# Patient Record
Sex: MALE | Race: WHITE | Employment: UNEMPLOYED | URBAN - METROPOLITAN AREA
[De-identification: names, ages, dates, MRNs, and addresses within clinical notes are randomized per-mention and may not be internally consistent; named-entity substitution may affect disease eponyms.]

---

## 2020-08-19 ENCOUNTER — HOSPITAL ENCOUNTER (INPATIENT)
Age: 45
LOS: 6 days | Discharge: NURSING, REHAB OR LONG-TERM CARE | DRG: 246 | End: 2020-08-25
Attending: EMERGENCY MEDICINE | Admitting: INTERNAL MEDICINE
Payer: MEDICAID

## 2020-08-19 ENCOUNTER — APPOINTMENT (OUTPATIENT)
Dept: GENERAL RADIOLOGY | Age: 45
DRG: 246 | End: 2020-08-19

## 2020-08-19 ENCOUNTER — APPOINTMENT (OUTPATIENT)
Dept: CT IMAGING | Age: 45
DRG: 246 | End: 2020-08-19

## 2020-08-19 PROBLEM — I46.9 CARDIAC ARREST (HCC): Status: ACTIVE | Noted: 2020-08-19

## 2020-08-19 LAB
ALBUMIN SERPL-MCNC: 3.7 G/DL (ref 3.5–4.6)
ALP BLD-CCNC: 96 U/L (ref 35–104)
ALT SERPL-CCNC: 142 U/L (ref 0–41)
ANION GAP SERPL CALCULATED.3IONS-SCNC: 10 MEQ/L (ref 9–15)
ANION GAP SERPL CALCULATED.3IONS-SCNC: 18 MEQ/L (ref 9–15)
ANION GAP SERPL CALCULATED.3IONS-SCNC: 20 MEQ/L (ref 9–15)
APTT: 29.8 SEC (ref 24.4–36.8)
APTT: 66.6 SEC (ref 24.4–36.8)
AST SERPL-CCNC: 190 U/L (ref 0–40)
BACTERIA: NEGATIVE /HPF
BASE EXCESS ARTERIAL: -9 (ref -3–3)
BASE EXCESS ARTERIAL: -9 (ref -3–3)
BASOPHILS ABSOLUTE: 0 K/UL (ref 0–0.2)
BASOPHILS RELATIVE PERCENT: 0.2 %
BETA-HYDROXYBUTYRATE: 4.2 MG/DL (ref 0.2–2.8)
BILIRUB SERPL-MCNC: 0.3 MG/DL (ref 0.2–0.7)
BILIRUBIN URINE: NEGATIVE
BLOOD, URINE: ABNORMAL
BUN BLDV-MCNC: 18 MG/DL (ref 6–20)
BUN BLDV-MCNC: 20 MG/DL (ref 6–20)
BUN BLDV-MCNC: 20 MG/DL (ref 6–20)
CALCIUM IONIZED: 1.18 MMOL/L (ref 1.12–1.32)
CALCIUM IONIZED: 1.27 MMOL/L (ref 1.12–1.32)
CALCIUM SERPL-MCNC: 8.5 MG/DL (ref 8.5–9.9)
CALCIUM SERPL-MCNC: 8.5 MG/DL (ref 8.5–9.9)
CALCIUM SERPL-MCNC: 8.8 MG/DL (ref 8.5–9.9)
CHLORIDE BLD-SCNC: 101 MEQ/L (ref 95–107)
CHLORIDE BLD-SCNC: 115 MEQ/L (ref 95–107)
CHLORIDE BLD-SCNC: 95 MEQ/L (ref 95–107)
CHP ED QC CHECK: YES
CLARITY: CLEAR
CO2: 16 MEQ/L (ref 20–31)
CO2: 16 MEQ/L (ref 20–31)
CO2: 17 MEQ/L (ref 20–31)
COLOR: YELLOW
CREAT SERPL-MCNC: 0.8 MG/DL (ref 0.7–1.2)
CREAT SERPL-MCNC: 0.83 MG/DL (ref 0.7–1.2)
CREAT SERPL-MCNC: 0.88 MG/DL (ref 0.7–1.2)
EKG ATRIAL RATE: 89 BPM
EKG P AXIS: 81 DEGREES
EKG P-R INTERVAL: 158 MS
EKG Q-T INTERVAL: 414 MS
EKG QRS DURATION: 98 MS
EKG QTC CALCULATION (BAZETT): 503 MS
EKG R AXIS: 16 DEGREES
EKG T AXIS: 105 DEGREES
EKG VENTRICULAR RATE: 89 BPM
EOSINOPHILS ABSOLUTE: 0 K/UL (ref 0–0.7)
EOSINOPHILS RELATIVE PERCENT: 0.4 %
EPITHELIAL CELLS, UA: NORMAL /HPF (ref 0–5)
GFR AFRICAN AMERICAN: >60
GFR NON-AFRICAN AMERICAN: >60
GLOBULIN: 2.8 G/DL (ref 2.3–3.5)
GLUCOSE BLD-MCNC: 101 MG/DL (ref 60–115)
GLUCOSE BLD-MCNC: 101 MG/DL (ref 70–99)
GLUCOSE BLD-MCNC: 106 MG/DL (ref 60–115)
GLUCOSE BLD-MCNC: 150 MG/DL (ref 60–115)
GLUCOSE BLD-MCNC: 293 MG/DL (ref 60–115)
GLUCOSE BLD-MCNC: 454 MG/DL (ref 60–115)
GLUCOSE BLD-MCNC: 478 MG/DL (ref 70–99)
GLUCOSE BLD-MCNC: 519 MG/DL (ref 60–115)
GLUCOSE BLD-MCNC: 527 MG/DL
GLUCOSE BLD-MCNC: 527 MG/DL (ref 60–115)
GLUCOSE BLD-MCNC: 629 MG/DL (ref 70–99)
GLUCOSE BLD-MCNC: 674 MG/DL (ref 60–115)
GLUCOSE URINE: >=1000 MG/DL
HCO3 ARTERIAL: 17.4 MMOL/L (ref 21–29)
HCO3 ARTERIAL: 17.4 MMOL/L (ref 21–29)
HCT VFR BLD CALC: 38.6 % (ref 42–52)
HEMOGLOBIN: 12 GM/DL (ref 13.5–17.5)
HEMOGLOBIN: 13 G/DL (ref 14–18)
HEMOGLOBIN: 13.3 GM/DL (ref 13.5–17.5)
HYALINE CASTS: NORMAL /HPF (ref 0–5)
INR BLD: 1.1
INR BLD: 1.2
KETONES, URINE: NEGATIVE MG/DL
LACTATE: 3.94 MMOL/L (ref 0.4–2)
LACTATE: 5.92 MMOL/L (ref 0.4–2)
LEUKOCYTE ESTERASE, URINE: NEGATIVE
LV EF: 23 %
LVEF MODALITY: NORMAL
LYMPHOCYTES ABSOLUTE: 4.6 K/UL (ref 1–4.8)
LYMPHOCYTES RELATIVE PERCENT: 35.8 %
MAGNESIUM: 1.4 MG/DL (ref 1.7–2.4)
MAGNESIUM: 1.7 MG/DL (ref 1.7–2.4)
MCH RBC QN AUTO: 29.4 PG (ref 27–31.3)
MCHC RBC AUTO-ENTMCNC: 33.7 % (ref 33–37)
MCV RBC AUTO: 87.2 FL (ref 80–100)
MONOCYTES ABSOLUTE: 0.5 K/UL (ref 0.2–0.8)
MONOCYTES RELATIVE PERCENT: 3.6 %
NEUTROPHILS ABSOLUTE: 7.7 K/UL (ref 1.4–6.5)
NEUTROPHILS RELATIVE PERCENT: 60 %
NITRITE, URINE: NEGATIVE
O2 SAT, ARTERIAL: 100 % (ref 93–100)
O2 SAT, ARTERIAL: 95 % (ref 93–100)
PCO2 ARTERIAL: 35 MM HG (ref 35–45)
PCO2 ARTERIAL: 37 MM HG (ref 35–45)
PDW BLD-RTO: 12.2 % (ref 11.5–14.5)
PERFORMED ON: ABNORMAL
PERFORMED ON: NORMAL
PERFORMED ON: NORMAL
PH ARTERIAL: 7.28 (ref 7.35–7.45)
PH ARTERIAL: 7.3 (ref 7.35–7.45)
PH UA: 6 (ref 5–9)
PHOSPHORUS: 0.8 MG/DL (ref 2.3–4.8)
PHOSPHORUS: 2.5 MG/DL (ref 2.3–4.8)
PLATELET # BLD: 175 K/UL (ref 130–400)
PO2 ARTERIAL: 571 MM HG (ref 75–108)
PO2 ARTERIAL: 84 MM HG (ref 75–108)
POC ACTIVATED CLOTTING TIME KAOLIN: 87 SEC (ref 82–152)
POC CHLORIDE: 100 MEQ/L (ref 99–110)
POC CHLORIDE: 113 MEQ/L (ref 99–110)
POC CREATININE: 0.9 MG/DL (ref 0.9–1.3)
POC CREATININE: 1 MG/DL (ref 0.9–1.3)
POC FIO2: 100
POC FIO2: 50
POC HEMATOCRIT: 35 % (ref 41–53)
POC HEMATOCRIT: 39 % (ref 41–53)
POC POTASSIUM: 2.8 MEQ/L (ref 3.5–5.1)
POC POTASSIUM: 3.5 MEQ/L (ref 3.5–5.1)
POC SAMPLE TYPE: ABNORMAL
POC SAMPLE TYPE: ABNORMAL
POC SAMPLE TYPE: NORMAL
POC SODIUM: 130 MEQ/L (ref 136–145)
POC SODIUM: 145 MEQ/L (ref 136–145)
POTASSIUM SERPL-SCNC: 2.8 MEQ/L (ref 3.4–4.9)
POTASSIUM SERPL-SCNC: 3.6 MEQ/L (ref 3.4–4.9)
POTASSIUM SERPL-SCNC: 3.8 MEQ/L (ref 3.4–4.9)
PROTEIN UA: >=300 MG/DL
PROTHROMBIN TIME: 14.5 SEC (ref 12.3–14.9)
PROTHROMBIN TIME: 15 SEC (ref 12.3–14.9)
RBC # BLD: 4.43 M/UL (ref 4.7–6.1)
RBC UA: NORMAL /HPF (ref 0–5)
SODIUM BLD-SCNC: 131 MEQ/L (ref 135–144)
SODIUM BLD-SCNC: 135 MEQ/L (ref 135–144)
SODIUM BLD-SCNC: 142 MEQ/L (ref 135–144)
SPECIFIC GRAVITY UA: 1.03 (ref 1–1.03)
TCO2 ARTERIAL: 19 (ref 22–29)
TCO2 ARTERIAL: 19 (ref 22–29)
TOTAL PROTEIN: 6.5 G/DL (ref 6.3–8)
TROPONIN: 0.02 NG/ML (ref 0–0.01)
URINE REFLEX TO CULTURE: ABNORMAL
UROBILINOGEN, URINE: 1 E.U./DL
WBC # BLD: 12.8 K/UL (ref 4.8–10.8)
WBC UA: NORMAL /HPF (ref 0–5)

## 2020-08-19 PROCEDURE — 84100 ASSAY OF PHOSPHORUS: CPT

## 2020-08-19 PROCEDURE — 82435 ASSAY OF BLOOD CHLORIDE: CPT

## 2020-08-19 PROCEDURE — 6370000000 HC RX 637 (ALT 250 FOR IP): Performed by: EMERGENCY MEDICINE

## 2020-08-19 PROCEDURE — 72128 CT CHEST SPINE W/O DYE: CPT

## 2020-08-19 PROCEDURE — 82330 ASSAY OF CALCIUM: CPT

## 2020-08-19 PROCEDURE — 93005 ELECTROCARDIOGRAM TRACING: CPT | Performed by: EMERGENCY MEDICINE

## 2020-08-19 PROCEDURE — 2500000003 HC RX 250 WO HCPCS: Performed by: INTERNAL MEDICINE

## 2020-08-19 PROCEDURE — 92928 PRQ TCAT PLMT NTRAC ST 1 LES: CPT | Performed by: INTERNAL MEDICINE

## 2020-08-19 PROCEDURE — 93458 L HRT ARTERY/VENTRICLE ANGIO: CPT | Performed by: INTERNAL MEDICINE

## 2020-08-19 PROCEDURE — 4A023N7 MEASUREMENT OF CARDIAC SAMPLING AND PRESSURE, LEFT HEART, PERCUTANEOUS APPROACH: ICD-10-PCS | Performed by: INTERNAL MEDICINE

## 2020-08-19 PROCEDURE — 6360000004 HC RX CONTRAST MEDICATION: Performed by: SURGERY

## 2020-08-19 PROCEDURE — 2709999900 HC NON-CHARGEABLE SUPPLY

## 2020-08-19 PROCEDURE — C1769 GUIDE WIRE: HCPCS

## 2020-08-19 PROCEDURE — 2000000000 HC ICU R&B

## 2020-08-19 PROCEDURE — 71045 X-RAY EXAM CHEST 1 VIEW: CPT

## 2020-08-19 PROCEDURE — 027034Z DILATION OF CORONARY ARTERY, ONE ARTERY WITH DRUG-ELUTING INTRALUMINAL DEVICE, PERCUTANEOUS APPROACH: ICD-10-PCS | Performed by: INTERNAL MEDICINE

## 2020-08-19 PROCEDURE — 6360000002 HC RX W HCPCS: Performed by: INTERNAL MEDICINE

## 2020-08-19 PROCEDURE — 31500 INSERT EMERGENCY AIRWAY: CPT

## 2020-08-19 PROCEDURE — 85025 COMPLETE CBC W/AUTO DIFF WBC: CPT

## 2020-08-19 PROCEDURE — 83605 ASSAY OF LACTIC ACID: CPT

## 2020-08-19 PROCEDURE — 92941 PRQ TRLML REVSC TOT OCCL AMI: CPT | Performed by: INTERNAL MEDICINE

## 2020-08-19 PROCEDURE — 85730 THROMBOPLASTIN TIME PARTIAL: CPT

## 2020-08-19 PROCEDURE — 6360000004 HC RX CONTRAST MEDICATION: Performed by: INTERNAL MEDICINE

## 2020-08-19 PROCEDURE — 94002 VENT MGMT INPAT INIT DAY: CPT

## 2020-08-19 PROCEDURE — 2500000003 HC RX 250 WO HCPCS

## 2020-08-19 PROCEDURE — 99291 CRITICAL CARE FIRST HOUR: CPT

## 2020-08-19 PROCEDURE — 0BH18EZ INSERTION OF ENDOTRACHEAL AIRWAY INTO TRACHEA, VIA NATURAL OR ARTIFICIAL OPENING ENDOSCOPIC: ICD-10-PCS | Performed by: EMERGENCY MEDICINE

## 2020-08-19 PROCEDURE — 82565 ASSAY OF CREATININE: CPT

## 2020-08-19 PROCEDURE — 2500000003 HC RX 250 WO HCPCS: Performed by: EMERGENCY MEDICINE

## 2020-08-19 PROCEDURE — 36620 INSERTION CATHETER ARTERY: CPT

## 2020-08-19 PROCEDURE — 84132 ASSAY OF SERUM POTASSIUM: CPT

## 2020-08-19 PROCEDURE — 6370000000 HC RX 637 (ALT 250 FOR IP): Performed by: INTERNAL MEDICINE

## 2020-08-19 PROCEDURE — 85014 HEMATOCRIT: CPT

## 2020-08-19 PROCEDURE — 5A1945Z RESPIRATORY VENTILATION, 24-96 CONSECUTIVE HOURS: ICD-10-PCS | Performed by: INTERNAL MEDICINE

## 2020-08-19 PROCEDURE — 36600 WITHDRAWAL OF ARTERIAL BLOOD: CPT

## 2020-08-19 PROCEDURE — 2580000003 HC RX 258

## 2020-08-19 PROCEDURE — B2111ZZ FLUOROSCOPY OF MULTIPLE CORONARY ARTERIES USING LOW OSMOLAR CONTRAST: ICD-10-PCS | Performed by: INTERNAL MEDICINE

## 2020-08-19 PROCEDURE — 74177 CT ABD & PELVIS W/CONTRAST: CPT

## 2020-08-19 PROCEDURE — 2580000003 HC RX 258: Performed by: INTERNAL MEDICINE

## 2020-08-19 PROCEDURE — 83735 ASSAY OF MAGNESIUM: CPT

## 2020-08-19 PROCEDURE — 80053 COMPREHEN METABOLIC PANEL: CPT

## 2020-08-19 PROCEDURE — 71260 CT THORAX DX C+: CPT

## 2020-08-19 PROCEDURE — 85347 COAGULATION TIME ACTIVATED: CPT

## 2020-08-19 PROCEDURE — 99291 CRITICAL CARE FIRST HOUR: CPT | Performed by: INTERNAL MEDICINE

## 2020-08-19 PROCEDURE — 81001 URINALYSIS AUTO W/SCOPE: CPT

## 2020-08-19 PROCEDURE — B2151ZZ FLUOROSCOPY OF LEFT HEART USING LOW OSMOLAR CONTRAST: ICD-10-PCS | Performed by: INTERNAL MEDICINE

## 2020-08-19 PROCEDURE — 37799 UNLISTED PX VASCULAR SURGERY: CPT

## 2020-08-19 PROCEDURE — 70450 CT HEAD/BRAIN W/O DYE: CPT

## 2020-08-19 PROCEDURE — 82803 BLOOD GASES ANY COMBINATION: CPT

## 2020-08-19 PROCEDURE — 36415 COLL VENOUS BLD VENIPUNCTURE: CPT

## 2020-08-19 PROCEDURE — 84295 ASSAY OF SERUM SODIUM: CPT

## 2020-08-19 PROCEDURE — 6360000002 HC RX W HCPCS

## 2020-08-19 PROCEDURE — 85610 PROTHROMBIN TIME: CPT

## 2020-08-19 PROCEDURE — C1894 INTRO/SHEATH, NON-LASER: HCPCS

## 2020-08-19 PROCEDURE — 51702 INSERT TEMP BLADDER CATH: CPT

## 2020-08-19 PROCEDURE — 72131 CT LUMBAR SPINE W/O DYE: CPT

## 2020-08-19 PROCEDURE — 72125 CT NECK SPINE W/O DYE: CPT

## 2020-08-19 PROCEDURE — C1725 CATH, TRANSLUMIN NON-LASER: HCPCS

## 2020-08-19 PROCEDURE — 93306 TTE W/DOPPLER COMPLETE: CPT

## 2020-08-19 PROCEDURE — 99254 IP/OBS CNSLTJ NEW/EST MOD 60: CPT | Performed by: SURGERY

## 2020-08-19 PROCEDURE — 84484 ASSAY OF TROPONIN QUANT: CPT

## 2020-08-19 PROCEDURE — C1887 CATHETER, GUIDING: HCPCS

## 2020-08-19 PROCEDURE — 82010 KETONE BODYS QUAN: CPT

## 2020-08-19 PROCEDURE — 36556 INSERT NON-TUNNEL CV CATH: CPT

## 2020-08-19 PROCEDURE — 99223 1ST HOSP IP/OBS HIGH 75: CPT | Performed by: INTERNAL MEDICINE

## 2020-08-19 PROCEDURE — C1874 STENT, COATED/COV W/DEL SYS: HCPCS

## 2020-08-19 PROCEDURE — 82948 REAGENT STRIP/BLOOD GLUCOSE: CPT

## 2020-08-19 RX ORDER — POTASSIUM CHLORIDE 7.45 MG/ML
10 INJECTION INTRAVENOUS PRN
Status: DISCONTINUED | OUTPATIENT
Start: 2020-08-19 | End: 2020-08-21

## 2020-08-19 RX ORDER — ASPIRIN 81 MG/1
81 TABLET ORAL DAILY
Status: DISCONTINUED | OUTPATIENT
Start: 2020-08-20 | End: 2020-08-25 | Stop reason: HOSPADM

## 2020-08-19 RX ORDER — CHLORHEXIDINE GLUCONATE 0.12 MG/ML
15 RINSE ORAL 2 TIMES DAILY
Status: DISCONTINUED | OUTPATIENT
Start: 2020-08-19 | End: 2020-08-25 | Stop reason: HOSPADM

## 2020-08-19 RX ORDER — DEXTROSE MONOHYDRATE 25 G/50ML
12.5 INJECTION, SOLUTION INTRAVENOUS PRN
Status: DISCONTINUED | OUTPATIENT
Start: 2020-08-19 | End: 2020-08-25 | Stop reason: HOSPADM

## 2020-08-19 RX ORDER — DEXTROSE, SODIUM CHLORIDE, AND POTASSIUM CHLORIDE 5; .45; .15 G/100ML; G/100ML; G/100ML
INJECTION INTRAVENOUS CONTINUOUS PRN
Status: DISCONTINUED | OUTPATIENT
Start: 2020-08-19 | End: 2020-08-21

## 2020-08-19 RX ORDER — SODIUM CHLORIDE 9 MG/ML
INJECTION, SOLUTION INTRAVENOUS CONTINUOUS
Status: DISCONTINUED | OUTPATIENT
Start: 2020-08-19 | End: 2020-08-19 | Stop reason: SDUPTHER

## 2020-08-19 RX ORDER — PROMETHAZINE HYDROCHLORIDE 12.5 MG/1
12.5 TABLET ORAL EVERY 6 HOURS PRN
Status: DISCONTINUED | OUTPATIENT
Start: 2020-08-19 | End: 2020-08-25 | Stop reason: HOSPADM

## 2020-08-19 RX ORDER — MAGNESIUM SULFATE 1 G/100ML
1 INJECTION INTRAVENOUS PRN
Status: DISCONTINUED | OUTPATIENT
Start: 2020-08-19 | End: 2020-08-21

## 2020-08-19 RX ORDER — PROPOFOL 10 MG/ML
10 INJECTION, EMULSION INTRAVENOUS
Status: DISCONTINUED | OUTPATIENT
Start: 2020-08-19 | End: 2020-08-22

## 2020-08-19 RX ORDER — SODIUM CHLORIDE 9 MG/ML
INJECTION, SOLUTION INTRAVENOUS CONTINUOUS
Status: DISCONTINUED | OUTPATIENT
Start: 2020-08-19 | End: 2020-08-21

## 2020-08-19 RX ORDER — ATORVASTATIN CALCIUM 40 MG/1
80 TABLET, FILM COATED ORAL NIGHTLY
Status: DISCONTINUED | OUTPATIENT
Start: 2020-08-19 | End: 2020-08-25 | Stop reason: HOSPADM

## 2020-08-19 RX ORDER — ETOMIDATE 2 MG/ML
INJECTION INTRAVENOUS DAILY PRN
Status: COMPLETED | OUTPATIENT
Start: 2020-08-19 | End: 2020-08-19

## 2020-08-19 RX ORDER — HEPARIN SODIUM 1000 [USP'U]/ML
4000 INJECTION, SOLUTION INTRAVENOUS; SUBCUTANEOUS ONCE
Status: DISCONTINUED | OUTPATIENT
Start: 2020-08-19 | End: 2020-08-25 | Stop reason: HOSPADM

## 2020-08-19 RX ORDER — ASPIRIN 300 MG/1
300 SUPPOSITORY RECTAL ONCE
Status: DISCONTINUED | OUTPATIENT
Start: 2020-08-19 | End: 2020-08-25 | Stop reason: HOSPADM

## 2020-08-19 RX ORDER — 0.9 % SODIUM CHLORIDE 0.9 %
1000 INTRAVENOUS SOLUTION INTRAVENOUS ONCE
Status: DISCONTINUED | OUTPATIENT
Start: 2020-08-19 | End: 2020-08-19

## 2020-08-19 RX ORDER — PROPOFOL 10 MG/ML
INJECTION, EMULSION INTRAVENOUS
Status: COMPLETED
Start: 2020-08-19 | End: 2020-08-19

## 2020-08-19 RX ORDER — ETOMIDATE 2 MG/ML
20 INJECTION INTRAVENOUS ONCE
Status: COMPLETED | OUTPATIENT
Start: 2020-08-19 | End: 2020-08-19

## 2020-08-19 RX ORDER — ONDANSETRON 2 MG/ML
4 INJECTION INTRAMUSCULAR; INTRAVENOUS EVERY 6 HOURS PRN
Status: DISCONTINUED | OUTPATIENT
Start: 2020-08-19 | End: 2020-08-25 | Stop reason: HOSPADM

## 2020-08-19 RX ADMIN — POTASSIUM CHLORIDE, DEXTROSE MONOHYDRATE AND SODIUM CHLORIDE: 150; 5; 450 INJECTION, SOLUTION INTRAVENOUS at 22:22

## 2020-08-19 RX ADMIN — TICAGRELOR 180 MG: 90 TABLET ORAL at 11:11

## 2020-08-19 RX ADMIN — IOPAMIDOL 50 ML: 612 INJECTION, SOLUTION INTRAVENOUS at 14:54

## 2020-08-19 RX ADMIN — POTASSIUM CHLORIDE 10 MEQ: 7.46 INJECTION, SOLUTION INTRAVENOUS at 23:11

## 2020-08-19 RX ADMIN — SODIUM CHLORIDE: 9 INJECTION, SOLUTION INTRAVENOUS at 20:48

## 2020-08-19 RX ADMIN — CHLORHEXIDINE GLUCONATE 15 ML: 1.2 RINSE ORAL at 20:46

## 2020-08-19 RX ADMIN — PROPOFOL 10 MCG/KG/MIN: 10 INJECTION, EMULSION INTRAVENOUS at 13:25

## 2020-08-19 RX ADMIN — INSULIN HUMAN 7 UNITS: 100 INJECTION, SOLUTION PARENTERAL at 15:56

## 2020-08-19 RX ADMIN — ATORVASTATIN CALCIUM 80 MG: 40 TABLET, FILM COATED ORAL at 20:46

## 2020-08-19 RX ADMIN — ETOMIDATE 20 MG: 2 INJECTION, SOLUTION INTRAVENOUS at 10:43

## 2020-08-19 RX ADMIN — SODIUM CHLORIDE 13.77 UNITS/HR: 9 INJECTION, SOLUTION INTRAVENOUS at 15:47

## 2020-08-19 RX ADMIN — IOPAMIDOL 165 ML: 612 INJECTION, SOLUTION INTRAVENOUS at 13:47

## 2020-08-19 RX ADMIN — MAGNESIUM SULFATE HEPTAHYDRATE 1 G: 1 INJECTION, SOLUTION INTRAVENOUS at 23:33

## 2020-08-19 RX ADMIN — SODIUM PHOSPHATE, MONOBASIC, MONOHYDRATE 20 MMOL: 276; 142 INJECTION, SOLUTION INTRAVENOUS at 23:33

## 2020-08-19 RX ADMIN — PROPOFOL 20 MCG/KG/MIN: 10 INJECTION, EMULSION INTRAVENOUS at 21:10

## 2020-08-19 RX ADMIN — NOREPINEPHRINE BITARTRATE 2 MCG/MIN: 1 INJECTION INTRAVENOUS at 17:49

## 2020-08-19 RX ADMIN — SODIUM CHLORIDE 1.64 UNITS/HR: 9 INJECTION, SOLUTION INTRAVENOUS at 22:02

## 2020-08-19 RX ADMIN — ETOMIDATE 20 MG: 2 INJECTION INTRAVENOUS at 10:43

## 2020-08-19 ASSESSMENT — PULMONARY FUNCTION TESTS
PIF_VALUE: 11
PIF_VALUE: 16
PIF_VALUE: 12
PIF_VALUE: 10
PIF_VALUE: 11
PIF_VALUE: 16
PIF_VALUE: 11
PIF_VALUE: 12
PIF_VALUE: 15
PIF_VALUE: 17
PIF_VALUE: 11
PIF_VALUE: 16
PIF_VALUE: 15
PIF_VALUE: 14
PIF_VALUE: 13
PIF_VALUE: 13
PIF_VALUE: 12
PIF_VALUE: 12
PIF_VALUE: 10
PIF_VALUE: 14
PIF_VALUE: 16
PIF_VALUE: 11
PIF_VALUE: 12
PIF_VALUE: 25

## 2020-08-19 ASSESSMENT — PAIN SCALES - GENERAL
PAINLEVEL_OUTOF10: 0
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
## 2020-08-19 NOTE — BRIEF OP NOTE
Brief OP Note [facility]    Access: R femoral 6 sheath  Anticoagulation: heparin IV  additional heparin given  Hemostasis: manual  Indications: posterior MI, cardiac arrest  Referring: [place]    Findings    LMT: normal  LAD: 70% prox  LCX: 100% mid large territory codom, small caliber  RCA: 99% mid small caliber, diffuse disease    LV: 25% inferior, posterior AK  LVEDP: 22    Dominance: mixed    PCI note: Successful PCI mid % URBANO 0 flow reduced to 0% URBANO 3 flow with resolute 2.0/15 postdil 2.5 NC. Conclusions: DAPT, maximal medical therapy  Consult pulmonary trauma and hospitalist    Candido Gill MD [facility] Director of Cardiology Services and Cardiac Catheterization Laboratory  [facility], [facility]
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
## 2020-08-19 NOTE — ED NOTES
Brilinta given after double checking with Dr Hardy Cartwright that she wanted it administered     Merlene Schneider RN  08/19/20 0602

## 2020-08-19 NOTE — FLOWSHEET NOTE
1300: Farida care completed. Complete linen change. Assessment completed. Cath site; clean, dry, and intact. No signs of bleeding or hematoma. 1400: Pt taken to RAD for CT. Complete linen change after one episode of large emesis. 1600: Dr. Keke Puente in room. Unable to start artic-sun -s/p cath and sheath still in place. Also using as A-line to monitor pressures. Orders to check ACT q2hrs post procedure and remove sheath if new A-line is successfully inserted. Testing cartridges not available. Call to lab; awaiting ACT cartridge for testing delivery. 1630: Echo in progess. Call from wife. Updated on status of pt. Consent obtained for CVC and a-line. 1700: Unable to check ACT as cartridge not available. 2nd call placed to Lab. Cartridges sent to wrong unit. Discussed sheath removal orders and artic-sun protocol with . 1705: Dr. Brian Love in room inserting new Arterial Line and CVC. 1740: Pt cleaned and repositioned. Artic-Sun connected. Water tank refilled. Orders reviewed. 1800: Hypothermic therapy started. Portable Xray completed. Dr. Brian Love unable to view at this time d/t emergency care for other pt.     1830: Received ACT cartridge from lab will attempt to obtain results before change of shift. 1900: Cartridge reading failure. New cartridge requested from lab.    1930: ACT <170. Discussed with oncoming RN during hand-off of care report.

## 2020-08-19 NOTE — ED NOTES
STEMI showed on monitor, pt shocked at [facility], [address] [place] [address]  08/19/20 2270

## 2020-08-19 NOTE — PROGRESS NOTES
Spiritual Care Services     Summary of Visit:  Code Purple. Patient unable to respond. Patient from [facility] and no emergency contacts are listed. Security has patient's 's license and attempting to find Emergency contact information. Patient to go to Cath Lab. Spiritual Assessment/Intervention/Outcomes:    Encounter Summary  Services provided to[de-identified] Patient  Referral/Consult From[de-identified] Nurse  Support System: Unknown  Continue Visiting: Yes  Complexity of Encounter: High  Length of Encounter: 30 minutes     Crisis  Type: Stemi Alert  Assessment: Unable to respond  Intervention: Sustaining presence/ Ministry of presence  Outcome: Did not respond                            Care Plan:    Continue to follow and provide support as appropriate or needed. Spiritual Care Services   Electronically signed by Seth Bain on 8/19/20 at 11:07 AM EDT     To reach a  for emotional and spiritual support, place an [facility] consult request.   If a  is needed immediately, dial 0 and ask to page the on-call .
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
## 2020-08-20 ENCOUNTER — APPOINTMENT (OUTPATIENT)
Dept: GENERAL RADIOLOGY | Age: 45
DRG: 246 | End: 2020-08-20

## 2020-08-20 LAB
ANION GAP SERPL CALCULATED.3IONS-SCNC: 13 MEQ/L (ref 9–15)
ANION GAP SERPL CALCULATED.3IONS-SCNC: 8 MEQ/L (ref 9–15)
ANION GAP SERPL CALCULATED.3IONS-SCNC: 8 MEQ/L (ref 9–15)
ANION GAP SERPL CALCULATED.3IONS-SCNC: 9 MEQ/L (ref 9–15)
BASE EXCESS ARTERIAL: -10 (ref -3–3)
BASE EXCESS ARTERIAL: -9 (ref -3–3)
BASOPHILS ABSOLUTE: 0 K/UL (ref 0–0.2)
BASOPHILS ABSOLUTE: 0 K/UL (ref 0–0.2)
BASOPHILS RELATIVE PERCENT: 0.4 %
BASOPHILS RELATIVE PERCENT: 0.5 %
BUN BLDV-MCNC: 15 MG/DL (ref 6–20)
BUN BLDV-MCNC: 15 MG/DL (ref 6–20)
BUN BLDV-MCNC: 16 MG/DL (ref 6–20)
BUN BLDV-MCNC: 20 MG/DL (ref 6–20)
CALCIUM IONIZED: 1.07 MMOL/L (ref 1.12–1.32)
CALCIUM IONIZED: 1.1 MMOL/L (ref 1.12–1.32)
CALCIUM IONIZED: 1.11 MMOL/L (ref 1.12–1.32)
CALCIUM IONIZED: 1.18 MMOL/L (ref 1.12–1.32)
CALCIUM SERPL-MCNC: 7.2 MG/DL (ref 8.5–9.9)
CALCIUM SERPL-MCNC: 7.3 MG/DL (ref 8.5–9.9)
CALCIUM SERPL-MCNC: 7.6 MG/DL (ref 8.5–9.9)
CALCIUM SERPL-MCNC: 7.7 MG/DL (ref 8.5–9.9)
CHLORIDE BLD-SCNC: 105 MEQ/L (ref 95–107)
CHLORIDE BLD-SCNC: 105 MEQ/L (ref 95–107)
CHLORIDE BLD-SCNC: 107 MEQ/L (ref 95–107)
CHLORIDE BLD-SCNC: 111 MEQ/L (ref 95–107)
CO2: 15 MEQ/L (ref 20–31)
CO2: 16 MEQ/L (ref 20–31)
CO2: 16 MEQ/L (ref 20–31)
CO2: 17 MEQ/L (ref 20–31)
CREAT SERPL-MCNC: 0.41 MG/DL (ref 0.7–1.2)
CREAT SERPL-MCNC: 0.42 MG/DL (ref 0.7–1.2)
CREAT SERPL-MCNC: 0.5 MG/DL (ref 0.7–1.2)
CREAT SERPL-MCNC: 0.6 MG/DL (ref 0.7–1.2)
EOSINOPHILS ABSOLUTE: 0 K/UL (ref 0–0.7)
EOSINOPHILS ABSOLUTE: 0 K/UL (ref 0–0.7)
EOSINOPHILS RELATIVE PERCENT: 0 %
EOSINOPHILS RELATIVE PERCENT: 0.1 %
GFR AFRICAN AMERICAN: >60
GFR NON-AFRICAN AMERICAN: >60
GLUCOSE BLD-MCNC: 126 MG/DL (ref 60–115)
GLUCOSE BLD-MCNC: 134 MG/DL (ref 60–115)
GLUCOSE BLD-MCNC: 134 MG/DL (ref 70–99)
GLUCOSE BLD-MCNC: 138 MG/DL (ref 60–115)
GLUCOSE BLD-MCNC: 140 MG/DL (ref 60–115)
GLUCOSE BLD-MCNC: 145 MG/DL (ref 60–115)
GLUCOSE BLD-MCNC: 145 MG/DL (ref 70–99)
GLUCOSE BLD-MCNC: 147 MG/DL (ref 60–115)
GLUCOSE BLD-MCNC: 149 MG/DL (ref 60–115)
GLUCOSE BLD-MCNC: 157 MG/DL (ref 60–115)
GLUCOSE BLD-MCNC: 162 MG/DL (ref 70–99)
GLUCOSE BLD-MCNC: 163 MG/DL (ref 70–99)
GLUCOSE BLD-MCNC: 165 MG/DL (ref 60–115)
GLUCOSE BLD-MCNC: 167 MG/DL (ref 60–115)
GLUCOSE BLD-MCNC: 188 MG/DL (ref 60–115)
GLUCOSE BLD-MCNC: 196 MG/DL (ref 60–115)
GLUCOSE BLD-MCNC: 205 MG/DL (ref 60–115)
HCO3 ARTERIAL: 15.7 MMOL/L (ref 21–29)
HCO3 ARTERIAL: 16.6 MMOL/L (ref 21–29)
HCO3 ARTERIAL: 16.7 MMOL/L (ref 21–29)
HCO3 ARTERIAL: 17.3 MMOL/L (ref 21–29)
HCT VFR BLD CALC: 37.6 % (ref 42–52)
HCT VFR BLD CALC: 39.5 % (ref 42–52)
HEMOGLOBIN: 11.4 GM/DL (ref 13.5–17.5)
HEMOGLOBIN: 12.1 GM/DL (ref 13.5–17.5)
HEMOGLOBIN: 12.7 GM/DL (ref 13.5–17.5)
HEMOGLOBIN: 12.9 G/DL (ref 14–18)
HEMOGLOBIN: 13.4 G/DL (ref 14–18)
LACTATE: 1.9 MMOL/L (ref 0.4–2)
LACTATE: 2.26 MMOL/L (ref 0.4–2)
LACTATE: 2.56 MMOL/L (ref 0.4–2)
LACTATE: 3.35 MMOL/L (ref 0.4–2)
LACTIC ACID: 2.1 MMOL/L (ref 0.5–2.2)
LACTIC ACID: 2.8 MMOL/L (ref 0.5–2.2)
LACTIC ACID: 3.4 MMOL/L (ref 0.5–2.2)
LACTIC ACID: 3.9 MMOL/L (ref 0.5–2.2)
LYMPHOCYTES ABSOLUTE: 1.2 K/UL (ref 1–4.8)
LYMPHOCYTES ABSOLUTE: 1.4 K/UL (ref 1–4.8)
LYMPHOCYTES RELATIVE PERCENT: 10.5 %
LYMPHOCYTES RELATIVE PERCENT: 14 %
MAGNESIUM: 1.6 MG/DL (ref 1.7–2.4)
MAGNESIUM: 1.8 MG/DL (ref 1.7–2.4)
MAGNESIUM: 2.2 MG/DL (ref 1.7–2.4)
MCH RBC QN AUTO: 28.7 PG (ref 27–31.3)
MCH RBC QN AUTO: 29.1 PG (ref 27–31.3)
MCHC RBC AUTO-ENTMCNC: 34.1 % (ref 33–37)
MCHC RBC AUTO-ENTMCNC: 34.4 % (ref 33–37)
MCV RBC AUTO: 84.3 FL (ref 80–100)
MCV RBC AUTO: 84.7 FL (ref 80–100)
MONOCYTES ABSOLUTE: 0.3 K/UL (ref 0.2–0.8)
MONOCYTES ABSOLUTE: 0.4 K/UL (ref 0.2–0.8)
MONOCYTES RELATIVE PERCENT: 3.3 %
MONOCYTES RELATIVE PERCENT: 3.4 %
NEUTROPHILS ABSOLUTE: 8.3 K/UL (ref 1.4–6.5)
NEUTROPHILS ABSOLUTE: 9.5 K/UL (ref 1.4–6.5)
NEUTROPHILS RELATIVE PERCENT: 82.1 %
NEUTROPHILS RELATIVE PERCENT: 85.7 %
O2 SAT, ARTERIAL: 91 % (ref 93–100)
O2 SAT, ARTERIAL: 92 % (ref 93–100)
O2 SAT, ARTERIAL: 92 % (ref 93–100)
O2 SAT, ARTERIAL: 93 % (ref 93–100)
PCO2 ARTERIAL: 30 MM HG (ref 35–45)
PCO2 ARTERIAL: 33 MM HG (ref 35–45)
PCO2 ARTERIAL: 34 MM HG (ref 35–45)
PCO2 ARTERIAL: 35 MM HG (ref 35–45)
PDW BLD-RTO: 12.9 % (ref 11.5–14.5)
PDW BLD-RTO: 13 % (ref 11.5–14.5)
PERFORMED ON: ABNORMAL
PH ARTERIAL: 7.3 (ref 7.35–7.45)
PH ARTERIAL: 7.31 (ref 7.35–7.45)
PH ARTERIAL: 7.32 (ref 7.35–7.45)
PH ARTERIAL: 7.33 (ref 7.35–7.45)
PHOSPHORUS: 2.1 MG/DL (ref 2.3–4.8)
PHOSPHORUS: 2.5 MG/DL (ref 2.3–4.8)
PHOSPHORUS: 3.3 MG/DL (ref 2.3–4.8)
PLATELET # BLD: 122 K/UL (ref 130–400)
PLATELET # BLD: 135 K/UL (ref 130–400)
PO2 ARTERIAL: 65 MM HG (ref 75–108)
PO2 ARTERIAL: 67 MM HG (ref 75–108)
PO2 ARTERIAL: 68 MM HG (ref 75–108)
PO2 ARTERIAL: 69 MM HG (ref 75–108)
POC CHLORIDE: 108 MEQ/L (ref 99–110)
POC CHLORIDE: 109 MEQ/L (ref 99–110)
POC CHLORIDE: 109 MEQ/L (ref 99–110)
POC CREATININE: 0.4 MG/DL (ref 0.9–1.3)
POC CREATININE: 0.5 MG/DL (ref 0.9–1.3)
POC CREATININE: 0.5 MG/DL (ref 0.9–1.3)
POC FIO2: 50
POC FIO2: 80
POC HEMATOCRIT: 34 % (ref 41–53)
POC HEMATOCRIT: 36 % (ref 41–53)
POC HEMATOCRIT: 37 % (ref 41–53)
POC POTASSIUM: 3.5 MEQ/L (ref 3.5–5.1)
POC POTASSIUM: 3.9 MEQ/L (ref 3.5–5.1)
POC POTASSIUM: 4.2 MEQ/L (ref 3.5–5.1)
POC SAMPLE TYPE: ABNORMAL
POC SODIUM: 133 MEQ/L (ref 136–145)
POC SODIUM: 136 MEQ/L (ref 136–145)
POC SODIUM: 136 MEQ/L (ref 136–145)
POTASSIUM SERPL-SCNC: 3.5 MEQ/L (ref 3.4–4.9)
POTASSIUM SERPL-SCNC: 3.8 MEQ/L (ref 3.4–4.9)
POTASSIUM SERPL-SCNC: 4 MEQ/L (ref 3.4–4.9)
POTASSIUM SERPL-SCNC: 4.1 MEQ/L (ref 3.4–4.9)
RBC # BLD: 4.43 M/UL (ref 4.7–6.1)
RBC # BLD: 4.68 M/UL (ref 4.7–6.1)
SODIUM BLD-SCNC: 129 MEQ/L (ref 135–144)
SODIUM BLD-SCNC: 129 MEQ/L (ref 135–144)
SODIUM BLD-SCNC: 135 MEQ/L (ref 135–144)
SODIUM BLD-SCNC: 137 MEQ/L (ref 135–144)
TCO2 ARTERIAL: 17 (ref 22–29)
TCO2 ARTERIAL: 18 (ref 22–29)
WBC # BLD: 10.1 K/UL (ref 4.8–10.8)
WBC # BLD: 11.1 K/UL (ref 4.8–10.8)

## 2020-08-20 PROCEDURE — 80048 BASIC METABOLIC PNL TOTAL CA: CPT

## 2020-08-20 PROCEDURE — 37799 UNLISTED PX VASCULAR SURGERY: CPT

## 2020-08-20 PROCEDURE — 93010 ELECTROCARDIOGRAM REPORT: CPT | Performed by: INTERNAL MEDICINE

## 2020-08-20 PROCEDURE — 94003 VENT MGMT INPAT SUBQ DAY: CPT

## 2020-08-20 PROCEDURE — 2580000003 HC RX 258: Performed by: INTERNAL MEDICINE

## 2020-08-20 PROCEDURE — 85025 COMPLETE CBC W/AUTO DIFF WBC: CPT

## 2020-08-20 PROCEDURE — 99291 CRITICAL CARE FIRST HOUR: CPT | Performed by: INTERNAL MEDICINE

## 2020-08-20 PROCEDURE — 85014 HEMATOCRIT: CPT

## 2020-08-20 PROCEDURE — 84295 ASSAY OF SERUM SODIUM: CPT

## 2020-08-20 PROCEDURE — 84132 ASSAY OF SERUM POTASSIUM: CPT

## 2020-08-20 PROCEDURE — 82565 ASSAY OF CREATININE: CPT

## 2020-08-20 PROCEDURE — 82435 ASSAY OF BLOOD CHLORIDE: CPT

## 2020-08-20 PROCEDURE — 82330 ASSAY OF CALCIUM: CPT

## 2020-08-20 PROCEDURE — 2500000003 HC RX 250 WO HCPCS: Performed by: INTERNAL MEDICINE

## 2020-08-20 PROCEDURE — 84100 ASSAY OF PHOSPHORUS: CPT

## 2020-08-20 PROCEDURE — 83605 ASSAY OF LACTIC ACID: CPT

## 2020-08-20 PROCEDURE — 99254 IP/OBS CNSLTJ NEW/EST MOD 60: CPT | Performed by: PSYCHIATRY & NEUROLOGY

## 2020-08-20 PROCEDURE — 2700000000 HC OXYGEN THERAPY PER DAY

## 2020-08-20 PROCEDURE — 99233 SBSQ HOSP IP/OBS HIGH 50: CPT | Performed by: INTERNAL MEDICINE

## 2020-08-20 PROCEDURE — 36415 COLL VENOUS BLD VENIPUNCTURE: CPT

## 2020-08-20 PROCEDURE — 99231 SBSQ HOSP IP/OBS SF/LOW 25: CPT | Performed by: SURGERY

## 2020-08-20 PROCEDURE — 83735 ASSAY OF MAGNESIUM: CPT

## 2020-08-20 PROCEDURE — 6360000002 HC RX W HCPCS: Performed by: INTERNAL MEDICINE

## 2020-08-20 PROCEDURE — 6370000000 HC RX 637 (ALT 250 FOR IP): Performed by: INTERNAL MEDICINE

## 2020-08-20 PROCEDURE — 82803 BLOOD GASES ANY COMBINATION: CPT

## 2020-08-20 PROCEDURE — 71045 X-RAY EXAM CHEST 1 VIEW: CPT

## 2020-08-20 PROCEDURE — 2000000000 HC ICU R&B

## 2020-08-20 RX ADMIN — POTASSIUM CHLORIDE 10 MEQ: 7.46 INJECTION, SOLUTION INTRAVENOUS at 05:28

## 2020-08-20 RX ADMIN — POTASSIUM CHLORIDE 10 MEQ: 7.46 INJECTION, SOLUTION INTRAVENOUS at 04:08

## 2020-08-20 RX ADMIN — INSULIN LISPRO 2 UNITS: 100 INJECTION, SOLUTION INTRAVENOUS; SUBCUTANEOUS at 18:16

## 2020-08-20 RX ADMIN — TICAGRELOR 90 MG: 90 TABLET ORAL at 08:01

## 2020-08-20 RX ADMIN — POTASSIUM CHLORIDE, DEXTROSE MONOHYDRATE AND SODIUM CHLORIDE: 150; 5; 450 INJECTION, SOLUTION INTRAVENOUS at 12:20

## 2020-08-20 RX ADMIN — CHLORHEXIDINE GLUCONATE 15 ML: 1.2 RINSE ORAL at 20:31

## 2020-08-20 RX ADMIN — PROPOFOL 25 MCG/KG/MIN: 10 INJECTION, EMULSION INTRAVENOUS at 07:48

## 2020-08-20 RX ADMIN — POTASSIUM CHLORIDE 10 MEQ: 7.46 INJECTION, SOLUTION INTRAVENOUS at 06:07

## 2020-08-20 RX ADMIN — PIPERACILLIN AND TAZOBACTAM 3.38 G: 3; .375 INJECTION, POWDER, FOR SOLUTION INTRAVENOUS at 20:31

## 2020-08-20 RX ADMIN — SODIUM PHOSPHATE, MONOBASIC, MONOHYDRATE 15 MMOL: 276; 142 INJECTION, SOLUTION INTRAVENOUS at 05:59

## 2020-08-20 RX ADMIN — TICAGRELOR 90 MG: 90 TABLET ORAL at 20:31

## 2020-08-20 RX ADMIN — ATORVASTATIN CALCIUM 80 MG: 40 TABLET, FILM COATED ORAL at 20:31

## 2020-08-20 RX ADMIN — POTASSIUM CHLORIDE 10 MEQ: 7.46 INJECTION, SOLUTION INTRAVENOUS at 18:00

## 2020-08-20 RX ADMIN — POTASSIUM CHLORIDE 10 MEQ: 7.46 INJECTION, SOLUTION INTRAVENOUS at 00:09

## 2020-08-20 RX ADMIN — CHLORHEXIDINE GLUCONATE 15 ML: 1.2 RINSE ORAL at 08:01

## 2020-08-20 RX ADMIN — PIPERACILLIN AND TAZOBACTAM 3.38 G: 3; .375 INJECTION, POWDER, FOR SOLUTION INTRAVENOUS at 15:55

## 2020-08-20 RX ADMIN — POTASSIUM CHLORIDE 10 MEQ: 7.46 INJECTION, SOLUTION INTRAVENOUS at 02:08

## 2020-08-20 RX ADMIN — POTASSIUM CHLORIDE 10 MEQ: 7.46 INJECTION, SOLUTION INTRAVENOUS at 01:04

## 2020-08-20 RX ADMIN — MAGNESIUM SULFATE HEPTAHYDRATE 1 G: 1 INJECTION, SOLUTION INTRAVENOUS at 00:37

## 2020-08-20 RX ADMIN — POTASSIUM CHLORIDE, DEXTROSE MONOHYDRATE AND SODIUM CHLORIDE: 150; 5; 450 INJECTION, SOLUTION INTRAVENOUS at 05:07

## 2020-08-20 RX ADMIN — FENTANYL CITRATE 25 MCG/HR: 50 INJECTION, SOLUTION INTRAMUSCULAR; INTRAVENOUS at 13:24

## 2020-08-20 RX ADMIN — ASPIRIN 81 MG: 81 TABLET, COATED ORAL at 08:01

## 2020-08-20 ASSESSMENT — PULMONARY FUNCTION TESTS
PIF_VALUE: 11
PIF_VALUE: 16
PIF_VALUE: 13
PIF_VALUE: 14
PIF_VALUE: 11
PIF_VALUE: 13
PIF_VALUE: 13
PIF_VALUE: 14
PIF_VALUE: 11
PIF_VALUE: 15
PIF_VALUE: 11
PIF_VALUE: 13
PIF_VALUE: 11
PIF_VALUE: 12
PIF_VALUE: 11
PIF_VALUE: 13
PIF_VALUE: 10
PIF_VALUE: 11
PIF_VALUE: 13
PIF_VALUE: 11
PIF_VALUE: 13
PIF_VALUE: 13
PIF_VALUE: 14
PIF_VALUE: 13
PIF_VALUE: 14
PIF_VALUE: 13
PIF_VALUE: 13
PIF_VALUE: 11
PIF_VALUE: 11
PIF_VALUE: 10
PIF_VALUE: 13
PIF_VALUE: 11
PIF_VALUE: 13
PIF_VALUE: 12
PIF_VALUE: 14
PIF_VALUE: 13
PIF_VALUE: 12
PIF_VALUE: 13
PIF_VALUE: 14
PIF_VALUE: 13
PIF_VALUE: 13
PIF_VALUE: 11
PIF_VALUE: 16
PIF_VALUE: 13
PIF_VALUE: 13
PIF_VALUE: 11
PIF_VALUE: 13
PIF_VALUE: 14
PIF_VALUE: 10
PIF_VALUE: 14
PIF_VALUE: 10
PIF_VALUE: 13
PIF_VALUE: 15
PIF_VALUE: 12
PIF_VALUE: 14
PIF_VALUE: 11
PIF_VALUE: 10
PIF_VALUE: 12
PIF_VALUE: 11
PIF_VALUE: 11
PIF_VALUE: 13
PIF_VALUE: 13
PIF_VALUE: 11
PIF_VALUE: 14
PIF_VALUE: 13
PIF_VALUE: 12
PIF_VALUE: 13
PIF_VALUE: 11
PIF_VALUE: 13
PIF_VALUE: 14
PIF_VALUE: 13
PIF_VALUE: 14
PIF_VALUE: 14
PIF_VALUE: 22
PIF_VALUE: 13
PIF_VALUE: 10
PIF_VALUE: 13
PIF_VALUE: 10
PIF_VALUE: 14
PIF_VALUE: 10
PIF_VALUE: 10

## 2020-08-20 ASSESSMENT — PAIN SCALES - GENERAL
PAINLEVEL_OUTOF10: 0

## 2020-08-20 NOTE — PROGRESS NOTES
2000 Dr. Bass Sides checked [facility] for central line placement- stated that cental line is in good position and that IV fluids can be infused. Pt is on [place]- cooling to 33 celsius. Pt is on Propofol and Insulin drips. Pt on vent and restrained bilaterally. Pt receiving supplemental K, Magnesium, Sodium Phosphate IV. Wife at bedside. Given HIPPA code and condition report. Pts clothing, cell phone and wallet given to wife. Restarted on Levophed drip for low BP. R femoral sheath removed and pressure held  X 20 min. R dorsalis pedis pulse palpated. Pressure dressing applied. No bleeding or hematoma noted. Pt has minimal movement to painful stimuli. Pupils equal and reactive. Remains on [place]- cooling. Suctioned per ET tube for small amount tan mucous. Remains on Propofol, Levophed, Insulin drips.

## 2020-08-20 NOTE — FLOWSHEET NOTE
Blood draw collected for Mag, Phos, and BMP. Awaiting results. 1530: Call to  at approximately this time to check on status of results. None available at this time.

## 2020-08-20 NOTE — PROGRESS NOTES
Trauma Service    Overnight events reviewed       08/20/20 0845   --   26   68   --   113/63   --   80   --   90   --   --   --   --        08/20/20 0830   --   21   62   --   121/70   --   60   --   91   --   --   --   --       08/20/20 0815   --   23   67   --   106/59   --   60   --   84   --   --   --   --       08/20/20 0800   --   25   68   --   111/61   --   60   --   86   --   0   --   --      Tertiary trauma exam    Extremities x4 without new ecchymosis or edema  Abdomen soft nontender/non distended  Remain intubated and sedated  Pupils equal, reactive  Chest wall stable, no crepitus    No new traumatic injuries identified on tertiary survey  Please call with any questions/concerns    Shereen Ormond MD  Trauma Surgery/Critical Care  [phone]

## 2020-08-20 NOTE — PROGRESS NOTES
Physician Progress Note    2020   12:21 PM    Name:  Yolanda Warren. MRN:    [id number]     IP Day: 1     Admit Date: 2020 10:44 AM  PCP: Ivone Martinez MD    Code Status:  Full Code      Assessment and Plan: Active Problems/ diagnosis:     V.fib arrest/ STEMI  CAD s/p PCI  DKA/ DM   Acute Respiratory failure due to above     Plan  - resume insulin gtt until diet is started  - resume IVF, frequent BMP, Mg, phos  - hypothermia protocol  - CC is on board  - cardiology is primary     DVT PPx     Subjective:      no new events. Intubated and sedated. Physical Examination:      Vitals:  BP (!) 66/45   Pulse 68   Temp 92.8 °F (33.8 °C) (Core)   Resp 26   Ht 5' 10\" (1.778 m)   Wt 165 lb (74.8 kg)   SpO2 90%   BMI 23.68 kg/m²   Temp (24hrs), Av °F (35.6 °C), Min:92.8 °F (33.8 °C), Max:98.6 °F (37 °C)      General appearance: sedated, intubated   Mental Status: sedated   Lungs: clear to auscultation bilaterally   Heart: regular rate    Abdomen: soft, nontender, nondistended, bowel sounds present, no masses  Extremities: no edema, redness, tenderness in the calves  Skin: no gross lesions, rashes  Fuentes in place   ETT intact     Data:     Labs:  Recent Labs     20  1100  20  2143 20  1052   WBC 12.8*  --   --   --    HGB 13.0*   < > 12.0* 11.4*     --   --   --     < > = values in this interval not displayed.      Recent Labs     20  0200 20  0831 20  1052    135  --    K 3.5 4.0  --    * 107  --    CO2 17* 15*  --    BUN 20 16  --    CREATININE 0.60* 0.50* 0.4*   GLUCOSE 145* 162*  --      Recent Labs     20  1100   *   *   BILITOT 0.3   ALKPHOS 96       Current Facility-Administered Medications   Medication Dose Route Frequency Provider Last Rate Last Dose    fentaNYL (SUBLIMAZE) 1,000 mcg in sodium chloride 0.9 % 100 mL infusion  25 mcg/hr Intravenous Continuous Matthew Espinosa MD        heparin (porcine) injection 4,000 Units  4,000 Units Intravenous Once Lauren Kenny Dakin, DO        aspirin suppository 300 mg  300 mg Rectal Once Korina Johnson, DO        insulin regular (HUMULIN R;NOVOLIN R) injection 15 Units  15 Units Intravenous Once Korina Johnson DO        sodium bicarbonate 8.4 % injection 50 mEq  50 mEq Intravenous Once Lauren Kenny Dakin, DO        ticagrelor (BRILINTA) tablet 90 mg  90 mg Oral BID Jason Sigala MD   90 mg at 08/20/20 0801    aspirin EC tablet 81 mg  81 mg Oral Daily Jason Sigala MD   81 mg at 08/20/20 0801    atorvastatin (LIPITOR) tablet 80 mg  80 mg Oral Nightly Jason Sigala MD   80 mg at 08/19/20 2046    propofol injection  10 mcg/kg/min Intravenous Titrated Caren Hernández MD 11.2 mL/hr at 08/20/20 0748 25 mcg/kg/min at 08/20/20 0748    dextrose 50 % IV solution  12.5 g Intravenous PRN Shirlean Morrill Sedar, DO        potassium chloride 10 mEq/100 mL IVPB (Peripheral Line)  10 mEq Intravenous PRN Shirlean [place] Sedar,  mL/hr at 08/20/20 0607 10 mEq at 08/20/20 0607    magnesium sulfate 1 g in dextrose 5% 100 mL IVPB  1 g Intravenous PRN Shirlean Morrill Sedar, DO   Stopped at 08/20/20 0137    sodium phosphate 10 mmol in dextrose 5 % 250 mL IVPB  10 mmol Intravenous PRN Shirlean Morrill Sedar, DO        Or    sodium phosphate 15 mmol in dextrose 5 % 250 mL IVPB  15 mmol Intravenous PRN Shirlean [place] Sedar, DO 62.5 mL/hr at 08/20/20 0559 15 mmol at 08/20/20 0559    Or    sodium phosphate 20 mmol in dextrose 5 % 500 mL IVPB  20 mmol Intravenous PRN Shirlean Morrill Sedar, DO   Stopped at 08/20/20 0558    0.9 % sodium chloride infusion   Intravenous Continuous Shirlean Morrill Sedar, DO   Stopped at 08/19/20 2222    dextrose 5 % and 0.45 % NaCl with KCl 20 mEq infusion   Intravenous Continuous PRN Shirlean [place] Sedar,  mL/hr at 08/20/20 1220      insulin regular (HUMULIN R;NOVOLIN R) 100 Units in sodium chloride 0.9 % 100 mL infusion  0.1 Units/kg/hr Intravenous Continuous Enzo Ley DO 1 mL/hr at 08/20/20 0900 1.02 Units/hr at 08/20/20 0900    promethazine (PHENERGAN) tablet 12.5 mg  12.5 mg Oral Q6H PRN Bebeto Sanderson MD        Or    ondansetron (ZOFRAN) injection 4 mg  4 mg Intravenous Q6H PRN Bebeto Sanderson MD        chlorhexidine (PERIDEX) 0.12 % solution 15 mL  15 mL Mouth/Throat BID Bebeto Sanderson MD   15 mL at 08/20/20 0801    norepinephrine (LEVOPHED) 16 mg in dextrose 5 % 250 mL infusion  2 mcg/min Intravenous Continuous Bebeto Sanderson MD 4.7 mL/hr at 08/20/20 0030 5 mcg/min at 08/20/20 0030       Additional work up or/and treatment plan may be added today or then after based on clinical progression. I am managing a portion of pt care. Some medical issues are handled by other specialists. Additional work up and treatment should be done in out pt setting by pt PCP and other out pt providers. In addition to examining and evaluating pt, I spent additional time explaining care, normaland abnormal findings, and treatment plan. All of pt questions were answered. Counseling, diet and education were provided. Case will be discussed with nursing staff when appropriate. Family will be updated if and when appropriate.        Electronically signed by Gerardo Harris DO on 8/20/2020 at 12:21 PM

## 2020-08-20 NOTE — CARE COORDINATION
Angelica Mcmahon, talked to the patient's wife. The patient's wife states she will be in to the hospital after 5 pm today. The patient's wife may bring the patient's insurance cards later today. Angelica Mcmahon, would like someone to make a copy of the patient's insurance cards and place on the patient's blue chart if possible. The LSW talked to the  and she will update the patient's RN.  Electronically signed by PEDRO Mera on 8/20/20 at 11:36 AM EDT
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
## 2020-08-20 NOTE — PLAN OF CARE
Problem: Restraint Use - Nonviolent/Non-Self-Destructive Behavior:  Goal: Absence of restraint indications  Description: Absence of restraint indications  Outcome: Ongoing  Goal: Absence of restraint-related injury  Description: Absence of restraint-related injury  Outcome: Ongoing     Problem: Skin Integrity:  Goal: Will show no infection signs and symptoms  Description: Will show no infection signs and symptoms  Outcome: Ongoing  Goal: Absence of new skin breakdown  Description: Absence of new skin breakdown  Outcome: Ongoing     Problem: Falls - Risk of:  Goal: Will remain free from falls  Description: Will remain free from falls  Outcome: Ongoing  Goal: Absence of physical injury  Description: Absence of physical injury  Outcome: Ongoing     Problem: Discharge Planning:  Goal: Participates in care planning  Description: Participates in care planning  Outcome: Ongoing  Goal: Discharged to appropriate level of care  Description: Discharged to appropriate level of care  Outcome: Ongoing     Problem: Airway Clearance - Ineffective:  Goal: Ability to maintain a clear airway will improve  Description: Ability to maintain a clear airway will improve  Outcome: Ongoing     Problem: Anxiety/Stress:  Goal: Level of anxiety will decrease  Description: Level of anxiety will decrease  Outcome: Ongoing     Problem: Aspiration:  Goal: Absence of aspiration  Description: Absence of aspiration  Outcome: Ongoing     Problem:  Bowel Function - Altered:  Goal: Bowel elimination is within specified parameters  Description: Bowel elimination is within specified parameters  Outcome: Ongoing     Problem: Cardiac Output - Decreased:  Goal: Hemodynamic stability will improve  Description: Hemodynamic stability will improve  Outcome: Ongoing     Problem: Fluid Volume - Imbalance:  Goal: Absence of imbalanced fluid volume signs and symptoms  Description: Absence of imbalanced fluid volume signs and symptoms  Outcome: Ongoing     Problem: Gas Exchange - Impaired:  Goal: Levels of oxygenation will improve  Description: Levels of oxygenation will improve  Outcome: Ongoing     Problem: Mental Status - Impaired:  Goal: Mental status will be restored to baseline  Description: Mental status will be restored to baseline  Outcome: Ongoing     Problem: Nutrition Deficit:  Goal: Ability to achieve adequate nutritional intake will improve  Description: Ability to achieve adequate nutritional intake will improve  Outcome: Ongoing     Problem: Pain:  Goal: Pain level will decrease  Description: Pain level will decrease  Outcome: Ongoing  Goal: Recognizes and communicates pain  Description: Recognizes and communicates pain  Outcome: Ongoing  Goal: Control of acute pain  Description: Control of acute pain  Outcome: Ongoing  Goal: Control of chronic pain  Description: Control of chronic pain  Outcome: Ongoing     Problem: Serum Glucose Level - Abnormal:  Goal: Ability to maintain appropriate glucose levels will improve to within specified parameters  Description: Ability to maintain appropriate glucose levels will improve to within specified parameters  Outcome: Ongoing     Problem: Skin Integrity - Impaired:  Goal: Will show no infection signs and symptoms  Description: Will show no infection signs and symptoms  Outcome: Ongoing  Goal: Absence of new skin breakdown  Description: Absence of new skin breakdown  Outcome: Ongoing     Problem: Sleep Pattern Disturbance:  Goal: Appears well-rested  Description: Appears well-rested  Outcome: Ongoing     Problem: Tissue Perfusion, Altered:  Goal: Circulatory function within specified parameters  Description: Circulatory function within specified parameters  Outcome: Ongoing     Problem: Tissue Perfusion - Cardiopulmonary, Altered:  Goal: Absence of angina  Description: Absence of angina  Outcome: Ongoing  Goal: Hemodynamic stability will improve  Description: Hemodynamic stability will improve  Outcome: Ongoing

## 2020-08-20 NOTE — PROGRESS NOTES
Progress Note  Patient: Sabine Ace. Unit/Bed: 11/IC11-01  YOB: 1975  MRN: [id number]  Acct: [de-identified]   Admitting Diagnosis: Cardiac arrest [facility]) [I46.9]  Admit Date:  8/19/2020  Hospital Day: 1    Chief Complaint: found down     Subjective/HPI: 38year old male with prior CAD and PCI LCx presents after semi accident and found unresponsive with vfib/cardiac arrest s/p defib. Posterior stemi s/p primary PCI of the LCx. ICM EF 25%. Trauma survey negative. Patient on hypothermia and sedation. When awakens moans and has response to pain but no purposeful movements. EKG:  Review of Systems:   Review of Systems      Physical Examination:    BP (!) 66/45   Pulse 68   Temp 92.8 °F (33.8 °C) (Core)   Resp 26   Ht 5' 10\" (1.778 m)   Wt 165 lb (74.8 kg)   SpO2 90%   BMI 23.68 kg/m²    Physical Exam   Constitutional: No distress. He appears chronically ill and acutely ill. Intubated, sedated   HENT:   Mouth/Throat: Oropharynx is clear. Eyes: Pupils are equal, round, and reactive to light. Neck: Normal range of motion. No JVD present. Cardiovascular: Regular rhythm, S1 normal, S2 normal, normal heart sounds and intact distal pulses. Exam reveals no gallop. No murmur heard. Pulses:       Radial pulses are 2+ on the right side. Dorsalis pedis pulses are 2+ on the right side. Pulmonary/Chest: Effort normal and breath sounds normal. He has no wheezes. He has no rales. He exhibits no tenderness. Abdominal: Soft. Bowel sounds are normal.   Musculoskeletal: Normal range of motion. General: No edema. Neurological: He has intact cranial nerves. He exhibits altered mental status. Skin: Skin is warm and dry. No rash noted.        LABS:  CBC:   Lab Results   Component Value Date    WBC 12.8 08/19/2020    RBC 4.43 08/19/2020    HGB 11.4 08/20/2020    HCT 38.6 08/19/2020    MCV 87.2 08/19/2020    MCH 29.4 08/19/2020    MCHC 33.7 08/19/2020    RDW 12.2 08/19/2020

## 2020-08-20 NOTE — PROGRESS NOTES
Comprehensive Nutrition Assessment    Type and Reason for Visit:  Initial(mechanical ventilation)    Nutrition Recommendations/Plan: Once thermodynamically stable, start TF. Recommend Low Calorie, High Protein formula at 20 ml/hr x 24 hrs with a goal rate of 60 ml/hr when able to advance to goal. 100 ml water flush Q 4 hrs, or as per MD    Nutrition Assessment:  Pt admitted with s/p cardiac arrest.  Currently intubated and sedated with hypothermic protocol. Recommend start TF once pt thermodynamically stable. recommendations provided. Malnutrition Assessment:  Malnutrition Status:  Insufficient data    Context:  Acute Illness     Findings of the 6 clinical characteristics of malnutrition:  Energy Intake:  Unable to assess  Weight Loss:  Unable to assess     Body Fat Loss:  No significant body fat loss     Muscle Mass Loss:  No significant muscle mass loss    Fluid Accumulation:  Unable to assess     Strength:  Not Performed    Estimated Daily Nutrient Needs:  Energy (kcal):  3066-6041 (kg x 20-22); Weight Used for Energy Requirements:  Admission(74.8 kg)     Protein (g):  111-133 gm (kg x 1.5-1.8); Weight Used for Protein Requirements:  Admission(74 kg)        Fluid (ml/day):  ~ 1500 ml (1 ml/kcal) or as per physician       Nutrition Related Findings:  intubated (8/19), PMH: DM, HTN, heart disease.   Labs: Gluc: 162, 629 on admission, insulin drip infusing, OGT in place with 300 ml output, Peripheral line, IVF: D5 & .45 NaCl with 20 Meq KCL @ 150 ml/hr, abdomen noted to be rounded;nontender, BM (8/20), generalized edema per nsg, poor dentition      Wounds:  None       Current Nutrition Therapies:    Diet NPO Effective Now    Anthropometric Measures:  · Height: 5' 10\" (177.8 cm)  · Admission Body Weight: 165 lb (74.8 kg)(estimated)    · Usual Body Weight: (UTD-no recent records in EMR, 175 lb (2018-office visit))     · Ideal Body Weight: 166 lbs    · BMI:  23.68  · BMI Categories: Normal Weight (BMI 18.5-24. 9)       Nutrition Diagnosis:   · Inadequate oral intake related to impaired respiratory function as evidenced by intubation      Nutrition Interventions:   Food and/or Nutrient Delivery:  (Once thermodynamically stable, start TF.   Recommend Low Calorie, High Protein formula at 20 ml/hr x 24 hrs with a goal rate of 60 ml/hr when able to advance to goal. 100 ml water flush Q 4 hrs, or as per MD)  Nutrition Education/Counseling:  No recommendation at this time   Coordination of Nutrition Care:  Continued Inpatient Monitoring    Goals:  Ability to start TF       Nutrition Monitoring and Evaluation:   Food/Nutrient Intake Outcomes:  Enteral Nutrition Intake/Tolerance  Physical Signs/Symptoms Outcomes:  Biochemical Data, Fluid Status or Edema, Hemodynamic Status, Weight, Other (Comment)(thermodynamic status)     Electronically signed by Yazmin Antoine RD, LD on 8/20/20 at 11:20 AM EDT

## 2020-08-20 NOTE — PLAN OF CARE
Nutrition Problem #1: Inadequate oral intake  Intervention: Food and/or Nutrient Delivery: (Once thermodynamically stable, start TF.   Recommend Low Calorie, High Protein formula at 20 ml/hr x 24 hrs with a goal rate of 60 ml/hr when able to advance to goal. 100 ml water flush Q 4 hrs, or as per MD)  Nutritional Goals: Ability to start TF

## 2020-08-20 NOTE — CARE COORDINATION
[facility] EMERGENCY [facility] AT [place] Case Management Initial Discharge Assessment    Met with Flakita Vazquez to discuss discharge plan. PCP: Aleksandar Azul MD                                Date of Last Visit: Was seeing prior and they lost insurance    If no PCP, list provided? Declined    Discharge Planning    Living Arrangements: independently at home    Who do you live with? Wife    Who helps you with your care:  self    If lives at home:     Do you have any barriers navigating in your home? no    Patient can perform ADL? Yes    Current Services (outpatient and in home) :  None    Dialysis: No    Is transportation available to get to your appointments? Yes    DME Equipment:  No     Respiratory equipment: None    Respiratory provider:  no     Pharmacy:  yes - [facility]  In [address] with Medication Assistance Program?  Yes      Patient agreeable to [facility] AT [facility]? Declined    Patient agreeable to SNF/Rehab? Declined    Other discharge needs identified? N/A    Freedom of choice list provided with basic dialogue that supports the patient's individualized plan of care/goals and shares the quality data associated with the providers. Yes    Does Patient Have a High-Risk for Readmission Diagnosis (CHF, PN, MI, COPD)? Yes    If Yes,     Consult with pulmonologist? Yes   Consult with cardiologist? Yes   Cardiac Rehab referral if EF <35%? Yes   Consult with Pharmacy for medication assessment prior to discharge? Yes   Consult with Behavioral health to aid in depression, anxiety, or coping issues? N/A   Palliative Care Consult? N/A   Pulmonary Rehab order for COPD, PN, and CHF (if EF > 35%)? N/A    Does patient have a reliable scale and know how to read it (for CHF)? N/A   Nutrition consult for CHF? Yes   Respiratory therapy consult that includes bedside instruction on administration of nebulizers and/or inhalers, and assessment of oxygen and equipment needs in the home?  Yes    The plan for Transition of Care is related to the following treatment goal- Pt will be able to recover from this     Initial Discharge Plan? (Note: please see concurrent daily documentation for any updates after initial note). Spoke to patients wife and he is unresponsive on vent with [facility] and we will follow to determine his course and needs. The Patient   was provided with choice of any post-acute providers for care and equipment and agrees with discharge plan  Yes  I called patients wife and she will bring in insurance card. She thinks that their insurance is not any good and they have to pay a high out of pocket amount . Pt was independent with all ADLs and working as  prior to this admission. We will follow to determine his status and what he will need.    Electronically signed by Jayla Trujillo on 8/20/2020 at 2:29 PM

## 2020-08-21 ENCOUNTER — APPOINTMENT (OUTPATIENT)
Dept: GENERAL RADIOLOGY | Age: 45
DRG: 246 | End: 2020-08-21

## 2020-08-21 LAB
ANION GAP SERPL CALCULATED.3IONS-SCNC: 14 MEQ/L (ref 9–15)
ANION GAP SERPL CALCULATED.3IONS-SCNC: 15 MEQ/L (ref 9–15)
ANION GAP SERPL CALCULATED.3IONS-SCNC: 8 MEQ/L (ref 9–15)
BASE EXCESS ARTERIAL: -11 (ref -3–3)
BASOPHILS ABSOLUTE: 0 K/UL (ref 0–0.2)
BASOPHILS RELATIVE PERCENT: 0.3 %
BUN BLDV-MCNC: 15 MG/DL (ref 6–20)
BUN BLDV-MCNC: 16 MG/DL (ref 6–20)
BUN BLDV-MCNC: 19 MG/DL (ref 6–20)
CALCIUM IONIZED: 1.06 MMOL/L (ref 1.12–1.32)
CALCIUM SERPL-MCNC: 7.1 MG/DL (ref 8.5–9.9)
CALCIUM SERPL-MCNC: 7.2 MG/DL (ref 8.5–9.9)
CALCIUM SERPL-MCNC: 7.2 MG/DL (ref 8.5–9.9)
CHLORIDE BLD-SCNC: 102 MEQ/L (ref 95–107)
CHLORIDE BLD-SCNC: 106 MEQ/L (ref 95–107)
CHLORIDE BLD-SCNC: 106 MEQ/L (ref 95–107)
CO2: 14 MEQ/L (ref 20–31)
CO2: 15 MEQ/L (ref 20–31)
CO2: 19 MEQ/L (ref 20–31)
CREAT SERPL-MCNC: 0.46 MG/DL (ref 0.7–1.2)
CREAT SERPL-MCNC: 0.59 MG/DL (ref 0.7–1.2)
CREAT SERPL-MCNC: 0.83 MG/DL (ref 0.7–1.2)
EOSINOPHILS ABSOLUTE: 0 K/UL (ref 0–0.7)
EOSINOPHILS RELATIVE PERCENT: 0 %
GFR AFRICAN AMERICAN: >60
GFR NON-AFRICAN AMERICAN: >60
GLUCOSE BLD-MCNC: 129 MG/DL (ref 70–99)
GLUCOSE BLD-MCNC: 149 MG/DL (ref 60–115)
GLUCOSE BLD-MCNC: 160 MG/DL (ref 60–115)
GLUCOSE BLD-MCNC: 167 MG/DL (ref 70–99)
GLUCOSE BLD-MCNC: 183 MG/DL (ref 60–115)
GLUCOSE BLD-MCNC: 188 MG/DL (ref 60–115)
GLUCOSE BLD-MCNC: 275 MG/DL (ref 60–115)
GLUCOSE BLD-MCNC: 285 MG/DL (ref 70–99)
HCO3 ARTERIAL: 15.6 MMOL/L (ref 21–29)
HCT VFR BLD CALC: 31.2 % (ref 42–52)
HCT VFR BLD CALC: 34.2 % (ref 42–52)
HCT VFR BLD CALC: 39.5 % (ref 42–52)
HEMOGLOBIN: 10.5 G/DL (ref 14–18)
HEMOGLOBIN: 11.9 G/DL (ref 14–18)
HEMOGLOBIN: 12.1 GM/DL (ref 13.5–17.5)
HEMOGLOBIN: 13.5 G/DL (ref 14–18)
LACTATE: 1.83 MMOL/L (ref 0.4–2)
LYMPHOCYTES ABSOLUTE: 0.7 K/UL (ref 1–4.8)
LYMPHOCYTES ABSOLUTE: 0.8 K/UL (ref 1–4.8)
LYMPHOCYTES ABSOLUTE: 1.2 K/UL (ref 1–4.8)
LYMPHOCYTES RELATIVE PERCENT: 10.7 %
LYMPHOCYTES RELATIVE PERCENT: 6.7 %
LYMPHOCYTES RELATIVE PERCENT: 7.6 %
MCH RBC QN AUTO: 28.5 PG (ref 27–31.3)
MCH RBC QN AUTO: 28.9 PG (ref 27–31.3)
MCH RBC QN AUTO: 29.1 PG (ref 27–31.3)
MCHC RBC AUTO-ENTMCNC: 33.6 % (ref 33–37)
MCHC RBC AUTO-ENTMCNC: 34.1 % (ref 33–37)
MCHC RBC AUTO-ENTMCNC: 34.7 % (ref 33–37)
MCV RBC AUTO: 83.8 FL (ref 80–100)
MCV RBC AUTO: 84.7 FL (ref 80–100)
MCV RBC AUTO: 84.9 FL (ref 80–100)
MONOCYTES ABSOLUTE: 0.3 K/UL (ref 0.2–0.8)
MONOCYTES RELATIVE PERCENT: 2.6 %
MONOCYTES RELATIVE PERCENT: 3.1 %
MONOCYTES RELATIVE PERCENT: 3.1 %
NEUTROPHILS ABSOLUTE: 10.1 K/UL (ref 1.4–6.5)
NEUTROPHILS ABSOLUTE: 8.7 K/UL (ref 1.4–6.5)
NEUTROPHILS ABSOLUTE: 9.5 K/UL (ref 1.4–6.5)
NEUTROPHILS RELATIVE PERCENT: 85.9 %
NEUTROPHILS RELATIVE PERCENT: 89.5 %
NEUTROPHILS RELATIVE PERCENT: 89.9 %
O2 SAT, ARTERIAL: 92 % (ref 93–100)
PCO2 ARTERIAL: 33 MM HG (ref 35–45)
PDW BLD-RTO: 12.8 % (ref 11.5–14.5)
PDW BLD-RTO: 13 % (ref 11.5–14.5)
PDW BLD-RTO: 13 % (ref 11.5–14.5)
PERFORMED ON: ABNORMAL
PH ARTERIAL: 7.28 (ref 7.35–7.45)
PLATELET # BLD: 100 K/UL (ref 130–400)
PLATELET # BLD: 102 K/UL (ref 130–400)
PLATELET # BLD: 123 K/UL (ref 130–400)
PO2 ARTERIAL: 72 MM HG (ref 75–108)
POC ACTIVATED CLOTTING TIME KAOLIN: 197 SEC (ref 82–152)
POC ACTIVATED CLOTTING TIME KAOLIN: 213 SEC (ref 82–152)
POC CHLORIDE: 107 MEQ/L (ref 99–110)
POC CREATININE: 0.5 MG/DL (ref 0.9–1.3)
POC FIO2: 85
POC HEMATOCRIT: 36 % (ref 41–53)
POC POTASSIUM: 3.9 MEQ/L (ref 3.5–5.1)
POC SAMPLE TYPE: ABNORMAL
POC SODIUM: 135 MEQ/L (ref 136–145)
POTASSIUM SERPL-SCNC: 3.9 MEQ/L (ref 3.4–4.9)
POTASSIUM SERPL-SCNC: 4 MEQ/L (ref 3.4–4.9)
POTASSIUM SERPL-SCNC: 4.1 MEQ/L (ref 3.4–4.9)
POTASSIUM SERPL-SCNC: 4.1 MEQ/L (ref 3.4–4.9)
POTASSIUM SERPL-SCNC: 4.3 MEQ/L (ref 3.4–4.9)
POTASSIUM SERPL-SCNC: 4.4 MEQ/L (ref 3.4–4.9)
POTASSIUM SERPL-SCNC: 4.6 MEQ/L (ref 3.4–4.9)
RBC # BLD: 3.67 M/UL (ref 4.7–6.1)
RBC # BLD: 4.07 M/UL (ref 4.7–6.1)
RBC # BLD: 4.66 M/UL (ref 4.7–6.1)
SODIUM BLD-SCNC: 129 MEQ/L (ref 135–144)
SODIUM BLD-SCNC: 135 MEQ/L (ref 135–144)
SODIUM BLD-SCNC: 135 MEQ/L (ref 135–144)
TCO2 ARTERIAL: 17 (ref 22–29)
WBC # BLD: 11.1 K/UL (ref 4.8–10.8)
WBC # BLD: 11.3 K/UL (ref 4.8–10.8)
WBC # BLD: 9.7 K/UL (ref 4.8–10.8)

## 2020-08-21 PROCEDURE — 84295 ASSAY OF SERUM SODIUM: CPT

## 2020-08-21 PROCEDURE — 94003 VENT MGMT INPAT SUBQ DAY: CPT

## 2020-08-21 PROCEDURE — 82435 ASSAY OF BLOOD CHLORIDE: CPT

## 2020-08-21 PROCEDURE — 99233 SBSQ HOSP IP/OBS HIGH 50: CPT | Performed by: PSYCHIATRY & NEUROLOGY

## 2020-08-21 PROCEDURE — 2700000000 HC OXYGEN THERAPY PER DAY

## 2020-08-21 PROCEDURE — 2580000003 HC RX 258: Performed by: INTERNAL MEDICINE

## 2020-08-21 PROCEDURE — 83605 ASSAY OF LACTIC ACID: CPT

## 2020-08-21 PROCEDURE — 37799 UNLISTED PX VASCULAR SURGERY: CPT

## 2020-08-21 PROCEDURE — 6360000002 HC RX W HCPCS: Performed by: INTERNAL MEDICINE

## 2020-08-21 PROCEDURE — 82330 ASSAY OF CALCIUM: CPT

## 2020-08-21 PROCEDURE — 99291 CRITICAL CARE FIRST HOUR: CPT | Performed by: INTERNAL MEDICINE

## 2020-08-21 PROCEDURE — 2000000000 HC ICU R&B

## 2020-08-21 PROCEDURE — C9113 INJ PANTOPRAZOLE SODIUM, VIA: HCPCS | Performed by: INTERNAL MEDICINE

## 2020-08-21 PROCEDURE — 6370000000 HC RX 637 (ALT 250 FOR IP): Performed by: INTERNAL MEDICINE

## 2020-08-21 PROCEDURE — 82565 ASSAY OF CREATININE: CPT

## 2020-08-21 PROCEDURE — 85014 HEMATOCRIT: CPT

## 2020-08-21 PROCEDURE — 80048 BASIC METABOLIC PNL TOTAL CA: CPT

## 2020-08-21 PROCEDURE — 85025 COMPLETE CBC W/AUTO DIFF WBC: CPT

## 2020-08-21 PROCEDURE — 99233 SBSQ HOSP IP/OBS HIGH 50: CPT | Performed by: INTERNAL MEDICINE

## 2020-08-21 PROCEDURE — 84132 ASSAY OF SERUM POTASSIUM: CPT

## 2020-08-21 PROCEDURE — 82803 BLOOD GASES ANY COMBINATION: CPT

## 2020-08-21 PROCEDURE — 82948 REAGENT STRIP/BLOOD GLUCOSE: CPT

## 2020-08-21 PROCEDURE — 71045 X-RAY EXAM CHEST 1 VIEW: CPT

## 2020-08-21 RX ORDER — SODIUM CHLORIDE 9 MG/ML
INJECTION, SOLUTION INTRAVENOUS CONTINUOUS
Status: DISCONTINUED | OUTPATIENT
Start: 2020-08-21 | End: 2020-08-21

## 2020-08-21 RX ORDER — SODIUM CHLORIDE 9 MG/ML
10 INJECTION INTRAVENOUS DAILY
Status: DISCONTINUED | OUTPATIENT
Start: 2020-08-21 | End: 2020-08-23

## 2020-08-21 RX ORDER — PANTOPRAZOLE SODIUM 40 MG/10ML
40 INJECTION, POWDER, LYOPHILIZED, FOR SOLUTION INTRAVENOUS DAILY
Status: DISCONTINUED | OUTPATIENT
Start: 2020-08-21 | End: 2020-08-23

## 2020-08-21 RX ORDER — NICOTINE POLACRILEX 4 MG
15 LOZENGE BUCCAL PRN
Status: DISCONTINUED | OUTPATIENT
Start: 2020-08-21 | End: 2020-08-25 | Stop reason: HOSPADM

## 2020-08-21 RX ORDER — DEXTROSE MONOHYDRATE 25 G/50ML
12.5 INJECTION, SOLUTION INTRAVENOUS PRN
Status: DISCONTINUED | OUTPATIENT
Start: 2020-08-21 | End: 2020-08-25 | Stop reason: HOSPADM

## 2020-08-21 RX ORDER — DEXTROSE MONOHYDRATE 50 MG/ML
100 INJECTION, SOLUTION INTRAVENOUS PRN
Status: DISCONTINUED | OUTPATIENT
Start: 2020-08-21 | End: 2020-08-25 | Stop reason: HOSPADM

## 2020-08-21 RX ORDER — FUROSEMIDE 10 MG/ML
40 INJECTION INTRAMUSCULAR; INTRAVENOUS ONCE
Status: COMPLETED | OUTPATIENT
Start: 2020-08-21 | End: 2020-08-21

## 2020-08-21 RX ORDER — DOBUTAMINE HYDROCHLORIDE 200 MG/100ML
2.5 INJECTION INTRAVENOUS CONTINUOUS
Status: DISCONTINUED | OUTPATIENT
Start: 2020-08-21 | End: 2020-08-22

## 2020-08-21 RX ADMIN — TICAGRELOR 90 MG: 90 TABLET ORAL at 20:52

## 2020-08-21 RX ADMIN — PIPERACILLIN AND TAZOBACTAM 3.38 G: 3; .375 INJECTION, POWDER, FOR SOLUTION INTRAVENOUS at 21:00

## 2020-08-21 RX ADMIN — PROPOFOL 30 MCG/KG/MIN: 10 INJECTION, EMULSION INTRAVENOUS at 21:57

## 2020-08-21 RX ADMIN — DOBUTAMINE HYDROCHLORIDE 2.5 MCG/KG/MIN: 200 INJECTION INTRAVENOUS at 11:00

## 2020-08-21 RX ADMIN — CHLORHEXIDINE GLUCONATE 15 ML: 1.2 RINSE ORAL at 08:25

## 2020-08-21 RX ADMIN — SODIUM CHLORIDE: 9 INJECTION, SOLUTION INTRAVENOUS at 04:57

## 2020-08-21 RX ADMIN — PROPOFOL 10 MCG/KG/MIN: 10 INJECTION, EMULSION INTRAVENOUS at 02:49

## 2020-08-21 RX ADMIN — TICAGRELOR 90 MG: 90 TABLET ORAL at 08:25

## 2020-08-21 RX ADMIN — FENTANYL CITRATE 25 MCG/HR: 50 INJECTION, SOLUTION INTRAMUSCULAR; INTRAVENOUS at 14:42

## 2020-08-21 RX ADMIN — ATORVASTATIN CALCIUM 80 MG: 40 TABLET, FILM COATED ORAL at 20:52

## 2020-08-21 RX ADMIN — FUROSEMIDE 40 MG: 10 INJECTION, SOLUTION INTRAMUSCULAR; INTRAVENOUS at 10:19

## 2020-08-21 RX ADMIN — CHLORHEXIDINE GLUCONATE 15 ML: 1.2 RINSE ORAL at 20:51

## 2020-08-21 RX ADMIN — PANTOPRAZOLE SODIUM 40 MG: 40 INJECTION, POWDER, FOR SOLUTION INTRAVENOUS at 12:00

## 2020-08-21 RX ADMIN — PIPERACILLIN AND TAZOBACTAM 3.38 G: 3; .375 INJECTION, POWDER, FOR SOLUTION INTRAVENOUS at 15:18

## 2020-08-21 RX ADMIN — FENTANYL CITRATE 100 MCG/HR: 50 INJECTION, SOLUTION INTRAMUSCULAR; INTRAVENOUS at 04:30

## 2020-08-21 RX ADMIN — Medication 10 ML: at 12:00

## 2020-08-21 RX ADMIN — PIPERACILLIN AND TAZOBACTAM 3.38 G: 3; .375 INJECTION, POWDER, FOR SOLUTION INTRAVENOUS at 05:59

## 2020-08-21 RX ADMIN — FENTANYL CITRATE 150 MCG/HR: 50 INJECTION, SOLUTION INTRAMUSCULAR; INTRAVENOUS at 20:52

## 2020-08-21 RX ADMIN — ASPIRIN 81 MG: 81 TABLET, COATED ORAL at 08:25

## 2020-08-21 ASSESSMENT — PULMONARY FUNCTION TESTS
PIF_VALUE: 20
PIF_VALUE: 21
PIF_VALUE: 22
PIF_VALUE: 22
PIF_VALUE: 21
PIF_VALUE: 13
PIF_VALUE: 27
PIF_VALUE: 17
PIF_VALUE: 22
PIF_VALUE: 18
PIF_VALUE: 21
PIF_VALUE: 21
PIF_VALUE: 12
PIF_VALUE: 19
PIF_VALUE: 15
PIF_VALUE: 15
PIF_VALUE: 21
PIF_VALUE: 17
PIF_VALUE: 24
PIF_VALUE: 13
PIF_VALUE: 23
PIF_VALUE: 21
PIF_VALUE: 14
PIF_VALUE: 23
PIF_VALUE: 17
PIF_VALUE: 23
PIF_VALUE: 13
PIF_VALUE: 18
PIF_VALUE: 20
PIF_VALUE: 12
PIF_VALUE: 25
PIF_VALUE: 21
PIF_VALUE: 12
PIF_VALUE: 22
PIF_VALUE: 23
PIF_VALUE: 17
PIF_VALUE: 19
PIF_VALUE: 15
PIF_VALUE: 13
PIF_VALUE: 19
PIF_VALUE: 12
PIF_VALUE: 15
PIF_VALUE: 22
PIF_VALUE: 15
PIF_VALUE: 22
PIF_VALUE: 13
PIF_VALUE: 13
PIF_VALUE: 24
PIF_VALUE: 18
PIF_VALUE: 14
PIF_VALUE: 13
PIF_VALUE: 24
PIF_VALUE: 23
PIF_VALUE: 13
PIF_VALUE: 14
PIF_VALUE: 24
PIF_VALUE: 14
PIF_VALUE: 16
PIF_VALUE: 13
PIF_VALUE: 23
PIF_VALUE: 24
PIF_VALUE: 15
PIF_VALUE: 17
PIF_VALUE: 13
PIF_VALUE: 17
PIF_VALUE: 21
PIF_VALUE: 17
PIF_VALUE: 17
PIF_VALUE: 15
PIF_VALUE: 23
PIF_VALUE: 15
PIF_VALUE: 19
PIF_VALUE: 21
PIF_VALUE: 22
PIF_VALUE: 15
PIF_VALUE: 21
PIF_VALUE: 12
PIF_VALUE: 20
PIF_VALUE: 13
PIF_VALUE: 12
PIF_VALUE: 23
PIF_VALUE: 23
PIF_VALUE: 12
PIF_VALUE: 13

## 2020-08-21 ASSESSMENT — PAIN SCALES - GENERAL
PAINLEVEL_OUTOF10: 0

## 2020-08-21 NOTE — FLOWSHEET NOTE
Call from Juanis Matos, [facility]; updated on status of pt. Pt is now opening eyes in response to name. Sustaining eye contact. This nurse informed that Mr. Perla Cerda no longer meets criteria for organ procurement.

## 2020-08-21 NOTE — PLAN OF CARE
Problem: Restraint Use - Nonviolent/Non-Self-Destructive Behavior:  Goal: Absence of restraint indications  Description: Absence of restraint indications  Outcome: Ongoing  Goal: Absence of restraint-related injury  Description: Absence of restraint-related injury  Outcome: Ongoing     Problem: Skin Integrity:  Goal: Will show no infection signs and symptoms  Description: Will show no infection signs and symptoms  Outcome: Ongoing  Goal: Absence of new skin breakdown  Description: Absence of new skin breakdown  Outcome: Ongoing     Problem: Falls - Risk of:  Goal: Will remain free from falls  Description: Will remain free from falls  Outcome: Ongoing  Goal: Absence of physical injury  Description: Absence of physical injury  Outcome: Ongoing     Problem: Discharge Planning:  Goal: Participates in care planning  Description: Participates in care planning  Outcome: Ongoing  Goal: Discharged to appropriate level of care  Description: Discharged to appropriate level of care  Outcome: Ongoing     Problem: Airway Clearance - Ineffective:  Goal: Ability to maintain a clear airway will improve  Description: Ability to maintain a clear airway will improve  Outcome: Ongoing     Problem: Anxiety/Stress:  Goal: Level of anxiety will decrease  Description: Level of anxiety will decrease  Outcome: Ongoing     Problem: Aspiration:  Goal: Absence of aspiration  Description: Absence of aspiration  Outcome: Ongoing     Problem:  Bowel Function - Altered:  Goal: Bowel elimination is within specified parameters  Description: Bowel elimination is within specified parameters  Outcome: Ongoing     Problem: Cardiac Output - Decreased:  Goal: Hemodynamic stability will improve  Description: Hemodynamic stability will improve  Outcome: Ongoing     Problem: Fluid Volume - Imbalance:  Goal: Absence of imbalanced fluid volume signs and symptoms  Description: Absence of imbalanced fluid volume signs and symptoms  Outcome: Ongoing     Problem: Gas Exchange - Impaired:  Goal: Levels of oxygenation will improve  Description: Levels of oxygenation will improve  Outcome: Ongoing     Problem: Mental Status - Impaired:  Goal: Mental status will be restored to baseline  Description: Mental status will be restored to baseline  Outcome: Ongoing     Problem: Nutrition Deficit:  Goal: Ability to achieve adequate nutritional intake will improve  Description: Ability to achieve adequate nutritional intake will improve  Outcome: Ongoing     Problem: Pain:  Goal: Pain level will decrease  Description: Pain level will decrease  Outcome: Ongoing  Goal: Recognizes and communicates pain  Description: Recognizes and communicates pain  Outcome: Ongoing  Goal: Control of acute pain  Description: Control of acute pain  Outcome: Ongoing  Goal: Control of chronic pain  Description: Control of chronic pain  Outcome: Ongoing     Problem: Serum Glucose Level - Abnormal:  Goal: Ability to maintain appropriate glucose levels will improve to within specified parameters  Description: Ability to maintain appropriate glucose levels will improve to within specified parameters  Outcome: Ongoing     Problem: Skin Integrity - Impaired:  Goal: Will show no infection signs and symptoms  Description: Will show no infection signs and symptoms  Outcome: Ongoing  Goal: Absence of new skin breakdown  Description: Absence of new skin breakdown  Outcome: Ongoing     Problem: Sleep Pattern Disturbance:  Goal: Appears well-rested  Description: Appears well-rested  Outcome: Ongoing     Problem: Tissue Perfusion, Altered:  Goal: Circulatory function within specified parameters  Description: Circulatory function within specified parameters  Outcome: Ongoing     Problem: Tissue Perfusion - Cardiopulmonary, Altered:  Goal: Absence of angina  Description: Absence of angina  Outcome: Ongoing  Goal: Hemodynamic stability will improve  Description: Hemodynamic stability will improve  Outcome: Ongoing Problem: Nutrition  Goal: Optimal nutrition therapy  Outcome: Ongoing

## 2020-08-21 NOTE — PROGRESS NOTES
2000 Pt remains on [address]- cooling phase to 33 celsius. On Levophed, fentanyl drips. Weaning down Propofol. Pt has some non purposeful movement of upper extremities. 2100 Luis Daniel from [employer] called for condition report. 0001 Pt now in rewarming phase set at 36.5 celsius. 0246 Pt is awake. Eyes opened and reaching towards endo tube. Trying to sit up in bed. Fentanyl increased to 100 mcg.     0247 Propofol increased to 10 mcg.     0300 Pt more sedated now. BP is dropping. Levophed increased to 10 mcg.     0600Ptt remains in rewarming phase on [facility] [employer]. Remains on Propofol, Levophed, Fentanyl drips. Oral care given. Monitor shows sinus rhythm.

## 2020-08-21 NOTE — PROGRESS NOTES
Progress Note  Patient: John Ford. Unit/Bed: 11/IC11-01  YOB: 1975  MRN: [id number]  Acct: [de-identified]   Admitting Diagnosis: Cardiac arrest [facility]) [I46.9]  Admit Date:  8/19/2020  Hospital Day: 2    Chief Complaint: found down     Subjective/HPI: 38year old male with prior CAD and PCI LCx presents after semi accident and found unresponsive with vfib/cardiac arrest s/p defib. Posterior stemi s/p primary PCI of the LCx. ICM EF 25%. Trauma survey negative. Coming off hypothermia. Hypotensive on sedation. Dr. Blessing Dickens feels he is not ready for extubation due to pulmonary edema, wants to start dobutamine. EKG:  Review of Systems:   Review of Systems      Physical Examination:    BP (!) 66/45   Pulse 96   Temp 95.4 °F (35.2 °C) (Core)   Resp 21   Ht 5' 10\" (1.778 m)   Wt 165 lb (74.8 kg)   SpO2 100%   BMI 23.68 kg/m²    Physical Exam   Constitutional: No distress. He appears chronically ill and acutely ill. Intubated, sedated   HENT:   Mouth/Throat: Oropharynx is clear. Eyes: Pupils are equal, round, and reactive to light. Neck: Normal range of motion. No JVD present. Cardiovascular: Regular rhythm, S1 normal, S2 normal, normal heart sounds and intact distal pulses. Exam reveals no gallop. No murmur heard. Pulses:       Radial pulses are 2+ on the right side. Dorsalis pedis pulses are 2+ on the right side. Pulmonary/Chest: Effort normal and breath sounds normal. He has no wheezes. He has no rales. He exhibits no tenderness. Abdominal: Soft. Bowel sounds are normal.   Musculoskeletal: Normal range of motion. General: No edema. Neurological: He has intact cranial nerves. He exhibits altered mental status. Skin: Skin is warm and dry. No rash noted.        LABS:  CBC:   Lab Results   Component Value Date    WBC 9.7 08/21/2020    RBC 4.07 08/21/2020    HGB 11.9 08/21/2020    HCT 34.2 08/21/2020    MCV 83.8 08/21/2020    MCH 29.1 08/21/2020    MCHC 8/21/2020 at 11:41 AM

## 2020-08-21 NOTE — PROGRESS NOTES
Physician Progress Note    2020   1:38 PM    Name:  Shahana Steen MRN:    [id number]     IP Day: 2     Admit Date: 2020 10:44 AM  PCP: Echo Benedict MD    Code Status:  Full Code      Assessment and Plan: Active Problems/ diagnosis:     V.fib arrest/ STEMI  CAD s/p PCI  DKA/ DM   Acute Respiratory failure due to above     Plan    - resume insulin gtt until diet is started  - resume IVF, frequent BMP, Mg, phos  - hypothermia protocol  - CC is on board  - cardiology is primary      -Patient is doing much better today. He still intubated but he is awake and responding to commands. He is moving all his extremities. He is on CMV mode on the ventilator.  -He continues to require levophed, his sedation is being titrated to get him more comfortable. His blood pressure is stable.  -He is getting rewarmed today.  -Patient is DKA, he is off insulin drip. Endocrine is following.  -Critical care is on board.  -Cardiology is primary. DVT PPx     Subjective:      no new events. Intubated and sedated. Physical Examination:      Vitals:  BP (!) 66/45   Pulse 110   Temp 95.4 °F (35.2 °C) (Core)   Resp 18   Ht 5' 10\" (1.778 m)   Wt 165 lb (74.8 kg)   SpO2 98%   BMI 23.68 kg/m²   Temp (24hrs), Av.7 °F (33.7 °C), Min:90.9 °F (32.7 °C), Max:95.4 °F (35.2 °C)      General appearance: Intubated but awake and following commands  Mental Status:  Following commands  Lungs: Mechanical sound transmitted bilaterally  Heart: regular rate    Abdomen: soft, nontender, nondistended, bowel sounds present, no masses  Extremities: no edema, redness, tenderness in the calves  Skin: no gross lesions, rashes  Fuentes in place   ETT intact     Data:     Labs:  Recent Labs     20  0400 20  0506 20  1000   WBC 11.1*  --  9.7   HGB 13.5* 12.1* 11.9*   *  --  102*     Recent Labs     20  0400 20  0506  20  1000 20  1306     --   --  135  --    K 4.1  -- < > 4.1 4.4     --   --  106  --    CO2 14*  --   --  15*  --    BUN 15  --   --  16  --    CREATININE 0.46* 0.5*  --  0.59*  --    GLUCOSE 129*  --   --  167*  --     < > = values in this interval not displayed.      Recent Labs     08/19/20  1100   *   *   BILITOT 0.3   ALKPHOS 96       Current Facility-Administered Medications   Medication Dose Route Frequency Provider Last Rate Last Dose    DOBUTamine (DOBUTREX) 500 mg in dextrose 5 % 250 mL infusion  2.5 mcg/kg/min Intravenous Continuous Paulie Elaine MD 11.2 mL/hr at 08/21/20 1139 5 mcg/kg/min at 08/21/20 1139    pantoprazole (PROTONIX) injection 40 mg  40 mg Intravenous Daily Paulie Elaine MD        And    sodium chloride (PF) 0.9 % injection 10 mL  10 mL Intravenous Daily Paulie Elaine MD        fentaNYL (SUBLIMAZE) 1,000 mcg in sodium chloride 0.9 % 100 mL infusion  25 mcg/hr Intravenous Continuous Paulie Elaine MD 10 mL/hr at 08/21/20 0430 100 mcg/hr at 08/21/20 0430    piperacillin-tazobactam (ZOSYN) 3.375 g in dextrose 5 % 50 mL IVPB extended infusion (mini-bag)  3.375 g Intravenous Q8H Paulie Elaine MD   Stopped at 08/21/20 1041    insulin lispro (HUMALOG) injection vial 0-12 Units  0-12 Units Subcutaneous 4 times per day Paulie Elaine MD   2 Units at 08/21/20 0617    heparin (porcine) injection 4,000 Units  4,000 Units Intravenous Once Korina Johnson DO        aspirin suppository 300 mg  300 mg Rectal Once Korina Johnson DO        insulin regular (HUMULIN R;NOVOLIN R) injection 15 Units  15 Units Intravenous Once Korina Johnson DO        sodium bicarbonate 8.4 % injection 50 mEq  50 mEq Intravenous Once Korina Burrell DO        ticagrelor (BRILINTA) tablet 90 mg  90 mg Oral BID Jerrica Govea MD   90 mg at 08/21/20 0825    aspirin EC tablet 81 mg  81 mg Oral Daily Jerrica Govea MD   81 mg at 08/21/20 0825    atorvastatin (LIPITOR) tablet 80 mg  80 mg Oral Nightly Jerrica Govea MD 80 mg at 08/20/20 2031    propofol injection  10 mcg/kg/min Intravenous Titrated Ariella Rosales MD 4.5 mL/hr at 08/21/20 0249 10 mcg/kg/min at 08/21/20 0249    dextrose 50 % IV solution  12.5 g Intravenous PRN [employer] [place],         promethazine (PHENERGAN) tablet 12.5 mg  12.5 mg Oral Q6H PRN Ariella Rosales MD        Or    ondansetron (ZOFRAN) injection 4 mg  4 mg Intravenous Q6H PRN Ariella Rosales MD        chlorhexidine (PERIDEX) 0.12 % solution 15 mL  15 mL Mouth/Throat BID Ariella Rosales MD   15 mL at 08/21/20 0825    norepinephrine (LEVOPHED) 16 mg in dextrose 5 % 250 mL infusion  2 mcg/min Intravenous Continuous Ariella Rosales MD 9.4 mL/hr at 08/21/20 0630 10 mcg/min at 08/21/20 0630       Additional work up or/and treatment plan may be added today or then after based on clinical progression. I am managing a portion of pt care. Some medical issues are handled by other specialists. Additional work up and treatment should be done in out pt setting by pt PCP and other out pt providers. In addition to examining and evaluating pt, I spent additional time explaining care, normaland abnormal findings, and treatment plan. All of pt questions were answered. Counseling, diet and education were provided. Case will be discussed with nursing staff when appropriate. Family will be updated if and when appropriate.        Electronically signed by Dea Kapoor DO on 8/21/2020 at 1:38 PM

## 2020-08-21 NOTE — PROGRESS NOTES
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 which can be seen with propofol. In the first instance will have him vomiting in the evening and perform a CT scan of the head tomorrow to see if there is any cerebral edema followed by a EEG off sedation. Prognostication will only be done after all this tests are available. Patient is a high risk for seizures though is on propofol and wants to start to taper the propofol restarted on Keppra or other anticonvulsants unless he starts to have myoclonic jerks in between then will start him on Keppra  No myoclonic jerks are noted and patient appears to be still on sedation. According to the nurse sedation was discontinued and he was moving around. Patient still has some pupillary responses and one would continue to be aggressive in terms of his treatments. Is still under hypothermia cooling and once this is done we will obtain a CT scan. Leodan Cardenas MD, Jenny Santos American Board of Psychiatry & Neurology  Board Certified in Vascular Neurology  Board Certified in Neuromuscular Medicine  Certified in [facility]:

## 2020-08-21 NOTE — CARE COORDINATION
PATIENT REMAINS INTUBATED AT THIS TIME. WILL NEED ASSESSMENT POST EXTUBATION TO DETERMINE DC PLANS. PER GREGOR WITH HELP DPT. PATIENT DOES NOT HAVE HEALTH INSURANCE, NOT ELIGIBLE FOR MEDICAID DURING THE MONTH OF AUGUST.  WILL SUBMIT APPLICATION  IF HERE BEYOND AUGUST
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
## 2020-08-22 ENCOUNTER — APPOINTMENT (OUTPATIENT)
Dept: GENERAL RADIOLOGY | Age: 45
DRG: 246 | End: 2020-08-22

## 2020-08-22 LAB
BASE EXCESS ARTERIAL: -3 (ref -3–3)
BASE EXCESS ARTERIAL: -4 (ref -3–3)
CALCIUM IONIZED, CALC AT PH 7.4: 1.09 MMOL/L (ref 1.11–1.3)
CALCIUM IONIZED: 1.09 MMOL/L (ref 1.12–1.32)
CALCIUM IONIZED: 1.09 MMOL/L (ref 1.12–1.32)
CALCIUM IONIZED: 1.25 MMOL/L (ref 1.11–1.3)
GFR AFRICAN AMERICAN: >60
GFR AFRICAN AMERICAN: >60
GFR NON-AFRICAN AMERICAN: >60
GFR NON-AFRICAN AMERICAN: >60
GLUCOSE BLD-MCNC: 196 MG/DL (ref 60–115)
GLUCOSE BLD-MCNC: 221 MG/DL (ref 60–115)
GLUCOSE BLD-MCNC: 222 MG/DL (ref 60–115)
GLUCOSE BLD-MCNC: 223 MG/DL (ref 60–115)
GLUCOSE BLD-MCNC: 241 MG/DL (ref 60–115)
GLUCOSE BLD-MCNC: 252 MG/DL (ref 60–115)
HCO3 ARTERIAL: 22 MMOL/L (ref 21–29)
HCO3 ARTERIAL: 22.1 MMOL/L (ref 21–29)
HEMOGLOBIN: 12 GM/DL (ref 13.5–17.5)
HEMOGLOBIN: 13.4 GM/DL (ref 13.5–17.5)
LACTATE: 1.42 MMOL/L (ref 0.4–2)
LACTATE: 1.71 MMOL/L (ref 0.4–2)
O2 SAT, ARTERIAL: 93 % (ref 93–100)
O2 SAT, ARTERIAL: 98 % (ref 93–100)
PCO2 ARTERIAL: 36 MM HG (ref 35–45)
PCO2 ARTERIAL: 41 MM HG (ref 35–45)
PERFORMED ON: ABNORMAL
PH ARTERIAL: 7.34 (ref 7.35–7.45)
PH ARTERIAL: 7.4 (ref 7.35–7.45)
PO2 ARTERIAL: 108 MM HG (ref 75–108)
PO2 ARTERIAL: 67 MM HG (ref 75–108)
POC CHLORIDE: 102 MEQ/L (ref 99–110)
POC CHLORIDE: 104 MEQ/L (ref 99–110)
POC CREATININE: 0.9 MG/DL (ref 0.9–1.3)
POC CREATININE: 1 MG/DL (ref 0.9–1.3)
POC FIO2: 4
POC FIO2: 50
POC HEMATOCRIT: 35 % (ref 41–53)
POC HEMATOCRIT: 39 % (ref 41–53)
POC POTASSIUM: 3.9 MEQ/L (ref 3.5–5.1)
POC POTASSIUM: 4 MEQ/L (ref 3.5–5.1)
POC SAMPLE TYPE: ABNORMAL
POC SAMPLE TYPE: ABNORMAL
POC SODIUM: 133 MEQ/L (ref 136–145)
POC SODIUM: 133 MEQ/L (ref 136–145)
TCO2 ARTERIAL: 23 (ref 22–29)
TCO2 ARTERIAL: 23 (ref 22–29)

## 2020-08-22 PROCEDURE — 6360000002 HC RX W HCPCS: Performed by: INTERNAL MEDICINE

## 2020-08-22 PROCEDURE — 37799 UNLISTED PX VASCULAR SURGERY: CPT

## 2020-08-22 PROCEDURE — 99233 SBSQ HOSP IP/OBS HIGH 50: CPT | Performed by: PSYCHIATRY & NEUROLOGY

## 2020-08-22 PROCEDURE — 6370000000 HC RX 637 (ALT 250 FOR IP): Performed by: INTERNAL MEDICINE

## 2020-08-22 PROCEDURE — 87205 SMEAR GRAM STAIN: CPT

## 2020-08-22 PROCEDURE — C9113 INJ PANTOPRAZOLE SODIUM, VIA: HCPCS | Performed by: INTERNAL MEDICINE

## 2020-08-22 PROCEDURE — 94761 N-INVAS EAR/PLS OXIMETRY MLT: CPT

## 2020-08-22 PROCEDURE — 87186 SC STD MICRODIL/AGAR DIL: CPT

## 2020-08-22 PROCEDURE — 2580000003 HC RX 258: Performed by: INTERNAL MEDICINE

## 2020-08-22 PROCEDURE — 2700000000 HC OXYGEN THERAPY PER DAY

## 2020-08-22 PROCEDURE — 85014 HEMATOCRIT: CPT

## 2020-08-22 PROCEDURE — 82803 BLOOD GASES ANY COMBINATION: CPT

## 2020-08-22 PROCEDURE — 84295 ASSAY OF SERUM SODIUM: CPT

## 2020-08-22 PROCEDURE — 82948 REAGENT STRIP/BLOOD GLUCOSE: CPT

## 2020-08-22 PROCEDURE — 82565 ASSAY OF CREATININE: CPT

## 2020-08-22 PROCEDURE — 87070 CULTURE OTHR SPECIMN AEROBIC: CPT

## 2020-08-22 PROCEDURE — 87077 CULTURE AEROBIC IDENTIFY: CPT

## 2020-08-22 PROCEDURE — 82330 ASSAY OF CALCIUM: CPT

## 2020-08-22 PROCEDURE — 94660 CPAP INITIATION&MGMT: CPT

## 2020-08-22 PROCEDURE — 94003 VENT MGMT INPAT SUBQ DAY: CPT

## 2020-08-22 PROCEDURE — 87147 CULTURE TYPE IMMUNOLOGIC: CPT

## 2020-08-22 PROCEDURE — 99233 SBSQ HOSP IP/OBS HIGH 50: CPT | Performed by: INTERNAL MEDICINE

## 2020-08-22 PROCEDURE — 94799 UNLISTED PULMONARY SVC/PX: CPT

## 2020-08-22 PROCEDURE — 89220 SPUTUM SPECIMEN COLLECTION: CPT

## 2020-08-22 PROCEDURE — 2000000000 HC ICU R&B

## 2020-08-22 PROCEDURE — 83605 ASSAY OF LACTIC ACID: CPT

## 2020-08-22 PROCEDURE — 99291 CRITICAL CARE FIRST HOUR: CPT | Performed by: INTERNAL MEDICINE

## 2020-08-22 PROCEDURE — 82435 ASSAY OF BLOOD CHLORIDE: CPT

## 2020-08-22 PROCEDURE — 71045 X-RAY EXAM CHEST 1 VIEW: CPT

## 2020-08-22 PROCEDURE — 84132 ASSAY OF SERUM POTASSIUM: CPT

## 2020-08-22 RX ORDER — POLYETHYLENE GLYCOL 3350 17 G/17G
17 POWDER, FOR SOLUTION ORAL DAILY
Status: DISCONTINUED | OUTPATIENT
Start: 2020-08-22 | End: 2020-08-25 | Stop reason: HOSPADM

## 2020-08-22 RX ADMIN — INSULIN LISPRO 9 UNITS: 100 INJECTION, SOLUTION INTRAVENOUS; SUBCUTANEOUS at 17:34

## 2020-08-22 RX ADMIN — CHLORHEXIDINE GLUCONATE 15 ML: 1.2 RINSE ORAL at 20:54

## 2020-08-22 RX ADMIN — Medication 10 ML: at 08:43

## 2020-08-22 RX ADMIN — INSULIN LISPRO 6 UNITS: 100 INJECTION, SOLUTION INTRAVENOUS; SUBCUTANEOUS at 10:10

## 2020-08-22 RX ADMIN — DOCUSATE SODIUM 100 MG: 50 LIQUID ORAL at 20:54

## 2020-08-22 RX ADMIN — CHLORHEXIDINE GLUCONATE 15 ML: 1.2 RINSE ORAL at 08:43

## 2020-08-22 RX ADMIN — PANTOPRAZOLE SODIUM 40 MG: 40 INJECTION, POWDER, FOR SOLUTION INTRAVENOUS at 08:43

## 2020-08-22 RX ADMIN — ASPIRIN 81 MG: 81 TABLET, COATED ORAL at 08:43

## 2020-08-22 RX ADMIN — PIPERACILLIN AND TAZOBACTAM 3.38 G: 3; .375 INJECTION, POWDER, FOR SOLUTION INTRAVENOUS at 14:22

## 2020-08-22 RX ADMIN — PIPERACILLIN AND TAZOBACTAM 3.38 G: 3; .375 INJECTION, POWDER, FOR SOLUTION INTRAVENOUS at 20:54

## 2020-08-22 RX ADMIN — ATORVASTATIN CALCIUM 80 MG: 40 TABLET, FILM COATED ORAL at 20:54

## 2020-08-22 RX ADMIN — TICAGRELOR 90 MG: 90 TABLET ORAL at 08:43

## 2020-08-22 RX ADMIN — PIPERACILLIN AND TAZOBACTAM 3.38 G: 3; .375 INJECTION, POWDER, FOR SOLUTION INTRAVENOUS at 05:17

## 2020-08-22 RX ADMIN — FENTANYL CITRATE 150 MCG/HR: 50 INJECTION, SOLUTION INTRAMUSCULAR; INTRAVENOUS at 04:14

## 2020-08-22 RX ADMIN — PROPOFOL 30 MCG/KG/MIN: 10 INJECTION, EMULSION INTRAVENOUS at 04:11

## 2020-08-22 RX ADMIN — TICAGRELOR 90 MG: 90 TABLET ORAL at 20:54

## 2020-08-22 RX ADMIN — INSULIN LISPRO 3 UNITS: 100 INJECTION, SOLUTION INTRAVENOUS; SUBCUTANEOUS at 21:49

## 2020-08-22 RX ADMIN — DOBUTAMINE HYDROCHLORIDE 7.5 MCG/KG/MIN: 200 INJECTION INTRAVENOUS at 01:50

## 2020-08-22 ASSESSMENT — PULMONARY FUNCTION TESTS
PIF_VALUE: 26
PIF_VALUE: 18
PIF_VALUE: 19
PIF_VALUE: 29
PIF_VALUE: 15
PIF_VALUE: 25
PIF_VALUE: 23
PIF_VALUE: 11
PIF_VALUE: 27
PIF_VALUE: 22
PIF_VALUE: 11
PIF_VALUE: 17
PIF_VALUE: 21
PIF_VALUE: 9
PIF_VALUE: 10
PIF_VALUE: 25
PIF_VALUE: 21
PIF_VALUE: 25
PIF_VALUE: 23
PIF_VALUE: 22
PIF_VALUE: 22
PIF_VALUE: 25
PIF_VALUE: 10
PIF_VALUE: 26
PIF_VALUE: 24
PIF_VALUE: 25
PIF_VALUE: 11
PIF_VALUE: 28
PIF_VALUE: 24
PIF_VALUE: 24
PIF_VALUE: 26
PIF_VALUE: 22
PIF_VALUE: 12
PIF_VALUE: 16
PIF_VALUE: 27
PIF_VALUE: 26
PIF_VALUE: 11
PIF_VALUE: 12
PIF_VALUE: 14
PIF_VALUE: 11
PIF_VALUE: 25
PIF_VALUE: 10
PIF_VALUE: 24
PIF_VALUE: 22
PIF_VALUE: 25
PIF_VALUE: 11

## 2020-08-22 ASSESSMENT — PAIN SCALES - GENERAL
PAINLEVEL_OUTOF10: 0
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
## 2020-08-22 NOTE — PROGRESS NOTES
Progress Note  Patient: Daniel Dubon. Unit/Bed: [facility]/IC11-01  YOB: 1975  MRN: [id number]  Acct: [de-identified]   Admitting Diagnosis: Cardiac arrest [facility]) [I46.9]  Admit Date:  8/19/2020  Hospital Day: 3    Chief Complaint: found down     Subjective/HPI: 38year old male with prior CAD and PCI LCx presents after semi accident and found unresponsive with vfib/cardiac arrest s/p defib. Posterior stemi s/p primary PCI of the LCx. ICM EF 25%. Trauma survey negative. Patient waking now and can follow commands. Still on drips, intubated. No obvious complaints. EKG:  Review of Systems:   Review of Systems      Physical Examination:    BP (!) 88/57   Pulse 105   Temp 97.9 °F (36.6 °C) (Core)   Resp 18   Ht 5' 10\" (1.778 m)   Wt 185 lb 6.5 oz (84.1 kg)   SpO2 99%   BMI 26.60 kg/m²    Physical Exam   Constitutional: No distress. He appears chronically ill and acutely ill. Intubated, sedated   HENT:   Mouth/Throat: Oropharynx is clear. Eyes: Pupils are equal, round, and reactive to light. Neck: Normal range of motion. No JVD present. Cardiovascular: Regular rhythm, S1 normal, S2 normal, normal heart sounds and intact distal pulses. Exam reveals no gallop. No murmur heard. Pulses:       Radial pulses are 2+ on the right side. Dorsalis pedis pulses are 2+ on the right side. Pulmonary/Chest: Effort normal and breath sounds normal. He has no wheezes. He has no rales. He exhibits no tenderness. Abdominal: Soft. Bowel sounds are normal.   Musculoskeletal: Normal range of motion. General: No edema. Neurological: He has intact cranial nerves. He exhibits altered mental status. Skin: Skin is warm and dry. No rash noted.        LABS:  CBC:   Lab Results   Component Value Date    WBC 11.3 08/21/2020    RBC 3.67 08/21/2020    HGB 10.5 08/21/2020    HCT 31.2 08/21/2020    MCV 84.9 08/21/2020    MCH 28.5 08/21/2020    MCHC 33.6 08/21/2020    RDW 13.0 08/21/2020  08/21/2020     CBC with Differential:    Lab Results   Component Value Date    WBC 11.3 08/21/2020    RBC 3.67 08/21/2020    HGB 10.5 08/21/2020    HCT 31.2 08/21/2020     08/21/2020    MCV 84.9 08/21/2020    MCH 28.5 08/21/2020    MCHC 33.6 08/21/2020    RDW 13.0 08/21/2020    LYMPHOPCT 6.7 08/21/2020    MONOPCT 3.1 08/21/2020    BASOPCT 0.3 08/21/2020    MONOSABS 0.3 08/21/2020    LYMPHSABS 0.8 08/21/2020    EOSABS 0.0 08/21/2020    BASOSABS 0.0 08/21/2020     CMP:    Lab Results   Component Value Date     08/21/2020    K 4.6 08/21/2020     08/21/2020    CO2 19 08/21/2020    BUN 19 08/21/2020    CREATININE 0.83 08/21/2020    GFRAA >60.0 08/21/2020    LABGLOM >60.0 08/21/2020    GLUCOSE 285 08/21/2020    PROT 6.5 08/19/2020    LABALBU 3.7 08/19/2020    CALCIUM 7.1 08/21/2020    BILITOT 0.3 08/19/2020    ALKPHOS 96 08/19/2020     08/19/2020     08/19/2020     BMP:    Lab Results   Component Value Date     08/21/2020    K 4.6 08/21/2020     08/21/2020    CO2 19 08/21/2020    BUN 19 08/21/2020    LABALBU 3.7 08/19/2020    CREATININE 0.83 08/21/2020    CALCIUM 7.1 08/21/2020    GFRAA >60.0 08/21/2020    LABGLOM >60.0 08/21/2020    GLUCOSE 285 08/21/2020     Magnesium:    Lab Results   Component Value Date    MG 1.6 08/20/2020     Troponin:    Lab Results   Component Value Date    TROPONINI 0.018 08/19/2020         Assessment/Plan:    Active Hospital Problems    Diagnosis Date Noted    Cardiac arrest [facility]) [I46.9] 08/19/2020           DAPT, statin, no b-blocker due to hypotension. Continue to monitor neuro status. Hypothermia protocol. Appreciate pulmonary, internal med and trauma help. Continue cardiac supportive care. DAPT, statin, b-blocker and RF modification      Electronically signed by Alyssa Farah.  Donald Gill MD [facility] Director of Cardiology Services and Cardiac Catheterization Laboratory  [facility], [place]   on 8/22/2020 at 7:50 AM

## 2020-08-22 NOTE — FLOWSHEET NOTE
0800- am assessment completed, vss. Patient on ventilator, sedated, arctic sun on normothermia. Hypothermia therapy and rewarming completed. 0900- arctic sun discontinued    1015- all sedation stopped. Patient awake and following commands. Able to lift head off the pillow. Patient squeezed my hand on command. Lifted arm off the pillow when I asked him. Respiratory culture obtained and sent to lab. Patient on cpap, keeps going apneic. Had to put patient back on assist control setting on the vent. 1225-'patient extubated himself,now on 4l nc    1400- patient wanted to have a drink of water. Small sip of water and the patient started coughing.     1500- patient asking what happened to me

## 2020-08-22 NOTE — PROGRESS NOTES
Physician Progress Note    2020   12:56 PM    Name:  Joann Wilde MRN:    [id number]     IP Day: 3     Admit Date: 2020 10:44 AM  PCP: Atiya Ovalle MD    Code Status:  Full Code      Assessment and Plan: Active Problems/ diagnosis:     V.fib arrest/ STEMI  CAD s/p PCI  Possible aspiration pneumonia  DKA/ DM   Acute Respiratory failure due to above     Plan    - resume insulin gtt until diet is started  - resume IVF, frequent BMP, Mg, phos  - hypothermia protocol  - CC is on board  - cardiology is primary      -Patient is doing much better today. He still intubated but he is awake and responding to commands. He is moving all his extremities. He is on CMV mode on the ventilator.  -He continues to require levophed, his sedation is being titrated to get him more comfortable. His blood pressure is stable.  -He is getting rewarmed today.  -Patient is DKA, he is off insulin drip. Endocrine is following.  -Critical care is on board.  -Cardiology is primary.   -Patient is on a spontaneous breathing trial this morning. Discussed with critical care. Most likely will get extubated. He would likely need BiPAP overnight.  -His glucose within acceptable range. He is out of DKA. He is off all the drips.  -Patient is on Zosyn for possible aspiration  -Patient is on aspirin, Brilinta as per cardiology.  -Resume DVT and GI prophylaxis    DVT PPx     Subjective:     Poor historian due to intubation however he is following command on spontaneous breathing trial.    Physical Examination:      Vitals:  BP 97/64   Pulse 110   Temp 97.9 °F (36.6 °C) (Core)   Resp 16   Ht 5' 10\" (1.778 m)   Wt 185 lb 6.5 oz (84.1 kg)   SpO2 99%   BMI 26.60 kg/m²   Temp (24hrs), Av °F (36.7 °C), Min:97.9 °F (36.6 °C), Max:98.1 °F (36.7 °C)      General appearance: Intubated but awake and following commands  Mental Status:  Following commands  Lungs: Mechanical sound transmitted bilaterally  Heart: regular rate    Abdomen: soft, nontender, nondistended, bowel sounds present, no masses  Extremities: no edema, redness, tenderness in the calves  Skin: no gross lesions, rashes  Fuentes in place   ETT intact     Data:     Labs:  Recent Labs     08/21/20  1000 08/21/20  2156 08/22/20  0916 08/22/20  1240   WBC 9.7 11.3*  --   --    HGB 11.9* 10.5* 12.0* 13.4*   * 100*  --   --      Recent Labs     08/21/20  1000 08/21/20  1306 08/21/20  2157 08/22/20  0916 08/22/20  1240     --  129*  --   --    K 4.1 4.4 4.6  --   --      --  102  --   --    CO2 15*  --  19*  --   --    BUN 16  --  19  --   --    CREATININE 0.59*  --  0.83 1.0 0.9   GLUCOSE 167*  --  285*  --   --      No results for input(s): AST, ALT, ALB, BILITOT, ALKPHOS in the last 72 hours.     Current Facility-Administered Medications   Medication Dose Route Frequency Provider Last Rate Last Dose    docusate (COLACE) 50 MG/5ML liquid 100 mg  100 mg Oral BID Mark Penaloza MD        polyethylene glycol (GLYCOLAX) packet 17 g  17 g Oral Daily Mark Penaloza MD        insulin lispro (HUMALOG) injection vial 0-18 Units  0-18 Units Subcutaneous Q6H Mark Penaloza MD   6 Units at 08/22/20 1010    DOBUTamine (DOBUTREX) 500 mg in dextrose 5 % 250 mL infusion  2.5 mcg/kg/min Intravenous Continuous Maggy Uriostegui MD 16.8 mL/hr at 08/22/20 0700 7.5 mcg/kg/min at 08/22/20 0700    pantoprazole (PROTONIX) injection 40 mg  40 mg Intravenous Daily Maggy Uriostegui MD   40 mg at 08/22/20 0843    And    sodium chloride (PF) 0.9 % injection 10 mL  10 mL Intravenous Daily Maggy Uriosetgui MD   10 mL at 08/22/20 0843    glucose (GLUTOSE) 40 % oral gel 15 g  15 g Oral PRN Johnny Fox MD        dextrose 50 % IV solution  12.5 g Intravenous PRN Johnny Fox MD        glucagon (rDNA) injection 1 mg  1 mg Intramuscular PRN Johnny Fox MD        dextrose 5 % solution  100 mL/hr Intravenous PRN Johnny Fox MD        fentaNYL (SUBLIMAZE) 1,000 mcg in sodium chloride 0.9 % 100 mL infusion  25 mcg/hr Intravenous Continuous Paulie Elaine MD 5 mL/hr at 08/22/20 1005 50 mcg/hr at 08/22/20 1005    piperacillin-tazobactam (ZOSYN) 3.375 g in dextrose 5 % 50 mL IVPB extended infusion (mini-bag)  3.375 g Intravenous Mehnaz Pascual MD   Stopped at 08/22/20 0917    heparin (porcine) injection 4,000 Units  4,000 Units Intravenous Once Korinadavid Johnson, DO        aspirin suppository 300 mg  300 mg Rectal Once Korinadavid Johnson, DO        insulin regular (HUMULIN R;NOVOLIN R) injection 15 Units  15 Units Intravenous Once Korina SALVADOR SaraviaMetz, DO        sodium bicarbonate 8.4 % injection 50 mEq  50 mEq Intravenous Once Korina Burrell, DO        ticagrelor (BRILINTA) tablet 90 mg  90 mg Oral BID Jerrica Govea MD   90 mg at 08/22/20 0843    aspirin EC tablet 81 mg  81 mg Oral Daily Jerrica Govea MD   81 mg at 08/22/20 0843    atorvastatin (LIPITOR) tablet 80 mg  80 mg Oral Nightly Jerrica Govea MD   80 mg at 08/21/20 2052    propofol injection  10 mcg/kg/min Intravenous Titrated Paulie Elaine MD   Stopped at 08/22/20 1002    dextrose 50 % IV solution  12.5 g Intravenous PRN Verona Daniels Sedar, DO        promethazine (PHENERGAN) tablet 12.5 mg  12.5 mg Oral Q6H PRN Paulie Elaine MD        Or    ondansetron (ZOFRAN) injection 4 mg  4 mg Intravenous Q6H PRN Paulie Elaine MD        chlorhexidine (PERIDEX) 0.12 % solution 15 mL  15 mL Mouth/Throat BID Paulie Elaine MD   15 mL at 08/22/20 0843    norepinephrine (LEVOPHED) 16 mg in dextrose 5 % 250 mL infusion  2 mcg/min Intravenous Continuous Paulie Elaine MD   Stopped at 08/22/20 1003       Additional work up or/and treatment plan may be added today or then after based on clinical progression. I am managing a portion of pt care. Some medical issues are handled by other specialists. Additional work up and treatment should be done in out pt setting by pt PCP and other out pt providers.       In addition

## 2020-08-22 NOTE — PROGRESS NOTES
Pulmonary & Critical Care Medicine ICU Progress Note  Chief complaint : Post cardiac arrest, respiratory failure    Subjunctive/24 hour events :   Patient seen and examined during multidisciplinary rounds with RN, charge nurse, RT, pharmacy, dietitian, and social service. Patient on vent, awake, follows commands, no fever, bowels moving, tolerated CPAP trial, urine output 1500 cc, no reported vomiting, currently not on feeding      Social History     Tobacco Use    Smoking status: Current Every Day Smoker     Packs/day: 1.00     Years: 20.00     Pack years: 20.00    Smokeless tobacco: Never Used   Substance Use Topics    Alcohol use: Yes     Comment: rarely     History reviewed. No pertinent family history. Recent Labs     08/22/20  0916 08/22/20  1240   PHART 7.339* 7.396   [id number] 41 36   PO2ART 108* 67*       MV Settings:  Vent Mode: CPAP Rate Set: 20 bmp/Vt Ordered: 450 mL/ /FiO2 : 50 %           IV:   DOBUTamine 7.5 mcg/kg/min (08/22/20 0700)    dextrose      fentaNYL (SUBLIMAZE) infusion 50 mcg/hr (08/22/20 1005)    propofol Stopped (08/22/20 1002)    norepinephrine Stopped (08/22/20 1003)       Vitals:  BP 97/64   Pulse 110   Temp 97.9 °F (36.6 °C) (Core)   Resp 16   Ht 5' 10\" (1.778 m)   Wt 185 lb 6.5 oz (84.1 kg)   SpO2 99%   BMI 26.60 kg/m²    Tmax:        Intake/Output Summary (Last 24 hours) at 8/22/2020 1247  Last data filed at 8/22/2020 0540  Gross per 24 hour   Intake 1489 ml   Output 1600 ml   Net -111 ml       EXAM:  General: alert, on vent, no distress  Head: normocephalic, atraumatic  Eyes:No gross abnormalities. ENT:  MMM no lesions, ET tube in  Neck:  supple, no masses and right IJ significant portion is out  Chest : clear to auscultation bilaterally- no wheezes, rales or rhonchi, normal air movement, no respiratory distress  Heart[de-identified] Heart sounds are normal.  Regular rate and rhythm without murmur, gallop or rub.   ABD:  symmetric, soft, non-tender  Musculoskeletal : no cyanosis, no clubbing and no edema  Neuro: Follows commands and moves all 4 extremities  Skin: No rashes or nodules noted. Lymph node:  no cervical nodes  Urology: Yes Fuentes   Psychiatric: calm    Medications:  Scheduled Meds:   docusate  100 mg Oral BID    polyethylene glycol  17 g Oral Daily    insulin lispro  0-18 Units Subcutaneous Q6H    pantoprazole  40 mg Intravenous Daily    And    sodium chloride (PF)  10 mL Intravenous Daily    piperacillin-tazobactam  3.375 g Intravenous Q8H    heparin (porcine)  4,000 Units Intravenous Once    aspirin  300 mg Rectal Once    insulin regular  15 Units Intravenous Once    sodium bicarbonate  50 mEq Intravenous Once    ticagrelor  90 mg Oral BID    aspirin  81 mg Oral Daily    atorvastatin  80 mg Oral Nightly    chlorhexidine  15 mL Mouth/Throat BID       PRN Meds:  glucose, dextrose, glucagon (rDNA), dextrose, dextrose, promethazine **OR** ondansetron    Results: reviewed by me   CBC:   Recent Labs     08/21/20  0400  08/21/20  1000 08/21/20  2156 08/22/20  0916 08/22/20  1240   WBC 11.1*  --  9.7 11.3*  --   --    HGB 13.5*   < > 11.9* 10.5* 12.0* 13.4*   HCT 39.5*  --  34.2* 31.2*  --   --    MCV 84.7  --  83.8 84.9  --   --    *  --  102* 100*  --   --     < > = values in this interval not displayed. BMP:   Recent Labs     08/20/20  0200 08/20/20  0831  08/20/20  1400  08/21/20  0400  08/21/20  1000 08/21/20  1306 08/21/20  2157 08/22/20  0916 08/22/20  1240    135  --  129*   < > 135  --  135  --  129*  --   --    K 3.5 4.0  --  3.8   < > 4.1   < > 4.1 4.4 4.6  --   --    * 107  --  105   < > 106  --  106  --  102  --   --    CO2 17* 15*  --  16*   < > 14*  --  15*  --  19*  --   --    PHOS 2.1* 3.3  --  2.5  --   --   --   --   --   --   --   --    BUN 20 16  --  15   < > 15  --  16  --  19  --   --    CREATININE 0.60* 0.50*   < > 0.42*   < > 0.46*   < > 0.59*  --  0.83 1.0 0.9    < > = values in this interval not displayed. LIVER PROFILE: No results for input(s): AST, ALT, LIPASE, BILIDIR, BILITOT, ALKPHOS in the last 72 hours. Invalid input(s): AMYLASE,  ALB  PT/INR:   Recent Labs     08/19/20  1746   PROTIME 15.0*   INR 1.2     APTT:   Recent Labs     08/19/20  1746   APTT 66.6*     UA:No results for input(s): NITRITE, COLORU, PHUR, LABCAST, WBCUA, RBCUA, MUCUS, TRICHOMONAS, YEAST, BACTERIA, CLARITYU, SPECGRAV, LEUKOCYTESUR, UROBILINOGEN, BILIRUBINUR, BLOODU, GLUCOSEU, AMORPHOUS in the last 72 hours. Invalid input(s): [place] Mage    Cultures:  MS Staph aureus in wound culture  Films:  CXR reviewed by me and it showed congested, right IJ line is high in the internal jugular      Assessment: This is a critically ill patient at risk of deterioration / death , needing close ICU monitoring and intervention due to below noted problems   · Acute hypoxic respiratory failure  · Post V. fib cardiac arrest status post stenting   · Aspiration pneumonia  · Coronary artery disease status post stent  · Ischemic cardiomyopathy EF 25%  · Diabetic ketoacidosis, resolved    Recommendation  · CPAP trial, patient self extubated almost 1.5-hour after starting CPAP trial  · Placed on BiPAP post extubation  · BiPAP as needed and while asleep  · Continue Zosyn  · Respiratory culture sent today  · Change to high sliding scale  · Target blood sugar 1 40-1 80  · Bedside swallow eval if does not pass then speech eval  · If needed will place a CorPak  · Removed central line  · Incentive spirometry and flutter valve  · O2 to keep sat 90 to 92%          Due to the immediate potential for life-threatening deterioration due to acute respiratory failure I spent 40  minutes providing critical care.  This time is excluding time spent performing procedures.           Electronically signed by Mark Penaloza MD,  [facility] ,on 8/22/2020 at 12:47 PM

## 2020-08-23 LAB
ANION GAP SERPL CALCULATED.3IONS-SCNC: 12 MEQ/L (ref 9–15)
BASOPHILS ABSOLUTE: 0.1 K/UL (ref 0–0.2)
BASOPHILS RELATIVE PERCENT: 0.5 %
BUN BLDV-MCNC: 17 MG/DL (ref 6–20)
CALCIUM SERPL-MCNC: 8.1 MG/DL (ref 8.5–9.9)
CHLORIDE BLD-SCNC: 100 MEQ/L (ref 95–107)
CO2: 22 MEQ/L (ref 20–31)
CREAT SERPL-MCNC: 0.6 MG/DL (ref 0.7–1.2)
EOSINOPHILS ABSOLUTE: 0 K/UL (ref 0–0.7)
EOSINOPHILS RELATIVE PERCENT: 0 %
GFR AFRICAN AMERICAN: >60
GFR NON-AFRICAN AMERICAN: >60
GLUCOSE BLD-MCNC: 145 MG/DL (ref 70–99)
GLUCOSE BLD-MCNC: 168 MG/DL (ref 60–115)
GLUCOSE BLD-MCNC: 184 MG/DL (ref 60–115)
GLUCOSE BLD-MCNC: 204 MG/DL (ref 60–115)
GLUCOSE BLD-MCNC: 210 MG/DL (ref 60–115)
GLUCOSE BLD-MCNC: 264 MG/DL (ref 60–115)
HCT VFR BLD CALC: 26.3 % (ref 42–52)
HEMOGLOBIN: 9 G/DL (ref 14–18)
LYMPHOCYTES ABSOLUTE: 0.7 K/UL (ref 1–4.8)
LYMPHOCYTES RELATIVE PERCENT: 6 %
MCH RBC QN AUTO: 29.1 PG (ref 27–31.3)
MCHC RBC AUTO-ENTMCNC: 34.2 % (ref 33–37)
MCV RBC AUTO: 84.9 FL (ref 80–100)
MONOCYTES ABSOLUTE: 0.3 K/UL (ref 0.2–0.8)
MONOCYTES RELATIVE PERCENT: 2.6 %
NEUTROPHILS ABSOLUTE: 11.1 K/UL (ref 1.4–6.5)
NEUTROPHILS RELATIVE PERCENT: 90.9 %
PDW BLD-RTO: 13.2 % (ref 11.5–14.5)
PERFORMED ON: ABNORMAL
PLATELET # BLD: 111 K/UL (ref 130–400)
POTASSIUM SERPL-SCNC: 4 MEQ/L (ref 3.4–4.9)
RBC # BLD: 3.1 M/UL (ref 4.7–6.1)
SODIUM BLD-SCNC: 134 MEQ/L (ref 135–144)
WBC # BLD: 12.2 K/UL (ref 4.8–10.8)

## 2020-08-23 PROCEDURE — 92610 EVALUATE SWALLOWING FUNCTION: CPT

## 2020-08-23 PROCEDURE — 80048 BASIC METABOLIC PNL TOTAL CA: CPT

## 2020-08-23 PROCEDURE — 6360000002 HC RX W HCPCS: Performed by: INTERNAL MEDICINE

## 2020-08-23 PROCEDURE — 6370000000 HC RX 637 (ALT 250 FOR IP): Performed by: INTERNAL MEDICINE

## 2020-08-23 PROCEDURE — 85025 COMPLETE CBC W/AUTO DIFF WBC: CPT

## 2020-08-23 PROCEDURE — 99233 SBSQ HOSP IP/OBS HIGH 50: CPT | Performed by: INTERNAL MEDICINE

## 2020-08-23 PROCEDURE — 94660 CPAP INITIATION&MGMT: CPT

## 2020-08-23 PROCEDURE — 2060000000 HC ICU INTERMEDIATE R&B

## 2020-08-23 PROCEDURE — 2580000003 HC RX 258: Performed by: INTERNAL MEDICINE

## 2020-08-23 PROCEDURE — C9113 INJ PANTOPRAZOLE SODIUM, VIA: HCPCS | Performed by: INTERNAL MEDICINE

## 2020-08-23 PROCEDURE — 99233 SBSQ HOSP IP/OBS HIGH 50: CPT | Performed by: PSYCHIATRY & NEUROLOGY

## 2020-08-23 RX ORDER — CARVEDILOL 3.12 MG/1
3.12 TABLET ORAL 2 TIMES DAILY
Status: DISCONTINUED | OUTPATIENT
Start: 2020-08-23 | End: 2020-08-25 | Stop reason: HOSPADM

## 2020-08-23 RX ORDER — INSULIN GLARGINE 100 [IU]/ML
10 INJECTION, SOLUTION SUBCUTANEOUS DAILY
Status: DISCONTINUED | OUTPATIENT
Start: 2020-08-23 | End: 2020-08-25

## 2020-08-23 RX ORDER — PANTOPRAZOLE SODIUM 40 MG/1
40 TABLET, DELAYED RELEASE ORAL
Status: DISCONTINUED | OUTPATIENT
Start: 2020-08-24 | End: 2020-08-25 | Stop reason: HOSPADM

## 2020-08-23 RX ADMIN — TICAGRELOR 90 MG: 90 TABLET ORAL at 21:48

## 2020-08-23 RX ADMIN — CARVEDILOL 3.12 MG: 3.12 TABLET, FILM COATED ORAL at 21:48

## 2020-08-23 RX ADMIN — ASPIRIN 81 MG: 81 TABLET, COATED ORAL at 08:10

## 2020-08-23 RX ADMIN — Medication 10 ML: at 08:10

## 2020-08-23 RX ADMIN — PIPERACILLIN AND TAZOBACTAM 3.38 G: 3; .375 INJECTION, POWDER, FOR SOLUTION INTRAVENOUS at 14:11

## 2020-08-23 RX ADMIN — DOCUSATE SODIUM 100 MG: 50 LIQUID ORAL at 21:47

## 2020-08-23 RX ADMIN — PIPERACILLIN AND TAZOBACTAM 3.38 G: 3; .375 INJECTION, POWDER, FOR SOLUTION INTRAVENOUS at 21:47

## 2020-08-23 RX ADMIN — INSULIN LISPRO 6 UNITS: 100 INJECTION, SOLUTION INTRAVENOUS; SUBCUTANEOUS at 21:49

## 2020-08-23 RX ADMIN — VANCOMYCIN HYDROCHLORIDE 1250 MG: 5 INJECTION, POWDER, LYOPHILIZED, FOR SOLUTION INTRAVENOUS at 16:24

## 2020-08-23 RX ADMIN — PANTOPRAZOLE SODIUM 40 MG: 40 INJECTION, POWDER, FOR SOLUTION INTRAVENOUS at 08:10

## 2020-08-23 RX ADMIN — TICAGRELOR 90 MG: 90 TABLET ORAL at 08:10

## 2020-08-23 RX ADMIN — INSULIN LISPRO 6 UNITS: 100 INJECTION, SOLUTION INTRAVENOUS; SUBCUTANEOUS at 17:36

## 2020-08-23 RX ADMIN — ATORVASTATIN CALCIUM 80 MG: 40 TABLET, FILM COATED ORAL at 21:47

## 2020-08-23 RX ADMIN — CHLORHEXIDINE GLUCONATE 15 ML: 1.2 RINSE ORAL at 21:47

## 2020-08-23 RX ADMIN — INSULIN GLARGINE 10 UNITS: 100 INJECTION, SOLUTION SUBCUTANEOUS at 11:25

## 2020-08-23 RX ADMIN — CHLORHEXIDINE GLUCONATE 15 ML: 1.2 RINSE ORAL at 08:11

## 2020-08-23 RX ADMIN — PIPERACILLIN AND TAZOBACTAM 3.38 G: 3; .375 INJECTION, POWDER, FOR SOLUTION INTRAVENOUS at 05:36

## 2020-08-23 RX ADMIN — DOCUSATE SODIUM 100 MG: 50 LIQUID ORAL at 08:10

## 2020-08-23 RX ADMIN — POLYETHYLENE GLYCOL 3350 17 G: 17 POWDER, FOR SOLUTION ORAL at 08:11

## 2020-08-23 RX ADMIN — INSULIN LISPRO 3 UNITS: 100 INJECTION, SOLUTION INTRAVENOUS; SUBCUTANEOUS at 08:12

## 2020-08-23 ASSESSMENT — PULMONARY FUNCTION TESTS
PIF_VALUE: 9
PIF_VALUE: 11
PIF_VALUE: 10
PIF_VALUE: 9
PIF_VALUE: 9
PIF_VALUE: 3

## 2020-08-23 ASSESSMENT — PAIN SCALES - GENERAL: PAINLEVEL_OUTOF10: 0

## 2020-08-23 NOTE — FLOWSHEET NOTE
Patint sitting up in bed, diaphoretic. Pt oriented to person and place. 98.0--100-no ectopy--130/84 P. Ox is 92% on 4 litres oxygen. One Touch  204. Monitor room states NO ARRYTHMIAS.

## 2020-08-23 NOTE — PROGRESS NOTES
PT ON NC SPO2 86-88% PLACED PT ON BIPAP 12/6 40% SPO2 AT 94-96% PT RESTING COMFORTABLE AT THIS TIME ON BIPAP.

## 2020-08-23 NOTE — PROGRESS NOTES
Physician Progress Note    8/23/2020   11:02 AM    Name:  Dodie Boston. MRN:    [id number]     IP Day: 4     Admit Date: 8/19/2020 10:44 AM  PCP: Gerson Chao MD    Code Status:  Full Code      Assessment and Plan: Active Problems/ diagnosis:     V.fib arrest/ STEMI  CAD s/p PCI  Possible aspiration pneumonia versus MRSA pneumonia. Sputum culture positive for MRSA. DKA/ DM   Acute Respiratory failure due to above     Plan  8/20  - resume insulin gtt until diet is started  - resume IVF, frequent BMP, Mg, phos  - hypothermia protocol  - CC is on board  - cardiology is primary    8/21  -Patient is doing much better today. He still intubated but he is awake and responding to commands. He is moving all his extremities. He is on CMV mode on the ventilator.  -He continues to require levophed, his sedation is being titrated to get him more comfortable. His blood pressure is stable.  -He is getting rewarmed today.  -Patient is DKA, he is off insulin drip. Endocrine is following.  -Critical care is on board.  -Cardiology is primary. 8/22  -Patient is on a spontaneous breathing trial this morning. Discussed with critical care. Most likely will get extubated. He would likely need BiPAP overnight.  -His glucose within acceptable range. He is out of DKA. He is off all the drips.  -Patient is on Zosyn for possible aspiration  -Patient is on aspirin, Brilinta as per cardiology.  -Resume DVT and GI prophylaxis    8/23  -Patient self extubated. He is doing well on nasal cannula. He will need BiPAP versus CPAP overnight as per pulmonary.  -Endocrine is following for insulin management,.  -Speech-language pathology evaluation, PT OT.  -Resume aspirin Brilinta as per cardiology  -And start vancomycin, add pharmacy consult. DVT PPx     Subjective:     Patient is alert and to x3. He feels very tired. No dyspnea. No chest pain.     Physical Examination:      Vitals:  /65   Pulse 101   Temp 98.5 °F (36.9 °C) (Oral)   Resp 19   Ht 5' 10\" (1.778 m)   Wt 185 lb 6.5 oz (84.1 kg)   SpO2 96%   BMI 26.60 kg/m²   Temp (24hrs), Av.4 °F (36.9 °C), Min:98.1 °F (36.7 °C), Max:98.5 °F (36.9 °C)      General appearance: Alert and to x3, appears tired. Mental Status: Following commands, appropriate  Lungs: Normal effort, good air movement bilaterally  . Heart: regular rate    Abdomen: soft, nontender, nondistended, bowel sounds present, no masses  Extremities: no edema, redness, tenderness in the calves  Skin: no gross lesions, rashes  Fuentes in place     Data:     Labs:  Recent Labs     20  1240 20  0537   WBC 11.3*  --   --  12.2*   HGB 10.5*   < > 13.4* 9.0*   *  --   --  111*    < > = values in this interval not displayed. Recent Labs     20  1240 20  0537   *  --   --  134*   K 4.6  --   --  4.0     --   --  100   CO2 19*  --   --  22   BUN 19  --   --  17   CREATININE 0.83   < > 0.9 0.60*   GLUCOSE 285*  --   --  145*    < > = values in this interval not displayed. No results for input(s): AST, ALT, ALB, BILITOT, ALKPHOS in the last 72 hours.     Current Facility-Administered Medications   Medication Dose Route Frequency Provider Last Rate Last Dose    carvedilol (COREG) tablet 3.125 mg  3.125 mg Oral BID Rebecca Alcala MD        insulin glargine (LANTUS) injection vial 10 Units  10 Units Subcutaneous Daily Shawn Parada MD        docusate (COLACE) 50 MG/5ML liquid 100 mg  100 mg Oral BID Ricci Russell MD   100 mg at 20 0810    polyethylene glycol (GLYCOLAX) packet 17 g  17 g Oral Daily Ricci Russell MD   17 g at 20 0811    insulin lispro (HUMALOG) injection vial 0-18 Units  0-18 Units Subcutaneous Q6H Ricci Russell MD   3 Units at 20 0812    pantoprazole (PROTONIX) injection 40 mg  40 mg Intravenous Daily Mik Knight MD   40 mg at 20 0810    And    sodium chloride (PF) 0.9 % injection 10 mL  10 mL Intravenous Daily Froylan Joyce MD   10 mL at 08/23/20 0810    glucose (GLUTOSE) 40 % oral gel 15 g  15 g Oral PRN Iris Busch MD        dextrose 50 % IV solution  12.5 g Intravenous PRN Iris Busch MD        glucagon (rDNA) injection 1 mg  1 mg Intramuscular PRN Iris Busch MD        dextrose 5 % solution  100 mL/hr Intravenous PRN Iris Busch MD        piperacillin-tazobactam (ZOSYN) 3.375 g in dextrose 5 % 50 mL IVPB extended infusion (mini-bag)  3.375 g Intravenous Q8H Froylan Joyce MD 12.5 mL/hr at 08/23/20 0536 3.375 g at 08/23/20 0536    heparin (porcine) injection 4,000 Units  4,000 Units Intravenous Once Korina Johnson, DO        aspirin suppository 300 mg  300 mg Rectal Once Korina Johnson DO        insulin regular (HUMULIN R;NOVOLIN R) injection 15 Units  15 Units Intravenous Once Korina Johnson, DO        sodium bicarbonate 8.4 % injection 50 mEq  50 mEq Intravenous Once Korina Cox, DO        ticagrelor (BRILINTA) tablet 90 mg  90 mg Oral BID Sterling Hamm MD   90 mg at 08/23/20 0810    aspirin EC tablet 81 mg  81 mg Oral Daily Sterling Hamm MD   81 mg at 08/23/20 0810    atorvastatin (LIPITOR) tablet 80 mg  80 mg Oral Nightly Sterling Hamm MD   80 mg at 08/22/20 2054    dextrose 50 % IV solution  12.5 g Intravenous PRN Komal Karla Sedar, DO        promethazine (PHENERGAN) tablet 12.5 mg  12.5 mg Oral Q6H PRN Froylan Joyce MD        Or    ondansetron (ZOFRAN) injection 4 mg  4 mg Intravenous Q6H PRN Froylan Joyce MD        chlorhexidine (PERIDEX) 0.12 % solution 15 mL  15 mL Mouth/Throat BID Froylan Joyce MD   15 mL at 08/23/20 8892       Additional work up or/and treatment plan may be added today or then after based on clinical progression. I am managing a portion of pt care. Some medical issues are handled by other specialists.  Additional work up and treatment should be done in out pt setting by pt PCP and other out pt providers. In addition to examining and evaluating pt, I spent additional time explaining care, normaland abnormal findings, and treatment plan. All of pt questions were answered. Counseling, diet and education were provided. Case will be discussed with nursing staff when appropriate. Family will be updated if and when appropriate.        Electronically signed by Bradford Daugherty DO on 8/23/2020 at 11:02 AM

## 2020-08-23 NOTE — PROGRESS NOTES
Progress Note  Patient: Italia Quiros. Unit/Bed: [facility]/IC11-01  YOB: 1975  MRN: [id number]  Acct: [de-identified]   Admitting Diagnosis: Cardiac arrest [facility]) [I46.9]  Admit Date:  8/19/2020  Hospital Day: 4    Chief Complaint: found down     Subjective/HPI: 38year old male with prior CAD and PCI LCx presents after semi accident and found unresponsive with vfib/cardiac arrest s/p defib. Posterior stemi s/p primary PCI of the LCx. ICM EF 25%. Trauma survey negative. Patient extubated yesterday on bipap overnight, more alert. Still not able to answer appropriately. Improved, no chest pain. No shortness of breath. EKG:  Review of Systems:   Review of Systems      Physical Examination:    /71   Pulse 104   Temp 98.5 °F (36.9 °C) (Oral)   Resp 15   Ht 5' 10\" (1.778 m)   Wt 185 lb 6.5 oz (84.1 kg)   SpO2 91%   BMI 26.60 kg/m²    Physical Exam   Constitutional: No distress. He appears chronically ill and acutely ill. Intubated, sedated   HENT:   Mouth/Throat: Oropharynx is clear. Eyes: Pupils are equal, round, and reactive to light. Neck: Normal range of motion. No JVD present. Cardiovascular: Regular rhythm, S1 normal, S2 normal, normal heart sounds and intact distal pulses. Exam reveals no gallop. No murmur heard. Pulses:       Radial pulses are 2+ on the right side. Dorsalis pedis pulses are 2+ on the right side. Pulmonary/Chest: Effort normal and breath sounds normal. He has no wheezes. He has no rales. He exhibits no tenderness. Abdominal: Soft. Bowel sounds are normal.   Musculoskeletal: Normal range of motion. General: No edema. Neurological: He has intact cranial nerves. He exhibits altered mental status. Skin: Skin is warm and dry. No rash noted.        LABS:  CBC:   Lab Results   Component Value Date    WBC 12.2 08/23/2020    RBC 3.10 08/23/2020    HGB 9.0 08/23/2020    HCT 26.3 08/23/2020    MCV 84.9 08/23/2020    MCH 29.1 08/23/2020 MCHC 34.2 08/23/2020    RDW 13.2 08/23/2020     08/23/2020     CBC with Differential:    Lab Results   Component Value Date    WBC 12.2 08/23/2020    RBC 3.10 08/23/2020    HGB 9.0 08/23/2020    HCT 26.3 08/23/2020     08/23/2020    MCV 84.9 08/23/2020    MCH 29.1 08/23/2020    MCHC 34.2 08/23/2020    RDW 13.2 08/23/2020    LYMPHOPCT 6.0 08/23/2020    MONOPCT 2.6 08/23/2020    BASOPCT 0.5 08/23/2020    MONOSABS 0.3 08/23/2020    LYMPHSABS 0.7 08/23/2020    EOSABS 0.0 08/23/2020    BASOSABS 0.1 08/23/2020     CMP:    Lab Results   Component Value Date     08/23/2020    K 4.0 08/23/2020     08/23/2020    CO2 22 08/23/2020    BUN 17 08/23/2020    CREATININE 0.60 08/23/2020    GFRAA >60.0 08/23/2020    LABGLOM >60.0 08/23/2020    GLUCOSE 145 08/23/2020    PROT 6.5 08/19/2020    LABALBU 3.7 08/19/2020    CALCIUM 8.1 08/23/2020    BILITOT 0.3 08/19/2020    ALKPHOS 96 08/19/2020     08/19/2020     08/19/2020     BMP:    Lab Results   Component Value Date     08/23/2020    K 4.0 08/23/2020     08/23/2020    CO2 22 08/23/2020    BUN 17 08/23/2020    LABALBU 3.7 08/19/2020    CREATININE 0.60 08/23/2020    CALCIUM 8.1 08/23/2020    GFRAA >60.0 08/23/2020    LABGLOM >60.0 08/23/2020    GLUCOSE 145 08/23/2020     Magnesium:    Lab Results   Component Value Date    MG 1.6 08/20/2020     Troponin:    Lab Results   Component Value Date    TROPONINI 0.018 08/19/2020         Assessment/Plan:    Active Hospital Problems    Diagnosis Date Noted    Cardiac arrest [facility]) [I46.9] 08/19/2020           DAPT, statin, no b-blocker due to hypotension. Continue to monitor neuro status. Hypothermia protocol. Appreciate pulmonary, internal med and trauma help. Continue cardiac supportive care. DAPT, statin, b-blocker and RF modification    Add coreg today. Electronically signed by Naty Heredia.  Hayden Winchester MD [facility] Director of Cardiology Services and Cardiac Catheterization Laboratory  Time Dk Odom   on 8/23/2020 at 9:33 AM
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
## 2020-08-23 NOTE — FLOWSHEET NOTE
Patient arrived on [facility] [facility] from icu via bed. Pt awake, oriented to person and place. Pt very weak, not able to lift his arms. Pt stated that he coulnt breathe, P. Ox 87%. Pts oxygen not in his nose, I applied oxygen at 4 litres nasal cannula. P. Ox is now 92%. Pt denies pain. Right groin is soft, no hematoma noted. ST on tele. Bilat compression stckings on legs. Falls precautions. 98.6--104--18--124/74.

## 2020-08-23 NOTE — FLOWSHEET NOTE
Pt asked what happened to him. Inserted NG pt tolerated well. Pt more alert less lethargic through night. Tolerated bipap very well. Gave pt very small sips of water through night.

## 2020-08-23 NOTE — PROGRESS NOTES
Pharmacy Note  Vancomycin Consult    Chuy Abraham is a 38 y.o. male started on Vancomycin for MRSA pneumonia consult received from Dr. Whitney Hernandez to manage therapy. Also receiving the following antibiotics: ZOSYN 3.375 g Q8H. Patient Active Problem List   Diagnosis    DM (diabetes mellitus), type 2, uncontrolled ([facility] [address]    CAD (coronary artery disease)    Cardiac arrest ([facility]       Allergies:  Metformin and related     Temp max: 98.6F    Recent Labs     08/21/20  2157 08/23/20  0537   BUN 19 17       Recent Labs     08/22/20  1240 08/23/20  0537   CREATININE 0.9 0.60*       Recent Labs     08/21/20  2156 08/23/20  0537   WBC 11.3* 12.2*         Intake/Output Summary (Last 24 hours) at 8/23/2020 1456  Last data filed at 8/23/2020 1115  Gross per 24 hour   Intake 353 ml   Output 1850 ml   Net -1497 ml       Culture Date      Source                       Results  Procedure  Component  Value  Units  Date/Time    Culture, Respiratory [[id number]]  (Abnormal)  Collected: 08/22/20 1020    Order Status: Completed  Specimen: Sputum, Suctioned  Updated: 08/23/20 1204     Gram Stain Result  Moderate WBC's   Few epithelial cells   Many Gram positive cocci in clusters-resembling Staph     Organism  Staph aureus MRSAAbnormal       CULTURE, RESPIRATORY  --Abnormal       Heavy growth   Sensitivity to follow   CONTACT PRECAUTIONS INDICATED   PBP2= POSITIVE   Abnormal      Narrative:      ORDER#: [id number]                          ORDERED BY: RODY LLANES   SOURCE: Sputum Suctioned Sputum            COLLECTED:  08/22/20 10:20   ANTIBIOTICS AT JENNIFER.:                      RECEIVED :  08/22/20 10:29   CALL  Brady  LC1W tel. [phone],   MRSA results called to and read back by Juan Schmitz, 08/23/2020 12:04, by [facility]        Ht Readings from Last 1 Encounters:   08/19/20 5' 10\" (1.778 m)        Wt Readings from Last 1 Encounters:   08/22/20 185 lb 6.5 oz (84.1 kg)         Body mass index is 26.6 kg/m².     Estimated Creatinine Clearance: 161 mL/min (A) (based on SCr of 0.6 mg/dL (L)). Goal Trough Level: 15 - 20 mcg/mL    Assessment/Plan:  Will initiate Vancomycin 1250 mg IV every 12 hours based on patient's weight and renal function. Timing of trough level will be determined based on culture results, renal function, and clinical response. Obtain trough 30 minutes prior to fourth dose on 08/25 @ 0330. Thank you for the consult. Will continue to follow.      Collin [facility]

## 2020-08-23 NOTE — PROGRESS NOTES
Pulmonary & Critical Care Medicine ICU Progress Note  Chief complaint : Post cardiac arrest, respiratory failure    Subjunctive/24 hour events :   Patient seen and examined during multidisciplinary rounds with RN, charge nurse, RT, pharmacy, dietitian, and social service. Extubated yesterday, awake, oriented to place and person, answer simple questions, moves all 4 extremities, no fever overnight, urine output is good, no bowel movement yet, has dysphagia currently with NG tube in, no nausea or vomiting, no fever or chills. Social History     Tobacco Use    Smoking status: Current Every Day Smoker     Packs/day: 1.00     Years: 20.00     Pack years: 20.00    Smokeless tobacco: Never Used   Substance Use Topics    Alcohol use: Yes     Comment: rarely     History reviewed. No pertinent family history. Recent Labs     08/22/20  0916 08/22/20  1240   PHART 7.339* 7.396   QHK2XAP 41 36   PO2ART 108* 67*       MV Settings:  Vent Mode: CPAP Rate Set: 15 bmp/Vt Ordered: 450 mL/ /FiO2 : 40 %           IV:   dextrose         Vitals:  /71   Pulse 104   Temp 98.5 °F (36.9 °C) (Oral)   Resp 15   Ht 5' 10\" (1.778 m)   Wt 185 lb 6.5 oz (84.1 kg)   SpO2 91%   BMI 26.60 kg/m²    Tmax:        Intake/Output Summary (Last 24 hours) at 8/23/2020 1013  Last data filed at 8/23/2020 0800  Gross per 24 hour   Intake 353 ml   Output 1400 ml   Net -1047 ml       EXAM:  General: alert, on vent, no distress  Head: normocephalic, atraumatic  Eyes:No gross abnormalities. ENT:  MMM no lesions,    Neck:  supple, no masses   Chest : Diminished air sounds, no wheezing, minimal basal rales, nontender, tympanic  Heart[de-identified] Heart sounds are normal.  Regular rate and rhythm without murmur, gallop or rub. ABD:  symmetric, soft, non-tender  Musculoskeletal : no cyanosis, no clubbing and no edema  Neuro: Awake, moves all 4 extremities  Skin: No rashes or nodules noted.   Lymph node:  no cervical nodes  Urology: Yes Fuentes BACTERIA, CLARITYU, SPECGRAV, LEUKOCYTESUR, UROBILINOGEN, BILIRUBINUR, BLOODU, GLUCOSEU, AMORPHOUS in the last 72 hours. Invalid input(s): Latasha Victor    Cultures:  Negative so far  Films:  CXR reviewed by me pulmonary edema improved    Assessment: This is a critically ill patient at risk of deterioration / death , needing close ICU monitoring and intervention due to below noted problems   · Acute hypoxic respiratory failure  · Post V. fib cardiac arrest status post stenting   · Aspiration pneumonia, versus pulmonary edema significantly improved favoring edema  · Coronary artery disease status post stent  · Ischemic cardiomyopathy EF 25%  · Diabetic ketoacidosis, resolved    Recommendation  ·  BiPAP as needed and while asleep  · Continue Zosyn  · Check procalcitonin tomorrow if negative DC Zosyn  · Follow-up respiratory cultures  · Change to high sliding scale  · Target blood sugar 1 40-1 80  · Start Lantus  · Tube feed while n.p.o.  · Continue laxatives  · Speech therapy  · Incentive spirometry and flutter valve  · O2 to keep sat 90 to 92%  · Physical therapy  · Transfer to stepdown unit       I spent 30 min with this patient, greater the 50% of this time was spent in counseling and/or coordinating of care.         Electronically signed by Sarah Freed MD,  [facility] ,on 8/23/2020 at 10:13 AM

## 2020-08-23 NOTE — FLOWSHEET NOTE
1045- gave report to melissa turner. #20 in the right forearm inserted. 1100- swallow evaluation done by therapist patient okay for minced and moist diet. Order put in by dr. Shekhar Morley. Dr. Shekhar Morley said to keep NG in, and may remove it if the patient is eating and swallowing well.     1120- updated the wife ani on the patients transfer to room 191, [address] [facility]

## 2020-08-23 NOTE — PROGRESS NOTES
[facility]   Facility/Department: [address] [place] [address]  Speech Language Pathology  Clinical Bedside Swallow Evaluation    NAME:Ant Billingsley. : 1975 (38 y.o.)   MRN: [id number]  ROOM: [place] [address]  ADMISSION DATE: 2020  PATIENT DIAGNOSIS(ES): Cardiac arrest [facility]) [I46.9]  Chief Complaint   Patient presents with    Cardiac Arrest     Patient Active Problem List    Diagnosis Date Noted    Cardiac arrest ([facility] [address] 2020    CAD (coronary artery disease) 2015    DM (diabetes mellitus), type 2, uncontrolled ([facility] [address] 2015     History reviewed. No pertinent past medical history. History reviewed. No pertinent surgical history. Allergies   Allergen Reactions    Metformin And Related        DATE ONSET: 2020    Date of Evaluation: 2020   Evaluating Therapist: Rolf Tsai SLP    Recommended Diet and Intervention  Diet Solids Recommendation: Dysphagia Minced and Moist (Dysphagia II)  Liquid Consistency Recommendation: Moderately Thick (Honey)  Recommended Form of Meds: Crushed in puree as able  Recommendations: Dysphagia treatment  Therapeutic Interventions: Diet tolerance monitoring, Oral motor exercises, Pharyngeal exercises, Laryngeal exercises    Compensatory Swallowing Strategies  Compensatory Swallowing Strategies: Small bites/sips;Upright as possible for all oral intake;Assist feed; Alternate solids and liquids    Reason for Referral  Shahana Steen was referred for a bedside swallow evaluation to assess the efficiency of his swallow function, identify signs and symptoms of aspiration and make recommendations regarding safe dietary consistencies, effective compensatory strategies, and safe eating environment. General  Chart Reviewed: Yes  Subjective  Subjective: Patient was confused throughout evaluation but cooperative.   Behavior/Cognition: Cooperative;Confused  O2 Device: Nasal cannula  Communication Observation: Functional  Follows Directions: Simple  Dentition: Adequate  Patient Positioning: Upright in bed  Baseline Vocal Quality: Weak  Volitional Cough: Weak  Prior Dysphagia History: Patient denies hx of dysphagia  Consistencies Administered: Dysphagia Soft and Bite-Sized (Dysphagia III); Dysphagia Minced and Moist (Dysphagia II); Dysphagia Pureed (Dysphagia I); Thin - teaspoon;Honey - cup;Honey - teaspoon    Current Diet level:  Current Diet : NPO  Current Liquid Diet : NPO    Oral Motor Deficits  Oral/Motor  Oral Motor: Exceptions to [facility]  Labial Strength: Reduced  Lingual Strength: Reduced    Oral Phase Dysfunction  Oral Phase  Oral Phase: Exceptions  Oral Phase Dysfunction  Impaired Mastication: Mechanical soft  Decreased Anterior to Posterior Transit: Mechanical soft  Lingual/Palatal Residue: Mechanical soft  Oral Phase  Oral Phase - Comment: Patient presents with moderate oral dysphagia characterized by impaired mastication of soft solids, decreased anterior to posterior transit, and lingual residue. Patient demonstrating anterior pocketing. Patient unaware of residue and required cues to clear with lingual sweep. Patient demonstrated no s/s of aspiration with minced and moist consistency. Indicators of Pharyngeal Phase Dysfunction   Pharyngeal Phase  Pharyngeal Phase: Exceptions  Indicators of Pharyngeal Phase Dysfunction  Delayed Swallow: Thin - cup  Decreased Laryngeal Elevation: Thin - cup  Wet Vocal Quality: Thin - cup  Throat Clearing - Immediate: Thin - cup  Change in Vital Signs: Thin - cup  Pharyngeal Phase   Pharyngeal: Patient presents with moderate pharyngeal dysphagia characterized by delayed swallow onset, decreased laryngeal elevation, immediate throat clear, and increased breathing rate following thin liquids. Patient demonstrated no overt s/s of aspiration with HTL. Impression  Dysphagia Diagnosis: Severe oral stage dysphagia; Severe pharyngeal stage dysphagia  Dysphagia Impression : Patient presents with moderate oral dysphagia characterized by impaired mastication of soft solids, decreased anterior to posterior transit, and lingual residue. Patient demonstrating anterior pocketing. Patient unaware of residue and required cues to clear with lingual sweep. Patient demonstrated no s/s of aspiration with minced and moist consistency. Patient presents with moderate pharyngeal dysphagia characterized by delayed swallow onset, decreased laryngeal elevation, immediate throat clear, and increased breathing rate following thin liquids. Patient demonstrated no overt s/s of aspiration with HTL. Dysphagia Outcome Severity Scale: Level 3: Moderate dysphagia- Total assisstance, supervision or strategies. Two or more diet consistencies restricted     Treatment Plan  Requires SLP Intervention: Yes  Duration/Frequency of Treatment: 2-3x's for LOS or until goals met     Treatment/Goals  Short-term Goals  Timeframe for Short-term Goals: 1-2 weeks  Goal 1: Patient will tolerate minced and moist and HTL diet without s/s of aspiration in 5/5 trials. Goal 2: Patient will participate in therapeutic trials of soft and bite size and NTL without s/s of aspiration in 5/5 trials for possible diet upgrade. Goal 3: Pt will complete oral motor ROM and strengthening exercises with 80% accuracy, given cues as needed, in order to strengthen lingual/labial/buccal musculature to promote safety and efficiency of oral phase of swallow and decrease risk for pocketing. Goal 4: Pt will improve hyolaryngeal elevation by performing hyolaryngeal exercises (Mendelson, Shaker, Falsetto, etc) with 80% accuracy in order to strengthen and establish a more effective swallow. Goal 5: Pt will complete pharyngeal strengthening exercises (such as the Francine, Effortful swallow, etc) with 80% acc in order to strengthen and establish a more effective swallow.   Long-term Goals  Timeframe for Long-term Goals: 1-2 weeks for LOS or until goals met  Goal 1: Patient will tolerate least restrictive diet without s/s of aspiration    Prognosis  Prognosis  Prognosis for safe diet advancement: good  Barriers to reach goals: severity of dysphagia  Individuals consulted  Consulted and agree with results and recommendations: Patient;RN(SUNIL ellison)    Education  Patient Education: Patient educated on results of BSE  Patient Education Response: Verbalizes understanding;Needs reinforcement  Safety Devices in place: Yes  Type of devices: Bed alarm in place;Call light within reach    Pain Assessment:  Initial Assessment:  Patient denies pain. Re-assessment:  Patient denies pain.        Therapy Time  SLP Individual Minutes  Time In: 0557  Time Out: 9512  Minutes: 20        Signature: Electronically signed by CLAU Armando on 8/23/2020 at 11:26 AM

## 2020-08-24 ENCOUNTER — APPOINTMENT (OUTPATIENT)
Dept: CT IMAGING | Age: 45
DRG: 246 | End: 2020-08-24

## 2020-08-24 LAB
ANION GAP SERPL CALCULATED.3IONS-SCNC: 13 MEQ/L (ref 9–15)
BASOPHILS ABSOLUTE: 0 K/UL (ref 0–0.2)
BASOPHILS RELATIVE PERCENT: 0.1 %
BUN BLDV-MCNC: 17 MG/DL (ref 6–20)
CALCIUM SERPL-MCNC: 8.5 MG/DL (ref 8.5–9.9)
CHLORIDE BLD-SCNC: 101 MEQ/L (ref 95–107)
CO2: 25 MEQ/L (ref 20–31)
CREAT SERPL-MCNC: 0.67 MG/DL (ref 0.7–1.2)
CULTURE, RESPIRATORY: ABNORMAL
EOSINOPHILS ABSOLUTE: 0 K/UL (ref 0–0.7)
EOSINOPHILS RELATIVE PERCENT: 0.1 %
GFR AFRICAN AMERICAN: >60
GFR NON-AFRICAN AMERICAN: >60
GLUCOSE BLD-MCNC: 122 MG/DL (ref 60–115)
GLUCOSE BLD-MCNC: 201 MG/DL (ref 70–99)
GLUCOSE BLD-MCNC: 222 MG/DL (ref 60–115)
GLUCOSE BLD-MCNC: 231 MG/DL (ref 60–115)
GLUCOSE BLD-MCNC: 240 MG/DL (ref 60–115)
GLUCOSE BLD-MCNC: 240 MG/DL (ref 60–115)
GRAM STAIN RESULT: ABNORMAL
HCT VFR BLD CALC: 27.2 % (ref 42–52)
HEMOGLOBIN: 9.4 G/DL (ref 14–18)
LYMPHOCYTES ABSOLUTE: 1 K/UL (ref 1–4.8)
LYMPHOCYTES RELATIVE PERCENT: 7.7 %
MCH RBC QN AUTO: 29.3 PG (ref 27–31.3)
MCHC RBC AUTO-ENTMCNC: 34.6 % (ref 33–37)
MCV RBC AUTO: 84.8 FL (ref 80–100)
MONOCYTES ABSOLUTE: 0.6 K/UL (ref 0.2–0.8)
MONOCYTES RELATIVE PERCENT: 4.9 %
NEUTROPHILS ABSOLUTE: 11 K/UL (ref 1.4–6.5)
NEUTROPHILS RELATIVE PERCENT: 87.2 %
ORGANISM: ABNORMAL
PDW BLD-RTO: 12.6 % (ref 11.5–14.5)
PERFORMED ON: ABNORMAL
PLATELET # BLD: 156 K/UL (ref 130–400)
POTASSIUM SERPL-SCNC: 3.8 MEQ/L (ref 3.4–4.9)
PROCALCITONIN: 0.57 NG/ML (ref 0–0.15)
RBC # BLD: 3.21 M/UL (ref 4.7–6.1)
SODIUM BLD-SCNC: 139 MEQ/L (ref 135–144)
WBC # BLD: 12.7 K/UL (ref 4.8–10.8)

## 2020-08-24 PROCEDURE — APPSS30 APP SPLIT SHARED TIME 16-30 MINUTES: Performed by: NURSE PRACTITIONER

## 2020-08-24 PROCEDURE — 2060000000 HC ICU INTERMEDIATE R&B

## 2020-08-24 PROCEDURE — 97166 OT EVAL MOD COMPLEX 45 MIN: CPT

## 2020-08-24 PROCEDURE — 85025 COMPLETE CBC W/AUTO DIFF WBC: CPT

## 2020-08-24 PROCEDURE — 6360000002 HC RX W HCPCS: Performed by: INTERNAL MEDICINE

## 2020-08-24 PROCEDURE — 95816 EEG AWAKE AND DROWSY: CPT

## 2020-08-24 PROCEDURE — 99232 SBSQ HOSP IP/OBS MODERATE 35: CPT | Performed by: INTERNAL MEDICINE

## 2020-08-24 PROCEDURE — 36415 COLL VENOUS BLD VENIPUNCTURE: CPT

## 2020-08-24 PROCEDURE — 2700000000 HC OXYGEN THERAPY PER DAY

## 2020-08-24 PROCEDURE — 80048 BASIC METABOLIC PNL TOTAL CA: CPT

## 2020-08-24 PROCEDURE — 70450 CT HEAD/BRAIN W/O DYE: CPT

## 2020-08-24 PROCEDURE — 94660 CPAP INITIATION&MGMT: CPT

## 2020-08-24 PROCEDURE — 2580000003 HC RX 258: Performed by: INTERNAL MEDICINE

## 2020-08-24 PROCEDURE — 6370000000 HC RX 637 (ALT 250 FOR IP): Performed by: INTERNAL MEDICINE

## 2020-08-24 PROCEDURE — 97162 PT EVAL MOD COMPLEX 30 MIN: CPT

## 2020-08-24 PROCEDURE — 99254 IP/OBS CNSLTJ NEW/EST MOD 60: CPT | Performed by: INTERNAL MEDICINE

## 2020-08-24 PROCEDURE — 84145 PROCALCITONIN (PCT): CPT

## 2020-08-24 PROCEDURE — 99233 SBSQ HOSP IP/OBS HIGH 50: CPT | Performed by: INTERNAL MEDICINE

## 2020-08-24 RX ADMIN — TICAGRELOR 90 MG: 90 TABLET ORAL at 09:30

## 2020-08-24 RX ADMIN — INSULIN GLARGINE 10 UNITS: 100 INJECTION, SOLUTION SUBCUTANEOUS at 09:54

## 2020-08-24 RX ADMIN — INSULIN LISPRO 6 UNITS: 100 INJECTION, SOLUTION INTRAVENOUS; SUBCUTANEOUS at 17:31

## 2020-08-24 RX ADMIN — CARVEDILOL 3.12 MG: 3.12 TABLET, FILM COATED ORAL at 22:34

## 2020-08-24 RX ADMIN — CARVEDILOL 3.12 MG: 3.12 TABLET, FILM COATED ORAL at 09:30

## 2020-08-24 RX ADMIN — TICAGRELOR 90 MG: 90 TABLET ORAL at 22:34

## 2020-08-24 RX ADMIN — DOCUSATE SODIUM 100 MG: 50 LIQUID ORAL at 09:31

## 2020-08-24 RX ADMIN — VANCOMYCIN HYDROCHLORIDE 1250 MG: 5 INJECTION, POWDER, LYOPHILIZED, FOR SOLUTION INTRAVENOUS at 15:57

## 2020-08-24 RX ADMIN — PIPERACILLIN AND TAZOBACTAM 3.38 G: 3; .375 INJECTION, POWDER, FOR SOLUTION INTRAVENOUS at 22:34

## 2020-08-24 RX ADMIN — POLYETHYLENE GLYCOL 3350 17 G: 17 POWDER, FOR SOLUTION ORAL at 09:32

## 2020-08-24 RX ADMIN — ASPIRIN 81 MG: 81 TABLET, COATED ORAL at 09:30

## 2020-08-24 RX ADMIN — PANTOPRAZOLE SODIUM 40 MG: 40 TABLET, DELAYED RELEASE ORAL at 09:53

## 2020-08-24 RX ADMIN — VANCOMYCIN HYDROCHLORIDE 1250 MG: 5 INJECTION, POWDER, LYOPHILIZED, FOR SOLUTION INTRAVENOUS at 04:11

## 2020-08-24 RX ADMIN — PIPERACILLIN AND TAZOBACTAM 3.38 G: 3; .375 INJECTION, POWDER, FOR SOLUTION INTRAVENOUS at 12:51

## 2020-08-24 RX ADMIN — INSULIN LISPRO 6 UNITS: 100 INJECTION, SOLUTION INTRAVENOUS; SUBCUTANEOUS at 22:42

## 2020-08-24 RX ADMIN — ATORVASTATIN CALCIUM 80 MG: 40 TABLET, FILM COATED ORAL at 22:34

## 2020-08-24 RX ADMIN — PIPERACILLIN AND TAZOBACTAM 3.38 G: 3; .375 INJECTION, POWDER, FOR SOLUTION INTRAVENOUS at 04:11

## 2020-08-24 RX ADMIN — DOCUSATE SODIUM 100 MG: 50 LIQUID ORAL at 22:34

## 2020-08-24 RX ADMIN — INSULIN LISPRO 6 UNITS: 100 INJECTION, SOLUTION INTRAVENOUS; SUBCUTANEOUS at 09:54

## 2020-08-24 ASSESSMENT — ENCOUNTER SYMPTOMS
ABDOMINAL PAIN: 0
VOMITING: 0
SHORTNESS OF BREATH: 1
COLOR CHANGE: 0
EYES NEGATIVE: 1
COUGH: 0
CHEST TIGHTNESS: 0
NAUSEA: 0
WHEEZING: 0
DIARRHEA: 0
TROUBLE SWALLOWING: 0
SHORTNESS OF BREATH: 0
GASTROINTESTINAL NEGATIVE: 1

## 2020-08-24 ASSESSMENT — PAIN SCALES - GENERAL: PAINLEVEL_OUTOF10: 0

## 2020-08-24 NOTE — PROGRESS NOTES
Equipment Evaluation, Education, & procurement, Transfer Training, Gait Training, Safety Education & Training, Balance Training, Endurance Training, Stair training, Patient/Caregiver Education & Training  Safety Devices  Type of devices: Bed alarm in place, Call light within reach    Goals:  Patient goals : agrees to PT POC  Long term goals  Long term goal 1: pt to be supervision with bed mobility  Long term goal 2: pt to be supervision with transfers  Long term goal 3: pt to ambulate >50 ft with SBA and [employer]  Long term goal 4: pt to be indep with HEP    [facility] (6 CLICK) [address] Mobility Raw Score : 11     Therapy Time:   Individual   Time In 1040   Time Out 1100   Minutes 20           Armando Marvin PT, 08/24/20 at 4:52 PM         Definitions for assistance levels  Independent = pt does not require any physical supervision or assistance from another person for activity completion. Device may be needed.   Stand by assistance = pt requires verbal cues or instructions from another person, close to but not touching, to perform the activity  Minimal assistance= pt performs 75% or more of the activity; assistance is required to complete the activity  Moderate assistance= pt performs 50% of the activity; assistance is required to complete the activity  Maximal assistance = pt performs 25% of the activity; assistance is required to complete the activity  Dependent = pt requires total physical assistance to accomplish the task

## 2020-08-24 NOTE — PROGRESS NOTES
Comprehensive Nutrition Assessment    Type and Reason for Visit:  Reassess    Nutrition Recommendations/Plan:   Continue Diet dysphagia minced and moist with moderately thick liquids. Start fortified pudding BID, frozen ONS at dinner. Please discontinue TF order    Nutrition Assessment:  Pt extubated 8/22, NGT was placed post extubation but pt has since removed it. Remains at high nutrition risk due to dysphagia and poor po intake, will start thickened nutrition supplements and continue to monitor    Malnutrition Assessment:  Malnutrition Status: At risk for malnutrition (Comment)    Context:  Acute Illness     Findings of the 6 clinical characteristics of malnutrition:  Energy Intake:  7 - 50% or less of estimated energy requirements for 5 or more days  Weight Loss:  Unable to assess     Body Fat Loss:  No significant body fat loss     Muscle Mass Loss:  No significant muscle mass loss    Fluid Accumulation:  Unable to assess     Strength:  Not Performed    Estimated Daily Nutrient Needs:  Energy (kcal):  4261-3059 (kg x 25-28); Weight Used for Energy Requirements:  Admission(74.8 kg)     Protein (g):  77-85 gm (kg x 1.0-1.1); Weight Used for Protein Requirements:  Current        Fluid (ml/day):  ~2000 ml (1 ml/kcal); Weight Used for Fluid Requirements:  Current      Nutrition Related Findings:  intubated (8/19-22), PMH: DM, HTN, heart disease, labs reviewed, gluc: 122, peripheral line, no maintenance IVF, BM (8/20), meds include colace BID, miralax, trace generalized edema noted, BSE with recommendations for Dysphagia Minced & Moist with moderately thich (honey) liquids. Per nsg, pt taking po liquids very well, but not eating well. Wounds:  None       Current Nutrition Therapies:    Diet Tube Feed Continuous/Cyclic w/ Diet  DIET CARB CONTROL;  Dysphagia Minced and Moist; Moderately Thick (Honey)    Anthropometric Measures:  · Height: 5' 10\" (177.8 cm)  · Current Body Weight: 171 lb (77.6 kg)(8/24-[facility])   · Admission Body Weight: 165 lb (74.8 kg)(estimated)    · Usual Body Weight: (UTD-no recent records in EMR, 175 lb (2018-office visit))     · Ideal Body Weight: 166 lbs  · BMI: 24.5  · BMI Categories: Normal Weight (BMI 18.5-24. 9)       Nutrition Diagnosis:   · Inadequate oral intake related to swallowing difficulty as evidenced by intake 0-25%, swallow study results      Nutrition Interventions:   Food and/or Nutrient Delivery:  Continue Current Diet, Start Oral Nutrition Supplement(Continue Diet dysphagia minced and moist with moderately thick liquids. Start fortified pudding BID, frozen ONS at dinner.   Please discontinue TF order)  Nutrition Education/Counseling:  No recommendation at this time   Coordination of Nutrition Care:  Continued Inpatient Monitoring    Goals:  New goal with extubation: po intake > 50% of meals and supplements, stable weight ~ 170 lb       Nutrition Monitoring and Evaluation:   Food/Nutrient Intake Outcomes:  Diet Advancement/Tolerance, Food and Nutrient Intake, Supplement Intake  Physical Signs/Symptoms Outcomes:  Biochemical Data, Chewing or Swallowing, Weight     Electronically signed by Alex Mejia RD, LD on 8/24/20 at 11:21 AM EDT

## 2020-08-24 NOTE — PROGRESS NOTES
[facility]   Facility/Department: [address]  Speech Language Pathology    Ramon Canela.  1975  [id number]    Date: 8/24/2020      Speech Therapy attempted to see Wychristine Prietoling. on this date for a/an:    Treatment    Pt was unable to be seen due to: Other:Pt currently having EEG in room.         Electronically signed by CLAU Caldera on 8/24/20 at 2:03 PM EDT

## 2020-08-24 NOTE — PROGRESS NOTES
[facility] OCCUPATIONAL THERAPY EVALUATION - ACUTE     NAME: Naty Galloway. : 1975 (38 y.o.)  MRN: [id number]  CODE STATUS: Full Code  Room: [facility]/J871-21    Date of Service: 2020    Patient Diagnosis(es): Cardiac arrest [facility]) [I46.9]   Chief Complaint   Patient presents with    Cardiac Arrest     Patient Active Problem List    Diagnosis Date Noted    Cardiac arrest [facility]) 2020    CAD (coronary artery disease) 2015    DM (diabetes mellitus), type 2, uncontrolled ([facility] [address] 2015        History reviewed. No pertinent past medical history. History reviewed. No pertinent surgical history. Restrictions:Fall,O2        Safety Devices: Safety Devices  Safety Devices in place: Yes  Type of devices: All fall risk precautions in place   Initially in place: Yes    Subjective:\"I just want to sleep. \"       Pain Reassessment: \"My chest is sore. \"  Pt reports pain not quantified.           Prior Level of Function:  Social/Functional History  Lives With: Spouse  Type of Home: Apartment  Home Layout: One level  Home Access: Level entry  Bathroom Shower/Tub: Tub/Shower unit  Home Equipment: (no equipment)  ADL Assistance: Independent  Homemaking Assistance: Independent  Homemaking Responsibilities: Yes  Ambulation Assistance: Independent  Transfer Assistance: Independent  Active : Yes  Occupation: Full time employment  Type of occupation:     OBJECTIVE:     Orientation Status:  Orientation  Overall Orientation Status: Within Functional Limits    Observation:  Observation/Palpation  Posture: Fair  Observation: Mild flexed posture with head down  Edema: Left UE edema noted    Cognition Status:  Cognition  Cognition Comment: Pt with increased time for processing required    Perception Status:  Perception  Overall Perceptual Status: WFL    Sensation Status:  Sensation  Overall Sensation Status: WFL    Vision and Hearing Status:  Vision  Vision: Within Functional Limits  Hearing  Hearing: Within functional limits     ROM:   LUE AROM (degrees)  LUE General AROM: limited shoulder flexion and abduction  Left Hand AROM (degrees)  Left Hand AROM: WFL  RUE AROM (degrees)  RUE AROM : WFL  Right Hand AROM (degrees)  Right Hand AROM: WFL    Strength:  LUE Strength  L Hand General: 4/5  LUE Strength Comment: limited shoulder  RUE Strength  Gross RUE Strength: WFL  R Hand General: 4/5    Coordination, Tone, Quality of Movement: Tone RUE  RUE Tone: Normotonic  Tone LUE  LUE Tone: Normotonic  Coordination  Movements Are Fluid And Coordinated: No  Coordination and Movement description: Decreased speed, Right UE, Left UE    Hand Dominance:  Hand Dominance  Hand Dominance: Right    ADL Status:  ADL  Feeding: Unable to assess(comment)  Grooming: Minimal assistance  UE Bathing: Moderate assistance  LE Bathing: Maximum assistance  UE Dressing: Moderate assistance  LE Dressing: Maximum assistance  Toileting: Unable to assess(comment)          Therapy key for assistance levels -   Independent = Pt. is able to perform task with no assistance but may require a device   Stand by assistance = Pt. does not perform task at an independent level but does not need physical assistance, requires verbal cues  Minimal, Moderate, Maximal Assistance = Pt. requires physical assistance (25%, 50%, 75% assist from helper) for task but is able to actively participate in task   Dependent = Pt. requires total assistance with task and is not able to actively participate with task completion     Functional Mobility:     Transfers  Sit to stand: Moderate assistance, 2 Person assistance  Stand to sit: Moderate assistance, 2 Person assistance    Bed Mobility  Bed mobility  Supine to Sit: Moderate assistance  Sit to Supine:  Moderate assistance    Seated and Standing Balance:  Balance  Sitting Balance: Stand by assistance  Standing Balance: Maximum assistance    Functional Endurance:  Activity Tolerance  Activity Tolerance: Patient limited by fatigue  Activity Tolerance: poor/poor+    D/C Recommendations:  OT D/C RECOMMENDATIONS  REQUIRES OT FOLLOW UP: Yes    Equipment Recommendations:       OT Education:        OT Follow Up:  OT D/C RECOMMENDATIONS  REQUIRES OT FOLLOW UP: Yes       Assessment/Discharge Disposition:     Performance deficits / Impairments: Decreased functional mobility , Decreased balance, Decreased ADL status, Decreased high-level IADLs, Decreased endurance, Decreased strength, Decreased posture  Prognosis: Good  Discharge Recommendations: Continue to assess pending progress  Decision Making: Medium Complexity  History: 2 complexities  Exam: 7 deficits  Assistance / Modification: Max A    Six Click Score    How much help for putting on and taking off regular lower body clothing?: A Lot  How much help for Bathing?: A Lot  How much help for Toileting?: A Lot  How much help for putting on and taking off regular upper body clothing?: A Lot  How much help for taking care of personal grooming?: A Lot  How much help for eating meals?: None  AM-[facility] Inpatient Daily Activity Raw Score: 14  AM-PAC Inpatient ADL T-Scale Score : 33.39  ADL Inpatient CMS 0-100% Score: 59.67    Plan:  Plan  Times per week: 1-4x/wk  Current Treatment Recommendations: Strengthening, Endurance Training, Neuromuscular Re-education, Self-Care / ADL, Balance Training, Functional Mobility Training    Goals:   Patient will:    - Improve functional endurance to tolerate/complete 8-15 mins of ADL's  - Be SBA in UB ADLs   - Be Min A in LB ADLs  - Be MIn A in ADL transfers without LOB  - Be MIn A in toileting tasks  - Improve bilateral UE strength and endurance to Fair+ in order to participate in self-care activities as projected. - Access appropriate D/C site with as few architectural barriers as possible. Patient Goal: Patient goals :  To return to home when ready      Discussed and agreed upon: Yes Comments:     Therapy Time:   OT Individual Minutes  Time In: 2179  Time Out: 1100  Minutes: 25    Electronically signed by:    TOPHER Pimentel  1/04/0192, 1:31 PM Electronically signed by TOPHER Pimentel on 3/80/22 at 12:55 PM EDT

## 2020-08-24 NOTE — PROGRESS NOTES
Wesly Dale. is a 38 y.o. male patient. pt was seen and evaluated, going for EEG, spoke with nursing about the care    Current Facility-Administered Medications   Medication Dose Route Frequency Provider Last Rate Last Dose    carvedilol (COREG) tablet 3.125 mg  3.125 mg Oral BID Yolanda Burns MD   3.125 mg at 08/24/20 0930    insulin glargine (LANTUS) injection vial 10 Units  10 Units Subcutaneous Daily Brock Chung MD   10 Units at 08/24/20 0954    pantoprazole (PROTONIX) tablet 40 mg  40 mg Oral QAM AC Yazid R Ashok, DO   40 mg at 08/24/20 1293    vancomycin (VANCOCIN) intermittent dosing (placeholder)   Other RX Placeholder [facility], DO        vancomycin (VANCOCIN) 1,250 mg in dextrose 5 % 250 mL IVPB  1,250 mg Intravenous Q12H [facility],  mL/hr at 08/24/20 1557 1,250 mg at 08/24/20 1557    docusate (COLACE) 50 MG/5ML liquid 100 mg  100 mg Oral BID Brock Chung MD   100 mg at 08/24/20 0931    polyethylene glycol (GLYCOLAX) packet 17 g  17 g Oral Daily Brock Chung MD   17 g at 08/24/20 0932    insulin lispro (HUMALOG) injection vial 0-18 Units  0-18 Units Subcutaneous Q6H Brock Chung MD   6 Units at 08/24/20 0954    glucose (GLUTOSE) 40 % oral gel 15 g  15 g Oral PRN Mariana Hernandez MD        dextrose 50 % IV solution  12.5 g Intravenous PRN Mariana Hernandez MD        glucagon (rDNA) injection 1 mg  1 mg Intramuscular PRN Mariana Hernandez MD        dextrose 5 % solution  100 mL/hr Intravenous PRN Mariana Hernandez MD        piperacillin-tazobactam (ZOSYN) 3.375 g in dextrose 5 % 50 mL IVPB extended infusion (mini-bag)  3.375 g Intravenous Marisela Combs MD   Stopped at 08/24/20 1558    heparin (porcine) injection 4,000 Units  4,000 Units Intravenous Once Korina Root DO        aspirin suppository 300 mg  300 mg Rectal Once Korina Johnson DO        insulin regular (HUMULIN R;NOVOLIN R) injection 15 Units  15 Units Intravenous Once Korina FRANCO Raniman, DO        sodium bicarbonate 8.4 % injection 50 mEq  50 mEq Intravenous Once Korina Robert Luz Elenabeto, DO        ticagrelor (BRILINTA) tablet 90 mg  90 mg Oral BID Darin Kaplan MD   90 mg at 08/24/20 0930    aspirin EC tablet 81 mg  81 mg Oral Daily Darin Kaplan MD   81 mg at 08/24/20 0930    atorvastatin (LIPITOR) tablet 80 mg  80 mg Oral Nightly Darin Kaplan MD   80 mg at 08/23/20 2147    dextrose 50 % IV solution  12.5 g Intravenous PRN Pleasant Valles Sedar, DO        promethazine (PHENERGAN) tablet 12.5 mg  12.5 mg Oral Q6H PRN Magdaleno Heredia MD        Or    ondansetron (ZOFRAN) injection 4 mg  4 mg Intravenous Q6H PRN Magdaleno Heredia MD        chlorhexidine (PERIDEX) 0.12 % solution 15 mL  15 mL Mouth/Throat BID Magdaleno Heredia MD   15 mL at 08/23/20 2147     Allergies   Allergen Reactions    Metformin And Related      Active Problems:    Cardiac arrest (HCC)    Moderate hypoxic-ischemic encephalopathy  Resolved Problems:    * No resolved hospital problems. *    Blood pressure 115/75, pulse 95, temperature 98.1 °F (36.7 °C), temperature source Oral, resp. rate 19, height 5' 10\" (1.778 m), weight 171 lb 1.6 oz (77.6 kg), SpO2 90 %. Subjective:  Symptoms:  He reports malaise and weakness. No shortness of breath, cough, chest pain, headache, chest pressure, anorexia, diarrhea or anxiety. Diet:  No nausea or vomiting. Objective:  General Appearance:  Ill-appearing. Vital signs: (most recent): Blood pressure 115/75, pulse 95, temperature 98.1 °F (36.7 °C), temperature source Oral, resp. rate 19, height 5' 10\" (1.778 m), weight 171 lb 1.6 oz (77.6 kg), SpO2 90 %. HEENT: Normal HEENT exam.    Lungs:  He is not in respiratory distress. (Decrease BS)  Heart: S1 normal and S2 normal.    Abdomen: Abdomen is soft. Neurological: Patient is alert. Pupils:  Pupils are equal, round, and reactive to light. Skin:  Warm and dry.       Lab Results   Component Value Date    WBC 12.7 (H) 08/24/2020    HGB 9.4 (L) 08/24/2020    HCT 27.2 (L) 08/24/2020    MCV 84.8 08/24/2020     08/24/2020     Lab Results   Component Value Date     08/24/2020    K 3.8 08/24/2020     08/24/2020    CO2 25 08/24/2020    BUN 17 08/24/2020    CREATININE 0.67 08/24/2020    GLUCOSE 201 08/24/2020    CALCIUM 8.5 08/24/2020          Assessment & Plan  V.fib arrest/stemi  Management as per cardiology  2) cAD no CP, no SOB  3)MRSA PNA and possible aspiration  ID eval  4) acute respiratory failure  C/w oxygen therapy to keep O@ sat > 88 %  5) DM2 with hyperglycemia better  Management as per primary service  Lester Edwards MD  8/24/2020

## 2020-08-24 NOTE — FLOWSHEET NOTE
Patient is using call light appropriately. He asked to be taken off of Bipap for a while and RT came and placed him on Nasal Cannula, he has oxygen saturations between 90-94%. He was able to follow commands appropriately, he lifted both arms to shoulder level but unable to maintain lifted for more than 5 secs. Passive ROM was done to BLE, bilat SCD's. Patient was bathed and has been turned q2 hours. He has been tolerating and swallowing the thickened liquids without any difficulties. IV in the LUE was removed as it was past due and a new IV was inserted in the R forearm.     Electronically signed by Armando Dickson RN on 8/24/2020 at 5:52 AM

## 2020-08-24 NOTE — PROGRESS NOTES
Exam  Vitals signs and nursing note reviewed. Constitutional:       General: He is not in acute distress. Appearance: He is not diaphoretic. HENT:      Head: Normocephalic and atraumatic. Eyes:      Extraocular Movements: Extraocular movements intact. Pupils: Pupils are equal, round, and reactive to light. Cardiovascular:      Rate and Rhythm: Normal rate and regular rhythm. Pulmonary:      Effort: Pulmonary effort is normal. No respiratory distress. Breath sounds: Normal breath sounds. Abdominal:      General: Bowel sounds are normal.      Palpations: Abdomen is soft. Skin:     General: Skin is warm and dry. Neurological:      General: No focal deficit present. Mental Status: He is alert. He is disoriented. Cranial Nerves: No cranial nerve deficit. Motor: Weakness present. No tremor or seizure activity.       Gait: Gait abnormal.               Medications:  Reviewed    Infusion Medications:    dextrose       Scheduled Medications:    carvedilol  3.125 mg Oral BID    insulin glargine  10 Units Subcutaneous Daily    pantoprazole  40 mg Oral QAM AC    vancomycin (VANCOCIN) intermittent dosing (placeholder)   Other RX Placeholder    vancomycin  1,250 mg Intravenous Q12H    docusate  100 mg Oral BID    polyethylene glycol  17 g Oral Daily    insulin lispro  0-18 Units Subcutaneous Q6H    piperacillin-tazobactam  3.375 g Intravenous Q8H    heparin (porcine)  4,000 Units Intravenous Once    aspirin  300 mg Rectal Once    insulin regular  15 Units Intravenous Once    sodium bicarbonate  50 mEq Intravenous Once    ticagrelor  90 mg Oral BID    aspirin  81 mg Oral Daily    atorvastatin  80 mg Oral Nightly    chlorhexidine  15 mL Mouth/Throat BID     PRN Meds: glucose, dextrose, glucagon (rDNA), dextrose, dextrose, promethazine **OR** ondansetron    Labs:   Recent Labs     08/21/20  2156  08/22/20  1240 08/23/20  0537 08/24/20  0624   WBC 11.3*  --   --  12.2* 12.7*   HGB 10.5*   < > 13.4* 9.0* 9.4*   HCT 31.2*  --   --  26.3* 27.2*   *  --   --  111* 156    < > = values in this interval not displayed. Recent Labs     08/21/20  2157  08/22/20  1240 08/23/20  0537 08/24/20  0624   *  --   --  134* 139   K 4.6  --   --  4.0 3.8     --   --  100 101   CO2 19*  --   --  22 25   BUN 19  --   --  17 17   CREATININE 0.83   < > 0.9 0.60* 0.67*   CALCIUM 7.1*  --   --  8.1* 8.5    < > = values in this interval not displayed. No results for input(s): AST, ALT, BILIDIR, BILITOT, ALKPHOS in the last 72 hours. No results for input(s): INR in the last 72 hours. No results for input(s): Tiney Clark in the last 72 hours. Urinalysis:   Lab Results   Component Value Date    NITRU Negative 08/19/2020    WBCUA 3-5 08/19/2020    BACTERIA Negative 08/19/2020    RBCUA 0-2 08/19/2020    BLOODU SMALL 08/19/2020    SPECGRAV 1.033 08/19/2020    GLUCOSEU >=1000 08/19/2020       Radiology:   Most recent    EEG No procedure found. MRI of Brain No results found for this or any previous visit. No results found for this or any previous visit. MRA of the Head and Neck: No results found for this or any previous visit. No results found for this or any previous visit. No results found for this or any previous visit. CT of the Head:   Results for orders placed during the hospital encounter of 08/19/20   CT HEAD WO CONTRAST    Narrative EXAMINATION: CT HEAD WO CONTRAST    CLINICAL HISTORY: anoxia     COMPARISON:  AUGUST 19, 2020     An unenhanced scan is performed. FINDINGS:   There is no bleed, mass effect, or space occupying lesion. No extra-axial mass or fluid collections. Calvarium and skull base intact. No change from recent CT. Impression NO ACUTE PROCESS IN THE BRAIN.        All CT scans at this facility use dose modulation, iterative reconstruction, and/or weight based dosing when appropriate to reduce radiation dose to as low as reasonably achievable. No results found for this or any previous visit. No results found for this or any previous visit. Carotid duplex: No results found for this or any previous visit. No results found for this or any previous visit. No results found for this or any previous visit. Echo No results found for this or any previous visit. Assessment/Plan:    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  This was an outside hospital cardiac arrest with ventricular fibrillation which was defibrillated. Patient is now on sedation and therefore neurological examination is suboptimal.  Patient has absent doll's eyes and febrile corneas which can be seen with propofol. In the first instance will have him vomiting in the evening and perform a CT scan of the head tomorrow to see if there is any cerebral edema followed by a EEG off sedation. Prognostication will only be done after all this tests are available. Patient is a high risk for seizures though is on propofol and wants to start to taper the propofol restarted on Keppra or other anticonvulsants unless he starts to have myoclonic jerks in between then will start him on Keppra     He is now extubated and transferred to the medical floor. Patient appears to be alert and oriented pupils equal reactive arms are conjugate and he has mostly nonfocal examination at this time. An underlying watershed infarct cannot be ruled out we will first assess his CT scan which has not been done as yet. And may consider an MRI if needed    8/24/20:  Repeat CT of the head today with no acute process in the brain  With multiple current medical issues being treated including acute hypoxic ischemic encephalopathy status post cardiac arrest, possible aspiration pneumonia versus MRSA pneumonia, DKA, acute respiratory failure, ischemic cardiomyopathy  Nonfocal    I independently performed an evaluation on this patient.  I have reviewed the above documentation completed by the Nurse Practitioner. Please see my additional contributions to the HPI, physical exam, assessment/medical decision making. Leodan Cardenas MD, Jenny Santos, American Board of Psychiatry & Neurology  Board Certified in Vascular Neurology  Board Certified in Neuromuscular Medicine  Certified in Neurorehabilitation       Collaborating physicians: Dr Parvez Cardenas    Electronically signed by MASSIEL Pereira CNP on 8/24/2020 at 2:06 PM
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
## 2020-08-25 ENCOUNTER — HOSPITAL ENCOUNTER (INPATIENT)
Age: 45
LOS: 9 days | Discharge: HOME | DRG: 091 | End: 2020-09-03
Attending: PHYSICAL MEDICINE & REHABILITATION | Admitting: PHYSICAL MEDICINE & REHABILITATION
Payer: MEDICAID

## 2020-08-25 VITALS
HEART RATE: 86 BPM | BODY MASS INDEX: 24.5 KG/M2 | OXYGEN SATURATION: 94 % | TEMPERATURE: 97.7 F | HEIGHT: 70 IN | RESPIRATION RATE: 18 BRPM | DIASTOLIC BLOOD PRESSURE: 66 MMHG | SYSTOLIC BLOOD PRESSURE: 109 MMHG | WEIGHT: 171.1 LBS

## 2020-08-25 PROBLEM — R49.0 DYSPHONIA: Status: ACTIVE | Noted: 2020-08-25

## 2020-08-25 PROBLEM — F17.200 SMOKER: Status: ACTIVE | Noted: 2020-08-25

## 2020-08-25 PROBLEM — S22.42XD CLOSED FRACTURE OF MULTIPLE RIBS OF LEFT SIDE WITH ROUTINE HEALING: Status: ACTIVE | Noted: 2020-08-25

## 2020-08-25 PROBLEM — Z74.09 IMPAIRED MOBILITY: Status: ACTIVE | Noted: 2020-08-25

## 2020-08-25 PROBLEM — R26.9 GAIT ABNORMALITY: Status: ACTIVE | Noted: 2020-08-25

## 2020-08-25 LAB
ANION GAP SERPL CALCULATED.3IONS-SCNC: 12 MEQ/L (ref 9–15)
BASOPHILS ABSOLUTE: 0 K/UL (ref 0–0.2)
BASOPHILS RELATIVE PERCENT: 0.1 %
BUN BLDV-MCNC: 19 MG/DL (ref 6–20)
CALCIUM SERPL-MCNC: 8.4 MG/DL (ref 8.5–9.9)
CHLORIDE BLD-SCNC: 100 MEQ/L (ref 95–107)
CO2: 27 MEQ/L (ref 20–31)
CREAT SERPL-MCNC: 0.79 MG/DL (ref 0.7–1.2)
EOSINOPHILS ABSOLUTE: 0 K/UL (ref 0–0.7)
EOSINOPHILS RELATIVE PERCENT: 0.2 %
GFR AFRICAN AMERICAN: >60
GFR NON-AFRICAN AMERICAN: >60
GLUCOSE BLD-MCNC: 114 MG/DL (ref 60–115)
GLUCOSE BLD-MCNC: 149 MG/DL (ref 70–99)
GLUCOSE BLD-MCNC: 183 MG/DL (ref 60–115)
GLUCOSE BLD-MCNC: 243 MG/DL (ref 60–115)
GLUCOSE BLD-MCNC: 296 MG/DL (ref 60–115)
HCT VFR BLD CALC: 26.6 % (ref 42–52)
HEMOGLOBIN: 9 G/DL (ref 14–18)
LYMPHOCYTES ABSOLUTE: 1.3 K/UL (ref 1–4.8)
LYMPHOCYTES RELATIVE PERCENT: 13.4 %
MCH RBC QN AUTO: 28.9 PG (ref 27–31.3)
MCHC RBC AUTO-ENTMCNC: 33.9 % (ref 33–37)
MCV RBC AUTO: 85.3 FL (ref 80–100)
MONOCYTES ABSOLUTE: 0.9 K/UL (ref 0.2–0.8)
MONOCYTES RELATIVE PERCENT: 9 %
NEUTROPHILS ABSOLUTE: 7.7 K/UL (ref 1.4–6.5)
NEUTROPHILS RELATIVE PERCENT: 77.3 %
PDW BLD-RTO: 12.7 % (ref 11.5–14.5)
PERFORMED ON: ABNORMAL
PERFORMED ON: NORMAL
PLATELET # BLD: 177 K/UL (ref 130–400)
POTASSIUM SERPL-SCNC: 3.5 MEQ/L (ref 3.4–4.9)
RBC # BLD: 3.13 M/UL (ref 4.7–6.1)
SODIUM BLD-SCNC: 139 MEQ/L (ref 135–144)
WBC # BLD: 9.9 K/UL (ref 4.8–10.8)

## 2020-08-25 PROCEDURE — 6360000002 HC RX W HCPCS: Performed by: INTERNAL MEDICINE

## 2020-08-25 PROCEDURE — 99232 SBSQ HOSP IP/OBS MODERATE 35: CPT | Performed by: INTERNAL MEDICINE

## 2020-08-25 PROCEDURE — 97116 GAIT TRAINING THERAPY: CPT

## 2020-08-25 PROCEDURE — 94660 CPAP INITIATION&MGMT: CPT

## 2020-08-25 PROCEDURE — 6370000000 HC RX 637 (ALT 250 FOR IP): Performed by: INTERNAL MEDICINE

## 2020-08-25 PROCEDURE — 95816 EEG AWAKE AND DROWSY: CPT | Performed by: PSYCHIATRY & NEUROLOGY

## 2020-08-25 PROCEDURE — 2580000003 HC RX 258: Performed by: INTERNAL MEDICINE

## 2020-08-25 PROCEDURE — 92526 ORAL FUNCTION THERAPY: CPT

## 2020-08-25 PROCEDURE — 80048 BASIC METABOLIC PNL TOTAL CA: CPT

## 2020-08-25 PROCEDURE — 81001 URINALYSIS AUTO W/SCOPE: CPT

## 2020-08-25 PROCEDURE — 85025 COMPLETE CBC W/AUTO DIFF WBC: CPT

## 2020-08-25 PROCEDURE — 36415 COLL VENOUS BLD VENIPUNCTURE: CPT

## 2020-08-25 PROCEDURE — APPSS15 APP SPLIT SHARED TIME 0-15 MINUTES: Performed by: NURSE PRACTITIONER

## 2020-08-25 PROCEDURE — 99233 SBSQ HOSP IP/OBS HIGH 50: CPT | Performed by: PSYCHIATRY & NEUROLOGY

## 2020-08-25 PROCEDURE — 1180000000 HC REHAB R&B

## 2020-08-25 PROCEDURE — 97535 SELF CARE MNGMENT TRAINING: CPT

## 2020-08-25 PROCEDURE — 99222 1ST HOSP IP/OBS MODERATE 55: CPT | Performed by: PHYSICAL MEDICINE & REHABILITATION

## 2020-08-25 RX ORDER — ASPIRIN 81 MG/1
81 TABLET ORAL DAILY
Status: DISCONTINUED | OUTPATIENT
Start: 2020-08-26 | End: 2020-09-03 | Stop reason: HOSPADM

## 2020-08-25 RX ORDER — DEXTROSE MONOHYDRATE 50 MG/ML
100 INJECTION, SOLUTION INTRAVENOUS PRN
Status: CANCELLED | OUTPATIENT
Start: 2020-08-25

## 2020-08-25 RX ORDER — PROMETHAZINE HYDROCHLORIDE 12.5 MG/1
12.5 TABLET ORAL EVERY 6 HOURS PRN
Status: DISCONTINUED | OUTPATIENT
Start: 2020-08-25 | End: 2020-09-03 | Stop reason: HOSPADM

## 2020-08-25 RX ORDER — INSULIN GLARGINE 100 [IU]/ML
15 INJECTION, SOLUTION SUBCUTANEOUS 2 TIMES DAILY
Status: DISCONTINUED | OUTPATIENT
Start: 2020-08-25 | End: 2020-08-25 | Stop reason: HOSPADM

## 2020-08-25 RX ORDER — ASPIRIN 81 MG/1
81 TABLET ORAL DAILY
Status: CANCELLED | OUTPATIENT
Start: 2020-08-26

## 2020-08-25 RX ORDER — PANTOPRAZOLE SODIUM 40 MG/1
40 TABLET, DELAYED RELEASE ORAL
Status: DISCONTINUED | OUTPATIENT
Start: 2020-08-26 | End: 2020-09-03 | Stop reason: HOSPADM

## 2020-08-25 RX ORDER — INSULIN GLARGINE 100 [IU]/ML
15 INJECTION, SOLUTION SUBCUTANEOUS 2 TIMES DAILY
Status: CANCELLED | OUTPATIENT
Start: 2020-08-25

## 2020-08-25 RX ORDER — ONDANSETRON 2 MG/ML
4 INJECTION INTRAMUSCULAR; INTRAVENOUS EVERY 6 HOURS PRN
Status: CANCELLED | OUTPATIENT
Start: 2020-08-25

## 2020-08-25 RX ORDER — DEXTROSE MONOHYDRATE 25 G/50ML
12.5 INJECTION, SOLUTION INTRAVENOUS PRN
Status: CANCELLED | OUTPATIENT
Start: 2020-08-25

## 2020-08-25 RX ORDER — POLYETHYLENE GLYCOL 3350 17 G/17G
17 POWDER, FOR SOLUTION ORAL DAILY
Status: CANCELLED | OUTPATIENT
Start: 2020-08-26

## 2020-08-25 RX ORDER — ATORVASTATIN CALCIUM 80 MG/1
80 TABLET, FILM COATED ORAL NIGHTLY
Status: DISCONTINUED | OUTPATIENT
Start: 2020-08-25 | End: 2020-09-03 | Stop reason: HOSPADM

## 2020-08-25 RX ORDER — INSULIN GLARGINE 100 [IU]/ML
15 INJECTION, SOLUTION SUBCUTANEOUS 2 TIMES DAILY
Status: DISCONTINUED | OUTPATIENT
Start: 2020-08-25 | End: 2020-08-26

## 2020-08-25 RX ORDER — POLYETHYLENE GLYCOL 3350 17 G/17G
17 POWDER, FOR SOLUTION ORAL DAILY
Status: DISCONTINUED | OUTPATIENT
Start: 2020-08-26 | End: 2020-08-31

## 2020-08-25 RX ORDER — ATORVASTATIN CALCIUM 80 MG/1
80 TABLET, FILM COATED ORAL NIGHTLY
Status: CANCELLED | OUTPATIENT
Start: 2020-08-25

## 2020-08-25 RX ORDER — PANTOPRAZOLE SODIUM 40 MG/1
40 TABLET, DELAYED RELEASE ORAL
Status: CANCELLED | OUTPATIENT
Start: 2020-08-26

## 2020-08-25 RX ORDER — PROMETHAZINE HYDROCHLORIDE 12.5 MG/1
12.5 TABLET ORAL EVERY 6 HOURS PRN
Status: CANCELLED | OUTPATIENT
Start: 2020-08-25

## 2020-08-25 RX ORDER — NICOTINE POLACRILEX 4 MG
15 LOZENGE BUCCAL PRN
Status: CANCELLED | OUTPATIENT
Start: 2020-08-25

## 2020-08-25 RX ORDER — CARVEDILOL 3.12 MG/1
3.12 TABLET ORAL 2 TIMES DAILY
Status: DISCONTINUED | OUTPATIENT
Start: 2020-08-25 | End: 2020-08-27

## 2020-08-25 RX ORDER — ONDANSETRON 2 MG/ML
4 INJECTION INTRAMUSCULAR; INTRAVENOUS EVERY 6 HOURS PRN
Status: DISCONTINUED | OUTPATIENT
Start: 2020-08-25 | End: 2020-09-03 | Stop reason: HOSPADM

## 2020-08-25 RX ORDER — CHLORHEXIDINE GLUCONATE 0.12 MG/ML
15 RINSE ORAL 2 TIMES DAILY
Status: CANCELLED | OUTPATIENT
Start: 2020-08-25

## 2020-08-25 RX ORDER — CARVEDILOL 3.12 MG/1
3.12 TABLET ORAL 2 TIMES DAILY
Status: CANCELLED | OUTPATIENT
Start: 2020-08-25

## 2020-08-25 RX ORDER — NICOTINE POLACRILEX 4 MG
15 LOZENGE BUCCAL PRN
Status: DISCONTINUED | OUTPATIENT
Start: 2020-08-25 | End: 2020-09-03 | Stop reason: HOSPADM

## 2020-08-25 RX ORDER — DEXTROSE MONOHYDRATE 50 MG/ML
100 INJECTION, SOLUTION INTRAVENOUS PRN
Status: DISCONTINUED | OUTPATIENT
Start: 2020-08-25 | End: 2020-09-03 | Stop reason: HOSPADM

## 2020-08-25 RX ORDER — DEXTROSE MONOHYDRATE 25 G/50ML
12.5 INJECTION, SOLUTION INTRAVENOUS PRN
Status: DISCONTINUED | OUTPATIENT
Start: 2020-08-25 | End: 2020-09-03 | Stop reason: HOSPADM

## 2020-08-25 RX ADMIN — INSULIN LISPRO 9 UNITS: 100 INJECTION, SOLUTION INTRAVENOUS; SUBCUTANEOUS at 11:59

## 2020-08-25 RX ADMIN — ATORVASTATIN CALCIUM 80 MG: 80 TABLET, FILM COATED ORAL at 22:55

## 2020-08-25 RX ADMIN — PIPERACILLIN AND TAZOBACTAM 3.38 G: 3; .375 INJECTION, POWDER, FOR SOLUTION INTRAVENOUS at 05:34

## 2020-08-25 RX ADMIN — CARVEDILOL 3.12 MG: 3.12 TABLET, FILM COATED ORAL at 22:56

## 2020-08-25 RX ADMIN — DOCUSATE SODIUM 100 MG: 50 LIQUID ORAL at 23:07

## 2020-08-25 RX ADMIN — TICAGRELOR 90 MG: 90 TABLET ORAL at 22:53

## 2020-08-25 RX ADMIN — INSULIN GLARGINE 10 UNITS: 100 INJECTION, SOLUTION SUBCUTANEOUS at 10:39

## 2020-08-25 RX ADMIN — ASPIRIN 81 MG: 81 TABLET, COATED ORAL at 08:11

## 2020-08-25 RX ADMIN — INSULIN LISPRO 6 UNITS: 100 INJECTION, SOLUTION INTRAVENOUS; SUBCUTANEOUS at 17:05

## 2020-08-25 RX ADMIN — TICAGRELOR 90 MG: 90 TABLET ORAL at 08:12

## 2020-08-25 RX ADMIN — VANCOMYCIN HYDROCHLORIDE 1250 MG: 5 INJECTION, POWDER, LYOPHILIZED, FOR SOLUTION INTRAVENOUS at 23:30

## 2020-08-25 RX ADMIN — PANTOPRAZOLE SODIUM 40 MG: 40 TABLET, DELAYED RELEASE ORAL at 05:34

## 2020-08-25 RX ADMIN — PIPERACILLIN AND TAZOBACTAM 3.38 G: 3; .375 INJECTION, POWDER, LYOPHILIZED, FOR SOLUTION INTRAVENOUS at 22:54

## 2020-08-25 RX ADMIN — PIPERACILLIN AND TAZOBACTAM 3.38 G: 3; .375 INJECTION, POWDER, FOR SOLUTION INTRAVENOUS at 13:14

## 2020-08-25 RX ADMIN — CARVEDILOL 3.12 MG: 3.12 TABLET, FILM COATED ORAL at 08:12

## 2020-08-25 SDOH — SOCIAL STABILITY: SOCIAL INSECURITY
WITHIN THE LAST YEAR, HAVE YOU BEEN KICKED, HIT, SLAPPED, OR OTHERWISE PHYSICALLY HURT BY YOUR PARTNER OR EX-PARTNER?: NO

## 2020-08-25 SDOH — SOCIAL STABILITY: SOCIAL INSECURITY: WITHIN THE LAST YEAR, HAVE YOU BEEN AFRAID OF YOUR PARTNER OR EX-PARTNER?: NO

## 2020-08-25 SDOH — SOCIAL STABILITY: SOCIAL NETWORK: ARE YOU MARRIED, WIDOWED, DIVORCED, SEPARATED, NEVER MARRIED, OR LIVING WITH A PARTNER?: MARRIED

## 2020-08-25 SDOH — SOCIAL STABILITY: SOCIAL INSECURITY
WITHIN THE LAST YEAR, HAVE TO BEEN RAPED OR FORCED TO HAVE ANY KIND OF SEXUAL ACTIVITY BY YOUR PARTNER OR EX-PARTNER?: NO

## 2020-08-25 SDOH — SOCIAL STABILITY: SOCIAL INSECURITY: WITHIN THE LAST YEAR, HAVE YOU BEEN HUMILIATED OR EMOTIONALLY ABUSED IN OTHER WAYS BY YOUR PARTNER OR EX-PARTNER?: NO

## 2020-08-25 ASSESSMENT — PAIN SCALES - GENERAL
PAINLEVEL_OUTOF10: 5
PAINLEVEL_OUTOF10: 1
PAINLEVEL_OUTOF10: 0

## 2020-08-25 ASSESSMENT — ENCOUNTER SYMPTOMS
BACK PAIN: 1
NAUSEA: 0
COLOR CHANGE: 0
VOMITING: 0
DIARRHEA: 0
STRIDOR: 0
CHEST TIGHTNESS: 0
SORE THROAT: 0
SHORTNESS OF BREATH: 0
SHORTNESS OF BREATH: 1
BLOOD IN STOOL: 0
TROUBLE SWALLOWING: 0
EYE PAIN: 0
PHOTOPHOBIA: 0
CONSTIPATION: 1
COUGH: 0
GASTROINTESTINAL NEGATIVE: 1
EYE REDNESS: 0
WHEEZING: 0
ABDOMINAL PAIN: 0

## 2020-08-25 ASSESSMENT — PAIN DESCRIPTION - LOCATION
LOCATION: CHEST
LOCATION: CHEST

## 2020-08-25 ASSESSMENT — PAIN - FUNCTIONAL ASSESSMENT: PAIN_FUNCTIONAL_ASSESSMENT: 0-10

## 2020-08-25 ASSESSMENT — PAIN DESCRIPTION - DESCRIPTORS: DESCRIPTORS: ACHING
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
## 2020-08-25 NOTE — PROGRESS NOTES
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 above. 2. Endotracheal tube projects with its distal tip approximately 2 cm superior to the superior border of the clavicular heads, clinical correlation for desired location is recommended. 3. No acute cardiopulmonary disease process/pneumothorax is otherwise identified. Xr Chest Portable    Result Date: 2020  XR CHEST PORTABLE Clinical History:  Chest pain. STEMI . Comparison:  None  RESULT: Lungs and pleura:  No consolidation. No pleural effusion. No pneumothorax. Normal pulmonary vascular pattern. Cardiomediastinal silhouette:  Normal. Other:  No acute osseous findings. No acute radiographic abnormality. IMPRESSION AND SUGGESTION:  1. Post cardiac arrest  2. Acute hypoxic respiratory failure  3. Aspiration pneumonia  4. Coronary disease status post stent  5. Ischemic cardiomyopathy EF 25%  6. Diabetic ketoacidosis    Continue O2 to keep saturation 90% or above. Continue BiPAP therapy during sleep  As tolerated. Continue IV Zosyn. Patient has elevated procalcitonin. Antibiotic as per ID. Patient had EEG done today. Continue incentive spirometer and flutter valve.   Will follow  I spent more than 35 min with this patient's care , greater the 50% of this time was spent in counseling and/or coordinating of care.     Electronically signed by Fiordaliza Shipman MD, FCCP on 8/24/2020 at 9:24 PM

## 2020-08-25 NOTE — CARE COORDINATION
Per Camron Dominguez, she emailed a medicaid application and financial aid application to the patient's wife. Hospitalist ordered [facility]. The LSW called and talked with Luna Padilla.  RN is aware of the rehab eval. Electronically signed by PEDRO Mckeon on 8/25/20 at 11:01 AM EDT

## 2020-08-25 NOTE — DISCHARGE SUMMARY
Discharge Summary    Date:8/25/2020        Patient Name:Ant Lambert. YOB: 1975     Age:44 y.o. Admit Date:8/19/2020   Admission Condition:   Discharged Condition:  Discharge Date: 08/25/20 8/25/20      Discharge Diagnoses   Active Problems:    DM (diabetes mellitus), type 2, uncontrolled ([facility] [address]    CAD (coronary artery disease)    Cardiac arrest ([facility] [address]    Moderate hypoxic-ischemic encephalopathy    Gait abnormality    Smoker    Closed fracture of multiple ribs of left side with routine healing    Dysphonia  Resolved Problems:    * No resolved hospital problems. [facility] Stay   Narrative of Hospital Course:     Consultants:   IP CONSULT TO HOSPITALIST  IP CONSULT TO PULMONOLOGY  IP CONSULT TO NEUROLOGY  PHARMACY TO DOSE VANCOMYCIN  IP CONSULT TO INFECTIOUS DISEASES  IP CONSULT TO REHAB/TCU ADMISSION COORDINATOR  IP CONSULT TO NEUROLOGY  PHARMACY TO DOSE VANCOMYCIN  IP CONSULT TO INFECTIOUS DISEASES  IP CONSULT TO REHAB/TCU ADMISSION COORDINATOR  IP CONSULT TO ENDOCRINOLOGY    Time Spent on Discharge:   minutes were spent in patient examination, evaluation, counseling as well as medication reconciliation, prescriptions for required medications, discharge plan and follow up. Surgeries/Procedures Performed:       Treatments:       Significant Diagnostic Studies:   Recent Labs:      Radiology Last 7 Days:  Ct Head Wo Contrast    Result Date: 8/24/2020  NO ACUTE PROCESS IN THE BRAIN. All CT scans at this facility use dose modulation, iterative reconstruction, and/or weight based dosing when appropriate to reduce radiation dose to as low as reasonably achievable. Ct Head Wo Contrast    Result Date: 8/19/2020  FINAL IMPRESSION: NO ACUTE INTRACRANIAL PROCESS, FRACTURE, OR EVIDENCE OF CERVICAL SPINE INJURY IDENTIFIED, WITHIN THE LIMITS OF THE STUDIES.      Ct Chest W Contrast    Result Date: 8/19/2020  FINAL IMPRESSION: ENDOTRACHEAL TUBE SOMEWHAT HIGH IN POSITION WITH ITS TIP AT THE INFERIOR ASPECT OF T1. OROGASTRIC TUBE IN EXPECTED POSITION WITHIN A MODERATE TO MARKEDLY DISTENDED STOMACH. NONDISPLACED ANTEROLATERAL LEFT FIFTH THROUGH SEVENTH RIB FRACTURES, WITHOUT COMPLICATION. PROBABLY OLD FRACTURES OF THE LEFT L2-L4 TRANSVERSE PROCESSES. NO EVIDENCE OF GREAT VESSEL OR SOLID ORGAN INJURY. MILD TO MODERATE DEPENDENT ATELECTASIS. Ct Cervical Spine Wo Contrast    Result Date: 8/19/2020  No acute fracture or traumatic malalignment. Ct Cervical Spine Wo Contrast    Result Date: 8/19/2020  FINAL IMPRESSION: NO ACUTE INTRACRANIAL PROCESS, FRACTURE, OR EVIDENCE OF CERVICAL SPINE INJURY IDENTIFIED, WITHIN THE LIMITS OF THE STUDIES. Ct Thoracic Spine Wo Contrast    Result Date: 8/19/2020  FINAL IMPRESSION: ENDOTRACHEAL TUBE SOMEWHAT HIGH IN POSITION WITH ITS TIP AT THE INFERIOR ASPECT OF T1. OROGASTRIC TUBE IN EXPECTED POSITION WITHIN A MODERATE TO MARKEDLY DISTENDED STOMACH. NONDISPLACED ANTEROLATERAL LEFT FIFTH THROUGH SEVENTH RIB FRACTURES, WITHOUT COMPLICATION. PROBABLY OLD FRACTURES OF THE LEFT L2-L4 TRANSVERSE PROCESSES. NO EVIDENCE OF GREAT VESSEL OR SOLID ORGAN INJURY. MILD TO MODERATE DEPENDENT ATELECTASIS. Ct Lumbar Spine Wo Contrast    Result Date: 8/19/2020  FINAL IMPRESSION: ENDOTRACHEAL TUBE SOMEWHAT HIGH IN POSITION WITH ITS TIP AT THE INFERIOR ASPECT OF T1. OROGASTRIC TUBE IN EXPECTED POSITION WITHIN A MODERATE TO MARKEDLY DISTENDED STOMACH. NONDISPLACED ANTEROLATERAL LEFT FIFTH THROUGH SEVENTH RIB FRACTURES, WITHOUT COMPLICATION. PROBABLY OLD FRACTURES OF THE LEFT L2-L4 TRANSVERSE PROCESSES. NO EVIDENCE OF GREAT VESSEL OR SOLID ORGAN INJURY. MILD TO MODERATE DEPENDENT ATELECTASIS. Ct Abdomen Pelvis W Iv Contrast Additional Contrast? None    Result Date: 8/19/2020  FINAL IMPRESSION: ENDOTRACHEAL TUBE SOMEWHAT HIGH IN POSITION WITH ITS TIP AT THE INFERIOR ASPECT OF T1. OROGASTRIC TUBE IN EXPECTED POSITION WITHIN A MODERATE TO MARKEDLY DISTENDED STOMACH.  NONDISPLACED ANTEROLATERAL LEFT FIFTH THROUGH SEVENTH RIB FRACTURES, WITHOUT COMPLICATION. PROBABLY OLD FRACTURES OF THE LEFT L2-L4 TRANSVERSE PROCESSES. NO EVIDENCE OF GREAT VESSEL OR SOLID ORGAN INJURY. MILD TO MODERATE DEPENDENT ATELECTASIS. Xr Chest Portable    Result Date: 8/22/2020  Findings compatible with improving pulmonary edema. Xr Chest Portable    Result Date: 8/21/2020  Interval development of left lung base infiltrate. Interval progression of right midlung and right lung base infiltrate. These findings may relate to pulmonary edema however pneumonia is also a consideration. Xr Chest Portable    Result Date: 8/20/2020  Interval development of groundglass opacity within the right lung, nonspecific. Xr Chest Portable    Result Date: 8/19/2020  Impression:  1. Interval placement right-sided central line and NG tube as described above. 2. Endotracheal tube projects with its distal tip approximately 2 cm superior to the superior border of the clavicular heads, clinical correlation for desired location is recommended. 3. No acute cardiopulmonary disease process/pneumothorax is otherwise identified. Xr Chest Portable    Result Date: 8/19/2020  No acute radiographic abnormality. Discharge Plan   Disposition:     Provider Follow-Up:   Geraldo Chung [address] [place] [id number] [phone]             Hospital/Incidental Findings Requiring Follow-Up:      Patient Instructions   Diet:     Activity:     Other Instructions:     Discharge Medications         Medication List      ASK your doctor about these medications    aspirin EC 81 MG EC tablet  Take 1 tablet by mouth daily to prevent heart attack and stroke     atorvastatin 40 MG tablet  Commonly known as:  LIPITOR  Take 1 tablet by mouth daily.      blood glucose test strips strip  Commonly known as:  ONE TOUCH ULTRA TEST  Use to test blood sugar up to 2 times daily     glyBURIDE 5 MG tablet  Commonly known as:  DIABETA  Take 2 tablets by mouth 2 times daily (with meals). lisinopril 2.5 MG tablet  Commonly known as:  PRINIVIL;ZESTRIL  Take 1 tablet by mouth daily. meloxicam 15 MG tablet  Commonly known as:  MOBIC  Take 1 tablet by mouth daily. with food     metFORMIN 750 MG extended release tablet  Commonly known as:  GLUCOPHAGE-XR  Take 1 tablet by mouth daily. with food     ONE TOUCH ULTRA 2 w/Device Kit  Use as directed for diabetes     OneTouch Delica Lancets Fine Misc  Use as directed for diabetes     permethrin 5 % cream  Commonly known as:  ELIMITE  Apply from head to toe, leave on 8-14 hours before washing off with water. A single application is usually adequate for scabies     pioglitazone 30 MG tablet  Commonly known as:  ACTOS  Take 1 tablet by mouth daily. for diabetes     triamcinolone 0.025 % Lotn lotion  Commonly known as:  KENALOG  Apply to affected area(s) 2 times a day.  Use a thin layer            Electronically signed by Montserrat Whittington MD on 8/25/20 at 4:55 PM EDT

## 2020-08-25 NOTE — FLOWSHEET NOTE
Pt working with PT, up to chair with 1-2 person assist. Flat affect. Denies pain, N/V, SOB. 4L )@, lungs diminished. NSR on tele. #20 R arm. A&O x3. Call light in reach. 1040 pt back to bed with walker and 2 person assist    1517 message sent to Dr Nela Casiano re rehab admit    [phone] called rehab to give report, they will call back    [phone] report to rpieto in rehab.  Pt going to room 232

## 2020-08-25 NOTE — FLOWSHEET NOTE
2030: PCA reports that patient's sats are in the 60's and 70's. Respiratory notified and patient was placed on bipap. Sats improved in to the 90's.    2200: Patient continues to remove bipap. Explained the importance of leaving the bipap on to patient. 2330: Patient's sats are in the [de-identified] Bipap settings adjusted by respiratory therapist. Patient continues to remove bipap. 0200: Patient continues to remove bipap. States that he does not want to wear it. Instructed patient that he needs to keep bipap on.     0600: Patient's O2 97%. Patient placed back on nasal cannula at 5L.
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
## 2020-08-25 NOTE — PROGRESS NOTES
Physical Therapy Med Surg Daily Treatment Note  Facility/Department: [facility]  Room: [facility]/X391-73       NAME: Catalina Rosario. : 1975 (38 y.o.)  MRN: [id number]  CODE STATUS: Full Code    Date of Service: 2020    Patient Diagnosis(es): Cardiac arrest [facility]) [I46.9]   Chief Complaint   Patient presents with    Cardiac Arrest     Patient Active Problem List    Diagnosis Date Noted    Gait abnormality 2020    Smoker 2020    Closed fracture of multiple ribs of left side with routine healing 2020    Dysphonia 2020    Moderate hypoxic-ischemic encephalopathy     Cardiac arrest ([facility] [address] 2020    CAD (coronary artery disease) 2015    DM (diabetes mellitus), type 2, uncontrolled ([facility] [address] 2015        Past Medical History:   Diagnosis Date    CAD (coronary artery disease) 3/2/2015    DM (diabetes mellitus), type 2, uncontrolled ([facility] [address] 2015     History reviewed. No pertinent surgical history. Restrictions       SUBJECTIVE   General  Chart Reviewed: Yes  Response To Previous Treatment: Patient with no complaints from previous session. Family / Caregiver Present: No    Pre-Session Pain Report     Pain Screening  Patient Currently in Pain: Yes  Pain Assessment  Pain Assessment: 0-10  Pain Level: 5  Pain Location: Chest  Pain Descriptors: Aching    Post-Session Pain Report  Pain Assessment  Pain Assessment: 0-10  Pain Level: 5  Pain Location: Chest  Pain Descriptors: Aching      OBJECTIVE        Bed mobility  Rolling to Left: Minimal assistance  Rolling to Right: Minimal assistance  Supine to Sit: Moderate assistance  Comment: Patient demonstrates good ability to maintain midline seated at EOB for several minutes. Demonstrates LOB various directions upon fatigue however self corrects as needed. Transfers  Sit to Stand:  Moderate Assistance;2 Person Assistance  Stand to sit: Moderate Assistance;2 Person Assistance  Bed to Chair: Moderate assistance;2 requires verbal cues or instructions from another person, close to but not touching, to perform the activity  Minimal assistance= pt performs 75% or more of the activity; assistance is required to complete the activity  Moderate assistance= pt performs 50% of the activity; assistance is required to complete the activity  Maximal assistance = pt performs 25% of the activity; assistance is required to complete the activity  Dependent = pt requires total physical assistance to accomplish the task

## 2020-08-25 NOTE — CONSULTS
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 9.9 4.8 - 10.8 K/uL    RBC 3.13 (L) 4.70 - 6.10 M/uL    Hemoglobin 9.0 (L) 14.0 - 18.0 g/dL    Hematocrit 26.6 (L) 42.0 - 52.0 %    MCV 85.3 80.0 - 100.0 fL    MCH 28.9 27.0 - 31.3 pg    MCHC 33.9 33.0 - 37.0 %    RDW 12.7 11.5 - 14.5 %    Platelets 245 915 - 966 K/uL    Neutrophils % 77.3 %    Lymphocytes % 13.4 %    Monocytes % 9.0 %    Eosinophils % 0.2 %    Basophils % 0.1 %    Neutrophils Absolute 7.7 (H) 1.4 - 6.5 K/uL    Lymphocytes Absolute 1.3 1.0 - 4.8 K/uL    Monocytes Absolute 0.9 (H) 0.2 - 0.8 K/uL    Eosinophils Absolute 0.0 0.0 - 0.7 K/uL    Basophils Absolute 0.0 0.0 - 0.2 K/uL   POCT Glucose    Collection Time: 08/25/20  6:47 AM   Result Value Ref Range    POC Glucose 183 (H) 60 - 115 mg/dl    Performed on ACCU-CHEK               Impression:    1. Impaired mobility and ADLs due to  Hypoxic encephalopathy SP cardiac arrest  2.  Fatigue and Malaise      Complex Active General Medical Issues thatcomplicate care Assess & Plan:     1. Active Problems:    DM (diabetes mellitus), type 2, uncontrolled ([facility] [address]    CAD (coronary artery disease)    Cardiac arrest (HCC)    Moderate hypoxic-ischemic encephalopathy    Gait abnormality    Smoker    Closed fracture of multiple ribs of left side with routine healing  Resolved Problems:    * No resolved hospital problems. *            Recommendations:    1. Considering all of the factors aboveincluding the patient's current medical status, social status/home envirnment, their functional needs and their ability to participate in a therapy program, I feel that they would best be served at a acute   Rehabilitationlevel of care. It is my opinion that they will be able to tolerate and benefit from 3 hours of therapy a day. I reviewed the varous local options re these levels of care with the patient and family. 2. Nursing care to focus on bowel and bladder issues. 3. Vitamin B12 shot times one  4. Improve hydration and Nutrition by adding Proteinex and push PO fluids    Greater 50% of 50 minutes spent evaluating patient face to face. It was my pleasure toevaluate Rafaela Thacker. today. Please call [phone] with questions.     Katie Faulkner, DO

## 2020-08-25 NOTE — DISCHARGE INSTR - COC
Continuity of Care Form    Patient Name: John Ford. :  1975  MRN:  [id number]    Admit date:  2020  Discharge date:  20    Code Status Order: Full Code   Advance Directives:   [facility] Documentation     Date/Time Healthcare Directive Type of Healthcare Directive Copy in [address] Agent's Name Healthcare Agent's Phone Number    [phone]  No, patient does not have an advance directive for healthcare treatment -- -- -- -- --          Admitting Physician:  Chance Gunderson MD  PCP: Dejuan Vieira MD    Discharging Nurse: [facility]/Room#: D299/B900-67  Discharging Unit Phone Number: [phone]    Emergency Contact:   Extended Emergency Contact Information  Primary Emergency Contact: Janessa Esquivel, [address] Phone: [phone]  Work Phone: [phone]  Mobile Phone: [phone]  Relation: Spouse   needed? No    Past Surgical History:  History reviewed. No pertinent surgical history. Immunization History:   Immunization History   Administered Date(s) Administered    Pneumococcal Polysaccharide ([id number]) 2015       Active Problems:  Patient Active Problem List   Diagnosis Code    DM (diabetes mellitus), type 2, uncontrolled ([facility] [address] E11.65    CAD (coronary artery disease) I25.10    Cardiac arrest ([facility] [address] I46.9    Moderate hypoxic-ischemic encephalopathy P91.62    Gait abnormality R26.9    Smoker F17.200    Closed fracture of multiple ribs of left side with routine healing S22.42XD    Dysphonia R49.0       Isolation/Infection:   Isolation          Contact        Patient Infection Status     Infection Onset Added Last Indicated Last Indicated By Review Planned Expiration Resolved Resolved By    MRSA 20 Culture, Respiratory        Sputum,suctioned MRSA 20. Electronically signed by Cyndi Darby RN on 20 at 7:36 AM EDT             Nurse Assessment:  Last Vital Signs: /66   Pulse 86   Temp 97.7 °F (36.5 °C) (Oral)   Resp 18   Ht 5' 10\" (1.778 m)   Wt 171 lb 1.6 oz (77.6 kg)   SpO2 94%   BMI 24.55 kg/m²     Last documented pain score (0-10 scale): Pain Level: 1  Last Weight:   Wt Readings from Last 1 Encounters:   08/24/20 171 lb 1.6 oz (77.6 kg)     Mental Status:  oriented, alert and coherent    IV Access:  - Peripheral IV - site  R Basilic, insertion date: 8-23-20    Nursing Mobility/ADLs:  Walking   Assisted  Transfer  Assisted  Bathing  Assisted  Dressing  Assisted  Toileting  Assisted  Feeding  Assisted  Med Admin  Assisted  Med Delivery   whole    Wound Care Documentation and Therapy:        Elimination:  Continence:   · Bowel: Yes  · Bladder: Yes  Urinary Catheter: None   Colostomy/Ileostomy/Ileal Conduit: No       Date of Last BM: ***    Intake/Output Summary (Last 24 hours) at 8/25/2020 1721  Last data filed at 8/25/2020 1314  Gross per 24 hour   Intake 600 ml   Output 300 ml   Net 300 ml     I/O last 3 completed shifts: In: 600 [P.O.:600]  Out: 300 [Urine:300]    Safety Concerns: At Risk for Falls    Impairments/Disabilities:      None    Nutrition Therapy:  Current Nutrition Therapy:   - Oral Diet:  Dysphagia 3 advanced    Routes of Feeding: Oral  Liquids: Thin Liquids  Daily Fluid Restriction: no  Last Modified Barium Swallow with Video (Video Swallowing Test): not done    Treatments at the Time of Hospital Discharge:   Respiratory Treatments: ***  Oxygen Therapy:  is on oxygen at 4 L/min per nasal cannula.   Ventilator:    - BiPAP   IPAP: 12 cmH20, CPAP/EPAP: 6 cmH2O only when sleeping    Rehab Therapies: Physical Therapy, Occupational Therapy and Speech/Language Therapy  Weight Bearing Status/Restrictions: No weight bearing restirctions  Other Medical Equipment (for information only, NOT a DME order):  walker  Other Treatments: ***    Patient's personal belongings (please select all that are sent with patient):  {[facility] DME Belongings:[id number]}    SUNIL SIGNATURE:  {Esignature:[id number]}    CASE MANAGEMENT/SOCIAL WORK SECTION    Inpatient Status Date: ***    Readmission Risk Assessment Score:  Readmission Risk              Risk of Unplanned Readmission:        12           Discharging to Facility/ Agency   · Name:   · Address:  · Phone:  · Fax:    Dialysis Facility (if applicable)   · Name:  · Address:  · Dialysis Schedule:  · Phone:  · Fax:    / signature: {Esignature:[id number]}    PHYSICIAN SECTION    Prognosis: Fair    Condition at Discharge: Stable    Rehab Potential (if transferring to Rehab): Fair    Recommended Labs or Other Treatments After Discharge: none    Physician Certification: I certify the above information and transfer of Desean Odonnell.  is necessary for the continuing treatment of the diagnosis listed and that he requires Acute Rehab for less 30 days.      Update Admission H&P: No change in H&P    PHYSICIAN SIGNATURE:  Electronically signed by Monique Ellison MD on 8/25/20 at 4:51 PM EDT

## 2020-08-25 NOTE — PROGRESS NOTES
[facility]  Facility/Department: [address]  Speech Language Pathology   Treatment Note          Salma Dee  1975  [id number]    Medical Dx: Cardiac arrest [facility]) [I46.9]  Speech Dx: Dysphagia     8/25/2020    Subjective:  Alert and Cooperative        Interventions used this date:  Dysphagia Treatment    Objective/Assessment:  Patient progressing towards goals:  Short-term Goals  Timeframe for Short-term Goals: 1-2 weeks    Goal 1: Patient will tolerate minced and moist and HTL diet without s/s of aspiration in 5/5 trials. Patient observed with minced and moist tray and HTL consistencies. Patient demonstrated adequate mastication abilities and no s/s of aspiration. Goal Met    Goal 2: Patient will participate in therapeutic trials of soft and bite size and NTL without s/s of aspiration in 5/5 trials for possible diet upgrade. Patient trialed soft and bite size consistency. Patient demonstrated prolonged mastication but was able to clear oral residue completely. Patient independently utilizing lingual sweep and liquid wash. Patient reports that pieces of pineapple were stuck in his teeth and patient removed them with his fingers. No other s/s of aspiration were noted. Patient trailed NTL via cup. Patient demonstrating upright positiong and small sips independently. Patient demonstrating a timely swallow onset, good hyo-laryngeal elevation, and no s/s of aspiration. Patient requesting to trial thin liquids this date. Patient given thin liquids via cup. Patient, again, demonstrating upright positioning and small sips independently. Patient presenting with clear vocal quality, a timely swallow onset, good hyo-laryngeal elevation, and no s/s of aspiration. Patient upgraded to soft and bite size and thin liquid diet at this time with strict use of strategies. RN Traci aware. Patient educated on s/s of aspiration and risks as well as importance of utilizing strategies.  Patient verbalized understanding. Goal Met. Goal 3: Pt will complete oral motor ROM and strengthening exercises with 80% accuracy, given cues as needed, in order to strengthen lingual/labial/buccal musculature to promote safety and efficiency of oral phase of swallow and decrease risk for pocketing. Goal 4: Pt will improve hyolaryngeal elevation by performing hyolaryngeal exercises (Mendelson, Shaker, Falsetto, etc) with 80% accuracy in order to strengthen and establish a more effective swallow. Goal 5: Pt will complete pharyngeal strengthening exercises (such as the Francine, Effortful swallow, etc) with 80% acc in order to strengthen and establish a more effective swallow. Compensatory Swallowing Strategies: Small bites/sips, Upright as possible for all oral intake, Assist feed, Alternate solids and liquids      Updated goals:  1. Patient will tolerate soft and bite size and thin liquid diet without s/s of aspiration in 5/5 trials. 2. Patient will participate in therapeutic trials of regular solids, demonstrating adequate mastication and oral clearance, in 5/5 trials for possible diet upgrade. 3. - Continue   4. - Continue  5. - Continue    Treatment/Activity Tolerance:  Patient tolerated treatment well    Plan: Alter current POC (see above)    Pain Assessment:  Initial Assessment:  Patient c/o: Pain at level 1 in chest. Patient reports it is at an acceptable level for treatment. Re-assessment:  Patient denies pain. Patient/Caregiver Education:  Patient educated on session and progression towards goals. Patient stated verbal understanding of directions.     Safety Devices:  Bed alarm in place and Call light within reach      Therapy Time  SLP Individual Minutes  Time In: 1120  Time Out: 1140  Minutes: 20         Signature: Electronically signed by CLAU Armando on 8/25/2020 at 11:53 AM

## 2020-08-25 NOTE — PROGRESS NOTES
[facility] Neurology Daily Progress Note  Name: Antonio Mcintosh. Age: 38 y.o. Gender: male  CodeStatus: Full Code  Allergies: Metformin And Related    Chief Complaint:Cardiac Arrest    Primary Care Provider: Cherie De Jesus MD  InpatientTreatment Team: Treatment Team: Attending Provider: Kenya Bergeron MD; Consulting Physician: Cherelle Velasco MD; Consulting Physician: Eri Aguilar MD; Utilization Reviewer: Jacky Kaplan RN; Consulting Physician: Grayson Alicea MD; Hospitalist: Farzana Zavaleta MD; Consulting Physician: Celestine Walton MD; Registered Nurse: Mahi Flores, SUNIL; Patient Care Tech: Khris Belle; : April Clay RN  Admission Date: 8/19/2020      HPI Pt seen and examined for neuro follow up for hypoxic ischemic encephalopathy status post cardiac arrest.  Patient currently alert and oriented x3, no acute distress, cooperative. No focal deficits appreciated. No seizure activity reported. No new or acute neuro complaints or concerns. Vitals:    08/25/20 1425   BP: 109/66   Pulse: 86   Resp: 18   Temp: 97.7 °F (36.5 °C)   SpO2: 94%      Review of Systems   Constitutional: Positive for appetite change and fatigue. Negative for chills and fever. HENT: Negative for hearing loss and trouble swallowing. Eyes: Negative for visual disturbance. Respiratory: Negative for cough, chest tightness, shortness of breath and wheezing. Cardiovascular: Negative for chest pain, palpitations and leg swelling. Gastrointestinal: Negative for nausea and vomiting. Musculoskeletal: Positive for gait problem. Skin: Negative for color change and rash. Neurological: Positive for weakness (  Generalized). Negative for dizziness, tremors, seizures, syncope, facial asymmetry, speech difficulty, light-headedness, numbness and headaches. Psychiatric/Behavioral: Negative for agitation, confusion and hallucinations. The patient is not nervous/anxious.       Physical Exam  Vitals signs and nursing note reviewed. Constitutional:       General: He is not in acute distress. Appearance: He is not diaphoretic. HENT:      Head: Normocephalic and atraumatic. Eyes:      Extraocular Movements: Extraocular movements intact. Pupils: Pupils are equal, round, and reactive to light. Cardiovascular:      Rate and Rhythm: Normal rate and regular rhythm. Pulmonary:      Effort: Pulmonary effort is normal. No respiratory distress. Breath sounds: Normal breath sounds. Skin:     General: Skin is warm and dry. Neurological:      General: No focal deficit present. Mental Status: He is alert and oriented to person, place, and time. Cranial Nerves: No cranial nerve deficit. Motor: Weakness (  Generalized) present. No tremor or seizure activity.       Gait: Gait abnormal.               Medications:  Reviewed    Infusion Medications:    dextrose       Scheduled Medications:    carvedilol  3.125 mg Oral BID    insulin glargine  10 Units Subcutaneous Daily    pantoprazole  40 mg Oral QAM AC    docusate  100 mg Oral BID    polyethylene glycol  17 g Oral Daily    insulin lispro  0-18 Units Subcutaneous Q6H    piperacillin-tazobactam  3.375 g Intravenous Q8H    heparin (porcine)  4,000 Units Intravenous Once    aspirin  300 mg Rectal Once    insulin regular  15 Units Intravenous Once    sodium bicarbonate  50 mEq Intravenous Once    ticagrelor  90 mg Oral BID    aspirin  81 mg Oral Daily    atorvastatin  80 mg Oral Nightly    chlorhexidine  15 mL Mouth/Throat BID     PRN Meds: glucose, dextrose, glucagon (rDNA), dextrose, dextrose, promethazine **OR** ondansetron    Labs:   Recent Labs     08/23/20  0537 08/24/20  0624 08/25/20  0640   WBC 12.2* 12.7* 9.9   HGB 9.0* 9.4* 9.0*   HCT 26.3* 27.2* 26.6*   * 156 177     Recent Labs     08/23/20  0537 08/24/20  0624 08/25/20  0640   * 139 139   K 4.0 3.8 3.5    101 100   CO2 22 25 27   BUN 17 17 19   CREATININE 0.60* 0.67* 0.79   CALCIUM 8.1* 8.5 8.4*     No results for input(s): AST, ALT, BILIDIR, BILITOT, ALKPHOS in the last 72 hours. No results for input(s): INR in the last 72 hours. No results for input(s): Kyle Shown in the last 72 hours. Urinalysis:   Lab Results   Component Value Date    NITRU Negative 08/19/2020    WBCUA 3-5 08/19/2020    BACTERIA Negative 08/19/2020    RBCUA 0-2 08/19/2020    BLOODU SMALL 08/19/2020    SPECGRAV 1.033 08/19/2020    GLUCOSEU >=1000 08/19/2020       Radiology:   Most recent    EEG No procedure found. MRI of Brain No results found for this or any previous visit. No results found for this or any previous visit. MRA of the Head and Neck: No results found for this or any previous visit. No results found for this or any previous visit. No results found for this or any previous visit. CT of the Head:   Results for orders placed during the hospital encounter of 08/19/20   CT HEAD WO CONTRAST    Narrative EXAMINATION: CT HEAD WO CONTRAST    CLINICAL HISTORY: anoxia     COMPARISON:  AUGUST 19, 2020     An unenhanced scan is performed. FINDINGS:   There is no bleed, mass effect, or space occupying lesion. No extra-axial mass or fluid collections. Calvarium and skull base intact. No change from recent CT. Impression NO ACUTE PROCESS IN THE BRAIN. All CT scans at this facility use dose modulation, iterative reconstruction, and/or weight based dosing when appropriate to reduce radiation dose to as low as reasonably achievable. No results found for this or any previous visit. No results found for this or any previous visit. Carotid duplex: No results found for this or any previous visit. No results found for this or any previous visit. No results found for this or any previous visit. Echo No results found for this or any previous visit.           Assessment/Plan:    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  This was an outside hospital cardiac arrest with ventricular fibrillation which was defibrillated. Skye Sorto is now on sedation and therefore neurological examination is suboptimal.  Patient has absent doll's eyes and febrile corneas which can be seen with propofol.  In the first instance will have him vomiting in the evening and perform a CT scan of the head tomorrow to see if there is any cerebral edema followed by a EEG off sedation.  Prognostication will only be done after all this tests are available.  Patient is a high risk for seizures though is on propofol and wants to start to taper the propofol restarted on Keppra or other anticonvulsants unless he starts to have myoclonic jerks in between then will start him on Keppra     He is now extubated and transferred to the medical floor.  Patient appears to be alert and oriented pupils equal reactive arms are conjugate and he has mostly nonfocal examination at this time.  An underlying watershed infarct cannot be ruled out we will first assess his CT scan which has not been done as yet.  And may consider an MRI if needed     8/24/20:  Repeat CT of the head today with no acute process in the brain  With multiple current medical issues being treated including acute hypoxic ischemic encephalopathy status post cardiac arrest, possible aspiration pneumonia versus MRSA pneumonia, DKA, acute respiratory failure, ischemic cardiomyopathy  Nonfocal    8/25/20:  Continuing to improve  Nothing new from neurology    I independently performed an evaluation on this patient. I have reviewed the above documentation completed by the Nurse Practitioner. Please see my additional contributions to the HPI, physical exam, assessment/medical decision making. Leodan Bond MD, Fam Rosenthal, American Board of Psychiatry & Neurology  Board Certified in Vascular Neurology  Board Certified in Neuromuscular Medicine  Certified in Neurorehabilitation       Collaborating physicians: Dr Rica Mccarthy    Electronically signed by MASSIEL Larsen CNP on 8/25/2020 at 3:09 PM

## 2020-08-26 PROBLEM — I25.5 ISCHEMIC CARDIOMYOPATHY: Status: ACTIVE | Noted: 2020-08-26

## 2020-08-26 PROBLEM — R26.9 ABNORMALITY OF GAIT AND MOBILITY: Status: ACTIVE | Noted: 2020-08-26

## 2020-08-26 PROBLEM — Z95.820 S/P PERCUTANEOUS TRANSLUMINAL ANGIOPLASTY (PTA) WITH STENT PLACEMENT: Status: ACTIVE | Noted: 2020-08-26

## 2020-08-26 PROBLEM — R26.9 GAIT ABNORMALITY: Status: RESOLVED | Noted: 2020-08-25 | Resolved: 2020-08-26

## 2020-08-26 PROBLEM — J96.01 ACUTE RESPIRATORY FAILURE WITH HYPOXIA (HCC): Status: ACTIVE | Noted: 2020-08-26

## 2020-08-26 LAB
ANION GAP SERPL CALCULATED.3IONS-SCNC: 12 MEQ/L (ref 9–15)
BACTERIA: NEGATIVE /HPF
BASOPHILS ABSOLUTE: 0 K/UL (ref 0–0.2)
BASOPHILS RELATIVE PERCENT: 0.1 %
BILIRUBIN URINE: NEGATIVE
BLOOD, URINE: NEGATIVE
BUN BLDV-MCNC: 14 MG/DL (ref 6–20)
CALCIUM SERPL-MCNC: 8 MG/DL (ref 8.5–9.9)
CHLORIDE BLD-SCNC: 97 MEQ/L (ref 95–107)
CLARITY: CLEAR
CO2: 26 MEQ/L (ref 20–31)
COLOR: ABNORMAL
CREAT SERPL-MCNC: 0.64 MG/DL (ref 0.7–1.2)
EOSINOPHILS ABSOLUTE: 0.1 K/UL (ref 0–0.7)
EOSINOPHILS RELATIVE PERCENT: 1.1 %
EPITHELIAL CELLS, UA: NORMAL /HPF (ref 0–5)
GFR AFRICAN AMERICAN: >60
GFR NON-AFRICAN AMERICAN: >60
GLUCOSE BLD-MCNC: 133 MG/DL (ref 70–99)
GLUCOSE BLD-MCNC: 155 MG/DL (ref 60–115)
GLUCOSE BLD-MCNC: 170 MG/DL (ref 60–115)
GLUCOSE BLD-MCNC: 181 MG/DL (ref 60–115)
GLUCOSE BLD-MCNC: 215 MG/DL (ref 60–115)
GLUCOSE URINE: >=1000 MG/DL
HBA1C MFR BLD: 12.7 % (ref 4.8–5.9)
HCT VFR BLD CALC: 26.7 % (ref 42–52)
HEMOGLOBIN: 9.3 G/DL (ref 14–18)
HYALINE CASTS: NORMAL /HPF (ref 0–5)
KETONES, URINE: ABNORMAL MG/DL
LEUKOCYTE ESTERASE, URINE: NEGATIVE
LYMPHOCYTES ABSOLUTE: 1.1 K/UL (ref 1–4.8)
LYMPHOCYTES RELATIVE PERCENT: 10.6 %
MCH RBC QN AUTO: 29.8 PG (ref 27–31.3)
MCHC RBC AUTO-ENTMCNC: 35 % (ref 33–37)
MCV RBC AUTO: 85.1 FL (ref 80–100)
MONOCYTES ABSOLUTE: 0.8 K/UL (ref 0.2–0.8)
MONOCYTES RELATIVE PERCENT: 7.9 %
NEUTROPHILS ABSOLUTE: 8.6 K/UL (ref 1.4–6.5)
NEUTROPHILS RELATIVE PERCENT: 80.3 %
NITRITE, URINE: NEGATIVE
PDW BLD-RTO: 12.6 % (ref 11.5–14.5)
PERFORMED ON: ABNORMAL
PH UA: 5.5 (ref 5–9)
PLATELET # BLD: 173 K/UL (ref 130–400)
POTASSIUM SERPL-SCNC: 3.3 MEQ/L (ref 3.4–4.9)
PROTEIN UA: 30 MG/DL
RBC # BLD: 3.13 M/UL (ref 4.7–6.1)
RBC UA: NORMAL /HPF (ref 0–2)
SODIUM BLD-SCNC: 135 MEQ/L (ref 135–144)
SPECIFIC GRAVITY UA: 1.03 (ref 1–1.03)
URINE REFLEX TO CULTURE: ABNORMAL
UROBILINOGEN, URINE: 1 E.U./DL
WBC # BLD: 10.7 K/UL (ref 4.8–10.8)
WBC UA: NORMAL /HPF (ref 0–5)

## 2020-08-26 PROCEDURE — 6370000000 HC RX 637 (ALT 250 FOR IP): Performed by: INTERNAL MEDICINE

## 2020-08-26 PROCEDURE — 99222 1ST HOSP IP/OBS MODERATE 55: CPT | Performed by: INTERNAL MEDICINE

## 2020-08-26 PROCEDURE — 2500000003 HC RX 250 WO HCPCS: Performed by: PHYSICAL MEDICINE & REHABILITATION

## 2020-08-26 PROCEDURE — 97530 THERAPEUTIC ACTIVITIES: CPT

## 2020-08-26 PROCEDURE — 83036 HEMOGLOBIN GLYCOSYLATED A1C: CPT

## 2020-08-26 PROCEDURE — 94762 N-INVAS EAR/PLS OXIMTRY CONT: CPT

## 2020-08-26 PROCEDURE — 97535 SELF CARE MNGMENT TRAINING: CPT

## 2020-08-26 PROCEDURE — 80048 BASIC METABOLIC PNL TOTAL CA: CPT

## 2020-08-26 PROCEDURE — 2700000000 HC OXYGEN THERAPY PER DAY

## 2020-08-26 PROCEDURE — 6360000002 HC RX W HCPCS: Performed by: INTERNAL MEDICINE

## 2020-08-26 PROCEDURE — 85025 COMPLETE CBC W/AUTO DIFF WBC: CPT

## 2020-08-26 PROCEDURE — 97166 OT EVAL MOD COMPLEX 45 MIN: CPT

## 2020-08-26 PROCEDURE — 97116 GAIT TRAINING THERAPY: CPT

## 2020-08-26 PROCEDURE — 1180000000 HC REHAB R&B

## 2020-08-26 PROCEDURE — 97162 PT EVAL MOD COMPLEX 30 MIN: CPT

## 2020-08-26 PROCEDURE — 2580000003 HC RX 258: Performed by: INTERNAL MEDICINE

## 2020-08-26 PROCEDURE — 6360000002 HC RX W HCPCS: Performed by: PHYSICAL MEDICINE & REHABILITATION

## 2020-08-26 PROCEDURE — 97112 NEUROMUSCULAR REEDUCATION: CPT

## 2020-08-26 PROCEDURE — 6370000000 HC RX 637 (ALT 250 FOR IP): Performed by: PHYSICAL MEDICINE & REHABILITATION

## 2020-08-26 PROCEDURE — 92523 SPEECH SOUND LANG COMPREHEN: CPT

## 2020-08-26 PROCEDURE — 99223 1ST HOSP IP/OBS HIGH 75: CPT | Performed by: PHYSICAL MEDICINE & REHABILITATION

## 2020-08-26 PROCEDURE — 96125 COGNITIVE TEST BY HC PRO: CPT

## 2020-08-26 PROCEDURE — 36415 COLL VENOUS BLD VENIPUNCTURE: CPT

## 2020-08-26 PROCEDURE — 92610 EVALUATE SWALLOWING FUNCTION: CPT

## 2020-08-26 RX ORDER — INSULIN GLARGINE 100 [IU]/ML
15 INJECTION, SOLUTION SUBCUTANEOUS 2 TIMES DAILY
Status: DISCONTINUED | OUTPATIENT
Start: 2020-08-26 | End: 2020-08-26

## 2020-08-26 RX ORDER — CYANOCOBALAMIN 1000 UG/ML
1000 INJECTION INTRAMUSCULAR; SUBCUTANEOUS WEEKLY
Status: DISCONTINUED | OUTPATIENT
Start: 2020-08-26 | End: 2020-09-03 | Stop reason: HOSPADM

## 2020-08-26 RX ORDER — ACETAMINOPHEN 325 MG/1
650 TABLET ORAL EVERY 4 HOURS PRN
Status: DISCONTINUED | OUTPATIENT
Start: 2020-08-26 | End: 2020-09-03 | Stop reason: HOSPADM

## 2020-08-26 RX ORDER — SODIUM PHOSPHATE, DIBASIC AND SODIUM PHOSPHATE, MONOBASIC 7; 19 G/133ML; G/133ML
1 ENEMA RECTAL DAILY PRN
Status: DISCONTINUED | OUTPATIENT
Start: 2020-08-26 | End: 2020-09-03 | Stop reason: HOSPADM

## 2020-08-26 RX ORDER — INSULIN GLARGINE 100 [IU]/ML
12 INJECTION, SOLUTION SUBCUTANEOUS 2 TIMES DAILY
Status: DISCONTINUED | OUTPATIENT
Start: 2020-08-27 | End: 2020-09-01

## 2020-08-26 RX ORDER — CHLORHEXIDINE GLUCONATE 4 G/100ML
SOLUTION TOPICAL EVERY EVENING
Status: DISCONTINUED | OUTPATIENT
Start: 2020-08-26 | End: 2020-09-03 | Stop reason: HOSPADM

## 2020-08-26 RX ORDER — POVIDONE-IODINE 7.5 MG/ML
SOLUTION TOPICAL 3 TIMES DAILY
Status: DISCONTINUED | OUTPATIENT
Start: 2020-08-26 | End: 2020-09-03 | Stop reason: HOSPADM

## 2020-08-26 RX ORDER — UBIDECARENONE 100 MG
100 CAPSULE ORAL
Status: DISCONTINUED | OUTPATIENT
Start: 2020-08-26 | End: 2020-09-03 | Stop reason: HOSPADM

## 2020-08-26 RX ORDER — POTASSIUM CHLORIDE 600 MG/1
8 TABLET, FILM COATED, EXTENDED RELEASE ORAL
Status: DISCONTINUED | OUTPATIENT
Start: 2020-08-26 | End: 2020-08-27

## 2020-08-26 RX ORDER — VITAMIN B COMPLEX
2000 TABLET ORAL
Status: DISCONTINUED | OUTPATIENT
Start: 2020-08-26 | End: 2020-09-03 | Stop reason: HOSPADM

## 2020-08-26 RX ORDER — INSULIN GLARGINE 100 [IU]/ML
15 INJECTION, SOLUTION SUBCUTANEOUS 2 TIMES DAILY
Status: DISCONTINUED | OUTPATIENT
Start: 2020-08-26 | End: 2020-08-26 | Stop reason: CLARIF

## 2020-08-26 RX ORDER — ALOGLIPTIN 25 MG/1
25 TABLET, FILM COATED ORAL DAILY
Status: DISCONTINUED | OUTPATIENT
Start: 2020-08-27 | End: 2020-09-01

## 2020-08-26 RX ADMIN — PANTOPRAZOLE SODIUM 40 MG: 40 TABLET, DELAYED RELEASE ORAL at 06:59

## 2020-08-26 RX ADMIN — TICAGRELOR 90 MG: 90 TABLET ORAL at 08:31

## 2020-08-26 RX ADMIN — Medication 100 MG: at 11:54

## 2020-08-26 RX ADMIN — CARVEDILOL 3.12 MG: 3.12 TABLET, FILM COATED ORAL at 21:52

## 2020-08-26 RX ADMIN — ACETAMINOPHEN 650 MG: 325 TABLET, FILM COATED ORAL at 21:50

## 2020-08-26 RX ADMIN — POTASSIUM CHLORIDE 8 MEQ: 600 TABLET, FILM COATED, EXTENDED RELEASE ORAL at 11:53

## 2020-08-26 RX ADMIN — DOCUSATE SODIUM 100 MG: 50 LIQUID ORAL at 08:32

## 2020-08-26 RX ADMIN — TICAGRELOR 90 MG: 90 TABLET ORAL at 21:51

## 2020-08-26 RX ADMIN — CARVEDILOL 3.12 MG: 3.12 TABLET, FILM COATED ORAL at 08:31

## 2020-08-26 RX ADMIN — CYANOCOBALAMIN 1000 MCG: 1000 INJECTION, SOLUTION INTRAMUSCULAR; SUBCUTANEOUS at 08:33

## 2020-08-26 RX ADMIN — Medication 100 MG: at 08:31

## 2020-08-26 RX ADMIN — INSULIN GLARGINE 15 UNITS: 100 INJECTION, SOLUTION SUBCUTANEOUS at 08:39

## 2020-08-26 RX ADMIN — Medication 5 ML: at 22:03

## 2020-08-26 RX ADMIN — PIPERACILLIN AND TAZOBACTAM 3.38 G: 3; .375 INJECTION, POWDER, LYOPHILIZED, FOR SOLUTION INTRAVENOUS at 07:02

## 2020-08-26 RX ADMIN — PIPERACILLIN AND TAZOBACTAM 3.38 G: 3; .375 INJECTION, POWDER, LYOPHILIZED, FOR SOLUTION INTRAVENOUS at 15:42

## 2020-08-26 RX ADMIN — VANCOMYCIN HYDROCHLORIDE 1250 MG: 5 INJECTION, POWDER, LYOPHILIZED, FOR SOLUTION INTRAVENOUS at 11:54

## 2020-08-26 RX ADMIN — POLYETHYLENE GLYCOL 3350 17 G: 17 POWDER, FOR SOLUTION ORAL at 08:31

## 2020-08-26 RX ADMIN — Medication 5 ML: at 15:42

## 2020-08-26 RX ADMIN — INSULIN GLARGINE 15 UNITS: 100 INJECTION, SOLUTION SUBCUTANEOUS at 21:52

## 2020-08-26 RX ADMIN — Medication 2000 UNITS: at 17:02

## 2020-08-26 RX ADMIN — ATORVASTATIN CALCIUM 80 MG: 80 TABLET, FILM COATED ORAL at 21:52

## 2020-08-26 RX ADMIN — CHLORHEXIDINE GLUCONATE: 213 SOLUTION TOPICAL at 17:07

## 2020-08-26 RX ADMIN — PROVIDONE IODINE: 7.5 STICK TOPICAL at 22:03

## 2020-08-26 RX ADMIN — PIPERACILLIN AND TAZOBACTAM 3.38 G: 3; .375 INJECTION, POWDER, LYOPHILIZED, FOR SOLUTION INTRAVENOUS at 21:51

## 2020-08-26 RX ADMIN — PROVIDONE IODINE: 7.5 STICK TOPICAL at 11:54

## 2020-08-26 RX ADMIN — ASPIRIN 81 MG: 81 TABLET, COATED ORAL at 08:31

## 2020-08-26 ASSESSMENT — PAIN SCALES - GENERAL
PAINLEVEL_OUTOF10: 3
PAINLEVEL_OUTOF10: 3
PAINLEVEL_OUTOF10: 5
PAINLEVEL_OUTOF10: 1
PAINLEVEL_OUTOF10: 0

## 2020-08-26 ASSESSMENT — ENCOUNTER SYMPTOMS
ABDOMINAL PAIN: 0
BOWEL INCONTINENCE: 0
BACK PAIN: 1
SHORTNESS OF BREATH: 1

## 2020-08-26 ASSESSMENT — PAIN DESCRIPTION - PROGRESSION: CLINICAL_PROGRESSION: GRADUALLY WORSENING

## 2020-08-26 ASSESSMENT — PAIN DESCRIPTION - DESCRIPTORS
DESCRIPTORS: ACHING
DESCRIPTORS: ACHING

## 2020-08-26 ASSESSMENT — PAIN DESCRIPTION - LOCATION
LOCATION: CHEST

## 2020-08-26 ASSESSMENT — PAIN DESCRIPTION - PAIN TYPE: TYPE: ACUTE PAIN

## 2020-08-26 ASSESSMENT — PAIN DESCRIPTION - ONSET: ONSET: ON-GOING

## 2020-08-26 ASSESSMENT — PAIN DESCRIPTION - FREQUENCY: FREQUENCY: CONTINUOUS

## 2020-08-26 NOTE — PROGRESS NOTES
Pharmacy Note  Vancomycin Consult    [facility]. is a 38 y.o. male started on Vancomycin for MRSA pneumonia, sputum positive; consult received from Dr. Mike Negrete to manage therapy. Also receiving the following antibiotics: Zosyn. Patient Active Problem List   Diagnosis    DM (diabetes mellitus), type 2, uncontrolled ([facility] [address]    CAD (coronary artery disease)    Cardiac arrest ([facility]    Severe hypoxic-ischemic encephalopathy    Gait abnormality    Smoker    Closed fracture of multiple ribs of left side with routine healing    Dysphonia       Allergies:  Metformin and related     Temp max: 97.7 F    Recent Labs     08/24/20  0624 08/25/20  0640   BUN 17 19       Recent Labs     08/24/20  0624 08/25/20  0640   CREATININE 0.67* 0.79       Recent Labs     08/24/20  0624 08/25/20  0640   WBC 12.7* 9.9       No intake or output data in the 24 hours ending 08/25/20 2037    Culture Date      Source                       Results  Organism   Culture, Respiratory   Collected:  08/22/20 1020    Result status:  Final    Resulting lab:  [address] [place] [address] LAB    Value:  Staph aureus MRSAAbnormal       *Additional information available - narrative    Culture, Respiratory   Study Date: 08/24/2020   [facility].  38 y.o.     Ordering Provider  Luke Case MD    Contains abnormal data  Culture, Respiratory   Order: [id number]   Status:  Final result   Visible to patient:  No (not released) Next appt:  None   Specimen Information:  Sputum, Suctioned          Component  8/22/20 1020   Gram Stain Result  Moderate WBC's   Few epithelial cells   Many Gram positive cocci in clusters-resembling Staph    Organism  Staph aureus MRSAAbnormal       CULTURE, RESPIRATORY  Abnormal     Heavy growth   CONTACT PRECAUTIONS INDICATED   PBP2= POSITIVE     Susceptibility      Staph aureus mrsa      BACTERIAL SUSCEPTIBILITY PANEL BY CORNELIO      ceFAZolin   Resistant      cefTRIAXone   Resistant      clindamycin  <=0.12 mcg/mL  Sensitive gentamicin  <=0.5 mcg/mL  Sensitive      oxacillin  >=4 mcg/mL  Resistant      trimethoprim-sulfamethoxazole  <=10 mcg/mL  Sensitive      vancomycin  1 mcg/mL  Sensitive               Narrative     ORDER#: [id number]                          ORDERED BY: RODY LLANES   SOURCE: Sputum Suctioned Sputum            COLLECTED:  08/22/20 10:20   ANTIBIOTICS AT [facility]ENN[facility]ER.:                      RECEIVED :  08/22/20 10:29   Caesar Ordaz tel. [phone],   MRSA results called to and read back by Conor Parada, 08/23/2020 12:04, by [facility]       Specimen Collected: 08/22/20 10:20  Last Resulted: 08/24/20 08:37  Lab Flowsheet Order Details View Encounter Lab and Collection Details Routing Result History             Routing History     Priority  Sent On  From  To  Message Type      8/19/2020  6:18 PM  Marbin, Chpo Incoming Lab Results From Soft  Lucrecia Saez MD  CC'd Results      8/19/2020  6:18 PM  Marbin, Chpo Incoming Lab Results From [employer] [place], DO  CC'd Results      8/19/2020  1:54 PM  Marbin, Chpo Incoming Lab Results From [employer] D [place], DO  CC'd Results      8/19/2020  1:54 PM  Marbin, Chpo Incoming Lab Results From Soft  Carley Rivas MD  CC'd Results    Result Information     Flag: AbnormalAbnormal     Status: Final result (Resulted: 8/24/2020 08:37)  Provider Status: Ordered    Order Report      Order Details     Ht Readings from Last 1 Encounters:   08/19/20 5' 10\" (1.778 m)        Wt Readings from Last 1 Encounters:   08/24/20 171 lb 1.6 oz (77.6 kg)         There is no height or weight on file to calculate BMI. CrCl cannot be calculated (Unknown ideal weight.).  ~Estimated 121.9 mL/min    Assessment/Plan:  Will initiate vancomycin 1250 mg IV every 12 hours. Timing of trough level will be determined based on culture results, renal function, and clinical response. Thank you for the consult. Will continue to follow. NOHEMI Lock. Ph.  8/25/2020  8:46 PM

## 2020-08-26 NOTE — PROGRESS NOTES
Assumed care for this patient at 0000. The patient slept through the night repositioned 3 times. He is very weak . 2 person pivot and for positioning. Pt is on vancomycin and zosyn. Meplix as a preventive on his coccyx, redness. Pt has neuropathy in bilateral feet when touched. He has dark toe nails tan colored. Pt refused Bi pap on 4LO2 NC. Pt has rib fx on L side 6-7. Call light within reach bed alarm on monitoring for safety.  Pt has a non productive cough

## 2020-08-26 NOTE — PROGRESS NOTES
[facility]   Facility/Department: [facility]  Speech Language Pathology  Clinical Bedside Swallow Evaluation    NAME:Ant Go. : 1975 (38 y.o.)   MRN: [id number]  ROOM: Q352/C024-26  ADMISSION DATE: 2020  PATIENT DIAGNOSIS(ES): Impaired mobility and ADLs [Z74.09]  Impaired mobility [Z74.09]  Abnormality of gait and mobility [R26.9]  No chief complaint on file. Patient Active Problem List    Diagnosis Date Noted    Abnormality of gait and mobility 2020    Ischemic cardiomyopathy 2020    Acute respiratory failure with hypoxia ([facility] [address] 2020    BMI 24.0-24.9, adult 2020    S/P percutaneous transluminal angioplasty (PTA) with stent placement 2020    Smoker 2020    Closed fracture of multiple ribs of left side with routine healing 2020    Dysphonia 2020    Impaired mobility dt hypoxic encephalopathy--[facility] rehab admit 2020    Severe hypoxic-ischemic encephalopathy     Cardiac arrest ([facility] [address] 2020    CAD (coronary artery disease) 2015    DM (diabetes mellitus), type 2, uncontrolled ([facility] [address] 2015     Past Medical History:   Diagnosis Date    CAD (coronary artery disease) 3/2/2015    DM (diabetes mellitus), type 2, uncontrolled ([facility] [address] 2015     No past surgical history on file. Allergies   Allergen Reactions    Metformin And Related        DATE ONSET: 20    Date of Evaluation: 2020   Evaluating Therapist: Rainey Frankel, SLP      Recommended Diet and Intervention  Diet Solids Recommendation: Dysphagia Soft and Bite-Sized (Dysphagia III)  Liquid Consistency Recommendation:  Thin  Recommended Form of Meds: Meds in puree  Recommendations: Dysphagia treatment  Therapeutic Interventions: Bolus control exercises, Diet tolerance monitoring, Therapeutic PO trials with SLP, Oral care, Oral motor exercises, Tongue base strengthening    Compensatory Swallowing Strategies  Compensatory Swallowing Strategies: Alternate solids and liquids;Upright as possible for all oral intake; No straws;Eat/Feed slowly; Small bites/sips; Lingual sweep    Reason for Referral  Antonio Singh was referred for a bedside swallow evaluation to assess the efficiency of his swallow function, identify signs and symptoms of aspiration and make recommendations regarding safe dietary consistencies, effective compensatory strategies, and safe eating environment. General  Chart Reviewed: Yes  Subjective  Subjective: Pt was awake and alert and cooperative. Pt reported he does not remember anything that happened to him even days before coming in here. Behavior/Cognition: Alert; Cooperative;Pleasant mood  O2 Device: Nasal cannula  Liters of Oxygen: 4 L  Communication Observation: Functional  Follows Directions: Simple  Dentition: Some missing teeth  Patient Positioning: Upright in bed  Baseline Vocal Quality: Normal  Volitional Cough: Weak  Prior Dysphagia History: BSE completed 08/23/20 recommending minced moist, with honey thick liquids. ST recommended upgrade to Soft, bite size with thin liquids on 08/25/20. Poor intake has been noted. Pt was intubated X4 days. Consistencies Administered: Dysphagia Soft and Bite-Sized (Dysphagia III); Dysphagia Pureed (Dysphagia I); Thin - cup    Current Diet level:  Current Diet : Dysphagia Soft and Bite-Sized (Dysphagia III)  Current Liquid Diet : Thin    Oral Motor Deficits  Oral/Motor  Oral Motor: Within functional limits    Oral Phase Dysfunction  Oral Phase  Oral Phase: Exceptions  Oral Phase Dysfunction  Impaired Mastication: Soft Solid  Decreased Anterior to Posterior Transit: Soft solid  Lingual/Palatal Residue: Soft solid  Oral Phase  Oral Phase - Comment: Mild to mild to moderate oral dysphagia was noted     Indicators of Pharyngeal Phase Dysfunction   Pharyngeal Phase  Pharyngeal Phase: WFL  Indicators of Pharyngeal Phase Dysfunction  Delayed Swallow:  All  Pharyngeal Phase   Pharyngeal: Mild pharyngeal dysphagia was noted    Impression  Dysphagia Diagnosis: Mild to moderate oral stage dysphagia;Mild pharyngeal stage dysphagia  Dysphagia Impression : Mild to moderate oral and mild pharyngeal dysphagia was noted. Pt presented with tooth pain, which per patient was making it difficult for him to chew. ST noted increased mastication time with small bites of solids. Pt presented with lingual and buccal residue, which patient I cleared with lingual sweep and liquid wash. No overt s/s of aspiration was noted with all po trials. Pt did present with expectoration of cloody secretions X1 during the BSE. Pt was intubated X4 days. Vocal quality was clear, but weak. Delayed swallow response was noted with all po trials with inconsistent double swallow. Dysphagia Outcome Severity Scale: Level 5: Mild dysphagia- Distant supervision. May need one diet consistency restricted     Treatment Plan  Requires SLP Intervention: Yes  Duration/Frequency of Treatment: 2-3x/wk for LOS or until all goals are met  D/C Recommendations: To be determined       Treatment/Goals  Short-term Goals  Timeframe for Short-term Goals: 1-2 weeks  Goal 1: Patient will tolerate soft and bite size and thin liquid diet without s/s of aspiration in 5/5 trials. Goal 2: Patient will participate in therapeutic trials of regular solids, demonstrating adequate mastication and oral clearance, in 5/5 trials for possible diet upgrade. Goal 3: Pt will complete oral motor ROM and strengthening exercises with 80% accuracy, given cues as needed, in order to strengthen lingual/labial/buccal musculature to promote safety and efficiency of oral phase of swallow and decrease risk for pocketing. Goal 4: Pt will improve hyolaryngeal elevation by performing hyolaryngeal exercises (Mendelson, Shaker, Falsetto, etc) with 80% accuracy in order to strengthen and establish a more effective swallow.   Goal 5: Pt will complete pharyngeal strengthening exercises (such as the Francine, Effortful swallow, etc) with 80% acc in order to strengthen and establish a more effective swallow. Long-term Goals  Timeframe for Long-term Goals: 1-2 weeks  Goal 1: Patient will tolerate least restrictive diet without s/s of aspiration    Prognosis  Prognosis  Prognosis for safe diet advancement: good  Individuals consulted  Consulted and agree with results and recommendations: Patient;RN(Ambika)    Education  Patient Education: Pt educated on BSE results  Patient Education Response: Verbalizes understanding  Safety Devices in place: Yes  Type of devices: Bed alarm in place;Call light within reach    [x]  Ambika RN notified       Pain Assessment:  Initial Assessment:  Patient c/o: pain when chewing. RN aware    Re-assessment:  Patient c/o: Pain when chewing.  RN aware       NATIONAL OUTCOMES MEASUREMENT SYSTEM (NOMS):    SWALLOWING  Ratin    Therapy Time  SLP Individual Minutes  Time In: 4573  Time Out: 1733  Minutes: 17            Signature: Electronically signed by CLAU Mauricio on 2020 at 8:25 AM

## 2020-08-26 NOTE — PLAN OF CARE
Problem: Falls - Risk of:  Goal: Will remain free from falls  Description: Will remain free from falls  Outcome: Ongoing  Goal: Absence of physical injury  Description: Absence of physical injury  Outcome: Ongoing     Problem: Skin Integrity:  Goal: Will show no infection signs and symptoms  Description: Will show no infection signs and symptoms  Outcome: Ongoing  Goal: Absence of new skin breakdown  Description: Absence of new skin breakdown  Outcome: Ongoing     Problem: Pain Control  Goal: Maintain pain level at or below patient's acceptable level (or 5 if patient is unable to determine acceptable level)  Outcome: Ongoing     Problem: Cardiovascular  Goal: No DVT, peripheral vascular complications  Outcome: Ongoing  Goal: Hemodynamic stability  Outcome: Ongoing  Goal: Anticoagulate/Hct stable  Outcome: Ongoing  Goal: Agreement to quit smoking  Outcome: Ongoing  Goal: Understanding of dietary restrictions  Outcome: Ongoing     Problem: Respiratory  Goal: No pulmonary complications  Outcome: Ongoing  Goal: O2 Sat > 90%  Outcome: Ongoing  Goal: Supplemental O2 requirements decreased  Outcome: Ongoing  Goal: Agreement to quit smoking  Outcome: Ongoing  Goal: Pneumonia vaccine at discharge as needed  Outcome: Ongoing     Problem: Skin Integrity/Risk  Goal: No skin breakdown during hospitalization  Outcome: Ongoing  Goal: Wound healing  Outcome: Ongoing     Problem: Musculor/Skeletal Functional Status  Goal: Highest potential functional level  Outcome: Ongoing  Goal: Absence of falls  Outcome: Ongoing

## 2020-08-26 NOTE — PROGRESS NOTES
Facility/Department: [facility] Initial Assessment: Physical Therapy  Room: R232R232-01    NAME: Oscar New : 1975  MRN: [id number]    Date of Service: 2020    Rehab Diagnosis(es):Impaired mobility dt hypoxic encephalopathy--[facility] rehab admit 2020  Patient Active Problem List    Diagnosis Date Noted    Abnormality of gait and mobility 2020    Ischemic cardiomyopathy 2020    Acute respiratory failure with hypoxia ([facility] [address] 2020    BMI 24.0-24.9, adult 2020    S/P percutaneous transluminal angioplasty (PTA) with stent placement 2020    Smoker 2020    Closed fracture of multiple ribs of left side with routine healing 2020    Dysphonia 2020    [facility]    Severe hypoxic-ischemic encephalopathy     Cardiac arrest ([facility] [address] 2020    CAD (coronary artery disease) 2015    DM (diabetes mellitus), type 2, uncontrolled ([facility] [address] 2015       Past Medical History:   Diagnosis Date    CAD (coronary artery disease) 3/2/2015    DM (diabetes mellitus), type 2, uncontrolled ([facility] [address] 2015     No past surgical history on file.     Chart Reviewed: Yes  Family / Caregiver Present: No  Diagnosis: Impaired mobility dt hypoxic encephalopathy--[facility] rehab admit 2020    Restrictions:  Restrictions/Precautions: Fall Risk, Contact Precautions(droplet precautions for MRSA in sputum)  Position Activity Restriction  Other position/activity restrictions: contact precautions MRSA       SUBJECTIVE:      Pre Treatment Pain Screening  Pain at present: 0    Post Treatment Pain Screening:  Pain Assessment  Pain Level: 1  Pain Location: Chest  Pain Descriptors: Aching    Prior Level of Function:  Social/Functional History  Lives With: Spouse(with children 17 and 20)  Type of Home: Apartment(pt reports it is a hotel)  Home Layout: One level  Home Access: Elevator  Bathroom Shower/Tub: Tub/Shower unit  Home Equipment: (no equipment)  ADL Assistance: Independent  Homemaking Assistance: Independent  Homemaking Responsibilities: Yes  Ambulation Assistance: Independent  Transfer Assistance: Independent  Active : Yes  Occupation: Full time employment  Type of occupation:     OBJECTIVE:   Vision/Hearing:  Vision: Within Functional Limits(vision changes when blood sugars are not in control)  Hearing: Within functional limits    Cognition:  Overall Orientation Status: Within Functional Limits  Follows Commands: Within Functional Limits       ROM:  RLE PROM: WFL  LLE PROM: WFL    Strength:  Strength RLE  Comment: 3+/5  Strength LLE  Comment: 3+/5    Neuro:  Sensation  Overall Sensation Status: Impaired(neuropathy from diabetes)     Balance  Sitting - Static: Good;-  Sitting - Dynamic: Fair;+(able to reach 2-4 inches outside RADHA with 1 UE for support)  Standing - Static: Poor(LOB post in stand ing and gait requiring mod assist +2 for recovery)  Standing - Dynamic: Poor       Bed mobility  Rolling to Left: Stand by assistance  Rolling to Right: Stand by assistance  Supine to Sit: Minimal assistance  Sit to Supine: Minimal assistance  Comment: concern for edge of bed safety noted    Transfers  Sit to Stand: Moderate Assistance;2 Person Assistance  Stand to sit: Moderate Assistance;2 Person Assistance  Bed to Chair: Moderate assistance;2 Person Assistance  Comment: Pt with LOB post requiring mod +2 assist for recovery    Ambulation  Ambulation?: Yes  Ambulation 1  Surface: level tile  Device: No Device  Assistance:  Moderate assistance;2 Person assistance;Maximum assistance  Quality of Gait: flexed posture with downward gaze, poor pelvic stability, bilateral genu recurvatum, decreased bilateral foot clearance and step length, posterior lean, mild knee instability  Distance: 4 steps forward and backward              Activity Tolerance  Activity Tolerance: Patient limited by fatigue;Patient limited by endurance  Activity Tolerance: Pt OOB for 2 hours this date prior to PT arrival and stated he was fatigued as result          Quality Indicators (IRF-JAELYN):  Rolling L and R: Supervision or Touching Assistance - 4  Sit>Supine: Supervision or Touching Assistance - 4  Supine>Sit: Supervision or Touching Assistance - 4  Sit>Stand: Dependent (helper does all or +2 assist required) - 1  Chair/Bed>Chair Transfer: Dependent (helper does all or +2 assist required) - 1  Car Transfers: Not attempted due to Medical Condition or Safety Concerns (I.e. unsafe or physician orders) - 80  Walk 10 ft: Not attempted due to Medical Condition or Safety Concerns (I.e. unsafe or physician orders) - 80  Walk 50 ft with two 90 degree turns: Not attempted due to Medical Condition or Safety Concerns (I.e. unsafe or physician orders) - 80  Walk 150 ft in [address]: Not attempted due to Medical Condition or Safety Concerns (I.e. unsafe or physician orders) - 80  Walking 10 ft on Unlevel Surface: Not attempted due to Medical Condition or Safety Concerns (I.e. unsafe or physician orders) - 80  Picking up Objects from Standing Position: Not attempted due to Medical Condition or Safety Concerns (I.e. unsafe or physician orders) - 80  Stairs: No Not attempted due to medical condition or safety concerns (i.e. unsafe or physician order) - 88  WC Mobility: No Not Applicable (pt did not complete item prior to admission) - 9      ASSESSMENT:  Body structures, Functions, Activity limitations: Decreased functional mobility ; Decreased balance;Decreased endurance;Decreased strength;Decreased safe awareness;Decreased coordination;Decreased posture  Decision Making: Medium Complexity  History: medium  Exam: medium  Clinical Presentation: medium    Prognosis: Good  PT Education: PT Role;Plan of Care;Goals  Patient Education: Pt agreeable  Barriers to Learning: mild confusion    CLINICAL IMPRESSION: Pt demonstrates deficits as listed. He is requiring assistance with all mobility.  Prior to hospitalization pt was indep in all mobility    PLAN OF CARE:  Frequency: 1-2 treatment sessions per day, 5-7 days per week          Patient's Goal:  get out of here    GOALS:  Long term goals  Long term goal 1: pt to be indep with bed mobility  Long term goal 2: pt to be indep with bed transfers  Long term goal 3: pt to ambulate 150 ft with SBA and 50 feet indep - with appropriate device  Long term goal 4: pt to be indep with HEP  Long term goal 5: 35/56 for [place] balance test    ELOS:   Plan weeks: 2-3    Therapy Time:    Individual   Time In 1100   Time Out 1130   Minutes 100 [place], [place], 08/26/20 at 11:46 AM

## 2020-08-26 NOTE — CARE COORDINATION
Social/Functional Status:  Social/Functional History  Lives With: Spouse  Type of Home: Apartment(pt stated that he lives in a hotel but when asked further questions he was describing an apartment)  Home Layout: One level  Home Access: Level entry  Bathroom Shower/Tub: Tub/Shower unit  Home Equipment: (no equipment)  ADL Assistance: Independent  Homemaking Assistance: Independent  Homemaking Responsibilities: Yes  Ambulation Assistance: Independent  Transfer Assistance: Independent  Active : Yes  Occupation: Full time employment  Type of occupation:   Leisure & Hobbies: watch TV     Spoke with patient and explained role in the team. Patient questions answered appropriately. Explained discharge process. Patient stated understanding. Patient lives with spouse and 2 dtrs. Patient was independent prior to admission and was working full time. Patient has no DME. Per patient, they are living in a hotel, but did not explain why. Spoke with Bernie Gil in public benefits, patient is over income for him and his wife to receive medicaid and does not qualify for MAP meds. Will follow up with wife for more information.  Electronically signed by Twin Potter RN on 8/26/2020 at 2:35 PM

## 2020-08-26 NOTE — PROGRESS NOTES
cooperative. Pt reported that he was I with ADLs PTA. Pt reported he lives at home with his wife, 2 daughters and granddaughter at a hotel in [place]. Pt was a . Vision  Vision: Impaired  Vision Exceptions: Wears glasses for reading  Hearing  Hearing: Within functional limits           Objective:     Oral/Motor  Oral Motor: Within functional limits    Auditory Comprehension  Comprehension: Within Functional Limits(100% accuracy with aurally presented 3-4 step directions.)         Expression  Primary Mode of Expression: Verbal    Verbal Expression  Verbal Expression: Exceptions to functional limits  Convergent: Mild  Divergent: Mild(Mild to moderate)  Interfering Components: Impaired thought organization  Effective Techniques: Provide extra time; Word retrived strategies    Written Expression  Dominant Hand: Right  Written Expression: Unable to assess(Due to time constraint. Will assess in 1-5 days)    Motor Speech  Motor Speech: Within Functional Limits(Pt presented with mild reduced volume of voice. Pt was intubated X4 days. ST to monitor voice.)    Pragmatics/Social Functioning  Pragmatics: Within functional limits    Cognition:      Orientation  Overall Orientation Status: Within Functional Limits  Attention  Attention: Exceptions to [facility]  Divided Attention: Mild  Selective Attention: Mild  Sustained Attention: Mild  Memory  Memory: Exceptions to [facility]  People Encountered: Mild  Short-term Memory: Moderate  Working Memory: Mild  Problem Solving  Problem Solving: Exceptions to [facility]  Complex Functional Tasks: Mild  Managing Finances: To be assessed in therapy  Managing Medications: To be assessed in therapy  Performing Discharge Planning:  To be assessed in therapy  Verbal Reasoning Skills: Mild  Numeric Reasoning  Numeric Reasoning: Within Functional Limits  Abstract Reasoning  Abstract Reasoning: Exceptions to [facility]  Convergent Thinking: Mild  Divergent Thinking: Mild(mild to moderate)  Safety/Judgement  Safety/Judgement: Exceptions to [facility]  Complex Functional Tasks: Mild(Mild to moderate)  Insight: Moderate    Flexibility of Thought: Mild    Additional Assessments:       Portions of the RIPA-2 [employer] Information Processing Assessment-2nd Edition ) were administered. Scores are as follows:  Subtest: Raw Score Standard Score   I. Immediate Memory 22 10   II. Recent Memory 25 10   III. Temporal Orientation 30 14   VIII. Problem Solving and          Abstract Reasoning 25 12        Clock Drawing Score: (scoring obtained from the CLQT, Cognitive Linguistic Quick Test)  , indicating wfl     Errors included:    Delayed responses      Patient exhibited   Distractibility, slow initiation, delayed processing       Prognosis:  Speech Therapy Prognosis  Prognosis: Good  Individuals consulted  Consulted and agree with results and recommendations: Patient;RN(Ambika)    Education:  Patient Education: Pt educated on SLE and cog eval results  Patient Education Response: Verbalizes understanding  Safety Devices in place: Yes  Type of devices: Bed alarm in place;Call light within reach    Pain Assessment:  Initial Assessment:  Patient denies pain. Re-assessment:  Patient denies pain.     NATIONAL OUTCOMES MEASUREMENT SYSTEM (NOMS):  SPOKEN LANGUAGE COMPREHENSION  Ratin    SPOKEN LANGUAGE EXPRESSION  Ratin    MOTOR SPEECH  Ratin    PROBLEM SOLVING  Ratin    MEMORY  Ratin     VOICE:  5             Therapy Time  SLP Individual Minutes  Time In: 1300  Time Out: 1330  Minutes: 30          Signature: Electronically signed by CLAU Hill on 2020 at 2:09 PM

## 2020-08-26 NOTE — PROGRESS NOTES
Physical Therapy Rehab Treatment Note  Facility/Department: [facility]  Room: Y76/H374-05       NAME: Arabella Park : 1975 (37 y.o.)  MRN: [id number]  CODE STATUS: Full Code    Date of Service: 2020  Chart Reviewed: Yes  Family / Caregiver Present: No  Diagnosis: Impaired mobility dt hypoxic encephalopathy--[facility] rehab admit 2020    Restrictions:  Restrictions/Precautions: Fall Risk, Contact Precautions(droplet precautions for MRSA in sputum)       SUBJECTIVE:       Pre Treatment Pain Screening  Pain at present: 0    Post Treatment Pain Screening:  Pain Assessment  Pain Level: 1  Pain Location: Chest  Pain Descriptors: Aching    OBJECTIVE:   Overall Orientation Status: Within Functional Limits  Follows Commands: Within Functional Limits       Neuromuscular Education  PNF: standing and ambulatory balance facilitation in // bars and with ww. Challenges included: gait forward and backward, weight shifting, varying UE and LE movement patterns, sit to stand techniques and static standing tolerance  Neuromuscular Comments: Pt demonstrated good improvement in LE strength and overall endurance this pm. He demonstrated progressive improvement in gait during the session with +1 assistance required at the end of the trials      Transfers  Sit to Stand: Minimal Assistance  Stand to sit: Minimal Assistance  Comment: multiple trials with pt demonstrating signficant;y improved ability for transfers this session    Ambulation  Ambulation?: Yes  Ambulation 1  Surface: level tile  Device: Rolling Walker  Other Apparatus: O2  Assistance: 2 Person assistance; Moderate assistance;Minimal assistance  Quality of Gait: decreased left foot clearance,, wide RADHA, genu recurvatum, variable walker use and control, short step length  Distance: 25 feet x2  Comments: last gait trail required one person for 25 feet with min - mod assistance - overall pt ability imprved with each trial              Activity Tolerance  Activity Tolerance: Patient Tolerated treatment well  Activity Tolerance: rest between tasks required but short in duration          ASSESSMENT/COMMENTS:  Assessment: Overall pt demonstrates progressive improvemnt in balance and gait ability this session. He was able to participate with minimal rest between tasks      PLAN OF CARE/Safety:   Safety Devices  Type of devices: Chair alarm in place; Left in bed      Therapy Time:   Individual   Time In 1500   Time Out 1530   Minutes 30     Minutes:      Transfer/Bed mobility training: 10      Gait training:10      Neuro re education:10       Irlanda Posada PT, 08/26/20 at 4:15 PM

## 2020-08-26 NOTE — PLAN OF CARE
Problem: Falls - Risk of:  Goal: Will remain free from falls  Description: Will remain free from falls  8/26/2020 0048 by Sarah Rice RN  Outcome: Ongoing  8/26/2020 0048 by Jerry Martinez. Mirian Doe RN  Outcome: Ongoing  Goal: Absence of physical injury  Description: Absence of physical injury  8/26/2020 0048 by Sarah Rice RN  Outcome: Ongoing  8/26/2020 0048 by Jerry Martinez. Mirian Doe RN  Outcome: Ongoing     Problem: Skin Integrity:  Goal: Will show no infection signs and symptoms  Description: Will show no infection signs and symptoms  8/26/2020 0048 by Sarah Rice RN  Outcome: Ongoing  8/26/2020 0048 by Jerry Martinez. Mirian Doe RN  Outcome: Ongoing  Goal: Absence of new skin breakdown  Description: Absence of new skin breakdown  8/26/2020 0048 by Sarah Rice RN  Outcome: Ongoing  8/26/2020 0048 by Jerry Martinez. Mirian Doe RN  Outcome: Ongoing     Problem: Pain Control  Goal: Maintain pain level at or below patient's acceptable level (or 5 if patient is unable to determine acceptable level)  8/26/2020 0048 by Sarah Rice RN  Outcome: Ongoing  8/26/2020 0048 by Jerry Martinez. Mirian Doe RN  Outcome: Ongoing     Problem: Cardiovascular  Goal: No DVT, peripheral vascular complications  6/13/0686 0048 by Sarah Rice RN  Outcome: Ongoing  8/26/2020 0048 by Jerry Martinez. Mirian Doe RN  Outcome: Ongoing  Goal: Hemodynamic stability  8/26/2020 0048 by Sarah Rice RN  Outcome: Ongoing  8/26/2020 0048 by Rajendra Doe RN  Outcome: Ongoing  Goal: Anticoagulate/Hct stable  8/26/2020 0048 by Sarah Rice RN  Outcome: Ongoing  8/26/2020 0048 by Rajendra Doe RN  Outcome: Ongoing  Goal: Agreement to quit smoking  8/26/2020 0048 by Sarah Rice RN  Outcome: Ongoing  8/26/2020 0048 by Jerry Martinez. Mirian Doe RN  Outcome: Ongoing  Goal: Understanding of dietary restrictions  8/26/2020 0048 by Sarah Rice RN  Outcome: Ongoing  8/26/2020 0048 by Rajendra Doe, SUNIL  Outcome: Ongoing Problem: Respiratory  Goal: No pulmonary complications  0/38/0147 0048 by Ryder Crandall RN  Outcome: Ongoing  8/26/2020 0048 by Chivo Cerna. Mason Fitzgerald RN  Outcome: Ongoing  Goal: O2 Sat > 90%  8/26/2020 0048 by Ryder Crandall RN  Outcome: Ongoing  8/26/2020 0048 by Chivo Cerna. Mason Fitzgerald RN  Outcome: Ongoing  Goal: Supplemental O2 requirements decreased  8/26/2020 0048 by Ryder Crandall RN  Outcome: Ongoing  8/26/2020 0048 by Bao Fitzgerald RN  Outcome: Ongoing  Goal: Agreement to quit smoking  8/26/2020 0048 by Ryder Crandall RN  Outcome: Ongoing  8/26/2020 0048 by Chivo Cerna. Mason Fitzgerald RN  Outcome: Ongoing  Goal: Pneumonia vaccine at discharge as needed  8/26/2020 0048 by Ryder Crandall RN  Outcome: Ongoing  8/26/2020 0048 by Chivo Cerna. Mason Fitzgerald RN  Outcome: Ongoing     Problem: Skin Integrity/Risk  Goal: No skin breakdown during hospitalization  8/26/2020 0048 by Ryder Crandall RN  Outcome: Ongoing  8/26/2020 0048 by Chivo Cerna. Mason Fitzgerald RN  Outcome: Ongoing  Goal: Wound healing  8/26/2020 0048 by Ryder Crandall RN  Outcome: Ongoing  8/26/2020 0048 by Bao Fitzgerald RN  Outcome: Ongoing     Problem: Musculor/Skeletal Functional Status  Goal: Highest potential functional level  8/26/2020 0048 by Ryder Crandall RN  Outcome: Ongoing  8/26/2020 0048 by Chivo Cerna. Mason Fitzgerald RN  Outcome: Ongoing  Goal: Absence of falls  8/26/2020 0048 by Ryder Crandall RN  Outcome: Ongoing  8/26/2020 0048 by Bao Fitzgerald RN  Outcome: Ongoing

## 2020-08-26 NOTE — PROGRESS NOTES
Occupational Therapy  Facility/Department: [facility]  Daily Treatment Note  NAME: Ольга Black. : 1975  MRN: [id number]    Date of Service: 2020    Discharge Recommendations:  Continue to assess pending progress    Assessment  Activity Tolerance  Activity Tolerance: Patient Tolerated treatment well  Safety Devices  Safety Devices in place: Yes  Type of devices: All fall risk precautions in place       Patient Diagnosis(es): There were no encounter diagnoses. has a past medical history of CAD (coronary artery disease) and DM (diabetes mellitus), type 2, uncontrolled ([facility] [address] has no past surgical history on file. Restrictions  Restrictions/Precautions  Restrictions/Precautions: Fall Risk, Contact Precautions(droplet precautions for MRSA in sputum)  Position Activity Restriction  Other position/activity restrictions: contact precautions MRSA     Subjective: Pt quiet throughout session, however, responds to therapist when spoken to. Pt has a flat affect. General  Chart Reviewed: Yes  Patient assessed for rehabilitation services?: Yes  Response to previous treatment: Patient with no complaints from previous session  Family / Caregiver Present: No  Referring Practitioner: Dr. Aubrie Lee  Diagnosis: Impaired mobility/ADLs 2° to hypoxic encephalopathy s/p cardiac arrest  Pain Assessment  Pain Assessment: 0-10  Pain Level: 3  Pain Type: Acute pain  Pain Location: Chest  Pain Descriptors: Aching  Pain Frequency: Continuous  Pain Onset: On-going  Clinical Progression: Gradually worsening  Non-Pharmaceutical Pain Intervention(s): Declines  Pre Treatment Pain Screening  Pain at present: 0  Scale Used: Numeric Score  Intervention List: Patient able to continue with treatment  Vital Signs  Patient Currently in Pain: Yes     Objective  Pt incontinent of stool with soiled clothing. Assisted pt in changing clothes as follows.      ADL  UE Dressing: Unable to Assess ([address] [place], [place] [address] Street only)  LE Dressing: Dependent/Total (To doff/don pants.)  Toileting: Dependent/Total (Pt incontinent of stool. Pt unaware that he is soiled. Pt required Total A for hygiene and pants mgmt)    Transfers  Stand Pivot Transfers: 2 Person assistance;Dependent/Total (EOB > WC. LOB descending into chair requiring assistance to lower slow and controlled)  Sit to stand: 2 Person assistance; Moderate assistance  Stand to sit: Minimal assistance  Transfer Comments: +2 for safety. Inconsistent performance     Coordination  Fine Motor: Large Nuts and Bolts: Pt used bilateral hands to connect large bolts with their corresponding nut. Pt had Min difficulty physically with activity and worked at a steady pace without rest breaks. Pt had Min difficulty with problem solving aspects of activity. To improve bilateral hand coordination for mgmt of clothing fasteners/ADL containers in a timely manner. Upper Extremity Function  UE Strengthing:   Rings: Pt wore 1# wrist weights to don/doff rubber rings onto their corresponding vertical graded meagan. Pt had Min difficulty with activity and worked at a slow and steady pace with rest breaks prn. Repetitive reaching at and above shoulder level to increase BUE strength and endurance for improved transfers. Problem solving incorporated into activity. Pt had Min difficulty with problem solving and was able to identify and correct errors Independently.      Plan  Plan  Times per week: 5-7x/week  Plan weeks: 1-1.5 weeks  Current Treatment Recommendations: Strengthening, Endurance Training, Neuromuscular Re-education, Self-Care / ADL, Balance Training, Functional Mobility Training, Safety Education & Training, ROM  Plan Comment: Continue POC    Goals  Patient Goals   Patient goals : \"I want to get home\"    Therapy Time   Individual Concurrent Group Co-treatment   Time In 1400         Time Out 1500         Minutes 60         ADL trainin minutes  Therapeutic activities: 40 minutes    Electronically signed by BLADE Renae on 8/26/2020 at 3:10 PM  BLADE Renae

## 2020-08-26 NOTE — PROGRESS NOTES
Patient alert x3, delayed response with flat affect at times. 4L oxygen maintained. Continues on 2 IV ATB for aspiration pneumonia. C/o tooth pain, noted with poor dental hygiene. Magic mouthwash ordered. Remains on tele, SR 84. MD aware of labs, potassium 8 meq ordered. Denies pain thus far. Resting in bed at this time, pivot transfer x2.  Electronically signed by Mago Vaughn RN on 8/26/20 at 1:45 PM EDT

## 2020-08-26 NOTE — PROGRESS NOTES
Physical Therapy  Facility/Department: [facility] Q052/[id number]  Physical Therapy Discharge      NAME: Johnson Seals     : 1975 (37 y.o.)  MRN: [id number]    Account: [de-identified]  Gender: male      Patient has been discharged from acute care hospital. DC patient from current PT program.      Electronically signed by Brett Drake PT on 20 at 4:35 PM EDT

## 2020-08-26 NOTE — PROGRESS NOTES
Occupational Therapy   Occupational Therapy Initial Assessment  Date: 2020   Patient Name: John Ford. MRN: [id number]     : 1975    Date of Service: 2020    Discharge Recommendations:  Continue to assess pending progress       Assessment   Performance deficits / Impairments: Decreased functional mobility ; Decreased balance;Decreased ADL status; Decreased high-level IADLs;Decreased endurance;Decreased strength;Decreased posture  Assessment: Pt is a 38year old male who presents with the above deficits. Pt would benefit from skilled OT services to increase his overall independence with safety awareness and ADL tasks. Treatment Diagnosis: Impaired mobility and ADLs 2° to hypoxic encephalopathy s/p cardiac arrest.  Prognosis: Good  Decision Making: Medium Complexity  History: moderately complex  Exam: 7 deficits  Assistance / Modification: min A  REQUIRES OT FOLLOW UP: Yes  Activity Tolerance  Activity Tolerance: Patient Tolerated treatment well;Patient limited by fatigue  Activity Tolerance: Pt required increased time to complete tasks d/t fatigue but was able to complete session. Pt on 4L of O2  Safety Devices  Safety Devices in place: Yes  Type of devices: All fall risk precautions in place           Patient Diagnosis(es): There were no encounter diagnoses. has a past medical history of CAD (coronary artery disease) and DM (diabetes mellitus), type 2, uncontrolled ([facility] [address] has no past surgical history on file.     Treatment Diagnosis: Impaired mobility and ADLs 2° to hypoxic encephalopathy s/p cardiac arrest.      Restrictions  Restrictions/Precautions  Restrictions/Precautions: Fall Risk  Position Activity Restriction  Other position/activity restrictions: contact precautions MRSA    Subjective   General  Chart Reviewed: Yes  Family / Caregiver Present: No  Referring Practitioner: Dr. Jorgito Zuniga  Diagnosis: Impaired mobility/ADLs 2° to hypoxic encephalopathy s/p cardiac arrest Social/Functional History  Social/Functional History  Lives With: Spouse  Type of Home: Apartment(pt stated that he lives in a hotel but when asked further questions he was describing an apartment)  Home Layout: One level  Home Access: Level entry  Bathroom Shower/Tub: Tub/Shower unit  Home Equipment: (no equipment)  ADL Assistance: Independent  Homemaking Assistance: Independent  Homemaking Responsibilities: Yes  Ambulation Assistance: Independent  Transfer Assistance: Independent  Active : Yes  Occupation: Full time employment  Type of occupation:   Leisure & Hobbies: watch TV       Objective   Vision: Within Functional Limits  Hearing: Within functional limits    Orientation  Overall Orientation Status: Within Functional Limits  Observation/Palpation  Observation: Pt was laying in bed upon therapists arrival. Pt was pleasant and cooperative with evaluation. Balance  Sitting Balance: Supervision  Standing Balance: Contact guard assistance(varied CG-SBA)  Toilet Transfers  Toilet - Technique: Stand pivot  Equipment Used: Grab bars  Toilet Transfer: Minimal assistance  Shower Transfers  Shower Transfers: Not tested  Shower Transfers Comments: pt declined  ADL  Feeding: Setup  Grooming: Setup  UE Bathing: Setup  LE Bathing: Minimal assistance  UE Dressing: Unable to assess(comment)(hospital gown)  LE Dressing: Minimal assistance(pt required assistance to thread B legs)  Toileting: Unable to assess(comment)(pt declined to toilet)  Additional Comments: Pt completed morning self-care routine to promote independence with ADL tasks. Pt completed sponge bath at sink level stating \"I'm not ready to get in the shower yet\". Pt was able to sequence ADL task with no difficulty but required increased time to complete tasks.   Tone RUE  RUE Tone: Normotonic  Tone LUE  LUE Tone: Normotonic  Coordination  Movements Are Fluid And Coordinated: No  Coordination and Movement description: Decreased speed;Right UE;Left UE        Transfers  Sit to stand: Minimal assistance  Stand to sit: Minimal assistance     Cognition  Overall Cognitive Status: WFL  Arousal/Alertness: Appropriate responses to stimuli  Following Commands: Follows one step commands consistently; Follows multistep commands with increased time  Attention Span: Appears intact  Memory: Decreased short term memory  Safety Judgement: Good awareness of safety precautions  Problem Solving: Able to problem solve independently  Insights: Fully aware of deficits  Initiation: Does not require cues  Sequencing: Does not require cues  Cognition Comment: comp: sup exp: mod I social int: mod I prob solving: mod I memory: sup        Sensation  Overall Sensation Status: Impaired(Pt stated numbness and tingling in B feet.  pt stated this has been happening for a while but has gotten worse following his MI)        LUE AROM (degrees)  LUE AROM : WFL  LUE General AROM: Grossly assessed; limited shoulder flexion to about 110°  Left Hand AROM (degrees)  Left Hand AROM: WFL  RUE AROM (degrees)  RUE AROM : WFL  RUE General AROM: Grossly assessed; limited shoulder flexion to about 110°  Right Hand AROM (degrees)  Right Hand AROM: WFL  LUE Strength  Gross LUE Strength: WFL  L Hand General: 3+/5  LUE Strength Comment: MMT grossly assessed; LUE 3/5  RUE Strength  Gross RUE Strength: WFL  R Hand General: 3+/5  RUE Strength Comment: MMT grossly assessed; RUE 3/5     Hand Dominance  Hand Dominance: Right        Plan   Plan  Times per week: 5-7x/week  Plan weeks: 1-1.5 weeks  Current Treatment Recommendations: Strengthening, Endurance Training, Neuromuscular Re-education, Self-Care / ADL, Balance Training, Functional Mobility Training, Safety Education & Training, ROM  Plan Comment: Continue POC    Goals  Patient Goals   Patient goals : \"I want to get home\"     [x]  Patient will complete self care as followed using the recommended adaptive equipment and/or adaptive techniques as instructed:  Feeding:mod I  Grooming: mod I  Bathing: mod I  UE Dressing: mod I  LE Dressing: mod I  Toileting: mod I  Toilet Transfers: mod I  Tub Transfers: mod I     [x]  Patient will sequence self-care routine with no verbal/tactile cues. [x]  Patient will improve static and dynamic standing balance to complete pants management at mod I level     [x]  Patient will improve B UE Function (AROM, strength, motor control, tone normalization) to complete ADLs as projected. [x]  Patient will improve functional endurance to tolerate/complete 60 minutes of ADLs. [x]  Patient will improve B UE strength and endurance to [facility] in order to participate in self-care activities as projected. [x]  Patient will perform kitchen mobility at device level without episodes of LOB and good safety awareness      [x]  Patient will perform basic room mobility at mod I level. [x]  Patient will access appropriate D/C site with as few architectural barriers as possible. [x]  Patient and/or caregiver will demonstrate understanding of recommended HEP for BUE strength/endurance.         [x]  Patient's cognition will improve/,maintain to safely perform ADLs:  Comprehension: mod I  Expression: mod I  Social Interaction: mod I  Problem Solving: mod I  Memory: mod I           Therapy Time   Individual Concurrent Group Co-treatment   Time In 0830         Time Out 0930         Minutes 60           Eval: 60 minutes      This therapy session was supervised by MARCIA Painter, OTR/L    Estefany Bo, S/OT  8/26/2020, 10:53 AM       Estefany Bo

## 2020-08-26 NOTE — CARE COORDINATION
[facility] to come visit patient following care given by them for a follow up. Dahlia ALAS aware.  They will be here Friday around 12p Electronically signed by Gaby Jose RN on 8/26/2020 at 10:38 AM
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
## 2020-08-26 NOTE — PLAN OF CARE
Problem: Falls - Risk of:  Goal: Will remain free from falls  Description: Will remain free from falls  8/26/2020 1350 by Ed Brewster RN  Outcome: Ongoing  8/26/2020 0048 by Vicente Llamas RN  Outcome: Ongoing  8/26/2020 0048 by Aldo Cook. Master Pizarro RN  Outcome: Ongoing  Goal: Absence of physical injury  Description: Absence of physical injury  8/26/2020 1350 by Ed Brewster RN  Outcome: Ongoing  8/26/2020 0048 by Vicente Llamas RN  Outcome: Ongoing  8/26/2020 0048 by Aldo Cook. Master Pizarro RN  Outcome: Ongoing     Problem: Skin Integrity:  Goal: Will show no infection signs and symptoms  Description: Will show no infection signs and symptoms  8/26/2020 1350 by Ed Brewster RN  Outcome: Ongoing  8/26/2020 0048 by Vicente Llamas RN  Outcome: Ongoing  8/26/2020 0048 by Aldo Cook. Master Pizarro RN  Outcome: Ongoing  Goal: Absence of new skin breakdown  Description: Absence of new skin breakdown  8/26/2020 1350 by Ed Brewster RN  Outcome: Ongoing  8/26/2020 0048 by Vicente Llamas RN  Outcome: Ongoing  8/26/2020 0048 by Aldo Cook. Master Pizarro RN  Outcome: Ongoing     Problem: Pain Control  Goal: Maintain pain level at or below patient's acceptable level (or 5 if patient is unable to determine acceptable level)  8/26/2020 1350 by Ed Brewster RN  Outcome: Ongoing  8/26/2020 0048 by Vicente Llamas RN  Outcome: Ongoing  8/26/2020 0048 by Aldo Cook. Master Pizarro RN  Outcome: Ongoing     Problem: Cardiovascular  Goal: No DVT, peripheral vascular complications  2/63/1209 1350 by Ed Brewster RN  Outcome: Ongoing  8/26/2020 0048 by Vicente Llamas RN  Outcome: Ongoing  8/26/2020 0048 by Aldo Cook. Master Pizarro RN  Outcome: Ongoing  Goal: Hemodynamic stability  8/26/2020 1350 by Ed Brewster RN  Outcome: Ongoing  8/26/2020 0048 by Vicente Llamas RN  Outcome: Ongoing  8/26/2020 0048 by Enid Showers  Georgina Qualia RN  Outcome: Ongoing  Goal: Anticoagulate/Hct stable  8/26/2020 1350 by Ed Brewster, RN  Outcome: Ongoing  8/26/2020 0048 by Ryder Crandall RN  Outcome: Ongoing  8/26/2020 0048 by Bao Fitzgerald RN  Outcome: Ongoing  Goal: Agreement to quit smoking  8/26/2020 1350 by Laine Crandall, RN  Outcome: Ongoing  8/26/2020 0048 by Ryder Crandall RN  Outcome: Ongoing  8/26/2020 0048 by Chivo Cerna. Mason Fitzgerald RN  Outcome: Ongoing  Goal: Understanding of dietary restrictions  8/26/2020 1350 by Laine Crandall, RN  Outcome: Ongoing  8/26/2020 0048 by Ryder Crandall RN  Outcome: Ongoing  8/26/2020 0048 by Bao Fitzgerald RN  Outcome: Ongoing     Problem: Respiratory  Goal: No pulmonary complications  5/39/8031 1350 by Laine Crandall, RN  Outcome: Ongoing  8/26/2020 0048 by Ryder Crandall RN  Outcome: Ongoing  8/26/2020 0048 by Chivo Cerna. Mason Fitzgerald RN  Outcome: Ongoing  Goal: O2 Sat > 90%  8/26/2020 1350 by Laine Crandall, RN  Outcome: Ongoing  8/26/2020 0048 by Ryder Crandall, RN  Outcome: Ongoing  8/26/2020 0048 by Chivo Cerna. Mason Fitzgerald RN  Outcome: Ongoing  Goal: Supplemental O2 requirements decreased  8/26/2020 1350 by Laine Crandall, RN  Outcome: Ongoing  8/26/2020 0048 by Ryder Crandall RN  Outcome: Ongoing  8/26/2020 0048 by Bao Fitzgerald RN  Outcome: Ongoing  Goal: Agreement to quit smoking  8/26/2020 1350 by Laine Crandall, RN  Outcome: Ongoing  8/26/2020 0048 by Ryder Crandall, RN  Outcome: Ongoing  8/26/2020 0048 by Chivo Cerna. Mason Fitzgerald RN  Outcome: Ongoing  Goal: Pneumonia vaccine at discharge as needed  8/26/2020 1350 by Laine Crandall, RN  Outcome: Ongoing  8/26/2020 0048 by Ryder Crandall, RN  Outcome: Ongoing  8/26/2020 0048 by Chivo Cerna. Mason Fitzgerald RN  Outcome: Ongoing     Problem: Skin Integrity/Risk  Goal: No skin breakdown during hospitalization  8/26/2020 1350 by Laine Crandall, RN  Outcome: Ongoing  8/26/2020 0048 by Ryder Crandall RN  Outcome: Ongoing  8/26/2020 0048 by Chivo Cerna.  Mason Fitzgerald RN  Outcome: Ongoing  Goal: Wound healing  8/26/2020 1350 by Corina Calle RN  Outcome: Ongoing  8/26/2020 0048 by Kaye Marques RN  Outcome: Ongoing  8/26/2020 0048 by Lizeth Huff RN  Outcome: Ongoing     Problem: Musculor/Skeletal Functional Status  Goal: Highest potential functional level  8/26/2020 1350 by Corina Calle RN  Outcome: Ongoing  8/26/2020 0048 by Kaye Marques RN  Outcome: Ongoing  8/26/2020 0048 by Sunil Solis. Rosy Huff RN  Outcome: Ongoing  Goal: Absence of falls  8/26/2020 1350 by Corina Calle RN  Outcome: Ongoing  8/26/2020 0048 by Kaye Marques RN  Outcome: Ongoing  8/26/2020 0048 by Lizeth uHff RN  Outcome: Ongoing     Problem: IP SWALLOWING  Goal: LTG - patient will tolerate the least restrictive diet consistency to allow for safe consumption of daily meals  8/26/2020 1350 by Corina Calle RN  Outcome: Ongoing  8/26/2020 0826 by CLAU Rendon  Outcome: Ongoing     Problem: Pain:  Goal: Pain level will decrease  Description: Pain level will decrease  Outcome: Ongoing  Goal: Control of acute pain  Description: Control of acute pain  Outcome: Ongoing  Goal: Control of chronic pain  Description: Control of chronic pain  Outcome: Ongoing

## 2020-08-26 NOTE — H&P
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 program.    Old records were reviewed and summarized. 2.  Other diagnoses which complicate rehabilitation stay include:     Principal Problem:    Impaired mobility dt [facility]  Active Problems:  1. DM (diabetes mellitus), type 2, uncontrolled and peripheral vascular disease-vital signs every shift dose and titrate diabetic medications including Lantus and Humalog titrate carbohydrate intake limit carbs to 4 carbs per meal add a low carb protein supplementation  2. CAD (coronary artery disease), Cardiac arrest, status post cardiac stent placement for ischemic cardiomyopathy-vital signs every shift dose and titrate cardiac medications to include aspirin, Lipitor, Coreg and Brilinta consult hospitalist for backup medical consult cardiology for backup cardiac  3. Severe hypoxic-ischemic encephalopathy status post cardiopulmonary arrest-limit toxic medications  4. Smoker-stop smoking counseling  5.    Closed fracture of multiple ribs of left side labs.    5.  Will provide emotional support for this patient regarding adjustment to their disability. Cognition and mood will be screened daily and addressed by rehabilitationpsychology and/or speech therapy as appropriate. I have encouraged the patient to attend the Rehab Adjustment to Disability Support Group and recreational therapy. 6.  Estimated length of stay is 2-3 weeks. Discharge to home with help from family and home health PT, OT, RN, and aide. Patient should be independent at discharge. 7.  The patient's medical and rehab prognosis are good. [address] Larry regarding the patient's back up to general medical needs. A welcome letter was presented with an explanation of my services, my specialty and what to expect during the rehabilitation process. Aswell as introducing myself, I also wrote my name on their bedside marker board with their name as well as the names of the other physicians with an explanation of our individual roles in their care, as well as the rehab process.             Zoya Carrillo D.O., F.A.A.P.M.&JUAN LUIS

## 2020-08-27 ENCOUNTER — APPOINTMENT (OUTPATIENT)
Dept: INTERVENTIONAL RADIOLOGY/VASCULAR | Age: 45
DRG: 091 | End: 2020-08-27
Attending: PHYSICAL MEDICINE & REHABILITATION

## 2020-08-27 LAB
ANION GAP SERPL CALCULATED.3IONS-SCNC: 11 MEQ/L (ref 9–15)
BASOPHILS ABSOLUTE: 0 K/UL (ref 0–0.2)
BASOPHILS RELATIVE PERCENT: 0.2 %
BUN BLDV-MCNC: 11 MG/DL (ref 6–20)
CALCIUM SERPL-MCNC: 8.3 MG/DL (ref 8.5–9.9)
CHLORIDE BLD-SCNC: 96 MEQ/L (ref 95–107)
CO2: 30 MEQ/L (ref 20–31)
CREAT SERPL-MCNC: 0.71 MG/DL (ref 0.7–1.2)
EOSINOPHILS ABSOLUTE: 0.1 K/UL (ref 0–0.7)
EOSINOPHILS RELATIVE PERCENT: 1.6 %
GFR AFRICAN AMERICAN: >60
GFR NON-AFRICAN AMERICAN: >60
GLUCOSE BLD-MCNC: 141 MG/DL (ref 70–99)
GLUCOSE BLD-MCNC: 185 MG/DL (ref 60–115)
GLUCOSE BLD-MCNC: 202 MG/DL (ref 60–115)
GLUCOSE BLD-MCNC: 226 MG/DL (ref 60–115)
GLUCOSE BLD-MCNC: 246 MG/DL (ref 60–115)
HCT VFR BLD CALC: 26.3 % (ref 42–52)
HEMOGLOBIN: 9.1 G/DL (ref 14–18)
LYMPHOCYTES ABSOLUTE: 1.3 K/UL (ref 1–4.8)
LYMPHOCYTES RELATIVE PERCENT: 14.1 %
MAGNESIUM: 2.1 MG/DL (ref 1.7–2.4)
MCH RBC QN AUTO: 29.4 PG (ref 27–31.3)
MCHC RBC AUTO-ENTMCNC: 34.7 % (ref 33–37)
MCV RBC AUTO: 84.7 FL (ref 80–100)
MONOCYTES ABSOLUTE: 0.6 K/UL (ref 0.2–0.8)
MONOCYTES RELATIVE PERCENT: 6.5 %
NEUTROPHILS ABSOLUTE: 7.2 K/UL (ref 1.4–6.5)
NEUTROPHILS RELATIVE PERCENT: 77.6 %
PDW BLD-RTO: 12.7 % (ref 11.5–14.5)
PERFORMED ON: ABNORMAL
PLATELET # BLD: 202 K/UL (ref 130–400)
POTASSIUM SERPL-SCNC: 2.8 MEQ/L (ref 3.4–4.9)
POTASSIUM SERPL-SCNC: 3.3 MEQ/L (ref 3.4–4.9)
RBC # BLD: 3.11 M/UL (ref 4.7–6.1)
SODIUM BLD-SCNC: 137 MEQ/L (ref 135–144)
VANCOMYCIN TROUGH: 6.9 UG/ML (ref 10–20)
WBC # BLD: 9.3 K/UL (ref 4.8–10.8)

## 2020-08-27 PROCEDURE — 1180000000 HC REHAB R&B

## 2020-08-27 PROCEDURE — 2500000003 HC RX 250 WO HCPCS: Performed by: PHYSICAL MEDICINE & REHABILITATION

## 2020-08-27 PROCEDURE — 2700000000 HC OXYGEN THERAPY PER DAY

## 2020-08-27 PROCEDURE — 97535 SELF CARE MNGMENT TRAINING: CPT

## 2020-08-27 PROCEDURE — 84132 ASSAY OF SERUM POTASSIUM: CPT

## 2020-08-27 PROCEDURE — 97530 THERAPEUTIC ACTIVITIES: CPT

## 2020-08-27 PROCEDURE — 6370000000 HC RX 637 (ALT 250 FOR IP): Performed by: INTERNAL MEDICINE

## 2020-08-27 PROCEDURE — 6370000000 HC RX 637 (ALT 250 FOR IP): Performed by: PHYSICAL MEDICINE & REHABILITATION

## 2020-08-27 PROCEDURE — 97112 NEUROMUSCULAR REEDUCATION: CPT

## 2020-08-27 PROCEDURE — 99233 SBSQ HOSP IP/OBS HIGH 50: CPT | Performed by: PHYSICAL MEDICINE & REHABILITATION

## 2020-08-27 PROCEDURE — 80202 ASSAY OF VANCOMYCIN: CPT

## 2020-08-27 PROCEDURE — 83735 ASSAY OF MAGNESIUM: CPT

## 2020-08-27 PROCEDURE — 2709999900 IR PICC WO SQ PORT/PUMP > 5 YEARS

## 2020-08-27 PROCEDURE — 97129 THER IVNTJ 1ST 15 MIN: CPT

## 2020-08-27 PROCEDURE — 2580000003 HC RX 258: Performed by: INTERNAL MEDICINE

## 2020-08-27 PROCEDURE — 02HV33Z INSERTION OF INFUSION DEVICE INTO SUPERIOR VENA CAVA, PERCUTANEOUS APPROACH: ICD-10-PCS | Performed by: PHYSICAL MEDICINE & REHABILITATION

## 2020-08-27 PROCEDURE — 97116 GAIT TRAINING THERAPY: CPT

## 2020-08-27 PROCEDURE — 2580000003 HC RX 258: Performed by: PHYSICAL MEDICINE & REHABILITATION

## 2020-08-27 PROCEDURE — 36573 INSJ PICC RS&I 5 YR+: CPT | Performed by: RADIOLOGY

## 2020-08-27 PROCEDURE — 80048 BASIC METABOLIC PNL TOTAL CA: CPT

## 2020-08-27 PROCEDURE — 6370000000 HC RX 637 (ALT 250 FOR IP): Performed by: NURSE PRACTITIONER

## 2020-08-27 PROCEDURE — 36415 COLL VENOUS BLD VENIPUNCTURE: CPT

## 2020-08-27 PROCEDURE — 85025 COMPLETE CBC W/AUTO DIFF WBC: CPT

## 2020-08-27 PROCEDURE — 6360000002 HC RX W HCPCS: Performed by: INTERNAL MEDICINE

## 2020-08-27 PROCEDURE — 97130 THER IVNTJ EA ADDL 15 MIN: CPT

## 2020-08-27 RX ORDER — POTASSIUM CHLORIDE 20 MEQ/1
40 TABLET, EXTENDED RELEASE ORAL ONCE
Status: COMPLETED | OUTPATIENT
Start: 2020-08-27 | End: 2020-08-27

## 2020-08-27 RX ORDER — CARVEDILOL 6.25 MG/1
6.25 TABLET ORAL 2 TIMES DAILY
Status: DISCONTINUED | OUTPATIENT
Start: 2020-08-27 | End: 2020-09-03 | Stop reason: HOSPADM

## 2020-08-27 RX ORDER — LIDOCAINE HYDROCHLORIDE 20 MG/ML
5 INJECTION, SOLUTION INFILTRATION; PERINEURAL ONCE
Status: COMPLETED | OUTPATIENT
Start: 2020-08-27 | End: 2020-08-27

## 2020-08-27 RX ORDER — POTASSIUM CHLORIDE 7.45 MG/ML
10 INJECTION INTRAVENOUS PRN
Status: DISCONTINUED | OUTPATIENT
Start: 2020-08-27 | End: 2020-09-03 | Stop reason: HOSPADM

## 2020-08-27 RX ORDER — POTASSIUM CHLORIDE 600 MG/1
8 TABLET, FILM COATED, EXTENDED RELEASE ORAL 2 TIMES DAILY WITH MEALS
Status: DISCONTINUED | OUTPATIENT
Start: 2020-08-27 | End: 2020-08-27

## 2020-08-27 RX ORDER — SODIUM CHLORIDE 9 MG/ML
250 INJECTION, SOLUTION INTRAVENOUS ONCE
Status: COMPLETED | OUTPATIENT
Start: 2020-08-27 | End: 2020-08-27

## 2020-08-27 RX ORDER — SODIUM CHLORIDE 0.9 % (FLUSH) 0.9 %
10 SYRINGE (ML) INJECTION PRN
Status: DISCONTINUED | OUTPATIENT
Start: 2020-08-27 | End: 2020-09-03 | Stop reason: HOSPADM

## 2020-08-27 RX ORDER — POTASSIUM CHLORIDE 20 MEQ/1
40 TABLET, EXTENDED RELEASE ORAL PRN
Status: DISCONTINUED | OUTPATIENT
Start: 2020-08-27 | End: 2020-09-03 | Stop reason: HOSPADM

## 2020-08-27 RX ORDER — GUAIFENESIN 600 MG/1
600 TABLET, EXTENDED RELEASE ORAL 2 TIMES DAILY
Status: DISCONTINUED | OUTPATIENT
Start: 2020-08-27 | End: 2020-09-03 | Stop reason: HOSPADM

## 2020-08-27 RX ORDER — HYDROCODONE BITARTRATE AND ACETAMINOPHEN 7.5; 325 MG/1; MG/1
1 TABLET ORAL EVERY 6 HOURS PRN
Status: DISCONTINUED | OUTPATIENT
Start: 2020-08-27 | End: 2020-09-03 | Stop reason: HOSPADM

## 2020-08-27 RX ORDER — DOCUSATE SODIUM 100 MG/1
100 CAPSULE, LIQUID FILLED ORAL 2 TIMES DAILY
Status: DISCONTINUED | OUTPATIENT
Start: 2020-08-27 | End: 2020-08-31

## 2020-08-27 RX ORDER — SODIUM CHLORIDE 0.9 % (FLUSH) 0.9 %
10 SYRINGE (ML) INJECTION EVERY 12 HOURS SCHEDULED
Status: DISCONTINUED | OUTPATIENT
Start: 2020-08-27 | End: 2020-09-03 | Stop reason: HOSPADM

## 2020-08-27 RX ORDER — L. ACIDOPHILUS/L.BULGARICUS 1MM CELL
2 TABLET ORAL 3 TIMES DAILY
Status: DISCONTINUED | OUTPATIENT
Start: 2020-08-27 | End: 2020-09-03 | Stop reason: HOSPADM

## 2020-08-27 RX ORDER — CINNAMON
1 OIL (ML) MISCELLANEOUS 4 TIMES DAILY
Status: DISCONTINUED | OUTPATIENT
Start: 2020-08-27 | End: 2020-09-03 | Stop reason: HOSPADM

## 2020-08-27 RX ADMIN — POTASSIUM CHLORIDE 40 MEQ: 20 TABLET, EXTENDED RELEASE ORAL at 18:45

## 2020-08-27 RX ADMIN — POTASSIUM CHLORIDE 40 MEQ: 20 TABLET, EXTENDED RELEASE ORAL at 09:04

## 2020-08-27 RX ADMIN — Medication 2000 UNITS: at 18:45

## 2020-08-27 RX ADMIN — Medication 100 MG: at 13:10

## 2020-08-27 RX ADMIN — VANCOMYCIN HYDROCHLORIDE 1250 MG: 5 INJECTION, POWDER, LYOPHILIZED, FOR SOLUTION INTRAVENOUS at 00:10

## 2020-08-27 RX ADMIN — CHLORHEXIDINE GLUCONATE 15 ML: 213 SOLUTION TOPICAL at 18:45

## 2020-08-27 RX ADMIN — ALOGLIPTIN 25 MG: 25 TABLET, FILM COATED ORAL at 10:12

## 2020-08-27 RX ADMIN — TICAGRELOR 90 MG: 90 TABLET ORAL at 10:02

## 2020-08-27 RX ADMIN — ASPIRIN 81 MG: 81 TABLET, COATED ORAL at 10:02

## 2020-08-27 RX ADMIN — POTASSIUM CHLORIDE 40 MEQ: 20 TABLET, EXTENDED RELEASE ORAL at 09:03

## 2020-08-27 RX ADMIN — PIPERACILLIN AND TAZOBACTAM 3.38 G: 3; .375 INJECTION, POWDER, LYOPHILIZED, FOR SOLUTION INTRAVENOUS at 05:25

## 2020-08-27 RX ADMIN — PIPERACILLIN AND TAZOBACTAM 3.38 G: 3; .375 INJECTION, POWDER, LYOPHILIZED, FOR SOLUTION INTRAVENOUS at 15:09

## 2020-08-27 RX ADMIN — LIDOCAINE HYDROCHLORIDE 5 ML: 20 INJECTION, SOLUTION INFILTRATION; PERINEURAL at 10:57

## 2020-08-27 RX ADMIN — PROVIDONE IODINE: 7.5 STICK TOPICAL at 10:20

## 2020-08-27 RX ADMIN — PANTOPRAZOLE SODIUM 40 MG: 40 TABLET, DELAYED RELEASE ORAL at 05:25

## 2020-08-27 RX ADMIN — HYDROCODONE BITARTRATE AND ACETAMINOPHEN 1 TABLET: 7.5; 325 TABLET ORAL at 21:09

## 2020-08-27 RX ADMIN — VANCOMYCIN HYDROCHLORIDE 1250 MG: 5 INJECTION, POWDER, LYOPHILIZED, FOR SOLUTION INTRAVENOUS at 14:24

## 2020-08-27 RX ADMIN — PROVIDONE IODINE: 7.5 STICK TOPICAL at 21:13

## 2020-08-27 RX ADMIN — Medication 10 ML: at 14:24

## 2020-08-27 RX ADMIN — INSULIN GLARGINE 12 UNITS: 100 INJECTION, SOLUTION SUBCUTANEOUS at 21:14

## 2020-08-27 RX ADMIN — LACTOBACILLUS TAB 2 TABLET: TAB at 18:45

## 2020-08-27 RX ADMIN — Medication 5 ML: at 10:20

## 2020-08-27 RX ADMIN — GUAIFENESIN 600 MG: 600 TABLET ORAL at 21:08

## 2020-08-27 RX ADMIN — CARVEDILOL 6.25 MG: 6.25 TABLET, FILM COATED ORAL at 21:08

## 2020-08-27 RX ADMIN — PIPERACILLIN AND TAZOBACTAM 3.38 G: 3; .375 INJECTION, POWDER, LYOPHILIZED, FOR SOLUTION INTRAVENOUS at 21:07

## 2020-08-27 RX ADMIN — LACTOBACILLUS TAB 2 TABLET: TAB at 13:10

## 2020-08-27 RX ADMIN — SODIUM CHLORIDE 250 ML: 9 INJECTION, SOLUTION INTRAVENOUS at 10:56

## 2020-08-27 RX ADMIN — INSULIN GLARGINE 12 UNITS: 100 INJECTION, SOLUTION SUBCUTANEOUS at 10:01

## 2020-08-27 RX ADMIN — GUAIFENESIN 600 MG: 600 TABLET ORAL at 10:20

## 2020-08-27 RX ADMIN — Medication 10 ML: at 21:14

## 2020-08-27 RX ADMIN — CARVEDILOL 3.12 MG: 3.12 TABLET, FILM COATED ORAL at 10:02

## 2020-08-27 RX ADMIN — Medication 5 ML: at 21:13

## 2020-08-27 RX ADMIN — TICAGRELOR 90 MG: 90 TABLET ORAL at 21:08

## 2020-08-27 RX ADMIN — Medication 1 DROP: at 13:11

## 2020-08-27 RX ADMIN — ATORVASTATIN CALCIUM 80 MG: 80 TABLET, FILM COATED ORAL at 21:08

## 2020-08-27 RX ADMIN — Medication 100 MG: at 10:02

## 2020-08-27 ASSESSMENT — PAIN SCALES - GENERAL
PAINLEVEL_OUTOF10: 1
PAINLEVEL_OUTOF10: 0
PAINLEVEL_OUTOF10: 0
PAINLEVEL_OUTOF10: 5
PAINLEVEL_OUTOF10: 0
PAINLEVEL_OUTOF10: 0

## 2020-08-27 ASSESSMENT — PAIN DESCRIPTION - PAIN TYPE: TYPE: ACUTE PAIN

## 2020-08-27 ASSESSMENT — PAIN DESCRIPTION - LOCATION: LOCATION: CHEST

## 2020-08-27 ASSESSMENT — PAIN DESCRIPTION - DESCRIPTORS: DESCRIPTORS: TENDER

## 2020-08-27 NOTE — CARE COORDINATION
red    Pain Managed: Yes    Bladder continence: Yes    Bowel continence: Yes      Other: on O2, needs at home. Will order PICC for IV ATB  K low. Add Norco and mucinex  On droplet for MRSA in sputum    PHYSICAL THERAPY  Bed mobility:  Supine to Sit: Minimal assistance (08/26/20 1126)  Sit to Supine: Minimal assistance (08/26/20 1126)  Transfers:  Sit to Stand: Minimal Assistance (08/26/20 1600)  Bed to Chair: Moderate assistance;2 Person Assistance (08/26/20 1126)  Gait:   Device: Rolling Walker (08/26/20 1601)  Other Apparatus: O2 (08/26/20 1601)  Assistance: 2 Person assistance; Moderate assistance;Minimal assistance (08/26/20 1601)  Distance: 25 feet x2 (08/26/20 1601)  Quality of Gait: decreased left foot clearance,, wide RADHA, genu recurvatum, variable walker use and control, short step length (08/26/20 1601)  Comments: last gait trail required one person for 25 feet with min - mod assistance - overall pt ability imprved with each trial (08/26/20 1601)  Stairs:     W/C mobility:     LTG:  Long term goal 1: pt to be indep with bed mobility  Long term goal 2: pt to be indep with bed transfers  Long term goal 3: pt to ambulate 150 ft with SBA and 50 feet indep - with appropriate device  Long term goal 4: pt to be indep with HEP  Long term goal 5: 35/56 for Borrero balance test  PT Treatment Time:  1.0 hrs      OCCUPATIONAL THERAPY  Hand Dominance: Right  ADL  Feeding: Setup (08/26/20 1027)  Grooming: Setup (08/26/20 1027)  UE Bathing: Setup (08/26/20 1027)  LE Bathing: Minimal assistance (08/26/20 1027)  UE Dressing: Unable to assess(comment)(hospital gown) (08/26/20 1027)  LE Dressing: Dependent/Total(To doff/don pants.) (08/26/20 1429)  Toileting: Dependent/Total(Pt incontinent of stool. Pt unaware that he is soiled. Pt required Total A for hygiene and pants mgmt) (08/26/20 1429)  Additional Comments: Pt completed morning self-care routine to promote independence with ADL tasks.  Pt completed sponge bath at sink restricted    Compensatory Swallowing Strategies: Alternate solids and liquids, Upright as possible for all oral intake, No straws, Eat/Feed slowly, Small bites/sips, Lingual sweep  Therapeutic Interventions: Bolus control exercises, Diet tolerance monitoring, Therapeutic PO trials with SLP, Oral care, Oral motor exercises, Tongue base strengthening      LTG:  Long-term Goals  Timeframe for Long-term Goals: 2-3 weeks  Goal 1: Pt will improve his Cognition from min to mod assist to I for adequate functional recall and safety awareness for home and community  Long-term Goals  Timeframe for Long-term Goals: 1-2 weeks  Goal 1: Patient will tolerate least restrictive diet without s/s of aspiration          COGNITION  OT: Cognition Comment: comp: sup exp: mod I social int: mod I prob solving: mod I memory: sup  SP:Memory: (mild to moderate immediate and STM deficits. Mild working memory deficits, which may negatively impact pt's ability to complete ADLs safely.)  Problem Solving: (Mild to moderate problem solving, safety and abstract reasoning deficits. Pt presents with reduced attention, delayed responses and slow processing. Reduced insight noted.)    RECREATIONAL THERAPY  Attendance to recreational therapy programs:    []  Pet Therapy  [] Music Therapy  [] Art Therapy    [] Recreation Therapy Group [] Support Group           Patient social interaction (mood, participation): good      Patient strengths: was active prior       Patients goal:  \"I want to get home\"       Problems/Barriers: cognition        1. Safety:          - Intervention / Plan:    [x]  falls protocol     [x]  PT/OT    [x]  SP        - Results:         2. Potential DME needs:         - Intervention / Plan:  [x]  PT/OT     [x]  Assess equipment needs/access       - Results:         3. Weakness:          - Intervention / Plan:  [x]  PT/OT      []  Other:         - Results:         4.   Discharge planning needs:          - Intervention / Plan:  [x]  Weekly team conference      [x]  family training        - Results:         5.            - Intervention / Plan:          - Results:         6.            - Intervention / Plan:         - Results:         7.            - Intervention / Plan:         - Results:           Discharge Plan   Estimated Length of Stay: TBD    Tentative Discharge date:   9/8/20      Anticipated Discharge Destination:  Home      Team recommendations:    1. Follow up Therapy :    PT  OT  RN    2. Home Health    Other:     Equipment needed at Discharge:  Other: TBD      Team Members Present at Conference:    Physician: Dr. Amelia Navas  : Vandana Land RN  RN: Scarlet Olsen, SUNIL  Physical Therapist: Elsy Santos, DEEPTHI  Occupational [address]  Speech Therapist: Jovanny Torres, SLP  Nurse Manager: Fifi Cummings, RN    Electronically signed by Nabil Goetz RN on 8/27/2020 at 8:42 AM

## 2020-08-27 NOTE — PROGRESS NOTES
[facility]  Facility/Department: [facility]  Speech Language Pathology   Treatment Note          Stewart Roman.  1975  [id number]        Rehab Dx/Hx: Impaired mobility and ADLs [Z74.09]  Impaired mobility [Z74.09]  Abnormality of gait and mobility [R26.9]    Precautions: falls    Medical Dx: Impaired mobility and ADLs [Z74.09]  Impaired mobility [Z74.09]  Abnormality of gait and mobility [R26.9]  Speech Dx: Cognitive Linguistic Impairment     Date: 8/27/2020    Subjective:  Alert, Cooperative and Pleasant        Interventions used this date:  Cognitive Skill Development    Objective/Assessment:  Patient progressing towards goals:  Short-term Goals  Timeframe for Short-term Goals: 1-2 weeks  Goal 1: To increase safety awareness and judgment for safe completion of ADLs secondary to pt's cognitive deficits, pt will complete abstract reasoning tasks (i.e. Word deduction, convergent and divergent naming, similarities/differences) with 80% accuracy and min cues. Pt completed abstract divergent naming task I with 50% accuracy, improved to 86% accuracy with min to mod cueing. Flat affect noted throughout the session. Pt described similarities and differences between items I with 100% accuracy. Goal 2: To increase independence for functional activities for home and community, pt will complete mid level executive functioning tasks (i.e. Path finding, scheduling appointments, prioritizing tasks) with min cues. Not addressed  Goal 3: To decrease pt's cognitive deficits through the use of compensatory strategies, the pt will be educated on 3 different memory strategies and verbalize how he/she might use them at home in 3 ways with min cues. Not addressed  Goal 4: To decrease cognitive deficits and improve attention to tasks for safe completion of ADLs, pt will complete structured tasks addressing (sustained, selective, alternating, divided) attention with 90% accuracy and min cues.  Pt presented with improved attention skills this date. Wife was present and noted that patient is more delayed in responding then usual.   Goal 5: Within 1-5 days of implementing the ST POC, pt's functional reading/writing skills will be assessed in relation to executive functioning (e.g. bill paying, reading rx labels, completing personal information), with additional goals added to pt's POC as deemed necesary by treating SLP. Pt completed change addition task I with 83% accuracy, improved to 100% accuracy. Pt completed prescription task I with 100% accuracy,   Long-term Goals  Timeframe for Long-term Goals: 2-3 weeks  Goal 1: Pt will improve his Cognition from min to mod assist to I for adequate functional recall and safety awareness for home and community    Treatment/Activity Tolerance:  Patient tolerated treatment well    Plan:  Continue per POC    Pain Assessment:  Initial Assessment:  Patient denies pain. Re-assessment:  Patient denies pain. Patient/Caregiver Education:  Patient educated on session and progression towards goals.     Safety Devices:  Call light within reach and Chair alarm in place      [id number] [address] Sotomayor ([facility]):    SWALLOWING  Rating:  DNT    SPOKEN LANGUAGE COMPREHENSION  Rating:  DNT    SPOKEN LANGUAGE EXPRESSION  Rating:  DNT    MOTOR SPEECH  Rating:  DNT    PROBLEM SOLVING  Ratin    MEMORY  Ratin          Therapy Time  SLP Individual Minutes  Time In: 1000  Time Out: 4208  Minutes: 30              Signature: Electronically signed by CLAU Snyder on 2020 at 10:59 AM

## 2020-08-27 NOTE — PLAN OF CARE
Problem: Falls - Risk of:  Goal: Will remain free from falls  Description: Will remain free from falls  Outcome: Ongoing  Goal: Absence of physical injury  Description: Absence of physical injury  Outcome: Ongoing     Problem: Skin Integrity:  Goal: Will show no infection signs and symptoms  Description: Will show no infection signs and symptoms  Outcome: Ongoing  Goal: Absence of new skin breakdown  Description: Absence of new skin breakdown  Outcome: Ongoing     Problem: Pain Control  Goal: Maintain pain level at or below patient's acceptable level (or 5 if patient is unable to determine acceptable level)  Outcome: Ongoing     Problem: Cardiovascular  Goal: No DVT, peripheral vascular complications  Outcome: Ongoing  Goal: Hemodynamic stability  Outcome: Ongoing  Goal: Anticoagulate/Hct stable  Outcome: Ongoing  Goal: Agreement to quit smoking  Outcome: Ongoing  Goal: Understanding of dietary restrictions  Outcome: Ongoing     Problem: Respiratory  Goal: No pulmonary complications  Outcome: Ongoing  Goal: O2 Sat > 90%  Outcome: Ongoing  Goal: Supplemental O2 requirements decreased  Outcome: Ongoing  Goal: Agreement to quit smoking  Outcome: Ongoing  Goal: Pneumonia vaccine at discharge as needed  Outcome: Ongoing     Problem: Skin Integrity/Risk  Goal: No skin breakdown during hospitalization  Outcome: Ongoing  Goal: Wound healing  Outcome: Ongoing     Problem: Musculor/Skeletal Functional Status  Goal: Highest potential functional level  Outcome: Ongoing  Goal: Absence of falls  Outcome: Ongoing     Problem: IP SWALLOWING  Goal: LTG - patient will tolerate the least restrictive diet consistency to allow for safe consumption of daily meals  Outcome: Ongoing     Problem: Pain:  Goal: Pain level will decrease  Description: Pain level will decrease  Outcome: Ongoing  Goal: Control of acute pain  Description: Control of acute pain  Outcome: Ongoing  Goal: Control of chronic pain  Description: Control of chronic pain  Outcome: Ongoing     Problem: IP COMMUNICATION/DYSARTHRIA  Goal: LTG - patient will improve expressive language skills to allow for communication of wants and needs in daily activities  Outcome: Ongoing

## 2020-08-27 NOTE — PROGRESS NOTES
difficulty opening medication bottles. Patient with 0 difficulty opening organizer boxes. Patient placed a total of 74 beads into organizer with 0 verbal cues and a total of 74 being correct. Patient with MOD difficulty manipulating beads. Patient able to sort beads back into correct bottles with 0 errors. Patient able to securely close 7/7 caps on medication bottles. Balance  Standing Balance: Stand by assistance  Standing Balance  Time: able to stand up to 2 minutues 50 seconds with 1-2 UE support X 1 trial, 0 LOB  Activity: placing pegs in pegboard  Comment: Patient too fatigued to trial more than once. Patient finished pegs activity seated. Patient able to reach in lateral planes with 0 difficulty. Patient able to reach in forward planes with 0 difficulty. Patient able to  100 large mushroom pegs with 0 difficulty 1 at a time. Patient able to place large pegs 1 at a time in pegboard with 0 difficulty. Patient alternated UE's after every 10th peg. Patient with RB's as needed.            Plan   Plan  Times per week: 5-7x/week  Plan weeks: 1-1.5 weeks  Current Treatment Recommendations: Strengthening, Endurance Training, Neuromuscular Re-education, Self-Care / ADL, Balance Training, Functional Mobility Training, Safety Education & Training, ROM    Plan Comment: Continue per OT POC for planned d/c on 20         Goals  Patient Goals   Patient goals : \"I want to get home\"       Therapy Time   Individual Concurrent Group Co-treatment   Time In 1500         Time Out 1600         Minutes 60             ADL trainin minutes  Therapeutic activities: 20 minutes       Electronically signed by Capo Contreras on 20 at 4:41 PM BLADE Bliss
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
## 2020-08-27 NOTE — PROGRESS NOTES
INDIVIDUALIZED OVERALL REHAB PLAN OF CARE  ADDENDUM TO REHAB PROGRESS NOTE-for audit purposes must also refer to this day's clinical note and combine the information      Date: 2020  Patient Name: Rafaela Thacker. Room: R232/R232-01    MRN: [id number]    : 1975  (37 y.o.)  Gender: male       Today 2020 during weekly team meeting, I reviewed the patient Rafaela Thacker. in detail with the therapists and nurses involved in patient's care gathering complex physiatric data regarding current medical issues, progress in therapies, factors limiting progress, social issues, psychological issues, ongoing therapeutic plans and discharge planning. Legend:  I= independent Im =Modified independent  S=Supervised SB=stand by HATCH=set up CG=contact rafa Min= minimal Mod=Moderate Max=maximal Max of 2 =maximal assist of 2 people      CURRENT FUNCTIONAL STATUS:    NURSING ISSUES:       Patient is not forthcoming with his social status. Looks like he was living in a hotel not an apartment he may be  from his wife. Nursing will continue to focus on bowel and bladder continence transitioning toward independence by time of discharge. Monitoring post void residuals monitoring for severe constipation and bowel obstruction. Focus on achieving ADL goals with co-treating with OT when possible. PHYSICAL THERAPY  Bed mobility:  Supine to Sit: Minimal assistance (20 1126)  Sit to Supine: Minimal assistance (20 1126)  Transfers:  Sit to Stand: Minimal Assistance (20 1600)  Bed to Chair: Moderate assistance;2 Person Assistance (20 1126)  Gait:   Device: Rolling Walker (20 1601)  Other Apparatus: O2 (20 1601)  Assistance: 2 Person assistance; Moderate assistance;Minimal assistance (20 1601)  Distance: 25 feet x2 (20 1601)  Quality of Gait: decreased left foot clearance,, wide RADHA, genu recurvatum, variable walker use and control, short step length (20 1601)  Comments: last gait trail required one person for 25 feet with min - mod assistance - overall pt ability imprved with each trial (08/26/20 1601)  Stairs:     W/C mobility:         OCCUPATIONAL THERAPY  Hand Dominance: Right  ADL  Feeding: Setup (08/26/20 1027)  Grooming: Setup (08/26/20 1027)  UE Bathing: Setup (08/26/20 1027)  LE Bathing: Minimal assistance (08/26/20 1027)  UE Dressing: Unable to assess(comment)(hospital gown) (08/26/20 1027)  LE Dressing: Dependent/Total(To doff/don pants.) (08/26/20 1429)  Toileting: Dependent/Total(Pt incontinent of stool. Pt unaware that he is soiled. Pt required Total A for hygiene and pants mgmt) (08/26/20 1429)  Additional Comments: Pt completed morning self-care routine to promote independence with ADL tasks. Pt completed sponge bath at sink level stating \"I'm not ready to get in the shower yet\". Pt was able to sequence ADL task with no difficulty but required increased time to complete tasks. (08/26/20 1027)  Toilet Transfers  Toilet - Technique: Stand pivot (08/26/20 1040)  Equipment Used: Grab bars (08/26/20 1040)  Toilet Transfer: Minimal assistance (08/26/20 1040)     Shower Transfers  Shower Transfers: Not tested (08/26/20 1040)  Shower Transfers Comments: pt declined (08/26/20 1040)      SPEECH THERAPY  Motor Speech: (Mild reduced volume of voice secondary to post intubation. ST to monitor)  Comprehension: [facility] for 3-4 step directions)  Verbal Expression: (Mild to mild to moderate divergent and convergent naming deficits secondary to cognitive linguistic deficits)      Diet/Swallow:  Diet Solids Recommendation: Dysphagia Soft and Bite-Sized (Dysphagia III)  Liquid Consistency Recommendation: Thin  Dysphagia Outcome Severity Scale: Level 5: Mild dysphagia- Distant supervision. May need one diet consistency restricted    Compensatory Swallowing Strategies:  Alternate solids and liquids, Upright as possible for all oral intake, No straws, Eat/Feed slowly, Small bites/sips, Lingual sweep  Therapeutic Interventions: Bolus control exercises, Diet tolerance monitoring, Therapeutic PO trials with SLP, Oral care, Oral motor exercises, Tongue base strengthening          COGNITION  OT: Cognition Comment: comp: sup exp: mod I social int: mod I prob solving: mod I memory: sup  SP:Memory: (mild to moderate immediate and STM deficits. Mild working memory deficits, which may negatively impact pt's ability to complete ADLs safely.)  Problem Solving: (Mild to moderate problem solving, safety and abstract reasoning deficits. Pt presents with reduced attention, delayed responses and slow processing.  Reduced insight noted.)        THERAPY, MEDICAL AND NURSING COORDINATION:    []  Pain medication before therapies     []  Check orthostatic BP      [x]  Ambulate to the bathroom in room    [x]  Add scheduled rest beaks     []  In room therapies      Discharge date set for:               9/8/2020      Home with:   Alone versus with his wife with help from   his wife          And:     [address]:     [x]  PT    [x]  OT    [x]  ST   [x]  Aide   []  SW    [x]  RN                    Outpatient Therapy:  []  PT    []  OT    []  ST   []  Rehab Psych                 Equipment:  [address]      At D/C their function is goaled at:   PT:Long term goal 1: pt to be indep with bed mobility  Long term goal 2: pt to be indep with bed transfers  Long term goal 3: pt to ambulate 150 ft with SBA and 50 feet indep - with appropriate device  Long term goal 4: pt to be indep with HEP  Long term goal 5: 35/56 for [place] balance test  OT:Eating  Assistance Needed: Setup or clean-up assistance  CARE Score: 5  Discharge Goal: Independent, Oral Hygiene  Assistance Needed: Setup or clean-up assistance  CARE Score: 5  Discharge Goal: [address], [address] [place]  Reason if not Attempted: Patient refused  CARE Score: 7  Discharge Goal: Independent, Shower/Bathe Self  Assistance Needed: Partial/moderate assistance  CARE Score: 3  Discharge Goal: Independent  Upper Body Dressing  Comment: hospital gown  Reason if not Attempted: Not applicable  CARE Score: 9  Discharge Goal: Independent, Lower Body Dressing  Assistance Needed: Partial/moderate assistance  CARE Score: 3  Discharge Goal: Independent, Putting On/Taking Off Footwear  Assistance Needed: Partial/moderate assistance  CARE Score: 3  Discharge Goal: Independent, Toilet Transfer  Assistance Needed: Partial/moderate assistance  CARE Score: 3  Discharge Goal: Independent  SP:Long-term Goals  Timeframe for Long-term Goals: 2-3 weeks  Goal 1: Pt will improve his Cognition from min to mod assist to I for adequate functional recall and safety awareness for home and community  Long-term Goals  Timeframe for Long-term Goals: 1-2 weeks  Goal 1: Patient will tolerate least restrictive diet without s/s of aspiration           From a cognitive standpoint they will need:        24 hr supervision  --progress to occasional           Significant problems/ barriers to functional progress include: Pt is at a high risk for functional loss,    [x]  Acute infection/UTI MRSA pneumonia    []  Low BP's     []  COPD flare-up   []  Uncontrolled blood sugar     []  Progressive anemia         [x]  Severe pain exacerbation     [x]  Impaired mental status    []  Urinary incontinence    []  Bowel incontinence           Plan to correct barriers to functional progress: Add scheduled rest breaks, control pain by using ice Lidoderm rest and massage as well as pain medications prior to therapy. Based on a comprehensive evaluation of the above, the individualized therapy and Discharge plan will be:    -Times stated are an average that will be varied based on the patient's daily need.        PT ____2__hrs/day 5-7 days per week           OT ___1___hrs per day 5-7 days per week ST ____1/2___hrs /day 3-5 days per week       Estimated LOS 2 week(s)    - Overall functional prognosis:     [x]  Good    []  Fair    []

## 2020-08-27 NOTE — FLOWSHEET NOTE
Patient assessment completed earlier this shift. The patient is short of breath with exertion. He complained of rib pain 3 of 10, and was medicated with tylenol 2 tabs with good effect. He  Is continent of urine so far tonight. He refused his colace syup and denies constipation. No distress is noted.

## 2020-08-27 NOTE — PROGRESS NOTES
Pharmacy Vancomycin Consult     Vancomycin Day: 5    Current Dosing: Vancomycin 1250 mg IV q 12hours    Recent Labs     08/26/20  0559 08/27/20  0533   BUN 14 11       Recent Labs     08/26/20  0559 08/27/20  0533   CREATININE 0.64* 0.71       Recent Labs     08/26/20  0559 08/27/20  0533   WBC 10.7 9.3         Intake/Output Summary (Last 24 hours) at 8/27/2020 1515  Last data filed at 8/27/2020 1424  Gross per 24 hour   Intake 10 ml   Output --   Net 10 ml       Ht Readings from Last 1 Encounters:   08/19/20 5' 10\" (1.778 m)        Wt Readings from Last 1 Encounters:   08/24/20 171 lb 1.6 oz (77.6 kg)         There is no height or weight on file to calculate BMI. CrCl cannot be calculated (Unknown ideal weight.). CrCl as calculated is 135 mL/min    Trough: 6.9    Assessment/Plan:  Due to timing adjustments due to therapy, this indicates trough might not be as low as resulted. Please continue Vancomycin 1250 mg IV q 12 hours at this time. Additional trough to be taken before Saturday dose.  Pharmacy to follow    [address] [place] [address]  Staff Pharmacist  8/27/2020  3:24 PM

## 2020-08-27 NOTE — PROGRESS NOTES
Occupational Therapy  Facility/Department: [facility]  Daily Treatment Note  NAME: Deborah Marques. : 1975  MRN: [id number]    Date of Service: 2020    Discharge Recommendations:  Continue to assess pending progress       Assessment      Activity Tolerance  Activity Tolerance: Patient Tolerated treatment well  Safety Devices  Safety Devices in place: Yes  Type of devices: All fall risk precautions in place         Patient Diagnosis(es): There were no encounter diagnoses. has a past medical history of CAD (coronary artery disease) and DM (diabetes mellitus), type 2, uncontrolled ([facility] [address] has no past surgical history on file. Restrictions  Restrictions/Precautions  Restrictions/Precautions: Fall Risk, Contact Precautions(droplet precautions for MRSA in sputum)  Position Activity Restriction  Other position/activity restrictions: contact precautions MRSA     Subjective   General  Chart Reviewed: Yes  Patient assessed for rehabilitation services?: Yes  Response to previous treatment: Patient with no complaints from previous session  Family / Caregiver Present: No  Referring Practitioner: Dr. Lorena iSms  Diagnosis: Impaired mobility/ADLs 2° to hypoxic encephalopathy s/p cardiac arrest    Subjective  Subjective: How long do I have to be here? Pain Assessment  Pain Level: 1  Pain Type: Acute pain  Pain Location: Chest  Pain Descriptors: Tender  Pre Treatment Pain Screening  Pain at present: 1  Intervention List: Patient able to continue with treatment;Patient declined any intervention     Orientation  Orientation  Overall Orientation Status: Within Functional Limits     Objective      ADL    Grooming: Setup    UE Bathing: Setup    LE Bathing: Stand by assistance    UE Dressing: Setup    LE Dressing: Minimal assistance(thread R LE X 1/2)    Toileting: None(denied need)    Shower Transfers  Shower - Transfer From: Wheelchair  Shower - Transfer Type: To and From  Shower - Transfer To:  Shower seat with back  Shower - Technique: (stand step)  Shower Transfers: Contact Guard  Shower Transfers Comments: grab bars           Plan   Plan  Times per week: 5-7x/week  Plan weeks: 1-1.5 weeks  Current Treatment Recommendations: Strengthening, Endurance Training, Neuromuscular Re-education, Self-Care / ADL, Balance Training, Functional Mobility Training, Safety Education & Training, ROM    Plan Comment: Continue per OT POC for planned d/c on 20         Goals  Patient Goals   Patient goals : \"I want to get home\"       Therapy Time   Individual Concurrent Group Co-treatment   Time In 830         Time Out 0930         Minutes 60             ADL trainin minutes       Electronically signed by Francyne Dance on 20 at 9:51 AM DELMY Lozano Ma, BLADE
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
## 2020-08-27 NOTE — PROGRESS NOTES
Educated pt and spouse about visiting hours, all questions were answered before leaving the room.  Electronically signed by Zhou Callahan RN on 8/27/2020 at 4:34 PM
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
## 2020-08-27 NOTE — PROGRESS NOTES
nursing    Minutes:      Transfer/Bed mobility trainin      Gait training:10      Neuro re education:10        Leo Goel PT, 20 at 4:00 PM

## 2020-08-28 LAB
FERRITIN: 526.9 NG/ML (ref 30–400)
FOLATE: 7.9 NG/ML (ref 7.3–26.1)
GLUCOSE BLD-MCNC: 148 MG/DL (ref 60–115)
GLUCOSE BLD-MCNC: 150 MG/DL (ref 60–115)
GLUCOSE BLD-MCNC: 152 MG/DL (ref 60–115)
GLUCOSE BLD-MCNC: 185 MG/DL (ref 60–115)
IRON SATURATION: 13 % (ref 11–46)
IRON: 21 UG/DL (ref 59–158)
PERFORMED ON: ABNORMAL
POTASSIUM SERPL-SCNC: 4 MEQ/L (ref 3.4–4.9)
TOTAL IRON BINDING CAPACITY: 164 UG/DL (ref 178–450)
VITAMIN B-12: >2000 PG/ML (ref 232–1245)

## 2020-08-28 PROCEDURE — 2500000003 HC RX 250 WO HCPCS: Performed by: PHYSICAL MEDICINE & REHABILITATION

## 2020-08-28 PROCEDURE — 6360000002 HC RX W HCPCS: Performed by: INTERNAL MEDICINE

## 2020-08-28 PROCEDURE — 99232 SBSQ HOSP IP/OBS MODERATE 35: CPT | Performed by: INTERNAL MEDICINE

## 2020-08-28 PROCEDURE — 83540 ASSAY OF IRON: CPT

## 2020-08-28 PROCEDURE — 82746 ASSAY OF FOLIC ACID SERUM: CPT

## 2020-08-28 PROCEDURE — 2580000003 HC RX 258: Performed by: INTERNAL MEDICINE

## 2020-08-28 PROCEDURE — 83550 IRON BINDING TEST: CPT

## 2020-08-28 PROCEDURE — 97112 NEUROMUSCULAR REEDUCATION: CPT

## 2020-08-28 PROCEDURE — 2580000003 HC RX 258: Performed by: PHYSICAL MEDICINE & REHABILITATION

## 2020-08-28 PROCEDURE — 1180000000 HC REHAB R&B

## 2020-08-28 PROCEDURE — 97530 THERAPEUTIC ACTIVITIES: CPT

## 2020-08-28 PROCEDURE — 97535 SELF CARE MNGMENT TRAINING: CPT

## 2020-08-28 PROCEDURE — 6370000000 HC RX 637 (ALT 250 FOR IP): Performed by: INTERNAL MEDICINE

## 2020-08-28 PROCEDURE — 97116 GAIT TRAINING THERAPY: CPT

## 2020-08-28 PROCEDURE — 99254 IP/OBS CNSLTJ NEW/EST MOD 60: CPT | Performed by: INTERNAL MEDICINE

## 2020-08-28 PROCEDURE — 97129 THER IVNTJ 1ST 15 MIN: CPT

## 2020-08-28 PROCEDURE — 36415 COLL VENOUS BLD VENIPUNCTURE: CPT

## 2020-08-28 PROCEDURE — 2700000000 HC OXYGEN THERAPY PER DAY

## 2020-08-28 PROCEDURE — 6370000000 HC RX 637 (ALT 250 FOR IP): Performed by: PHYSICAL MEDICINE & REHABILITATION

## 2020-08-28 PROCEDURE — 99232 SBSQ HOSP IP/OBS MODERATE 35: CPT | Performed by: PHYSICAL MEDICINE & REHABILITATION

## 2020-08-28 PROCEDURE — 82607 VITAMIN B-12: CPT

## 2020-08-28 PROCEDURE — 82728 ASSAY OF FERRITIN: CPT

## 2020-08-28 PROCEDURE — 84132 ASSAY OF SERUM POTASSIUM: CPT

## 2020-08-28 PROCEDURE — 92526 ORAL FUNCTION THERAPY: CPT

## 2020-08-28 RX ORDER — LIDOCAINE 4 G/G
2 PATCH TOPICAL DAILY
Status: DISCONTINUED | OUTPATIENT
Start: 2020-08-28 | End: 2020-08-31

## 2020-08-28 RX ADMIN — CARVEDILOL 6.25 MG: 6.25 TABLET, FILM COATED ORAL at 21:47

## 2020-08-28 RX ADMIN — VANCOMYCIN HYDROCHLORIDE 1250 MG: 5 INJECTION, POWDER, LYOPHILIZED, FOR SOLUTION INTRAVENOUS at 00:13

## 2020-08-28 RX ADMIN — Medication 2000 UNITS: at 17:10

## 2020-08-28 RX ADMIN — PROVIDONE IODINE: 7.5 STICK TOPICAL at 17:16

## 2020-08-28 RX ADMIN — Medication 10 ML: at 23:09

## 2020-08-28 RX ADMIN — ASPIRIN 81 MG: 81 TABLET, COATED ORAL at 08:20

## 2020-08-28 RX ADMIN — CHLORHEXIDINE GLUCONATE: 213 SOLUTION TOPICAL at 08:30

## 2020-08-28 RX ADMIN — PROVIDONE IODINE: 7.5 STICK TOPICAL at 08:32

## 2020-08-28 RX ADMIN — VANCOMYCIN HYDROCHLORIDE 1250 MG: 5 INJECTION, POWDER, LYOPHILIZED, FOR SOLUTION INTRAVENOUS at 13:19

## 2020-08-28 RX ADMIN — Medication 10 ML: at 08:40

## 2020-08-28 RX ADMIN — PIPERACILLIN AND TAZOBACTAM 3.38 G: 3; .375 INJECTION, POWDER, LYOPHILIZED, FOR SOLUTION INTRAVENOUS at 22:45

## 2020-08-28 RX ADMIN — GUAIFENESIN 600 MG: 600 TABLET ORAL at 08:20

## 2020-08-28 RX ADMIN — CARVEDILOL 6.25 MG: 6.25 TABLET, FILM COATED ORAL at 08:20

## 2020-08-28 RX ADMIN — Medication 100 MG: at 13:19

## 2020-08-28 RX ADMIN — DOCUSATE SODIUM 100 MG: 100 CAPSULE, LIQUID FILLED ORAL at 21:47

## 2020-08-28 RX ADMIN — INSULIN GLARGINE 12 UNITS: 100 INJECTION, SOLUTION SUBCUTANEOUS at 08:21

## 2020-08-28 RX ADMIN — TICAGRELOR 90 MG: 90 TABLET ORAL at 21:47

## 2020-08-28 RX ADMIN — LACTOBACILLUS TAB 2 TABLET: TAB at 17:09

## 2020-08-28 RX ADMIN — Medication 100 MG: at 08:20

## 2020-08-28 RX ADMIN — LACTOBACILLUS TAB 2 TABLET: TAB at 08:20

## 2020-08-28 RX ADMIN — ATORVASTATIN CALCIUM 80 MG: 80 TABLET, FILM COATED ORAL at 21:47

## 2020-08-28 RX ADMIN — INSULIN GLARGINE 12 UNITS: 100 INJECTION, SOLUTION SUBCUTANEOUS at 21:56

## 2020-08-28 RX ADMIN — TICAGRELOR 90 MG: 90 TABLET ORAL at 08:20

## 2020-08-28 RX ADMIN — GUAIFENESIN 600 MG: 600 TABLET ORAL at 21:47

## 2020-08-28 RX ADMIN — PIPERACILLIN AND TAZOBACTAM 3.38 G: 3; .375 INJECTION, POWDER, LYOPHILIZED, FOR SOLUTION INTRAVENOUS at 15:00

## 2020-08-28 RX ADMIN — PANTOPRAZOLE SODIUM 40 MG: 40 TABLET, DELAYED RELEASE ORAL at 05:32

## 2020-08-28 RX ADMIN — Medication 5 ML: at 08:21

## 2020-08-28 RX ADMIN — PIPERACILLIN AND TAZOBACTAM 3.38 G: 3; .375 INJECTION, POWDER, LYOPHILIZED, FOR SOLUTION INTRAVENOUS at 05:31

## 2020-08-28 RX ADMIN — ALOGLIPTIN 25 MG: 25 TABLET, FILM COATED ORAL at 08:20

## 2020-08-28 RX ADMIN — PROVIDONE IODINE: 7.5 STICK TOPICAL at 23:09

## 2020-08-28 RX ADMIN — LACTOBACILLUS TAB 2 TABLET: TAB at 13:19

## 2020-08-28 ASSESSMENT — PAIN DESCRIPTION - LOCATION: LOCATION: CHEST

## 2020-08-28 ASSESSMENT — PAIN DESCRIPTION - DESCRIPTORS: DESCRIPTORS: TENDER;DISCOMFORT

## 2020-08-28 ASSESSMENT — PAIN SCALES - GENERAL
PAINLEVEL_OUTOF10: 0
PAINLEVEL_OUTOF10: 5
PAINLEVEL_OUTOF10: 0

## 2020-08-28 ASSESSMENT — PAIN DESCRIPTION - PAIN TYPE: TYPE: ACUTE PAIN

## 2020-08-28 NOTE — PROGRESS NOTES
[facility]  Facility/Department: [facility]  Speech Language Pathology   Treatment Note          Ritchie Nim.  1975  [id number]        Rehab Dx/Hx: Impaired mobility and ADLs [Z74.09]  Impaired mobility [Z74.09]  Abnormality of gait and mobility [R26.9]    Precautions: falls    Medical Dx: Impaired mobility and ADLs [Z74.09]  Impaired mobility [Z74.09]  Abnormality of gait and mobility [R26.9]  Speech Dx: Cognitive Linguistic Impairment     Date: 8/28/2020    Subjective:  Alert, Cooperative and Agitated        Interventions used this date:  Cognitive Skill Development    Objective/Assessment:  Patient progressing towards goals:  Short-term Goals  Timeframe for Short-term Goals: 1-2 weeks  Goal 1: To increase safety awareness and judgment for safe completion of ADLs secondary to pt's cognitive deficits, pt will complete abstract reasoning tasks (i.e. Word deduction, convergent and divergent naming, similarities/differences) with 80% accuracy and min cues. Not addressed   Goal 2: To increase independence for functional activities for home and community, pt will complete mid level executive functioning tasks (i.e. Path finding, scheduling appointments, prioritizing tasks) with min cues. Not addressed  Goal 3: To decrease pt's cognitive deficits through the use of compensatory strategies, the pt will be educated on 3 different memory strategies and verbalize how he/she might use them at home in 3 ways with min cues. Pt able to immediately recall 4 words I in the correct order with 90% accuracy. Goal 4: To decrease cognitive deficits and improve attention to tasks for safe completion of ADLs, pt will complete structured tasks addressing (sustained, selective, alternating, divided) attention with 90% accuracy and min cues. Goal 5:  Within 1-5 days of implementing the ST POC, pt's functional reading/writing skills will be assessed in relation to executive functioning (e.g. bill paying, reading rx treatment well    Plan:  Continue per POC    Pain Assessment:  Initial Assessment:  Patient denies pain. Re-assessment:  Patient denies pain. Patient/Caregiver Education:  Patient educated on session and progression towards goals.     Safety Devices:  Chair alarm in place      [id number] Niraj Sotomayor ([facility]):    SWALLOW:   Ratin-5    PROBLEM SOLVING  Ratin    MEMORY  Ratin          Therapy Time  SLP Individual Minutes  Time In: 1110  Time Out: 1130  Minutes: 20     Dysphagia 8  Cognition 12              Signature: Electronically signed by CLAU Velazquez on 2020 at 11:19 AM
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
## 2020-08-28 NOTE — FLOWSHEET NOTE
Patient assessment completed earlier this shift. The patient complained of rib/chest pain 5-7 and was medicated with PRN pain med with god effects. He has a productive cough with tan and green  Sputum. The patient has received Zosyn, and Vancomycin IV via the PICC line in the right upper arm. He has been continent of urine. He refused his stool softener tonight in hopes to regain control of his bowels.

## 2020-08-28 NOTE — CONSULTS
[facility] CARDIOLOGY CONSULT NOTE    Patient: Deborah Marques. Unit/Bed: E389/Q059-41  YOB: 1975  MRN: [id number]  Acct: [de-identified]   Admitting Diagnosis: Impaired mobility and ADLs [Z74.09]  Impaired mobility [Z74.09]  Abnormality of gait and mobility [R26.9]  Admit Date:  8/25/2020  Hospital Day: 3      Chief Complaint: dyspnea    History of Present Illness: 38year old male with recent cardiac arrest, AMI posterior, s/p PCI LCx, ICM, EF 35%. Hypoxic encephalopathy. Aspiration pneumonia. Seen in rehab, doing well no chest pain, improving cough, less short of breath. PMHx:  Past Medical History:   Diagnosis Date    CAD (coronary artery disease) 3/2/2015    DM (diabetes mellitus), type 2, uncontrolled ([facility] 1/29/2015       PSHx:  No past surgical history on file. Social Hx:  Social History     Socioeconomic History    Marital status:      Spouse name: None    Number of children: None    Years of education: None    Highest education level: None   Occupational History    None   Social Needs    Financial resource strain: None    Food insecurity     Worry: None     Inability: None    Transportation needs     Medical: None     Non-medical: None   Tobacco Use    Smoking status: Current Every Day Smoker     Packs/day: 1.00     Years: 20.00     Pack years: 20.00    Smokeless tobacco: Never Used   Substance and Sexual Activity    Alcohol use: Yes     Comment: rarely    Drug use: No    Sexual activity: Yes     Partners: Female   Lifestyle    Physical activity     Days per week: None     Minutes per session: None    Stress: None   Relationships    Social connections     Talks on phone: None     Gets together: None     Attends Congregational service: None     Active member of club or organization: None     Attends meetings of clubs or organizations: None     Relationship status:     Intimate partner violence     Fear of current or ex partner: No     Emotionally abused:  No Physically abused: No     Forced sexual activity: No   Other Topics Concern    None   Social History Narrative         Lives With: Spouse    Type of Home: [id number] in [facility] [address]: One level Level entry    Bathroom Shower/Tub: Tub/Shower unit    Home Equipment: (no equipment)    ADL Assistance: Independent    Homemaking Assistance: Independent    Homemaking Responsibilities: Yes    Ambulation Assistance: Independent    Transfer Assistance: Independent    Active : Yes    Occupation: Full time employment    Type of occupation:        Family Hx:  No family history on file. Review of Systems:   Review of Systems   Unable to perform ROS: Other       Allergies:   Allergies   Allergen Reactions    Metformin And Related        Current Medications:    Current Facility-Administered Medications:     lidocaine 4 % external patch 2 patch, 2 patch, Transdermal, Daily, Fany Scullin, DO    docusate sodium (COLACE) capsule 100 mg, 100 mg, Oral, BID, Fany Scullin, DO    HYDROcodone-acetaminophen (NORCO) 7.5-325 MG per tablet 1 tablet, 1 tablet, Oral, Q6H PRN, Fany Scullin, DO, 1 tablet at 08/27/20 2109    guaiFENesin (MUCINEX) extended release tablet 600 mg, 600 mg, Oral, BID, Fany Scullin, DO, 600 mg at 08/28/20 0820    cinnamon oil liquid 1 drop, 1 drop, Oral, 4x Daily, Fany Scullin, DO, 1 drop at 08/27/20 1311    lactobacillus acidophilus [facility]) 2 tablet, 2 tablet, Oral, TID, Fany Scullin, DO, 2 tablet at 08/28/20 1319    sodium chloride flush 0.9 % injection 10 mL, 10 mL, Intravenous, 2 times per day, Fany Scullin, DO, 10 mL at 08/28/20 0840    sodium chloride flush 0.9 % injection 10 mL, 10 mL, Intravenous, PRN, Fany Scullin, DO    potassium chloride (KLOR-CON M) extended release tablet 40 mEq, 40 mEq, Oral, PRN **OR** potassium bicarb-citric acid (EFFER-K) effervescent tablet 40 mEq, 40 mEq, Oral, PRN **OR** potassium chloride 10 mEq/100 mL IVPB (Peripheral Line), 10 mEq, Intravenous, PRN, Garrie Sos, APRN - CNP    carvedilol (COREG) tablet 6.25 mg, 6.25 mg, Oral, BID, Portia Todd MD, 6.25 mg at 08/28/20 0820    chlorhexidine (HIBICLENS) 4 % liquid, , Topical, QPM, Fany Scullin, DO, 15 mL at 08/27/20 1845    Povidone-Iodine 7.5 % SWAB, , Topical, TID, Fany Scullin, DO    Vitamin D (CHOLECALCIFEROL) tablet 2,000 Units, 2,000 Units, Oral, Dinner, [employer], DO, 2,000 Units at 08/27/20 1845    coenzyme Q10 capsule 100 mg, 100 mg, Oral, 2 times per day, Fnay Scullin, DO, 100 mg at 08/28/20 1319    cyanocobalamin injection 1,000 mcg, 1,000 mcg, Intramuscular, Weekly, Fany Scullin, DO, 1,000 mcg at 08/26/20 5108    acetaminophen (TYLENOL) tablet 650 mg, 650 mg, Oral, Q4H PRN, Fany Scullin, DO, 650 mg at 08/26/20 2150    fleet rectal enema 1 enema, 1 enema, Rectal, Daily PRN, Fany Scullin, DO    magic (miracle) mouthwash, 5 mL, Swish & Spit, TID, Fany Scullin, DO, 5 mL at 08/28/20 0821    insulin glargine (LANTUS) injection vial 12 Units, 12 Units, Subcutaneous, BID, Sánchez Collins MD, 12 Units at 08/28/20 0821    insulin lispro (HUMALOG) injection vial 0-12 Units, 0-12 Units, Subcutaneous, TID WC, Sánchez Collins MD, 2 Units at 08/28/20 1320    insulin lispro (HUMALOG) injection vial 0-6 Units, 0-6 Units, Subcutaneous, Nightly, Kailash Barbour MD, 2 Units at 08/27/20 2122    alogliptin (NESINA) tablet 25 mg, 25 mg, Oral, Daily, Kailash Barbour MD, 25 mg at 08/28/20 0820    dextrose 5 % solution, 100 mL/hr, Intravenous, PRN, Myla Roldan MD    dextrose 50 % IV solution, 12.5 g, Intravenous, PRN, Myla Roldan MD    glucagon (rDNA) injection 1 mg, 1 mg, Intramuscular, PRN, Myla Roldan MD    glucose (GLUTOSE) 40 % oral gel 15 g, 15 g, Oral, PRN, Myla Roldan MD    aspirin EC tablet 81 mg, 81 mg, Oral, Daily, Myla Roldan MD, 81 mg at 08/28/20 0820    atorvastatin (LIPITOR) tablet 80 mg, 80 mg, Oral, Nightly, Bud Roberts MD, 80 mg at 08/27/20 2108    pantoprazole (PROTONIX) tablet 40 mg, 40 mg, Oral, QAM AC, Bud Roberts MD, 40 mg at 08/28/20 0532    piperacillin-tazobactam (ZOSYN) 3.375 g in dextrose 5 % 50 mL IVPB extended infusion (mini-bag), 3.375 g, Intravenous, Q8H, Bud Roberts MD, Stopped at 08/28/20 7901    polyethylene glycol (GLYCOLAX) packet 17 g, 17 g, Oral, Daily, Bud Roberts MD, 17 g at 08/26/20 0831    ticagrelor (BRILINTA) tablet 90 mg, 90 mg, Oral, BID, Bud Roberts MD, 90 mg at 08/28/20 0820    promethazine (PHENERGAN) tablet 12.5 mg, 12.5 mg, Oral, Q6H PRN **OR** ondansetron (ZOFRAN) injection 4 mg, 4 mg, Intravenous, Q6H PRN, Bud Roberts MD    vancomycin Tre Lynch) intermittent dosing (placeholder), , Other, RX Placeholder, Bud Roberts MD    vancomycin (VANCOCIN) 1,250 mg in dextrose 5 % 250 mL IVPB, 1,250 mg, Intravenous, Q12H, Bud Roberts MD, Last Rate: 125 mL/hr at 08/28/20 1319, 1,250 mg at 08/28/20 1319    Physical Examination:    /70   Pulse 78   Temp 98 °F (36.7 °C) (Oral)   Resp 17   Ht 5' 10\" (1.778 m)   Wt 172 lb (78 kg)   SpO2 94%   BMI 24.68 kg/m²    Physical Exam  Constitutional:       General: He is not in acute distress. Appearance: He is well-developed. He is not diaphoretic. HENT:      Head: Normocephalic and atraumatic. Eyes:      General:         Right eye: No discharge. Conjunctiva/sclera: Conjunctivae normal.      Pupils: Pupils are equal, round, and reactive to light. Neck:      Musculoskeletal: Normal range of motion and neck supple. Vascular: No JVD. Trachea: No tracheal deviation. Cardiovascular:      Rate and Rhythm: Normal rate and regular rhythm. Heart sounds: Normal heart sounds. No murmur. No friction rub. No gallop. Pulmonary:      Effort: Pulmonary effort is normal. No respiratory distress. Breath sounds: Normal breath sounds. No wheezing or rales.    Abdominal:      General: Bowel sounds are normal. There is no distension. Palpations: Abdomen is soft. Tenderness: There is no abdominal tenderness. Musculoskeletal: Normal range of motion. Skin:     General: Skin is warm. Findings: No rash. Neurological:      Mental Status: He is alert. He is disoriented. Cranial Nerves: No cranial nerve deficit.          LABS:  CBC:   Lab Results   Component Value Date    WBC 9.3 08/27/2020    RBC 3.11 08/27/2020    HGB 9.1 08/27/2020    HCT 26.3 08/27/2020    MCV 84.7 08/27/2020    MCH 29.4 08/27/2020    MCHC 34.7 08/27/2020    RDW 12.7 08/27/2020     08/27/2020     CBC with Differential:   Lab Results   Component Value Date    WBC 9.3 08/27/2020    RBC 3.11 08/27/2020    HGB 9.1 08/27/2020    HCT 26.3 08/27/2020     08/27/2020    MCV 84.7 08/27/2020    MCH 29.4 08/27/2020    MCHC 34.7 08/27/2020    RDW 12.7 08/27/2020    LYMPHOPCT 14.1 08/27/2020    MONOPCT 6.5 08/27/2020    BASOPCT 0.2 08/27/2020    MONOSABS 0.6 08/27/2020    LYMPHSABS 1.3 08/27/2020    EOSABS 0.1 08/27/2020    BASOSABS 0.0 08/27/2020     CMP:    Lab Results   Component Value Date     08/27/2020    K 4.0 08/28/2020    CL 96 08/27/2020    CO2 30 08/27/2020    BUN 11 08/27/2020    CREATININE 0.71 08/27/2020    GFRAA >60.0 08/27/2020    LABGLOM >60.0 08/27/2020    GLUCOSE 141 08/27/2020    PROT 6.5 08/19/2020    LABALBU 3.7 08/19/2020    CALCIUM 8.3 08/27/2020    BILITOT 0.3 08/19/2020    ALKPHOS 96 08/19/2020     08/19/2020     08/19/2020     BMP:    Lab Results   Component Value Date     08/27/2020    K 4.0 08/28/2020    CL 96 08/27/2020    CO2 30 08/27/2020    BUN 11 08/27/2020    LABALBU 3.7 08/19/2020    CREATININE 0.71 08/27/2020    CALCIUM 8.3 08/27/2020    GFRAA >60.0 08/27/2020    LABGLOM >60.0 08/27/2020    GLUCOSE 141 08/27/2020     Magnesium:    Lab Results   Component Value Date    MG 2.1 08/27/2020     Troponin:    Lab Results   Component Value Date    TROPONINI 0.018 08/19/2020       EKG: EKG: normal sinus rhythm. ASSESSMENT/PLAN:         Active Hospital Problems    Diagnosis Date Noted    Abnormality of gait and mobility [R26.9] 08/26/2020    Ischemic cardiomyopathy [I25.5] 08/26/2020    Acute respiratory failure with hypoxia (HCC) [J96.01] 08/26/2020    BMI 24.0-24.9, adult [Z68.24] 08/26/2020    S/P percutaneous transluminal angioplasty (PTA) with stent placement [Z95.820] 08/26/2020    Impaired mobility dt hypoxic encephalopathy--[facility] rehab admit 8/26/2020 [Z74.09] 08/25/2020    Smoker [F17.200] 08/25/2020    Dysphonia [R49.0] 08/25/2020    Closed fracture of multiple ribs of left side with routine healing [S22.42XD] 08/25/2020    Severe hypoxic-ischemic encephalopathy [P91.63]     Cardiac arrest ([facility] [I46.9] 08/19/2020    CAD (coronary artery disease) [I25.10] 03/02/2015    DM (diabetes mellitus), type 2, uncontrolled ([address] [E11.65] 01/29/2015        DAPT, statin, b-blocker and RF modification. ICM EF 35%. Acute on chronic systolic CHF compensated. Aspiration pneumonia resolving. Electronically signed by Stephanie Esquivel.  Manuel Peck MD [facility] Director of Cardiology Services and CardiacCatheterization Laboratory  [facility]   on 8/28/2020 at 2:40 PM

## 2020-08-28 NOTE — PROGRESS NOTES
Physical Therapy Rehab Treatment Note  Facility/Department: [facility]  Room: F076/P788-28       NAME: Dodie Boston. : 1975 (37 y.o.)  MRN: [id number]  CODE STATUS: Full Code    Date of Service: 2020       Restrictions:  Restrictions/Precautions: Fall Risk, Contact Precautions(droplet precautions for MRSA in sputum)       SUBJECTIVE:            Pre and pPost Treatment Pain Screenin-2 in chest       OBJECTIVE:               Neuromuscular Education  PNF: standing and ambulatory balance facilitation. Challenges included: weight shift tasks in combination with UE and LE movement patterns in all planes of motion, varying RADHA positions, varying directions for short distance gait, static balance without UE support with resistance added. Core and LE motor control facilitation in supine and in partial sit to stand with weight shifting and swiss ball utilized for challenges  Neuromuscular Comments: Pt demonstrates improved balance and mobility in all areas. He requires rest breaks throughout the session    Bed mobility  Rolling to Left: Modified independent  Rolling to Right: Modified independent  Supine to Sit: Minimal assistance  Sit to Supine: Minimal assistance  Comment: on mat with poor abdominal recruitment noted    Transfers  Sit to Stand: Stand by assistance  Stand to sit: Stand by assistance  Bed to Chair: Minimal assistance  Comment: multiple trials with occasional cueing for equipment management    Ambulation  Ambulation?: Yes  Ambulation 1  Surface: carpet  Device: No Device  Assistance: Minimal assistance; Moderate assistance;Contact guard assistance  Quality of Gait: short step length with intermittent recurvatum at knees noted, variable balance maintenance with varying assistance required for recovery, decreased arm swing and trunk rotation  Distance: 20 feet x 2; 40 feet x2 - rest between trials required due to LOB  Comments: pt with nice improvement in ability to ambulate with no UE support    Stairs/Curb  Stairs?: Yes  Stairs  # Steps : 4  Stairs Height: 6\"  Rails: Bilateral  Assistance: Minimal assistance  Comment: descended in reverse         Activity Tolerance  Activity Tolerance: pt able to tolerate added 15 min of PT time this date          ASSESSMENT/COMMENTS:  Assessment: Pt demonstrated signficant improvement in all mobility this session. He began stairclimbing and ambulation with no device. His endurance requires rest between trials however improved LE and trunk motor control improved.     Goals modified: Long term goals  Long term goal 1: pt to be indep with bed mobility  Long term goal 2: pt to be indep with bed and car transfers  Long term goal 3: indep gait with no device 50 feet - supervision for 150 feet  Long term goal 4: indep with 4 stairs - SBA for flight of stairs  Long term goal 5: 14/24 for DGI  Long term goal 6: 46/56 for Borrero    PLAN OF CARE/Safety:   Safety Devices  Type of devices: Left in chair;Chair alarm in place      Therapy Time:   Individual   Time In 1415   Time Out 1500   Minutes 45     Minutes:      Transfer/Bed mobility training: 10      Gait training:15      Neuro re education:20        Rito House PT, 08/28/20 at 3:52 PM

## 2020-08-28 NOTE — PROGRESS NOTES
Physical Therapy Rehab Treatment Note  Facility/Department: [facility]  Room: Y961/M992-96       NAME: Carolina Ng. : 1975 (37 y.o.)  MRN: [id number]  CODE STATUS: Full Code    Date of Service: 2020  Chart Reviewed: Yes  Family / Caregiver Present: No  Restrictions:  Restrictions/Precautions: Fall Risk, Contact Precautions    SUBJECTIVE: Subjective: Pt states he is doing ok. Pain Screening  Patient Currently in Pain: No     Post Treatment Pain Screenin/10     OBJECTIVE:           Balance  Comments: Borrero 31/56     Transfers  Sit to Stand: Stand by assistance  Stand to sit: Stand by assistance    Ambulation  Ambulation?: Yes  Ambulation 1  Surface: carpet  Device: Rolling Walker  Other Apparatus: O2  Assistance: Stand by assistance;Contact guard assistance  Distance: 100ft           Exercises  Neurodevelopmental Techniques: Alternating step tap on 4in step with 1 UE support x60 sec. Borrero tasks. ASSESSMENT/COMMENTS:  Assessment: Pt is a medium risk for falls per Borrero score. Pt challanged with SLS. PLAN OF CARE/Safety: Cont per POC.        Therapy Time:   Individual   Time In 1130   Time Out 1200   Minutes 30     Minutes:      Transfer/Bed mobility trainin      Gait training:10      Neuro re education:15     Therapeutic ex:0    Vannessa Garza PTA, 20 at 12:12 PM

## 2020-08-28 NOTE — PROGRESS NOTES
Occupational Therapy  Facility/Department: [facility]  Daily Treatment Note  NAME: Rafaela Thacker. : 1975  MRN: [id number]    Date of Service: 2020    Discharge Recommendations:  Continue to assess pending progress       Assessment      Activity Tolerance  Activity Tolerance: Patient Tolerated treatment well  Activity Tolerance: 4L O2  Safety Devices  Safety Devices in place: Yes  Type of devices: All fall risk precautions in place         Patient Diagnosis(es): There were no encounter diagnoses. has a past medical history of CAD (coronary artery disease) and DM (diabetes mellitus), type 2, uncontrolled ([facility] has no past surgical history on file. Restrictions  Restrictions/Precautions  Restrictions/Precautions: Fall Risk, Contact Precautions  Position Activity Restriction  Other position/activity restrictions: contact precautions MRSA     Subjective   General  Chart Reviewed: Yes  Patient assessed for rehabilitation services?: Yes  Response to previous treatment: Patient with no complaints from previous session  Family / Caregiver Present: No  Referring Practitioner: Dr. Catalina Plascencia  Diagnosis: Impaired mobility/ADLs 2° to hypoxic encephalopathy s/p cardiac arrest    Subjective  Subjective: The trooper said I drove down the wall 4 miles. He could see me coming. He said there was no one in the seat because I was laying down on the seats. Pain Assessment  Pain Level: 0  Pain Type: Acute pain  Pain Location: Chest  Pain Descriptors: Tender;Discomfort  Pre Treatment Pain Screening  Pain at present: 0  Intervention List: Patient able to continue with treatment;Patient declined any intervention     Orientation  Orientation  Overall Orientation Status: Within Functional Limits     Objective      Coordination  Fine Motor: Patient engaged in B FM coordination/strengthening and cognition to increase I with fasteners and small objects for ADL's and IADL's.    Patient able to connect MAX resisitive pop

## 2020-08-28 NOTE — PROGRESS NOTES
[facility]  Facility/Department: [facility]  Speech Language Pathology   Treatment Note          Cintia North.  1975  K984/T931-64        Rehab Dx/Hx: Impaired mobility and ADLs [Z74.09]  Impaired mobility [Z74.09]  Abnormality of gait and mobility [R26.9]    Precautions: falls    Medical Dx: Impaired mobility and ADLs [Z74.09]  Impaired mobility [Z74.09]  Abnormality of gait and mobility [R26.9]  Speech Dx: Dysphagia     Date: 2020    Subjective:  Alert, Cooperative and Pleasant        Interventions used this date:  Dysphagia Treatment    Objective/Assessment:  Patient progressing towards goals:  Short-term Goals  Timeframe for Short-term Goals: 1-2 weeks  Goal 1: Patient will tolerate soft and bite size and thin liquid diet without s/s of aspiration in 5/5 trials. Pt appeared agitated and refused to consume soft solids. Pt stated he does not eat breakfast. ST encouraged the consumption of protein supplement. Pt consumed 1 sip and refused to consume remainder of drink. Pt may benefit from another alternative supplement. Pt consumed thin liquids and applesauce, but refused all other trials. No overt s/s of aspiration was noted with all trials. Continue with current diet. ST schedule future meal monitors at lunch time . Compensatory Swallowing Strategies: Alternate solids and liquids, Upright as possible for all oral intake, No straws, Eat/Feed slowly, Small bites/sips, Lingual sweep      Treatment/Activity Tolerance:  Patient tolerated treatment well, but was agitated    Plan:  Continue per POC    Pain Assessment:  Initial Assessment:  Patient denies pain. Re-assessment:  Patient denies pain. Patient/Caregiver Education:  Patient educated on session and progression towards goals.     Safety Devices:  Call light within reach and Chair alarm in place      [id number] Niraj Sotomayor ([facility]):    SWALLOWING  Ratin        Therapy Time  SLP Individual Minutes  Time In: 0597  Time Out: 9252  Minutes: 11              Signature: Electronically signed by CLAU Dinero on 8/28/2020 at 8:38 AM
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
## 2020-08-29 LAB
GLUCOSE BLD-MCNC: 233 MG/DL (ref 60–115)
GLUCOSE BLD-MCNC: 237 MG/DL (ref 60–115)
GLUCOSE BLD-MCNC: 245 MG/DL (ref 60–115)
GLUCOSE BLD-MCNC: 313 MG/DL (ref 60–115)
PERFORMED ON: ABNORMAL
POTASSIUM SERPL-SCNC: 3.9 MEQ/L (ref 3.4–4.9)
VANCOMYCIN TROUGH: 8 UG/ML (ref 10–20)

## 2020-08-29 PROCEDURE — 6370000000 HC RX 637 (ALT 250 FOR IP): Performed by: INTERNAL MEDICINE

## 2020-08-29 PROCEDURE — 6370000000 HC RX 637 (ALT 250 FOR IP): Performed by: PHYSICAL MEDICINE & REHABILITATION

## 2020-08-29 PROCEDURE — 36415 COLL VENOUS BLD VENIPUNCTURE: CPT

## 2020-08-29 PROCEDURE — 80202 ASSAY OF VANCOMYCIN: CPT

## 2020-08-29 PROCEDURE — 97530 THERAPEUTIC ACTIVITIES: CPT

## 2020-08-29 PROCEDURE — 97129 THER IVNTJ 1ST 15 MIN: CPT

## 2020-08-29 PROCEDURE — 2580000003 HC RX 258: Performed by: PHYSICAL MEDICINE & REHABILITATION

## 2020-08-29 PROCEDURE — 2580000003 HC RX 258: Performed by: INTERNAL MEDICINE

## 2020-08-29 PROCEDURE — 97130 THER IVNTJ EA ADDL 15 MIN: CPT

## 2020-08-29 PROCEDURE — 6360000002 HC RX W HCPCS: Performed by: INTERNAL MEDICINE

## 2020-08-29 PROCEDURE — 2700000000 HC OXYGEN THERAPY PER DAY

## 2020-08-29 PROCEDURE — 97110 THERAPEUTIC EXERCISES: CPT

## 2020-08-29 PROCEDURE — 97535 SELF CARE MNGMENT TRAINING: CPT

## 2020-08-29 PROCEDURE — 1180000000 HC REHAB R&B

## 2020-08-29 PROCEDURE — 97116 GAIT TRAINING THERAPY: CPT

## 2020-08-29 PROCEDURE — 99222 1ST HOSP IP/OBS MODERATE 55: CPT | Performed by: INTERNAL MEDICINE

## 2020-08-29 PROCEDURE — 84132 ASSAY OF SERUM POTASSIUM: CPT

## 2020-08-29 PROCEDURE — 2500000003 HC RX 250 WO HCPCS: Performed by: PHYSICAL MEDICINE & REHABILITATION

## 2020-08-29 RX ADMIN — PANTOPRAZOLE SODIUM 40 MG: 40 TABLET, DELAYED RELEASE ORAL at 05:56

## 2020-08-29 RX ADMIN — VANCOMYCIN HYDROCHLORIDE 1250 MG: 5 INJECTION, POWDER, LYOPHILIZED, FOR SOLUTION INTRAVENOUS at 09:58

## 2020-08-29 RX ADMIN — GUAIFENESIN 600 MG: 600 TABLET ORAL at 09:47

## 2020-08-29 RX ADMIN — PIPERACILLIN AND TAZOBACTAM 3.38 G: 3; .375 INJECTION, POWDER, LYOPHILIZED, FOR SOLUTION INTRAVENOUS at 13:43

## 2020-08-29 RX ADMIN — Medication 100 MG: at 13:02

## 2020-08-29 RX ADMIN — CARVEDILOL 6.25 MG: 6.25 TABLET, FILM COATED ORAL at 21:21

## 2020-08-29 RX ADMIN — TICAGRELOR 90 MG: 90 TABLET ORAL at 21:21

## 2020-08-29 RX ADMIN — Medication 2000 UNITS: at 16:50

## 2020-08-29 RX ADMIN — Medication 10 ML: at 21:45

## 2020-08-29 RX ADMIN — LACTOBACILLUS TAB 2 TABLET: TAB at 16:50

## 2020-08-29 RX ADMIN — VANCOMYCIN HYDROCHLORIDE 1250 MG: 5 INJECTION, POWDER, LYOPHILIZED, FOR SOLUTION INTRAVENOUS at 01:58

## 2020-08-29 RX ADMIN — INSULIN GLARGINE 12 UNITS: 100 INJECTION, SOLUTION SUBCUTANEOUS at 09:51

## 2020-08-29 RX ADMIN — LACTOBACILLUS TAB 2 TABLET: TAB at 09:46

## 2020-08-29 RX ADMIN — Medication 100 MG: at 09:47

## 2020-08-29 RX ADMIN — GUAIFENESIN 600 MG: 600 TABLET ORAL at 21:21

## 2020-08-29 RX ADMIN — PIPERACILLIN AND TAZOBACTAM 3.38 G: 3; .375 INJECTION, POWDER, LYOPHILIZED, FOR SOLUTION INTRAVENOUS at 06:29

## 2020-08-29 RX ADMIN — CHLORHEXIDINE GLUCONATE: 213 SOLUTION TOPICAL at 16:51

## 2020-08-29 RX ADMIN — ALOGLIPTIN 25 MG: 25 TABLET, FILM COATED ORAL at 09:47

## 2020-08-29 RX ADMIN — Medication 10 ML: at 09:58

## 2020-08-29 RX ADMIN — ASPIRIN 81 MG: 81 TABLET, COATED ORAL at 09:47

## 2020-08-29 RX ADMIN — VANCOMYCIN HYDROCHLORIDE 1250 MG: 5 INJECTION, POWDER, LYOPHILIZED, FOR SOLUTION INTRAVENOUS at 17:53

## 2020-08-29 RX ADMIN — INSULIN GLARGINE 12 UNITS: 100 INJECTION, SOLUTION SUBCUTANEOUS at 21:26

## 2020-08-29 RX ADMIN — CARVEDILOL 6.25 MG: 6.25 TABLET, FILM COATED ORAL at 09:47

## 2020-08-29 RX ADMIN — TICAGRELOR 90 MG: 90 TABLET ORAL at 09:46

## 2020-08-29 RX ADMIN — PROVIDONE IODINE: 7.5 STICK TOPICAL at 13:51

## 2020-08-29 RX ADMIN — LACTOBACILLUS TAB 2 TABLET: TAB at 13:02

## 2020-08-29 RX ADMIN — PIPERACILLIN AND TAZOBACTAM 3.38 G: 3; .375 INJECTION, POWDER, LYOPHILIZED, FOR SOLUTION INTRAVENOUS at 21:21

## 2020-08-29 RX ADMIN — ATORVASTATIN CALCIUM 80 MG: 80 TABLET, FILM COATED ORAL at 21:21

## 2020-08-29 ASSESSMENT — PAIN DESCRIPTION - LOCATION: LOCATION: BACK;CHEST

## 2020-08-29 ASSESSMENT — PAIN DESCRIPTION - PROGRESSION: CLINICAL_PROGRESSION: GRADUALLY WORSENING

## 2020-08-29 ASSESSMENT — PAIN DESCRIPTION - ONSET: ONSET: ON-GOING

## 2020-08-29 ASSESSMENT — PAIN SCALES - GENERAL
PAINLEVEL_OUTOF10: 4
PAINLEVEL_OUTOF10: 0

## 2020-08-29 ASSESSMENT — PAIN DESCRIPTION - DESCRIPTORS: DESCRIPTORS: TENDER

## 2020-08-29 ASSESSMENT — PAIN DESCRIPTION - PAIN TYPE: TYPE: ACUTE PAIN

## 2020-08-29 ASSESSMENT — PAIN DESCRIPTION - FREQUENCY: FREQUENCY: CONTINUOUS
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
## 2020-08-29 NOTE — PROGRESS NOTES
Physical Therapy Rehab Treatment Note  [facility]       NAME: Quan Yanes. : 1975 (37 y.o.)  MRN: [id number]  CODE STATUS: Full Code    Date of Service: 2020  Chart Reviewed: Yes  Family / Caregiver Present: No    Restrictions:  Restrictions/Precautions: Fall Risk, Contact Precautions  Position Activity Restriction  Other position/activity restrictions: contact precautions MRSA       SUBJECTIVE: Subjective: Patient has no new reports  Response To Previous Treatment: Patient with no complaints from previous session.   Pain Screening  Patient Currently in Pain: No  Pre Treatment Pain Screening  Pain at present: 0  Scale Used: Numeric Score  Intervention List: Patient able to continue with treatment    Post Treatment Pain Screening:  Pain Assessment  Pain Assessment: 0-10  Pain Level: 0    OBJECTIVE:           Neuromuscular Education  NDT Treatment: Standing  Neuromuscular Comments: step fwd and back x15 ade, ankle and hip strategy max assist to prevent lob retro, x5 for quality, delayed reactions     Transfers  Sit to Stand: Stand by assistance  Stand to sit: Stand by assistance  Bed to Chair: Stand by assistance;Contact guard assistance  Car Transfer: Stand by assistance;Contact guard assistance  Comment: cues for technique in and out of car, good carryover, increased time and effort    Ambulation  Ambulation?: Yes  Ambulation 1  Surface: carpet  Device: No Device  Other Apparatus: O2  Assistance: Contact guard assistance  Quality of Gait: nbos, occational lateral path veer with fatigue, cues for posture and arm swing  Gait Deviations: Slow Desire;Decreased arm swing;Decreased step height  Distance: 85 feet, standing rest break at 50 feet    Stairs/Curb  Stairs?: Yes  Stairs  # Steps : 4  Stairs Height: 6\"  Rails: Bilateral  Assistance: Contact guard assistance  Comment: recip, fwd descend, cues for hand placement       ASSESSMENT/COMMENTS:  Body structures, Functions, Activity limitations: Decreased functional mobility ; Decreased balance;Decreased endurance;Decreased strength;Decreased safe awareness;Decreased coordination;Decreased posture  Assessment: Patient challenged with balance strategies. Improved stability and safety on stairs.     PLAN OF CARE/Safety:   Safety Devices  Type of devices: Left in chair;Chair alarm in place      Therapy Time:   Individual   Time In 0900   Time Out 0930   Minutes 30     Minutes:30      Transfer/Bed mobility training:10      Gait training:15      Neuro re education:5        Page Pain, PTA, 08/29/20 at 9:30 AM

## 2020-08-29 NOTE — PLAN OF CARE
Problem: Falls - Risk of:  Goal: Will remain free from falls  Description: Will remain free from falls  8/29/2020 1311 by Lawson Salinas RN  Outcome: Ongoing  8/29/2020 0235 by Ching Roy. Yamileth Carney RN  Outcome: Ongoing  Goal: Absence of physical injury  Description: Absence of physical injury  8/29/2020 1311 by Lawson Salinas RN  Outcome: Ongoing  8/29/2020 0235 by Ching Roy. Yamileth Carney RN  Outcome: Ongoing     Problem: Skin Integrity:  Goal: Will show no infection signs and symptoms  Description: Will show no infection signs and symptoms  8/29/2020 1311 by Lawson Salinas RN  Outcome: Ongoing  8/29/2020 0235 by Ching Roy. Yamileth Carney RN  Outcome: Ongoing  Goal: Absence of new skin breakdown  Description: Absence of new skin breakdown  8/29/2020 1311 by Lawson Salinas RN  Outcome: Ongoing  8/29/2020 0235 by Ching Roy. Yamileth Carney RN  Outcome: Ongoing     Problem: Pain Control  Goal: Maintain pain level at or below patient's acceptable level (or 5 if patient is unable to determine acceptable level)  8/29/2020 1311 by Lawson Salinas RN  Outcome: Ongoing  8/29/2020 0235 by Ching Roy. Yamileth Carney RN  Outcome: Ongoing     Problem: Cardiovascular  Goal: No DVT, peripheral vascular complications  9/90/5487 1311 by Lawson Salinas RN  Outcome: Ongoing  8/29/2020 0235 by Ching Roy. Yamileth Carney RN  Outcome: Ongoing  Goal: Hemodynamic stability  8/29/2020 1311 by Lawson Salinas RN  Outcome: Ongoing  8/29/2020 0235 by Ching Roy. Yamileth Carney RN  Outcome: Ongoing  Goal: Anticoagulate/Hct stable  8/29/2020 1311 by Lawson Salinas RN  Outcome: Ongoing  8/29/2020 0235 by Ching Roy. Yamileth Carney RN  Outcome: Ongoing  Goal: Agreement to quit smoking  8/29/2020 1311 by Lawson Salinas RN  Outcome: Ongoing  8/29/2020 0235 by Ching Roy. Yamileth Carney RN  Outcome: Ongoing  Goal: Understanding of dietary restrictions  8/29/2020 1311 by Lawson Salinas RN  Outcome: Ongoing  8/29/2020 0235 by Ching Roy.  Yamileth Carney SUNIL  Outcome: Ongoing     Problem: Respiratory  Goal: No pulmonary complications  4/75/5029 1311 by Ang Chanel RN  Outcome: Ongoing  8/29/2020 0235 by Sarah Nuñez. Nishant Balbuena RN  Outcome: Ongoing  Goal: O2 Sat > 90%  8/29/2020 1311 by Ang Chanel RN  Outcome: Ongoing  8/29/2020 0235 by Sarah Nuñez. Nishant Balbuena RN  Outcome: Ongoing  Goal: Supplemental O2 requirements decreased  8/29/2020 1311 by Ang Chanel RN  Outcome: Ongoing  8/29/2020 0235 by Sarah Nuñez. Nishant Balbuena RN  Outcome: Ongoing  Goal: Agreement to quit smoking  8/29/2020 1311 by Ang Chanel RN  Outcome: Ongoing  8/29/2020 0235 by Sarah Nuñez. Nishant Balbuena RN  Outcome: Ongoing  Goal: Pneumonia vaccine at discharge as needed  8/29/2020 1311 by Ang Chanel RN  Outcome: Ongoing  8/29/2020 0235 by Sarah Nuñez. Nishant Balbuena RN  Outcome: Ongoing     Problem: Skin Integrity/Risk  Goal: No skin breakdown during hospitalization  8/29/2020 1311 by Ang Chanel RN  Outcome: Ongoing  8/29/2020 0235 by Sarah Nuñez. Nishant Balbuena RN  Outcome: Ongoing  Goal: Wound healing  8/29/2020 1311 by Ang Chanel RN  Outcome: Ongoing  8/29/2020 0235 by Sarah Nuñez. Nishant Balbuena RN  Outcome: Ongoing     Problem: Musculor/Skeletal Functional Status  Goal: Highest potential functional level  8/29/2020 1311 by Ang Chanel RN  Outcome: Ongoing  8/29/2020 0235 by Sarah Nuñez. Nishant Balbuena RN  Outcome: Ongoing  Goal: Absence of falls  8/29/2020 1311 by Ang Chanel RN  Outcome: Ongoing  8/29/2020 0235 by Sarah Nuñez. Nishant Balbuena RN  Outcome: Ongoing     Problem: IP SWALLOWING  Goal: LTG - patient will tolerate the least restrictive diet consistency to allow for safe consumption of daily meals  8/29/2020 1311 by Ang Chanel RN  Outcome: Ongoing  8/29/2020 0235 by Sarah Nuñez. Nishant Balbuena RN  Outcome: Ongoing     Problem: Pain:  Goal: Pain level will decrease  Description: Pain level will decrease  8/29/2020 1311 by Ang Chanel RN  Outcome: Ongoing  8/29/2020 0235 by Sarah Nuñez.  Nishant Balbuena RN  Outcome: Ongoing  Goal: Control of acute pain  Description: Control of acute pain  8/29/2020 1311 by Nidhi Lizarraga RN  Outcome: Ongoing  8/29/2020 0235 by Serena Goldmann. Yola Layne RN  Outcome: Ongoing  Goal: Control of chronic pain  Description: Control of chronic pain  8/29/2020 1311 by Nidhi Lizarraga RN  Outcome: Ongoing  8/29/2020 0235 by Serena Goldmann. Yola Layne RN  Outcome: Ongoing     Problem: IP COMMUNICATION/DYSARTHRIA  Goal: LTG - patient will improve expressive language skills to allow for communication of wants and needs in daily activities  8/29/2020 1311 by Nidhi Lizarraga RN  Outcome: Ongoing  8/29/2020 0235 by Serena Goldmann.  Yola Layne RN  Outcome: Ongoing

## 2020-08-29 NOTE — PROGRESS NOTES
Patient weaned to 2L oxgyen from 4. No SOB noted, maintained around 94%. C/o intermittent chest pain from coughing. Small amount of thick, tan sputum noted in disposable cup. Offered Tylenol, patient refused. Patient also refused mouthwash, cinnamon oil and stool softeners. LBM noted 8/26, stated he feels like he will have one today. IV Vanco running at this time via PICC. Tele x 1 more day per cardio.  Electronically signed by Kelsea Plasencia RN on 8/29/20 at 10:31 AM EDT

## 2020-08-29 NOTE — PROGRESS NOTES
Pt seems to be in a good mood tonight. Pivots to w/c with one assist. Coughing up yellow mucous. Some pain in left rib cage. Refused any pain meds tonight. Triple picc line ports are sluggish. All three flushed with good blood returns. Call light in reach and bed alarm on. Electronically signed by Aldo Cook.  Neill Habermann on 8/29/2020 at 2:14 AM
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
## 2020-08-29 NOTE — PROGRESS NOTES
Pharmacy Vancomycin Consult     Vancomycin Day: 7  Current Dosing: vancomycin 1250mg IV q12hr    Temp max:  98F    Recent Labs     08/26/20  0559 08/27/20  0533   BUN 14 11       Recent Labs     08/26/20  0559 08/27/20  0533   CREATININE 0.64* 0.71       Recent Labs     08/26/20  0559 08/27/20  0533   WBC 10.7 9.3         Intake/Output Summary (Last 24 hours) at 8/29/2020 0500  Last data filed at 8/28/2020 0840  Gross per 24 hour   Intake 10 ml   Output --   Net 10 ml       Culture Date      Source                       Results  Specimen Information:  Sputum, Suctioned          Component  8/22/20 1020   Gram Stain Result  Moderate WBC's   Few epithelial cells   Many Gram positive cocci in clusters-resembling Staph    Organism  Staph aureus MRSAAbnormal       CULTURE, RESPIRATORY  Abnormal     Heavy growth   CONTACT PRECAUTIONS INDICATED   PBP2= POSITIVE     Susceptibility      Staph aureus mrsa      BACTERIAL SUSCEPTIBILITY PANEL BY CORNELIO      ceFAZolin   Resistant      cefTRIAXone   Resistant      clindamycin  <=0.12 mcg/mL  Sensitive      gentamicin  <=0.5 mcg/mL  Sensitive      oxacillin  >=4 mcg/mL  Resistant      trimethoprim-sulfamethoxazole  <=10 mcg/mL  Sensitive      vancomycin  1 mcg/mL  Sensitive                    Ht Readings from Last 1 Encounters:   08/28/20 5' 10\" (1.778 m)        Wt Readings from Last 1 Encounters:   08/28/20 172 lb (78 kg)         Body mass index is 24.68 kg/m². Estimated Creatinine Clearance: 136 mL/min (based on SCr of 0.71 mg/dL). Trough: 8    Assessment/Plan:  Trough is subtherapeutic for goal 15-20 for MRSA+ pneumonia. Will change to vancomycin 1250mg IV q8hr. Trough prior to 4th dose 8/30 0200. Thank you,    NOHEMI Hernandez. Ph.  8/29/2020  5:03 AM

## 2020-08-29 NOTE — PROGRESS NOTES
Physical Therapy Rehab Treatment Note  Facility/Department: [facility]  Room: -53       NAME: Naresh Tsai. : 1975 (37 y.o.)  MRN: [id number]  CODE STATUS: Full Code    Date of Service: 2020  Chart Reviewed: Yes  Family / Caregiver Present: No    Restrictions:  Restrictions/Precautions: Fall Risk, Contact Precautions  Position Activity Restriction  Other position/activity restrictions: contact precautions MRSA       SUBJECTIVE: Subjective: Patient states he has no pain but declined bed mobility stating it is too painful. Response To Previous Treatment: Patient with no complaints from previous session.   Pain Screening  Patient Currently in Pain: No  Pre Treatment Pain Screening  Pain at present: 0  Scale Used: Numeric Score  Intervention List: Patient able to continue with treatment    Post Treatment Pain Screening:\"just a little, I'm fine\"  Pain Assessment  Pain Assessment: 0-10  Pain Level: 0    OBJECTIVE:           Neuromuscular Education  NDT Treatment: Standing  Neuromuscular Comments: lat step out and return x20 each without ue support with varied assist mod to sba for stability, varied foot placement on lle     Transfers  Sit to Stand: Stand by assistance  Stand to sit: Stand by assistance  Bed to Chair: Stand by assistance;Contact guard assistance  Car Transfer: Stand by assistance;Contact guard assistance  Comment: mild unsteadiness    Ambulation  Ambulation?: Yes  Ambulation 1  Surface: level tile  Device: Rolling Walker  Other Apparatus: O2  Assistance: Contact guard assistance;Minimal assistance  Quality of Gait: cues for posture and safe distance within ad with turns  Gait Deviations: Slow Desire;Decreased arm swing;Decreased step height  Distance: 50 feet x2  Comments: ww vs no ad d/t increased unsteadiness this afternoon    Stairs/Curb  Stairs?: Yes  Stairs  # Steps : 4  Stairs Height: 6\"  Rails: Bilateral  Assistance: Contact guard assistance  Comment: recip, fwd descend, cues for hand placement            ASSESSMENT/COMMENTS:  Body structures, Functions, Activity limitations: Decreased functional mobility ; Decreased balance;Decreased endurance;Decreased strength;Decreased safe awareness;Decreased coordination;Decreased posture  Assessment: Patient challenged with balance strategies. Improved stability and safety on stairs.     PLAN OF CARE/Safety:   Safety Devices  Type of devices: Left in chair;Chair alarm in place      Therapy Time:   Individual   Time In 1300   Time Out 1330   Minutes 30     Minutes:30      Transfer/Bed mobility trainin      Gait training:10      Neuro re education:5     Therapeutic ex:10      Oksana Galarza PTA, 20 at 1:23 PM

## 2020-08-29 NOTE — PROGRESS NOTES
Vanco trough level came back 8.0. Electronically signed by Brian Acosta.  Seb Vieira on 8/29/2020 at 2:17 AM

## 2020-08-30 LAB
GLUCOSE BLD-MCNC: 224 MG/DL (ref 60–115)
GLUCOSE BLD-MCNC: 282 MG/DL (ref 60–115)
GLUCOSE BLD-MCNC: 309 MG/DL (ref 60–115)
GLUCOSE BLD-MCNC: 364 MG/DL (ref 60–115)
PERFORMED ON: ABNORMAL
POTASSIUM SERPL-SCNC: 4.1 MEQ/L (ref 3.4–4.9)
VANCOMYCIN TROUGH: 18.2 UG/ML (ref 10–20)

## 2020-08-30 PROCEDURE — 6370000000 HC RX 637 (ALT 250 FOR IP): Performed by: INTERNAL MEDICINE

## 2020-08-30 PROCEDURE — 2500000003 HC RX 250 WO HCPCS: Performed by: PHYSICAL MEDICINE & REHABILITATION

## 2020-08-30 PROCEDURE — 1180000000 HC REHAB R&B

## 2020-08-30 PROCEDURE — 6360000002 HC RX W HCPCS: Performed by: INTERNAL MEDICINE

## 2020-08-30 PROCEDURE — 84132 ASSAY OF SERUM POTASSIUM: CPT

## 2020-08-30 PROCEDURE — 80202 ASSAY OF VANCOMYCIN: CPT

## 2020-08-30 PROCEDURE — 2580000003 HC RX 258: Performed by: INTERNAL MEDICINE

## 2020-08-30 PROCEDURE — 2700000000 HC OXYGEN THERAPY PER DAY

## 2020-08-30 PROCEDURE — 6370000000 HC RX 637 (ALT 250 FOR IP): Performed by: PHYSICAL MEDICINE & REHABILITATION

## 2020-08-30 PROCEDURE — 97110 THERAPEUTIC EXERCISES: CPT

## 2020-08-30 PROCEDURE — 97116 GAIT TRAINING THERAPY: CPT

## 2020-08-30 PROCEDURE — 2580000003 HC RX 258: Performed by: PHYSICAL MEDICINE & REHABILITATION

## 2020-08-30 PROCEDURE — 36415 COLL VENOUS BLD VENIPUNCTURE: CPT

## 2020-08-30 RX ADMIN — ALOGLIPTIN 25 MG: 25 TABLET, FILM COATED ORAL at 08:16

## 2020-08-30 RX ADMIN — ASPIRIN 81 MG: 81 TABLET, COATED ORAL at 08:17

## 2020-08-30 RX ADMIN — Medication 2000 UNITS: at 17:00

## 2020-08-30 RX ADMIN — VANCOMYCIN HYDROCHLORIDE 1250 MG: 5 INJECTION, POWDER, LYOPHILIZED, FOR SOLUTION INTRAVENOUS at 10:16

## 2020-08-30 RX ADMIN — LACTOBACILLUS TAB 2 TABLET: TAB at 08:17

## 2020-08-30 RX ADMIN — CARVEDILOL 6.25 MG: 6.25 TABLET, FILM COATED ORAL at 20:53

## 2020-08-30 RX ADMIN — PROVIDONE IODINE: 7.5 STICK TOPICAL at 21:05

## 2020-08-30 RX ADMIN — LACTOBACILLUS TAB 2 TABLET: TAB at 12:16

## 2020-08-30 RX ADMIN — GUAIFENESIN 600 MG: 600 TABLET ORAL at 08:17

## 2020-08-30 RX ADMIN — TICAGRELOR 90 MG: 90 TABLET ORAL at 20:52

## 2020-08-30 RX ADMIN — VANCOMYCIN HYDROCHLORIDE 1250 MG: 5 INJECTION, POWDER, LYOPHILIZED, FOR SOLUTION INTRAVENOUS at 01:35

## 2020-08-30 RX ADMIN — CARVEDILOL 6.25 MG: 6.25 TABLET, FILM COATED ORAL at 08:20

## 2020-08-30 RX ADMIN — Medication 100 MG: at 12:16

## 2020-08-30 RX ADMIN — TICAGRELOR 90 MG: 90 TABLET ORAL at 08:17

## 2020-08-30 RX ADMIN — PANTOPRAZOLE SODIUM 40 MG: 40 TABLET, DELAYED RELEASE ORAL at 05:35

## 2020-08-30 RX ADMIN — PIPERACILLIN AND TAZOBACTAM 3.38 G: 3; .375 INJECTION, POWDER, LYOPHILIZED, FOR SOLUTION INTRAVENOUS at 20:54

## 2020-08-30 RX ADMIN — LACTOBACILLUS TAB 2 TABLET: TAB at 17:00

## 2020-08-30 RX ADMIN — Medication 10 ML: at 21:06

## 2020-08-30 RX ADMIN — GUAIFENESIN 600 MG: 600 TABLET ORAL at 20:52

## 2020-08-30 RX ADMIN — Medication 100 MG: at 08:17

## 2020-08-30 RX ADMIN — VANCOMYCIN HYDROCHLORIDE 1250 MG: 5 INJECTION, POWDER, LYOPHILIZED, FOR SOLUTION INTRAVENOUS at 18:22

## 2020-08-30 RX ADMIN — INSULIN GLARGINE 12 UNITS: 100 INJECTION, SOLUTION SUBCUTANEOUS at 08:23

## 2020-08-30 RX ADMIN — Medication 10 ML: at 10:16

## 2020-08-30 RX ADMIN — PIPERACILLIN AND TAZOBACTAM 3.38 G: 3; .375 INJECTION, POWDER, LYOPHILIZED, FOR SOLUTION INTRAVENOUS at 13:52

## 2020-08-30 RX ADMIN — PIPERACILLIN AND TAZOBACTAM 3.38 G: 3; .375 INJECTION, POWDER, LYOPHILIZED, FOR SOLUTION INTRAVENOUS at 05:27

## 2020-08-30 RX ADMIN — DOCUSATE SODIUM 100 MG: 100 CAPSULE, LIQUID FILLED ORAL at 20:52

## 2020-08-30 RX ADMIN — ATORVASTATIN CALCIUM 80 MG: 80 TABLET, FILM COATED ORAL at 20:52

## 2020-08-30 RX ADMIN — INSULIN GLARGINE 12 UNITS: 100 INJECTION, SOLUTION SUBCUTANEOUS at 20:53

## 2020-08-30 ASSESSMENT — PAIN SCALES - GENERAL
PAINLEVEL_OUTOF10: 0
PAINLEVEL_OUTOF10: 3

## 2020-08-30 ASSESSMENT — PAIN DESCRIPTION - DESCRIPTORS: DESCRIPTORS: ACHING

## 2020-08-30 ASSESSMENT — PAIN DESCRIPTION - LOCATION: LOCATION: RIB CAGE

## 2020-08-30 NOTE — PROGRESS NOTES
distance. Pt challenged with standing static balance exercise during tandem stance, slight lateral LOB, pt able to self correct. Gait training trialed without AD, pt stable with no LOB but increased lateral sway. Pt requests not to use O2 post tx, SPO2 reading 96%, nsg notified. PLAN OF CARE/Safety:   Plan Comment: Cont.  POC      Therapy Time:   Individual   Time In 0900   Time Out 0930   Minutes 30     Minutes:      Transfer/Bed mobility trainin      Gait training: 15      Neuro re education: 5     Therapeutic ex: Elaine Beck, PTA, 20 at 12:17 PM

## 2020-08-30 NOTE — PLAN OF CARE
Problem: Falls - Risk of:  Goal: Will remain free from falls  Description: Will remain free from falls  Outcome: Ongoing  Goal: Absence of physical injury  Description: Absence of physical injury  Outcome: Ongoing     Problem: Skin Integrity:  Goal: Will show no infection signs and symptoms  Description: Will show no infection signs and symptoms  Outcome: Ongoing  Goal: Absence of new skin breakdown  Description: Absence of new skin breakdown  Outcome: Ongoing     Problem: Pain Control  Goal: Maintain pain level at or below patient's acceptable level (or 5 if patient is unable to determine acceptable level)  Outcome: Ongoing     Problem: Cardiovascular  Goal: No DVT, peripheral vascular complications  Outcome: Ongoing  Goal: Hemodynamic stability  Outcome: Ongoing  Goal: Anticoagulate/Hct stable  Outcome: Ongoing  Goal: Agreement to quit smoking  Outcome: Ongoing  Goal: Understanding of dietary restrictions  Outcome: Ongoing     Problem: Respiratory  Goal: No pulmonary complications  Outcome: Ongoing  Goal: O2 Sat > 90%  Outcome: Ongoing  Goal: Supplemental O2 requirements decreased  Outcome: Ongoing  Goal: Agreement to quit smoking  Outcome: Ongoing  Goal: Pneumonia vaccine at discharge as needed  Outcome: Ongoing     Problem: Skin Integrity/Risk  Goal: No skin breakdown during hospitalization  Outcome: Ongoing  Goal: Wound healing  Outcome: Ongoing     Problem: Musculor/Skeletal Functional Status  Goal: Highest potential functional level  Outcome: Ongoing  Goal: Absence of falls  Outcome: Ongoing     Problem: IP SWALLOWING  Goal: LTG - patient will tolerate the least restrictive diet consistency to allow for safe consumption of daily meals  Outcome: Ongoing     Problem: Pain:  Goal: Pain level will decrease  Description: Pain level will decrease  Outcome: Ongoing  Goal: Control of acute pain  Description: Control of acute pain  Outcome: Ongoing  Goal: Control of chronic pain  Description: Control of chronic

## 2020-08-30 NOTE — FLOWSHEET NOTE
Patient assessment completed earlier this shift. The patient has a productive cough tangela a moderate amount of yellow sputum. He wears 02 @2 liters NC. No distress is noted. Patient complains of pain to the chest wall but declines medication at this time.

## 2020-08-30 NOTE — PROGRESS NOTES
Patient weaned off oxygen at this time. After walking around [place] with therapy able to maintain SP02 at 94% room air. Denies SOB. Educated patient to make staff aware if he becomes SOB or lightheaded. IV Vanco infusing at this time. Electronically signed by Caprice Briceño RN on 8/30/20 at 10:25 AM EDT    Patient refused am care including shower. Also refused mouth care and stool softeners. MD updated on rounds. Wife in to visit this afternoon.  Electronically signed by Caprice Briceño RN on 8/30/20 at 5:58 PM EDT

## 2020-08-31 LAB
ANION GAP SERPL CALCULATED.3IONS-SCNC: 9 MEQ/L (ref 9–15)
BASOPHILS ABSOLUTE: 0 K/UL (ref 0–0.2)
BASOPHILS RELATIVE PERCENT: 0.3 %
BUN BLDV-MCNC: 10 MG/DL (ref 6–20)
CALCIUM SERPL-MCNC: 8.5 MG/DL (ref 8.5–9.9)
CHLORIDE BLD-SCNC: 98 MEQ/L (ref 95–107)
CO2: 26 MEQ/L (ref 20–31)
CREAT SERPL-MCNC: 0.67 MG/DL (ref 0.7–1.2)
EOSINOPHILS ABSOLUTE: 0.1 K/UL (ref 0–0.7)
EOSINOPHILS RELATIVE PERCENT: 1 %
GFR AFRICAN AMERICAN: >60
GFR NON-AFRICAN AMERICAN: >60
GLUCOSE BLD-MCNC: 186 MG/DL (ref 60–115)
GLUCOSE BLD-MCNC: 264 MG/DL (ref 60–115)
GLUCOSE BLD-MCNC: 274 MG/DL (ref 60–115)
GLUCOSE BLD-MCNC: 312 MG/DL (ref 70–99)
GLUCOSE BLD-MCNC: 350 MG/DL (ref 60–115)
HCT VFR BLD CALC: 37.2 % (ref 42–52)
HEMOGLOBIN: 12.5 G/DL (ref 14–18)
LYMPHOCYTES ABSOLUTE: 0.9 K/UL (ref 1–4.8)
LYMPHOCYTES RELATIVE PERCENT: 13.3 %
MCH RBC QN AUTO: 28.7 PG (ref 27–31.3)
MCHC RBC AUTO-ENTMCNC: 33.7 % (ref 33–37)
MCV RBC AUTO: 84.9 FL (ref 80–100)
MONOCYTES ABSOLUTE: 0.5 K/UL (ref 0.2–0.8)
MONOCYTES RELATIVE PERCENT: 8.2 %
NEUTROPHILS ABSOLUTE: 5 K/UL (ref 1.4–6.5)
NEUTROPHILS RELATIVE PERCENT: 77.2 %
PDW BLD-RTO: 12.7 % (ref 11.5–14.5)
PERFORMED ON: ABNORMAL
PLATELET # BLD: 232 K/UL (ref 130–400)
POTASSIUM SERPL-SCNC: 4.4 MEQ/L (ref 3.4–4.9)
RBC # BLD: 4.38 M/UL (ref 4.7–6.1)
SODIUM BLD-SCNC: 133 MEQ/L (ref 135–144)
VANCOMYCIN TROUGH: 22.2 UG/ML (ref 10–20)
WBC # BLD: 6.5 K/UL (ref 4.8–10.8)

## 2020-08-31 PROCEDURE — 6370000000 HC RX 637 (ALT 250 FOR IP): Performed by: INTERNAL MEDICINE

## 2020-08-31 PROCEDURE — 6370000000 HC RX 637 (ALT 250 FOR IP): Performed by: PHYSICAL MEDICINE & REHABILITATION

## 2020-08-31 PROCEDURE — 97112 NEUROMUSCULAR REEDUCATION: CPT

## 2020-08-31 PROCEDURE — 80202 ASSAY OF VANCOMYCIN: CPT

## 2020-08-31 PROCEDURE — 99233 SBSQ HOSP IP/OBS HIGH 50: CPT | Performed by: PHYSICAL MEDICINE & REHABILITATION

## 2020-08-31 PROCEDURE — 97530 THERAPEUTIC ACTIVITIES: CPT

## 2020-08-31 PROCEDURE — 97129 THER IVNTJ 1ST 15 MIN: CPT

## 2020-08-31 PROCEDURE — 2580000003 HC RX 258: Performed by: PHYSICAL MEDICINE & REHABILITATION

## 2020-08-31 PROCEDURE — 99232 SBSQ HOSP IP/OBS MODERATE 35: CPT | Performed by: INTERNAL MEDICINE

## 2020-08-31 PROCEDURE — 2580000003 HC RX 258: Performed by: INTERNAL MEDICINE

## 2020-08-31 PROCEDURE — 97535 SELF CARE MNGMENT TRAINING: CPT

## 2020-08-31 PROCEDURE — 97110 THERAPEUTIC EXERCISES: CPT

## 2020-08-31 PROCEDURE — 97116 GAIT TRAINING THERAPY: CPT

## 2020-08-31 PROCEDURE — 85025 COMPLETE CBC W/AUTO DIFF WBC: CPT

## 2020-08-31 PROCEDURE — 36591 DRAW BLOOD OFF VENOUS DEVICE: CPT

## 2020-08-31 PROCEDURE — 6360000002 HC RX W HCPCS: Performed by: INTERNAL MEDICINE

## 2020-08-31 PROCEDURE — 1180000000 HC REHAB R&B

## 2020-08-31 PROCEDURE — 97130 THER IVNTJ EA ADDL 15 MIN: CPT

## 2020-08-31 PROCEDURE — 80048 BASIC METABOLIC PNL TOTAL CA: CPT

## 2020-08-31 RX ORDER — DOCUSATE SODIUM 100 MG/1
100 CAPSULE, LIQUID FILLED ORAL 2 TIMES DAILY PRN
Status: DISCONTINUED | OUTPATIENT
Start: 2020-08-31 | End: 2020-09-03 | Stop reason: HOSPADM

## 2020-08-31 RX ORDER — POLYETHYLENE GLYCOL 3350 17 G/17G
17 POWDER, FOR SOLUTION ORAL DAILY PRN
Status: DISCONTINUED | OUTPATIENT
Start: 2020-08-31 | End: 2020-09-03 | Stop reason: HOSPADM

## 2020-08-31 RX ORDER — LIDOCAINE 4 G/G
2 PATCH TOPICAL DAILY PRN
Status: DISCONTINUED | OUTPATIENT
Start: 2020-08-31 | End: 2020-09-03 | Stop reason: HOSPADM

## 2020-08-31 RX ADMIN — LACTOBACILLUS TAB 2 TABLET: TAB at 16:58

## 2020-08-31 RX ADMIN — INSULIN GLARGINE 12 UNITS: 100 INJECTION, SOLUTION SUBCUTANEOUS at 21:40

## 2020-08-31 RX ADMIN — Medication 2000 UNITS: at 16:57

## 2020-08-31 RX ADMIN — TICAGRELOR 90 MG: 90 TABLET ORAL at 21:35

## 2020-08-31 RX ADMIN — Medication 10 ML: at 09:08

## 2020-08-31 RX ADMIN — Medication 100 MG: at 12:49

## 2020-08-31 RX ADMIN — PANTOPRAZOLE SODIUM 40 MG: 40 TABLET, DELAYED RELEASE ORAL at 05:04

## 2020-08-31 RX ADMIN — TICAGRELOR 90 MG: 90 TABLET ORAL at 08:57

## 2020-08-31 RX ADMIN — LACTOBACILLUS TAB 2 TABLET: TAB at 12:50

## 2020-08-31 RX ADMIN — VANCOMYCIN HYDROCHLORIDE 1250 MG: 5 INJECTION, POWDER, LYOPHILIZED, FOR SOLUTION INTRAVENOUS at 02:36

## 2020-08-31 RX ADMIN — GUAIFENESIN 600 MG: 600 TABLET ORAL at 08:57

## 2020-08-31 RX ADMIN — Medication 100 MG: at 08:57

## 2020-08-31 RX ADMIN — CARVEDILOL 6.25 MG: 6.25 TABLET, FILM COATED ORAL at 08:58

## 2020-08-31 RX ADMIN — PIPERACILLIN AND TAZOBACTAM 3.38 G: 3; .375 INJECTION, POWDER, LYOPHILIZED, FOR SOLUTION INTRAVENOUS at 05:04

## 2020-08-31 RX ADMIN — INSULIN GLARGINE 12 UNITS: 100 INJECTION, SOLUTION SUBCUTANEOUS at 09:07

## 2020-08-31 RX ADMIN — CARVEDILOL 6.25 MG: 6.25 TABLET, FILM COATED ORAL at 21:35

## 2020-08-31 RX ADMIN — Medication 10 ML: at 21:51

## 2020-08-31 RX ADMIN — GUAIFENESIN 600 MG: 600 TABLET ORAL at 21:34

## 2020-08-31 RX ADMIN — ASPIRIN 81 MG: 81 TABLET, COATED ORAL at 08:58

## 2020-08-31 RX ADMIN — ATORVASTATIN CALCIUM 80 MG: 80 TABLET, FILM COATED ORAL at 21:35

## 2020-08-31 RX ADMIN — LACTOBACILLUS TAB 2 TABLET: TAB at 08:57

## 2020-08-31 RX ADMIN — ALOGLIPTIN 25 MG: 25 TABLET, FILM COATED ORAL at 08:57

## 2020-08-31 ASSESSMENT — ENCOUNTER SYMPTOMS
RESPIRATORY NEGATIVE: 1
GASTROINTESTINAL NEGATIVE: 1

## 2020-08-31 ASSESSMENT — PAIN SCALES - GENERAL
PAINLEVEL_OUTOF10: 0

## 2020-08-31 NOTE — PROGRESS NOTES
Pt cooperative and walks with walker to bathroom with one assist. One touch tonight was 309. Lantus 12 units given subcut and 4 units humalog given subcut. Pt refused colace tonight. Last bm was 8/26 but pt states he wants to wait to have bm before he takes anything for his bowels. pt had snack at hs. Will continue to monitor on rounds. Call light in reach. Electronically signed by Ofelia Dinero.  Fredy Vaughn on 8/30/2020 at 10:52 PM

## 2020-08-31 NOTE — PROGRESS NOTES
PHARMACY NOTE:   Vancomycin therapy discontinued by Dr. Jhon Leavitt   [address] [address] consult, placeholder, further trough levels   Will close [facility]    If pharmacy is to continue to follow vancomycin therapy, will need to be re-consulted. Thanks!      Stephon Handley, RanD   8/31/2020 12:37 PM

## 2020-08-31 NOTE — PROGRESS NOTES
Occupational Therapy  Facility/Department: [facility]  Daily Treatment Note  NAME: Landon Rivera. : 1975  MRN: [id number]    Date of Service: 2020    Discharge Recommendations:  Continue to assess pending progress       Assessment      Activity Tolerance  Activity Tolerance: Patient Tolerated treatment well  Safety Devices  Safety Devices in place: Yes  Type of devices: All fall risk precautions in place         Patient Diagnosis(es): There were no encounter diagnoses. has a past medical history of CAD (coronary artery disease) and DM (diabetes mellitus), type 2, uncontrolled ([facility] [address] has no past surgical history on file. Restrictions  Restrictions/Precautions  Restrictions/Precautions: Fall Risk, Contact Precautions  Position Activity Restriction  Other position/activity restrictions: contact precautions MRSA     Subjective   General  Chart Reviewed: Yes  Patient assessed for rehabilitation services?: Yes  Response to previous treatment: Patient reporting fatigue but able to participate  Family / Caregiver Present: No  Referring Practitioner: Dr. Julia Boucher  Diagnosis: Impaired mobility/ADLs 2° to hypoxic encephalopathy s/p cardiac arrest    Subjective  Subjective: No one tells me anything. Pain Assessment  Pain Level: 0  Pre Treatment Pain Screening  Pain at present: 0     Orientation  Orientation  Overall Orientation Status: Within Functional Limits     Objective      ADL    Grooming: Modified independent     UE Bathing: Modified independent     LE Bathing: Modified independent     UE Dressing: Independent    LE Dressing: Modified independent     Toileting: None(denied need)    Shower Transfers  Shower - Transfer Type: To and From  Shower - Transfer To:  Standing  Shower - Technique: Ambulating  Shower Transfers: Supervision  Shower Transfers Comments: grab bars; 0 AD     Upper Extremity Function  UE Strengthing: Patient engaged in therapeutic activity to increase B FM

## 2020-08-31 NOTE — PROGRESS NOTES
Patient adamantly refuses bipap, states \"I absolutely refuse that stupid thing. \"  Oliver Gibson removed from room as patient has not worn it in 6 nights.

## 2020-08-31 NOTE — PLAN OF CARE
Problem: Falls - Risk of:  Goal: Will remain free from falls  Description: Will remain free from falls  8/30/2020 2253 by Keli Magallanes RN  Outcome: Ongoing  8/30/2020 1155 by Emiliano Winters RN  Outcome: Ongoing  Goal: Absence of physical injury  Description: Absence of physical injury  8/30/2020 2253 by Keli Stuart RN  Outcome: Ongoing  8/30/2020 1155 by Emiliano Winters RN  Outcome: Ongoing     Problem: Skin Integrity:  Goal: Will show no infection signs and symptoms  Description: Will show no infection signs and symptoms  8/30/2020 2253 by Keli Magallanes RN  Outcome: Ongoing  8/30/2020 1155 by Emiliano Winters RN  Outcome: Ongoing  Goal: Absence of new skin breakdown  Description: Absence of new skin breakdown  8/30/2020 2253 by Keli Stuart RN  Outcome: Ongoing  8/30/2020 1155 by Emiliano Winters RN  Outcome: Ongoing     Problem: Pain Control  Goal: Maintain pain level at or below patient's acceptable level (or 5 if patient is unable to determine acceptable level)  8/30/2020 2253 by Keli Magallanes RN  Outcome: Ongoing  8/30/2020 1155 by Emiliano Winters RN  Outcome: Ongoing     Problem: Cardiovascular  Goal: No DVT, peripheral vascular complications  1/08/5683 2253 by Keli Magallanes RN  Outcome: Ongoing  8/30/2020 1155 by Emiliano Winters RN  Outcome: Ongoing  Goal: Hemodynamic stability  8/30/2020 2253 by Keli Magallanes RN  Outcome: Ongoing  8/30/2020 1155 by Emiliano Winters RN  Outcome: Ongoing  Goal: Anticoagulate/Hct stable  8/30/2020 2253 by Keli Magallanes RN  Outcome: Ongoing  8/30/2020 1155 by Emiliano Winters RN  Outcome: Ongoing  Goal: Agreement to quit smoking  8/30/2020 2253 by Keli Magallanes RN  Outcome: Ongoing  8/30/2020 1155 by Emiliano Winters RN  Outcome: Ongoing  Goal: Understanding of dietary restrictions  8/30/2020 2253 by Keli Magallanes RN  Outcome: Ongoing  8/30/2020 1155 by Emiliano Winters RN  Outcome: Ongoing     Problem: Respiratory  Goal: No pulmonary complications  3/83/8211 2253 by Aldo Cook. Georgina Sanchez RN  Outcome: Ongoing  8/30/2020 1155 by Ed Brewster RN  Outcome: Ongoing  Goal: O2 Sat > 90%  8/30/2020 2253 by Aldo Cook. Georgina Sanchez RN  Outcome: Ongoing  8/30/2020 1155 by Ed Brewster RN  Outcome: Ongoing  Goal: Supplemental O2 requirements decreased  8/30/2020 2253 by Aldo Cook. Georgina Sanchez RN  Outcome: Ongoing  8/30/2020 1155 by Ed Brewster RN  Outcome: Ongoing  Goal: Agreement to quit smoking  8/30/2020 2253 by Aldo Cook. Georgina Sanchez RN  Outcome: Ongoing  8/30/2020 1155 by Ed Brewster RN  Outcome: Ongoing  Goal: Pneumonia vaccine at discharge as needed  8/30/2020 2253 by Aldo Cook. Georgina Sanchez RN  Outcome: Ongoing  8/30/2020 1155 by Ed Brewster RN  Outcome: Ongoing     Problem: Skin Integrity/Risk  Goal: No skin breakdown during hospitalization  8/30/2020 2253 by Aldo Cook. Georgina Sanchez RN  Outcome: Ongoing  8/30/2020 1155 by Ed Brewster RN  Outcome: Ongoing  Goal: Wound healing  8/30/2020 2253 by Aldo Cook. Master Pizarro RN  Outcome: Ongoing  8/30/2020 1155 by Ed Brewster RN  Outcome: Ongoing     Problem: Musculor/Skeletal Functional Status  Goal: Highest potential functional level  8/30/2020 2253 by Aldo Cook. Georgina Sanchez RN  Outcome: Ongoing  8/30/2020 1155 by Ed Brewster RN  Outcome: Ongoing  Goal: Absence of falls  8/30/2020 2253 by Aldo Cook. Georgina Sanchez RN  Outcome: Ongoing  8/30/2020 1155 by Ed Brewster RN  Outcome: Ongoing     Problem: IP SWALLOWING  Goal: LTG - patient will tolerate the least restrictive diet consistency to allow for safe consumption of daily meals  8/30/2020 2253 by Aldo Cook. Georgina Sanchez RN  Outcome: Ongoing  8/30/2020 1155 by Ed Brewster RN  Outcome: Ongoing     Problem: Pain:  Goal: Pain level will decrease  Description: Pain level will decrease  8/30/2020 2253 by Aldo Cook.  Georgina Sanchez RN  Outcome: Ongoing  8/30/2020 1155 by Ed Brewster, RN  Outcome: Ongoing  Goal: Control of acute pain  Description: Control of acute pain  8/30/2020 2253 by Fatuma Hayes RN  Outcome: Ongoing  8/30/2020 1155 by Jeff Gonsalez RN  Outcome: Ongoing  Goal: Control of chronic pain  Description: Control of chronic pain  8/30/2020 2253 by Fatuma Hayes RN  Outcome: Ongoing  8/30/2020 1155 by Jeff Gonsalez RN  Outcome: Ongoing     Problem: IP COMMUNICATION/DYSARTHRIA  Goal: LTG - patient will improve expressive language skills to allow for communication of wants and needs in daily activities  8/30/2020 2253 by Fatuma Hayes RN  Outcome: Ongoing  8/30/2020 1155 by Jeff Gonsalez RN  Outcome: Ongoing

## 2020-08-31 NOTE — PROGRESS NOTES
[facility]  Facility/Department: [facility]  Speech Language Pathology   Treatment Note          Quan Yanes.  1975  [id number]/[id number]        Rehab Dx/Hx: Impaired mobility and ADLs [Z74.09]  Impaired mobility [Z74.09]  Abnormality of gait and mobility [R26.9]    Precautions: falls    Medical Dx: Impaired mobility and ADLs [Z74.09]  Impaired mobility [Z74.09]  Abnormality of gait and mobility [R26.9]  Speech Dx: Cognitive Linguistic Impairment     Date: 8/31/2020    Subjective:  Alert, Cooperative and Pleasant        Interventions used this date:  Cognitive Skill Development    Objective/Assessment:  Patient progressing towards goals:  Short-term Goals  Timeframe for Short-term Goals: 1-2 weeks  Goal 1: To increase safety awareness and judgment for safe completion of ADLs secondary to pt's cognitive deficits, pt will complete abstract reasoning tasks (i.e. Word deduction, convergent and divergent naming, similarities/differences) with 80% accuracy and min cues. Not addressed  Goal 2: To increase independence for functional activities for home and community, pt will complete mid level executive functioning tasks (i.e. Path finding, scheduling appointments, prioritizing tasks) with min cues. Pt completed high level deductive reasoning task I with 81% accuracy, with min cueing 100% accuracy. Goal 3: To decrease pt's cognitive deficits through the use of compensatory strategies, the pt will be educated on 3 different memory strategies and verbalize how he/she might use them at home in 3 ways with min cues. Pt completing 4 word immediate recall with specific questions I with 63% accuracy. ST suspects pt lack of motivation negatively impacted his accuracy during task. Pt reports some baseline memory deficits PTA admission. Goal 4:  To decrease cognitive deficits and improve attention to tasks for safe completion of ADLs, pt will complete structured tasks addressing (sustained, selective, alternating, divided) attention with 90% accuracy and min cues. Goal met. Pt maintained attention I throughout the entire session and all tasks. Goal 5: Within 1-5 days of implementing the ST POC, pt's functional reading/writing skills will be assessed in relation to executive functioning (e.g. bill paying, reading rx labels, completing personal information), with additional goals added to pt's POC as deemed necesary by treating SLP. Pt agreeable to have wife assist him initially with medication management upon discharge home. Pt completing checkbook activity without difficulty. Long-term Goals  Timeframe for Long-term Goals: 2-3 weeks  Goal 1: Pt will improve his Cognition from min to mod assist to I for adequate functional recall and safety awareness for home and community      Pt and ST discussed continued hoarse vocal quality. ST expressed concern that the patient's vocal quality has not improved. Pt reported that he continues to bring phlegm up from his chest and that he is not worried. Pt refused ENT consult and reported that if he has hoarse voice for a long time then he will go see someone. ST notified pt that he may be referred to ST at that time. Pt appeared surprised. Pt may benefit from ENT consult. Treatment/Activity Tolerance:  Patient tolerated treatment well    Plan:  Discharge cognitive goals. ST to f/u for dysphagia    Pain Assessment:  Initial Assessment:  Patient denies pain. Re-assessment:  Patient denies pain. Patient/Caregiver Education:  Patient educated on session and progression towards goals. Caregiver education on session and progress towards goals.     Safety Devices:  Chair alarm in place      [address] ([facility]):    SWALLOWING  Rating:  DNT    SPOKEN LANGUAGE COMPREHENSION  Ratin    SPOKEN LANGUAGE EXPRESSION  Ratin    MOTOR SPEECH  Rating:  DNT    PROBLEM SOLVING  Ratin    MEMORY  Ratin          Therapy Time  SLP Individual Minutes  Time In: 1100  Time Out: 9599  Minutes: 25              Signature: Electronically signed by CLAU Hill on 8/31/2020 at 11:43 AM

## 2020-08-31 NOTE — PROGRESS NOTES
Occupational Therapy  Facility/Department: [facility]  Daily Treatment Note  NAME: Stewart Roman. : 1975  MRN: [id number]    Date of Service: 2020    Discharge Recommendations:  Continue to assess pending progress       Assessment      Activity Tolerance  Activity Tolerance: Patient Tolerated treatment well  Safety Devices  Safety Devices in place: Yes  Type of devices: All fall risk precautions in place         Patient Diagnosis(es): There were no encounter diagnoses. has a past medical history of CAD (coronary artery disease) and DM (diabetes mellitus), type 2, uncontrolled ([facility] [address] has no past surgical history on file. Restrictions  Restrictions/Precautions  Restrictions/Precautions: Fall Risk, Contact Precautions  Position Activity Restriction  Other position/activity restrictions: contact precautions MRSA     Subjective   General  Chart Reviewed: Yes  Patient assessed for rehabilitation services?: Yes  Response to previous treatment: Patient reporting fatigue but able to participate  Family / Caregiver Present: No  Referring Practitioner: Dr. Marilyn Hess  Diagnosis: Impaired mobility/ADLs 2° to hypoxic encephalopathy s/p cardiac arrest    Subjective  Subjective: Hey. Pain Assessment  Pain Level: 0  Pre Treatment Pain Screening  Pain at present: 0     Orientation  Orientation  Overall Orientation Status: Within Functional Limits     Objective      Balance  Standing Balance: Independent  Standing Balance  Time: able to stand up to 8 minutues 30 seconds with 0-1 UE support X multiple trials, 0 LOB  Activity: Patient able to reach to L lateral side with 0 difficulty to retrieve large blocks 1 at a time. Patient able to reach in vertical planes with 0 difficulty to place 60 lg blocks on vertical dowel rods of varying heights 1 at a time following colored pattern. Patient noted to have 0 errors following colored pattern.    Patient removed large blocks 1 at a time by color with 0 difficulty from vertical dowels with repetitive reaching. Patient with 0 difficulty reaching in L lateral plane to place large blocks in container. Patient with 0 difficulty problem solving how to remove blocks 1 at a time by color. Patient alternated UE's as needed for RB's and had RB's as needed.              Plan   Plan  Times per week: 5-7x/week  Plan weeks: 1-1.5 weeks  Current Treatment Recommendations: Strengthening, Endurance Training, Neuromuscular Re-education, Self-Care / ADL, Balance Training, Functional Mobility Training, Safety Education & Training, ROM    Plan Comment: Continue per OT POC for planned d/c on 9-8-20            Goals  Patient Goals   Patient goals : \"I want to get home\"       Therapy Time   Individual Concurrent Group Co-treatment   Time In 1330         Time Out 1400         Minutes 30             Therapeutic activities: 30 minutes       Electronically signed by Kandi Emmanuel on 8/31/20 at 3:57 PM BLADE Flores

## 2020-08-31 NOTE — PROGRESS NOTES
Physician called back and had me call pharmacy adjust medication dosage. Called pharmacy to adjust dosage, will hold dose at this time per pharmacy.  Electronically signed by Sera Stroud RN on 8/31/2020 at 11:11 AM

## 2020-08-31 NOTE — PROGRESS NOTES
Pharmacy Vancomycin Consult     Vancomycin Day: 9  Current Dosing: vancomycin 1250mg IV q8h     Temp max:  98    Recent Labs     08/31/20  0556   BUN 10       Recent Labs     08/31/20  0556   CREATININE 0.67*       Recent Labs     08/31/20  0545   WBC 6.5       No intake or output data in the 24 hours ending 08/31/20 1159    Culture Date      Source                       Results  Staph MRSA    Ht Readings from Last 1 Encounters:   08/28/20 5' 10\" (1.778 m)        Wt Readings from Last 1 Encounters:   08/28/20 172 lb (78 kg)         Body mass index is 24.68 kg/m². Estimated Creatinine Clearance: 144 mL/min (A) (based on SCr of 0.67 mg/dL (L)). Trough: 22.2    Assessment/Plan:  Trough supra-therapeutic. Adjusted dose frequency to q12h and will re draw trough prior to 4th dose. Dose adjustments will be made based on renal function, clinical response, and lab results. Thank you for consult. Will continue to monitor.    Yecenia Hutsons PharmD

## 2020-08-31 NOTE — CARE COORDINATION
[address] [place] [address] NOTE  Room: H968/M755-97  Admit Date: 2020       Date: 2020  Patient Name: Wesly Corbett MRN: [id number]    : 1975  (37 y.o.)  Gender: male      Diagnosis: Impaired mobility dt hypoxic encephalopathy--[facility] rehab admit 2020    REHAB DIAGNOSIS:   Diagnosis: Impaired mobility dt hypoxic encephalopathy--[facility] rehab admit 2020    CO MORBIDITIES:  Type 2 diabetes with hyperglycemia, pneumonia. Past Medical History:   Diagnosis Date    CAD (coronary artery disease) 3/2/2015    DM (diabetes mellitus), type 2, uncontrolled ([facility] [address] 2015     No past surgical history on file. Chart Reviewed: Yes  Family / Caregiver Present: No  Restrictions  Restrictions/Precautions: Fall Risk, Contact Precautions  Position Activity Restriction  Other position/activity restrictions: contact precautions MRSA  CASE MANAGEMENT    Social/Functional History  Social/Functional History  Lives With: Spouse  Type of Home: Apartment(pt stated that he lives in a hotel but when asked further questions he was describing an apartment)  Home Layout: One level  Home Access: Level entry  Bathroom Shower/Tub: Tub/Shower unit  Home Equipment: (no equipment)  ADL Assistance: Independent  Homemaking Assistance: Independent  Homemaking Responsibilities: Yes  Ambulation Assistance: Independent  Transfer Assistance: Independent  Active : Yes  Occupation: Full time employment  Type of occupation:   Leisure & Hobbies: watch TV       Pts personal preferences: n/a    Pts assets/resources/support system: wife and children    COVERAGE INFORMATION:Pay[employer]: /       NURSING  Weight: 172 lb (78 kg) / Body mass index is 24.68 kg/m².     Dietary Nutrition Supplements: Protein Modular  DIET CARB CONTROL; Carb Control: 3 carb choices (45 gms)/meal; Dysphagia Soft and Bite-Sized  Dietary Nutrition Supplements: Snack (see comment)    SpO2: 94 % (08/31/20 0741)  O2 Flow Rate (L/min): 0 L/min (08/30/20 1015)  Contact    Skin Issues: Yes scabbed areas    Pain Managed: Yes    Bladder continence: Yes    Bowel continence: Yes      Other: ATB d/c, patient refusing meds  PICC RUE      PHYSICAL THERAPY  Bed mobility:  Supine to Sit: Modified independent (08/31/20 1049)  Sit to Supine: Modified independent (08/31/20 1049)  Transfers:  Sit to Stand: Modified independent (08/31/20 1046)  Bed to Chair: Stand by assistance;Contact guard assistance (08/29/20 1320)  Gait:   Device: No Device (08/31/20 1040)  Other Apparatus: O2 (08/29/20 1321)  Assistance: Stand by assistance;Contact guard assistance (08/31/20 1040)  Distance: 100' (08/31/20 1040)  Quality of Gait: nbos, slight lateral sway (08/31/20 1040)  Comments: once with O2, once without (08/30/20 1209)  Stairs:  # Steps : 12 (08/31/20 1040)  Rails: Bilateral (08/31/20 1040)  Assistance: Supervision (08/31/20 1040)  Comment: supervision secondary to first time performing 12 stairs, reciprocal pattern (08/31/20 1040)  W/C mobility:     LTG:  Long term goal 1: pt to be indep with bed mobility  Long term goal 2: pt to be indep with bed and car transfers  Long term goal 3: indep gait with no device 50 feet - supervision for 150 feet  Long term goal 4: indep with 4 stairs - SBA for flight of stairs  Long term goal 5: 14/24 for DGI  PT Treatment Time:  1.0 hrs      OCCUPATIONAL THERAPY  Hand Dominance: Right  ADL  Feeding: Setup (08/26/20 1027)  Grooming: Modified independent  (08/31/20 1047)  UE Bathing: Modified independent  (08/31/20 1047)  LE Bathing: Modified independent  (08/31/20 1047)  UE Dressing: Independent (08/31/20 1047)  LE Dressing: Modified independent  (08/31/20 1047)  Toileting: None(denied need) (08/31/20 1047)  Additional Comments: Pt completed morning self-care routine to promote independence with ADL tasks. Pt completed sponge bath at sink level stating \"I'm not ready to get in the shower yet\".  Pt was able to sequence ADL task with no difficulty but required increased time to complete tasks. (08/26/20 1027)  Toilet Transfers  Toilet - Technique: Stand pivot (08/26/20 1040)  Equipment Used: Grab bars (08/26/20 1040)  Toilet Transfer: Unable to assess (08/29/20 1552)  Toilet Transfers Comments: Pt stood at toilet to urinate with CGA (08/29/20 1552)     Shower Transfers  Shower - Transfer From: [address] (08/28/20 7507)  Shower - Transfer Type: To and From (08/31/20 1047)  Shower - Transfer To: Standing (08/31/20 1047)  Shower - Technique: Ambulating (08/31/20 1047)  Shower Transfers: Supervision (08/31/20 1047)  Shower Transfers Comments: grab bars; 0 AD (08/31/20 1047)  LTG:  Eating  Assistance Needed: Setup or clean-up assistance  CARE Score: 5  Discharge Goal: Independent, Oral Hygiene  Assistance Needed: Setup or clean-up assistance  CARE Score: 5  Discharge Goal: Independent, [address] Hygiene  Reason if not Attempted: Patient refused  CARE Score: 7  Discharge Goal: Independent, Shower/Bathe Self  Assistance Needed: Partial/moderate assistance  CARE Score: 3  Discharge Goal: Independent  Upper Body Dressing  Comment: hospital gown  Reason if not Attempted: Not applicable  CARE Score: 9  Discharge Goal: Independent, Lower Body Dressing  Assistance Needed: Partial/moderate assistance  CARE Score: 3  Discharge Goal: Independent, Putting On/Taking Off Footwear  Assistance Needed: Partial/moderate assistance  CARE Score: 3  Discharge Goal: Independent, Toilet Transfer  Assistance Needed: Partial/moderate assistance  CARE Score: 3  Discharge Goal: Independent  OT Treatment Time: 2.0 hrs      SPEECH THERAPY    Motor Speech: (Pt continues to present with reduced volume of voice and hoarse vocal quality. Pt refused ENT consult at this time.  Per pt he will f/u after discharge if he continues to have hoarse vocal quality)  Comprehension: [facility])  Verbal Expression: (WFL)      Diet/Swallow:  Diet Solids Recommendation: Dysphagia Soft and Bite-Sized (Dysphagia III)  Liquid Consistency Recommendation: Thin  Dysphagia Outcome Severity Scale: Level 5: Mild dysphagia- Distant supervision. May need one diet consistency restricted    Compensatory Swallowing Strategies: Alternate solids and liquids, Upright as possible for all oral intake, No straws, Eat/Feed slowly, Small bites/sips, Lingual sweep  Therapeutic Interventions: Bolus control exercises, Diet tolerance monitoring, Therapeutic PO trials with SLP, Oral care, Oral motor exercises, Tongue base strengthening(ST to assess regular solids during meal monitor on 09/01/20 to determine if patient is safe for a diet upgrade. )      LTG:  Long-term Goals  Timeframe for Long-term Goals: 2-3 weeks  Goal 1: Pt will improve his Cognition from min to mod assist to I for adequate functional recall and safety awareness for home and community  Long-term Goals  Timeframe for Long-term Goals: 1-2 weeks  Goal 1: Patient will tolerate least restrictive diet without s/s of aspiration          COGNITION  OT: Cognition Comment: comp: sup exp: mod I social int: mod I prob solving: mod I memory: sup  SP:Memory: (Mild recall deficits continue to be noted. Pt reports baseline memory deficits PTA. Pt reported he is at baseline for cognition.)  Problem Solving: (Pt's problem solving skills are modified I. Pt reports cognitive skills are at baseline. Pt would benefit from some assistance with medication and finance management initially upon return home.)    RECREATIONAL THERAPY  Attendance to recreational therapy programs:    []  Pet Therapy  [] Music Therapy  [] Art Therapy    [] Recreation Therapy Group [] Support Group           Patient social interaction (mood, participation): good      Patient strengths: active    Patients goal:  \"I want to get home\"    Problems/Barriers: some cognition        1.  Safety:          - Intervention / Plan:    [x]  falls protocol     [x]  PT/OT    [x]  SP        - Results:

## 2020-08-31 NOTE — PROGRESS NOTES
Physical Therapy Rehab Treatment Note  [facility]/Department[facility]  Room: O131/Y311-01       NAME: Quan Yanes. : 1975 (38 y.o.)  MRN: [id number]  CODE STATUS: Full Code    Date of Service: 2020  Chart Reviewed: Yes  Family / Caregiver Present: No    Restrictions:  Restrictions/Precautions: Fall Risk, Contact Precautions  Position Activity Restriction  Other position/activity restrictions: contact precautions MRSA       SUBJECTIVE: Subjective: II'm good. \"  Pain Screening  Patient Currently in Pain: Denies  Pre Treatment Pain Screening  Pain at present: 0  Intervention List: Patient able to continue with treatment    Post Treatment Pain Screening:  Pain Assessment  Pain Level: 0    OBJECTIVE:   Neuromuscular Education  Neuromuscular Comments: DGI performed     Bed mobility  Bridging: Modified independent   Rolling to Left: Modified independent  Rolling to Right: Modified independent  Supine to Sit: Modified independent  Sit to Supine: Modified independent  Scooting: Modified independent  Comment: Perfomed on mat table, no HR needed, good technique    Transfers  Sit to Stand: Modified independent  Stand to sit: Modified independent  Car Transfer: Supervision  Comment: VC's during car trsf for safety    Ambulation  Ambulation?: Yes  Ambulation 1  Surface: level tile;carpet  Device: No Device  Assistance: Stand by assistance;Contact guard assistance  Quality of Gait: nbos, slight lateral sway  Gait Deviations: Decreased head and trunk rotation;Decreased step height;Decreased step length; Slow Desire  Distance: 100'  Ambulation 2  Surface - 2: ramp;level tile;carpet  Device 2: No device  Assistance 2: Contact guard assistance;Stand by assistance  Quality of Gait 2: nbos, slight lateral sway, occ deviates to L  Gait Deviations: Slow Desire;Decreased step length;Decreased step height;Decreased head and trunk rotation  Distance: 200'  Comments: Pt able to correct when deviating to L without

## 2020-08-31 NOTE — PROGRESS NOTES
Infectious Disease     Patient Name: Naty Galloway. Date: 8/31/2020  YOB: 1975  Medical Record Number: [id number]            · Aspiration pneumonia with MRSA colonization/infection            Review of Systems   Respiratory: Negative. Cardiovascular: Negative. Gastrointestinal: Negative. Review of Systems: All 14 review of systems negative other than as stated above    Social History     Tobacco Use    Smoking status: Current Every Day Smoker     Packs/day: 1.00     Years: 20.00     Pack years: 20.00    Smokeless tobacco: Never Used   Substance Use Topics    Alcohol use: Yes     Comment: rarely    Drug use: No         Past Medical History:   Diagnosis Date    CAD (coronary artery disease) 3/2/2015    DM [facility] 1/29/2015           No past surgical history on file. No current facility-administered medications on file prior to encounter. Current Outpatient Medications on File Prior to Encounter   Medication Sig Dispense Refill    metFORMIN ER (GLUCOPHAGE-XR) 750 MG XR tablet Take 1 tablet by mouth daily. with food 90 tablet 3    pioglitazone (ACTOS) 30 MG tablet Take 1 tablet by mouth daily. for diabetes 90 tablet 3    atorvastatin (LIPITOR) 40 MG tablet Take 1 tablet by mouth daily. 90 tablet 3    glyBURIDE (DIABETA) 5 MG tablet Take 2 tablets by mouth 2 times daily (with meals). 120 tablet 3    Blood Glucose Monitoring Suppl (ONE TOUCH ULTRA 2) W/DEVICE KIT Use as directed for diabetes 1 kit 0    glucose blood VI test strips (ONE TOUCH ULTRA TEST) strip Use to test blood sugar up to 2 times daily 100 each 3    ONETOUCH DELICA LANCETS FINE MISC Use as directed for diabetes 100 each 5    aspirin EC 81 MG EC tablet Take 1 tablet by mouth daily to prevent heart attack and stroke 120 tablet 3    lisinopril (PRINIVIL;ZESTRIL) 2.5 MG tablet Take 1 tablet by mouth daily.  90 tablet 3    meloxicam (MOBIC) 15 MG tablet Take 1 stent placement         PLAN:  · Aspiration pneumonia with MRSA colonization/infection  Continue vancomycin and Zosyn for 1 more day
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
## 2020-08-31 NOTE — PROGRESS NOTES
INDIVIDUALIZED OVERALL REHAB PLAN OF CARE  ADDENDUM TO REHAB PROGRESS NOTE-for audit purposes must also refer to this day's clinical note and combine the information      Date: 2020  Patient Name: Antonio Mcintosh. Room: R232/R232-01    MRN: [id number]    : 1975  (37 y.o.)  Gender: male       Today 2020 during weekly team meeting, I reviewed the patient Antonio Mcintosh. in detail with the therapists and nurses involved in patient's care gathering complex physiatric data regarding current medical issues, progress in therapies, factors limiting progress, social issues, psychological issues, ongoing therapeutic plans and discharge planning. Legend:  I= independent Im =Modified independent  S=Supervised SB=stand by HATCH=set up CG=contact rafa Min= minimal Mod=Moderate Max=maximal Max of 2 =maximal assist of 2 people      CURRENT FUNCTIONAL STATUS:    NURSING ISSUES:         Nursing will continue to focus on bowel and bladder continence transitioning toward independence by time of discharge. Monitoring post void residuals monitoring for severe constipation and bowel obstruction. Focus on achieving ADL goals with co-treating with OT when possible.     PHYSICAL THERAPY  Bed mobility:  Supine to Sit: Modified independent (20 1049)  Sit to Supine: Modified independent (20 1049)  Transfers:  Sit to Stand: Modified independent (20 1046)  Bed to Chair: Stand by assistance;Contact guard assistance (20 1320)  Gait:   Device: No Device (20 1040)  Other Apparatus: O2 (20 1321)  Assistance: Stand by assistance;Contact guard assistance (20 1040)  Distance: 100' (20 1040)  Quality of Gait: nbos, slight lateral sway (20 1040)  Comments: once with O2, once without (20 1209)  Stairs:  # Steps : 12 (20 1040)  Rails: Bilateral (20 1040)  Assistance: Supervision (20 1040)  Comment: supervision secondary to first time performing 12 stairs, reciprocal pattern (08/31/20 1040)  W/C mobility:         OCCUPATIONAL THERAPY  Hand Dominance: Right  ADL  Feeding: Setup (08/26/20 1027)  Grooming: Modified independent  (08/31/20 1047)  UE Bathing: Modified independent  (08/31/20 1047)  LE Bathing: Modified independent  (08/31/20 1047)  UE Dressing: Independent (08/31/20 1047)  LE Dressing: Modified independent  (08/31/20 1047)  Toileting: None(denied need) (08/31/20 1047)  Additional Comments: Pt completed morning self-care routine to promote independence with ADL tasks. Pt completed sponge bath at sink level stating \"I'm not ready to get in the shower yet\". Pt was able to sequence ADL task with no difficulty but required increased time to complete tasks. (08/26/20 1027)  Toilet Transfers  Toilet - Technique: Stand pivot (08/26/20 1040)  Equipment Used: Grab bars (08/26/20 1040)  Toilet Transfer: Unable to assess (08/29/20 1552)  Toilet Transfers Comments: Pt stood at toilet to urinate with CGA (08/29/20 1552)     Shower Transfers  Shower - Transfer From: Carmine Luis (08/28/20 5915)  Shower - Transfer Type: To and From (08/31/20 1047)  Shower - Transfer To: Standing (08/31/20 1047)  Shower - Technique: Ambulating (08/31/20 1047)  Shower Transfers: Supervision (08/31/20 1047)  Shower Transfers Comments: grab bars; 0 AD (08/31/20 1047)      SPEECH THERAPY  Motor Speech: (Mild reduced volume of voice secondary to post intubation. ST to monitor)  Comprehension: [facility] for 3-4 step directions)  Verbal Expression: (Mild to mild to moderate divergent and convergent naming deficits secondary to cognitive linguistic deficits)      Diet/Swallow:  Diet Solids Recommendation: Dysphagia Soft and Bite-Sized (Dysphagia III)  Liquid Consistency Recommendation: Thin  Dysphagia Outcome Severity Scale: Level 5: Mild dysphagia- Distant supervision. May need one diet consistency restricted    Compensatory Swallowing Strategies:  Alternate solids and liquids, Upright as possible for all oral intake, No straws, Eat/Feed slowly, Small bites/sips, Lingual sweep  Therapeutic Interventions: Bolus control exercises, Diet tolerance monitoring, Therapeutic PO trials with SLP, Oral care, Oral motor exercises, Tongue base strengthening          COGNITION  OT: Cognition Comment: comp: sup exp: mod I social int: mod I prob solving: mod I memory: sup  SP:Memory: (mild to moderate immediate and STM deficits. Mild working memory deficits, which may negatively impact pt's ability to complete ADLs safely.)  Problem Solving: (Mild to moderate problem solving, safety and abstract reasoning deficits. Pt presents with reduced attention, delayed responses and slow processing. Reduced insight noted.)        THERAPY, MEDICAL AND NURSING COORDINATION:    []  Pain medication before therapies     []  Check orthostatic BP      [x]  Ambulate to the bathroom in room    [x]  Add scheduled rest beaks     []  In room therapies      Discharge date set for:              9/3/2020      Home with:   wife  with help from   Wife and dtr            And:      Home Health Care:     [x]  PT    []  OT    []  ST   [x]  Aide   []  SW    [x]  RN                    Outpatient Therapy:  []  PT    []  OT    []  ST   []  Rehab Psych                 Equipment:  [employer]      At D/C their function is goaled at:   PT:Long term goal 1: pt to be indep with bed mobility  Long term goal 2: pt to be indep with bed and car transfers  Long term goal 3: indep gait with no device 50 feet - supervision for 150 feet  Long term goal 4: indep with 4 stairs - SBA for flight of stairs  Long term goal 5: 14/24 for DGI  OT:Eating  Assistance Needed: Setup or clean-up assistance  CARE Score: 5  Discharge Goal: Independent, Oral Hygiene  Assistance Needed: Setup or clean-up assistance  CARE Score: 5  Discharge Goal: Independent, [address] [place]  Reason if not Attempted: Patient refused  CARE Score: 7  Discharge Goal: Independent, Shower/Bathe Self  Assistance Needed: Partial/moderate assistance  CARE Score: 3  Discharge Goal: Independent  Upper Body Dressing  Comment: hospital gown  Reason if not Attempted: Not applicable  CARE Score: 9  Discharge Goal: Independent, Lower Body Dressing  Assistance Needed: Partial/moderate assistance  CARE Score: 3  Discharge Goal: Independent, Putting On/Taking Off Footwear  Assistance Needed: Partial/moderate assistance  CARE Score: 3  Discharge Goal: Independent, Toilet Transfer  Assistance Needed: Partial/moderate assistance  CARE Score: 3  Discharge Goal: Independent  SP:Long-term Goals  Timeframe for Long-term Goals: 2-3 weeks  Goal 1: Pt will improve his Cognition from min to mod assist to I for adequate functional recall and safety awareness for home and community  Long-term Goals  Timeframe for Long-term Goals: 1-2 weeks  Goal 1: Patient will tolerate least restrictive diet without s/s of aspiration           From a cognitive standpoint they will need:        24 hr supervision  --progress to occasional           Significant problems/ barriers to functional progress include: Pt is at a high risk for functional loss,    [x]  Acute infection/UTI    []  Low BP's     []  COPD flare-up   []  Uncontrolled blood sugar     []  Progressive anemia         []  Severe pain exacerbation     []  Impaired mental status    []  Urinary incontinence    []  Bowel incontinence           Plan to correct barriers to functional progress: Add scheduled rest breaks, control pain by using ice Lidoderm rest and massage as well as pain medications prior to therapy. Based on a comprehensive evaluation of the above, the individualized therapy and Discharge plan will be:    -Times stated are an average that will be varied based on the patient's daily need.        PT ____2__hrs/day 5-7 days per week           OT ___1___hrs per day 5-7 days per week ST ____1/2___hrs /day 3-5 days per week       Estimated LOS 2 week(s)    - Overall functional prognosis:     [x]

## 2020-08-31 NOTE — PLAN OF CARE
Problem: Falls - Risk of:  Goal: Will remain free from falls  Description: Will remain free from falls  8/31/2020 1138 by Nichol Gil RN  Outcome: Ongoing  8/30/2020 2253 by Aldo Cook. Master Pizarro RN  Outcome: Ongoing  Goal: Absence of physical injury  Description: Absence of physical injury  8/31/2020 1138 by Nichol Gil RN  Outcome: Ongoing  8/30/2020 2253 by Aldo Cook. Master Pizarro RN  Outcome: Ongoing     Problem: Skin Integrity:  Goal: Will show no infection signs and symptoms  Description: Will show no infection signs and symptoms  8/31/2020 1138 by Nichol Gil RN  Outcome: Ongoing  8/30/2020 2253 by Aldo Cook. Master Pizarro RN  Outcome: Ongoing  Goal: Absence of new skin breakdown  Description: Absence of new skin breakdown  8/31/2020 1138 by Nichol Gil RN  Outcome: Ongoing  8/30/2020 2253 by Aldo Cook. Master Pizarro RN  Outcome: Ongoing     Problem: Pain Control  Goal: Maintain pain level at or below patient's acceptable level (or 5 if patient is unable to determine acceptable level)  8/31/2020 1138 by Nichol Gil RN  Outcome: Ongoing  8/30/2020 2253 by Aldo Cook. Master Pizarro RN  Outcome: Ongoing     Problem: Cardiovascular  Goal: No DVT, peripheral vascular complications  3/83/6955 1138 by Nichol Gil RN  Outcome: Ongoing  8/30/2020 2253 by Aldo Cook. Master Pizarro RN  Outcome: Ongoing  Goal: Hemodynamic stability  8/31/2020 1138 by Nichol Gil RN  Outcome: Ongoing  8/30/2020 2253 by Aldo Cook. Master Pizarro RN  Outcome: Ongoing  Goal: Anticoagulate/Hct stable  8/31/2020 1138 by Nichol Gil RN  Outcome: Ongoing  8/30/2020 2253 by Aldo Cook. Georgina Sanchez RN  Outcome: Ongoing  Goal: Agreement to quit smoking  8/31/2020 1138 by Nichol Gil RN  Outcome: Ongoing  8/30/2020 2253 by Aldo Cook. Master Pizarro RN  Outcome: Ongoing  Goal: Understanding of dietary restrictions  8/31/2020 1138 by Nichol Gil RN  Outcome: Ongoing  8/30/2020 2253 by Aldo Cook.  Master Pizarro RN  Outcome: Ongoing     Problem: Respiratory  Goal: No pulmonary complications  2/29/1377 1138 by Jolene Goff RN  Outcome: Ongoing  8/30/2020 2253 by Sarah Nuñez. Nishant Balbuena RN  Outcome: Ongoing  Goal: O2 Sat > 90%  8/31/2020 1138 by Jolene Goff RN  Outcome: Ongoing  8/30/2020 2253 by Sarah Nuñez. Nishant Balbuena RN  Outcome: Ongoing  Goal: Supplemental O2 requirements decreased  8/31/2020 1138 by Jolene Goff RN  Outcome: Ongoing  8/30/2020 2253 by Sarah Nuñez. Neto Bond RN  Outcome: Ongoing  Goal: Agreement to quit smoking  8/31/2020 1138 by Jolene Goff RN  Outcome: Ongoing  8/30/2020 2253 by Sarah Nuñez. Nishant Balbuena RN  Outcome: Ongoing  Goal: Pneumonia vaccine at discharge as needed  8/31/2020 1138 by Jolene Goff RN  Outcome: Ongoing  8/30/2020 2253 by Sarah Nuñez. Nishant Balbuena RN  Outcome: Ongoing     Problem: Skin Integrity/Risk  Goal: No skin breakdown during hospitalization  8/31/2020 1138 by Jolene Goff RN  Outcome: Ongoing  8/30/2020 2253 by Sarah Nuñez. Nishant Balbuena RN  Outcome: Ongoing  Goal: Wound healing  8/31/2020 1138 by Jolene Goff RN  Outcome: Ongoing  8/30/2020 2253 by Sarah Nuñez. Nishant Balbuena RN  Outcome: Ongoing     Problem: Musculor/Skeletal Functional Status  Goal: Highest potential functional level  8/31/2020 1138 by Jolene Goff RN  Outcome: Ongoing  8/30/2020 2253 by Sarah Nuñez. Nishant Balbuena RN  Outcome: Ongoing  Goal: Absence of falls  8/31/2020 1138 by Jolene Goff RN  Outcome: Ongoing  8/30/2020 2253 by Sarah Nuñez. Nishant Balbuena RN  Outcome: Ongoing     Problem: IP SWALLOWING  Goal: LTG - patient will tolerate the least restrictive diet consistency to allow for safe consumption of daily meals  8/31/2020 1138 by Jolene Goff RN  Outcome: Ongoing  8/30/2020 2253 by Sarah Nuñez. Nishant Balbuena RN  Outcome: Ongoing     Problem: Pain:  Goal: Pain level will decrease  Description: Pain level will decrease  8/31/2020 1138 by Jolene Goff RN  Outcome: Ongoing  8/30/2020 2253 by Sarah Nuñez.  Nishant Balbuena RN  Outcome: Ongoing  Goal: Control of acute pain  Description: Control of acute pain  8/31/2020 1138 by Rocco Dobbins RN  Outcome: Ongoing  8/30/2020 2253 by Brian Acosta. Sammy Kruger RN  Outcome: Ongoing  Goal: Control of chronic pain  Description: Control of chronic pain  8/31/2020 1138 by Rocco Dobbins RN  Outcome: Ongoing  8/30/2020 2253 by Brian Acosta. Sammy Kruger RN  Outcome: Ongoing     Problem: IP COMMUNICATION/DYSARTHRIA  Goal: LTG - patient will improve expressive language skills to allow for communication of wants and needs in daily activities  8/31/2020 1138 by Rocco Dobbins RN  Outcome: Ongoing  8/30/2020 2253 by Brian Acosta.  Sammy Kruger RN  Outcome: Ongoing

## 2020-08-31 NOTE — PROGRESS NOTES
Blood drawn this am from [facility] for labs. Pt cooperative. Electronically signed by Ofelia Dinero.  Fredy Vaughn on 8/31/2020 at 6:58 AM

## 2020-08-31 NOTE — PROGRESS NOTES
Physical Therapy Rehab Treatment Note  [facility]  [facility]       NAME: Preet Mckeon. : 1975 (37 y.o.)  MRN: [id number]  CODE STATUS: Full Code    Date of Service: 2020  Chart Reviewed: Yes  Family / Caregiver Present: No    Restrictions:  Restrictions/Precautions: Fall Risk, Contact Precautions  Position Activity Restriction  Other position/activity restrictions: contact precautions MRSA       SUBJECTIVE: Subjective: \"I'm ready to get out of here. \"  Pain Screening  Patient Currently in Pain: Denies  Pre Treatment Pain Screening  Pain at present: 0  Intervention List: Patient able to continue with treatment    Post Treatment Pain Screening:  Pain Assessment  Pain Level: 0    OBJECTIVE:     Neuromuscular Education  PNF: Throwing bean bags activity with alternating fwd stepping BLE, fwd reaching with trunk rotation, no UE support throughout, obstacle course with manuevering in and out of objects, stepping over objects and on airex, functional training in room to test balance and envronmental scanning in tight spaces  Neuromuscular Comments: HAGER Balance Test performed     Transfers  Sit to Stand: Modified independent  Stand to sit: Modified independent    Ambulation  Ambulation?: Yes  Ambulation 1  Surface: outdoors  Device: No Device  Assistance: Stand by assistance  Quality of Gait: nbos, slight lateral sway, decreased ade heel strike  Gait Deviations: Decreased step length;Decreased step height  Distance: 150'  Comments: SBA secondary to outdoors on multiple unstable surfaces such as grass, mulch, rocks, sidewalks, inclines and declines    Exercises  Comments: Standing sink ex's x 10 ea, no UE support     ASSESSMENT/COMMENTS:  Assessment: Pt displays improved balance this session secondary to outside gait training with no LOB, multiple balance activities performed and an increase in HAGER balance score from  to 54/56.  One minor episode of lateral LOB during standing ther ex, pt able to self correct without therapist assist.    PLAN OF CARE/Safety:   Plan Comment: Cont.  POC      Therapy Time:   Individual   Time In 1430   Time Out 1530   Minutes 60     Minutes:      Transfer/Bed mobility trainin      Gait trainin      Neuro re education: 30     Therapeutic ex: Elaine Beck, CARO, 20 at 3:44 PM

## 2020-08-31 NOTE — PROGRESS NOTES
Lab called w/critical Vanco trough of 22.2, messaged Dr. Alverto Silva w/infectious disease and inquired about holding the morning medication administration.  Electronically signed by Reyna Bhatt RN on 8/31/2020 at 10:51 AM
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
## 2020-09-01 LAB
GLUCOSE BLD-MCNC: 114 MG/DL (ref 60–115)
GLUCOSE BLD-MCNC: 265 MG/DL (ref 60–115)
GLUCOSE BLD-MCNC: 299 MG/DL (ref 60–115)
GLUCOSE BLD-MCNC: 331 MG/DL (ref 60–115)
PERFORMED ON: ABNORMAL
PERFORMED ON: NORMAL

## 2020-09-01 PROCEDURE — 92526 ORAL FUNCTION THERAPY: CPT

## 2020-09-01 PROCEDURE — 99232 SBSQ HOSP IP/OBS MODERATE 35: CPT | Performed by: PHYSICIAN ASSISTANT

## 2020-09-01 PROCEDURE — 99232 SBSQ HOSP IP/OBS MODERATE 35: CPT | Performed by: INTERNAL MEDICINE

## 2020-09-01 PROCEDURE — 6370000000 HC RX 637 (ALT 250 FOR IP): Performed by: INTERNAL MEDICINE

## 2020-09-01 PROCEDURE — 97112 NEUROMUSCULAR REEDUCATION: CPT

## 2020-09-01 PROCEDURE — 6370000000 HC RX 637 (ALT 250 FOR IP): Performed by: PHYSICAL MEDICINE & REHABILITATION

## 2020-09-01 PROCEDURE — 99232 SBSQ HOSP IP/OBS MODERATE 35: CPT | Performed by: PHYSICAL MEDICINE & REHABILITATION

## 2020-09-01 PROCEDURE — 97116 GAIT TRAINING THERAPY: CPT

## 2020-09-01 PROCEDURE — 97535 SELF CARE MNGMENT TRAINING: CPT

## 2020-09-01 PROCEDURE — 2580000003 HC RX 258: Performed by: PHYSICAL MEDICINE & REHABILITATION

## 2020-09-01 PROCEDURE — 97530 THERAPEUTIC ACTIVITIES: CPT

## 2020-09-01 PROCEDURE — 1180000000 HC REHAB R&B

## 2020-09-01 PROCEDURE — 6370000000 HC RX 637 (ALT 250 FOR IP): Performed by: PHYSICIAN ASSISTANT

## 2020-09-01 RX ORDER — INSULIN GLARGINE 100 [IU]/ML
15 INJECTION, SOLUTION SUBCUTANEOUS 2 TIMES DAILY
Status: DISCONTINUED | OUTPATIENT
Start: 2020-09-01 | End: 2020-09-03 | Stop reason: HOSPADM

## 2020-09-01 RX ADMIN — TICAGRELOR 90 MG: 90 TABLET ORAL at 22:00

## 2020-09-01 RX ADMIN — GUAIFENESIN 600 MG: 600 TABLET ORAL at 09:28

## 2020-09-01 RX ADMIN — LACTOBACILLUS TAB 2 TABLET: TAB at 12:51

## 2020-09-01 RX ADMIN — ALOGLIPTIN 25 MG: 25 TABLET, FILM COATED ORAL at 09:28

## 2020-09-01 RX ADMIN — ATORVASTATIN CALCIUM 80 MG: 80 TABLET, FILM COATED ORAL at 22:00

## 2020-09-01 RX ADMIN — PANTOPRAZOLE SODIUM 40 MG: 40 TABLET, DELAYED RELEASE ORAL at 05:44

## 2020-09-01 RX ADMIN — INSULIN GLARGINE 15 UNITS: 100 INJECTION, SOLUTION SUBCUTANEOUS at 22:02

## 2020-09-01 RX ADMIN — GUAIFENESIN 600 MG: 600 TABLET ORAL at 21:59

## 2020-09-01 RX ADMIN — TICAGRELOR 90 MG: 90 TABLET ORAL at 09:28

## 2020-09-01 RX ADMIN — LACTOBACILLUS TAB 2 TABLET: TAB at 17:24

## 2020-09-01 RX ADMIN — Medication 10 ML: at 09:31

## 2020-09-01 RX ADMIN — Medication 100 MG: at 09:28

## 2020-09-01 RX ADMIN — CARVEDILOL 6.25 MG: 6.25 TABLET, FILM COATED ORAL at 09:28

## 2020-09-01 RX ADMIN — CARVEDILOL 6.25 MG: 6.25 TABLET, FILM COATED ORAL at 22:00

## 2020-09-01 RX ADMIN — LACTOBACILLUS TAB 2 TABLET: TAB at 09:28

## 2020-09-01 RX ADMIN — Medication 2000 UNITS: at 17:24

## 2020-09-01 RX ADMIN — INSULIN GLARGINE 12 UNITS: 100 INJECTION, SOLUTION SUBCUTANEOUS at 09:30

## 2020-09-01 RX ADMIN — Medication 100 MG: at 12:51

## 2020-09-01 RX ADMIN — ASPIRIN 81 MG: 81 TABLET, COATED ORAL at 09:28

## 2020-09-01 ASSESSMENT — ENCOUNTER SYMPTOMS
GASTROINTESTINAL NEGATIVE: 1
RESPIRATORY NEGATIVE: 1

## 2020-09-01 ASSESSMENT — PAIN SCALES - GENERAL
PAINLEVEL_OUTOF10: 0

## 2020-09-01 NOTE — PROGRESS NOTES
[facility]  Facility/Department: [facility]  Speech Language Pathology   Treatment Note          Ritchie Nim.  1975  [id number]        Rehab Dx/Hx: Impaired mobility and ADLs [Z74.09]  Impaired mobility [Z74.09]  Abnormality of gait and mobility [R26.9]    Precautions: falls    Medical Dx: Impaired mobility and ADLs [Z74.09]  Impaired mobility [Z74.09]  Abnormality of gait and mobility [R26.9]  Speech Dx: Dysphagia     Date: 9/1/2020    Subjective:  Alert, Cooperative and Pleasant        Interventions used this date:  Dysphagia Treatment    Objective/Assessment:  Patient progressing towards goals:  Short-term Goals  Timeframe for Short-term Goals: 1-2 weeks  Goal 1: Patient will tolerate soft and bite size and thin liquid diet without s/s of aspiration in 5/5 trials. Pt tolerating current diet well. Pt requesting diet upgrade. Per patient he had difficulty masticating secondary to poor dentition PTA  Goal 2: Patient will participate in therapeutic trials of regular solids, demonstrating adequate mastication and oral clearance, in 5/5 trials for possible diet upgrade. Pt tolerated regular solids without overt s/s of aspiration. Pt Presented with mild increased mastication time. Pt reports this is baseline. Pt presented with good oral clearance of solids from oral cavity. Pt I utilized a liquid wash every 3 bites. Goal 3: Pt will complete oral motor ROM and strengthening exercises with 80% accuracy, given cues as needed, in order to strengthen lingual/labial/buccal musculature to promote safety and efficiency of oral phase of swallow and decrease risk for pocketing. Goal met. Pt's oral strength is at baseline. Goal 4: Pt will improve hyolaryngeal elevation by performing hyolaryngeal exercises (Mendelson, Shaker, Falsetto, etc) with 80% accuracy in order to strengthen and establish a more effective swallow. Goal met.  Pt's pharyngeal strength is at baseline  Goal 5: Pt will complete

## 2020-09-01 NOTE — PROGRESS NOTES
Min cueing increased recall accuracy to 100%. Pt   Goal 4: To decrease cognitive deficits and improve attention to tasks for safe completion of ADLs, pt will complete structured tasks addressing (sustained, selective, alternating, divided) attention with 90% accuracy and min cues. Goal met. Pt was able to sustain attention without cueing 100% of the time. Goal 5: Within 1-5 days of implementing the ST POC, pt's functional reading/writing skills will be assessed in relation to executive functioning (e.g. bill paying, reading rx labels, completing personal information), with additional goals added to pt's POC as deemed necesary by treating SLP. Goal met. Pt agrees that his wife should double check medication management initially to monitor accuracy. Pt completed check writing and change addition task with 100% accuracy. Pt and ST discussed continued hoarse vocal quality. ST expressed concern that the patient's vocal quality has not improved. Pt reported that he continues to bring phlegm up from his chest and that he is not worried. Pt refused ENT consult and reported that if he has hoarse voice for a long time then he will go see someone. ST notified pt that he may be referred to ST at that time. Pt appeared surprised.      Pt may benefit from ENT consult. Long-term Goals  Timeframe for Long-term Goals: 2-3 weeks  Goal 1: Pt will improve his Cognition from min to mod assist to I for adequate functional recall and safety awareness for home and community  Short-term Goals  Timeframe for Short-term Goals: 1-2 weeks  Goal 1: Patient will tolerate soft and bite size and thin liquid diet without s/s of aspiration in 5/5 trials. Goal met  Goal 2: Patient will participate in therapeutic trials of regular solids, demonstrating adequate mastication and oral clearance, in 5/5 trials for possible diet upgrade. Goal Met. St recommended upgrade to Regular solids 09/01/20.  ST continues to recommend alternate liquids and solids, small bites and sips and eat slowly. Goal 3: Pt will complete oral motor ROM and strengthening exercises with 80% accuracy, given cues as needed, in order to strengthen lingual/labial/buccal musculature to promote safety and efficiency of oral phase of swallow and decrease risk for pocketing. Goal met. Pt's oral strength is at baseline. Mastication difficulties are related to poor dentition  Goal 4: Pt will improve hyolaryngeal elevation by performing hyolaryngeal exercises (Mendelson, Shaker, Falsetto, etc) with 80% accuracy in order to strengthen and establish a more effective swallow. Goal met. Pharyngeal strength at baseline  Goal 5: Pt will complete pharyngeal strengthening exercises (such as the Francine, Effortful swallow, etc) with 80% acc in order to strengthen and establish a more effective swallow. Goal met. Pt's pharyngeal strength at baseline  Compensatory Swallowing Strategies: Alternate solids and liquids, Upright as possible for all oral intake, No straws, Eat/Feed slowly, Small bites/sips, Lingual sweep     STATUS AT DISCHARGE:  DIET: Regular solids with thin liquids    SWALLOWING  Ratin    SPOKEN LANGUAGE COMPREHENSION  Ratin    SPOKEN LANGUAGE EXPRESSION  Ratin    MOTOR SPEECH  Ratin    PROBLEM SOLVING  Ratin    MEMORY  Ratin     VOICE  Ratin    Functional Status at time of Discharge:    · Cognition: Patient demonstrates no cognitive deficits. · Communication: Patient demonstrates no communication deficits. · Language: Patient demonstrates no language deficits. · Motor Speech: Patient demonstrates no motor speech deficits. · Swallow: Patient demonstrates minimal dysphagia. Patient is discharged to Home with wife with assistance with medication management               [x] Recommend continued speech therapy.  If vocal quality does not improve         Signature:  Chuyita Jones CCC-SLP, Date: 2020, Time: 8:53 AM

## 2020-09-01 NOTE — PROGRESS NOTES
Occupational Therapy  Facility/Department: [facility]  Daily Treatment Note  NAME: Liliya Hidalgo. : 1975  MRN: [id number]    Date of Service: 2020    Discharge Recommendations:  Continue to assess pending progress       Assessment      Activity Tolerance  Activity Tolerance: Patient Tolerated treatment well  Safety Devices  Safety Devices in place: Yes  Type of devices: All fall risk precautions in place         Patient Diagnosis(es): There were no encounter diagnoses. has a past medical history of CAD (coronary artery disease) and DM (diabetes mellitus), type 2, uncontrolled ([facility] [address] has no past surgical history on file. Restrictions  Restrictions/Precautions  Restrictions/Precautions: Fall Risk, Contact Precautions  Position Activity Restriction  Other position/activity restrictions: contact precautions MRSA     Subjective   General  Chart Reviewed: Yes  Patient assessed for rehabilitation services?: Yes  Response to previous treatment: Patient reporting fatigue but able to participate  Family / Caregiver Present: No  Referring Practitioner: Dr. Maria De Jesus Asher  Diagnosis: Impaired mobility/ADLs 2° to hypoxic encephalopathy s/p cardiac arrest    Subjective  Subjective: That was the worse one yet (meal). Pain Assessment  Pain Level: 0  Pre Treatment Pain Screening  Pain at present: 0     Orientation  Orientation  Overall Orientation Status: Within Functional Limits     Objective      Patient engaged in B FM coordination/strengthening, B UE ROM/strengthening and cognition to increase I with ADL's , IADL's and transfers. Patient donned B 1 # wrist weights, able to reach in vertical planes with 0 difficulty with B UE to complete HiQ activity on incline. Patient able to remove HiQ circular pieces from wooden dowels on board with 0 difficulty. Patient able to place HiQ circular pieces on wooden dowels on board with 0 difficulty.    Patient with 0 problem solving activity leaving 4 pieces on board. Patient engaged in B UE ROM/strengthening, B FM coordination and cognition to increase I with ADL's, IADL's and transfers. Patient donned B 1 # wrist weights. Patient able to reach in lateral planes with 0 difficulty. Patient able to reach in forward planes with 0 difficulty. Patient able to sort small pipe pieces with 0 errors. Patient able to assemble small pipe tree replications with example picture placed at midline. Patient with 0 difficulty with vertical reaching. Patient with 0 difficulty picking up pipe pieces. Patient with 0 difficulty with in hand manipulation turning pieces into correct position for placement. Patient with 0 errors correctly replicating designs. For the 26 piece design, patient placed a total of 26 pieces with 26 being correct. Patient required 0 verbal cues to recall that the first piece of each design is already in place.       Plan   Plan  Times per week: 5-7x/week  Plan weeks: 1-1.5 weeks  Current Treatment Recommendations: Strengthening, Endurance Training, Neuromuscular Re-education, Self-Care / ADL, Balance Training, Functional Mobility Training, Safety Education & Training, ROM    Plan Comment: Continue per OT POC for updated planned d/c on 9-3-20         Goals  Patient Goals   Patient goals : \"I want to get home\"       Therapy Time   Individual Concurrent Group Co-treatment   Time In 1300         Time Out 1330         Minutes 30             Therapeutic activities: 30 minutes       Electronically signed by Jena Russo on 9/1/20 at 2:07 PM [facility]

## 2020-09-01 NOTE — PROGRESS NOTES
Physical Therapy Rehab Treatment Note  [facility]       NAME: Jemal Miller. : 1975 (37 y.o.)  MRN: [id number]  CODE STATUS: Full Code    Date of Service: 2020  Chart Reviewed: Yes  Family / Caregiver Present: No       SUBJECTIVE: Response To Previous Treatment: Patient with no complaints from previous session. Pain Screening  Patient Currently in Pain: Denies    Post Treatment Pain Screening:denies   OBJECTIVE:     Neuromuscular Education  Neuromuscular Comments: gait drills: lateral, march, forward ambulation carrying bolsters, obstacle course carrying bolsters    Transfers  Sit to Stand: Modified independent  Stand to sit: Modified independent    Ambulation  Ambulation?: Yes  Ambulation 1  Surface: outdoors  Device: No Device  Assistance: Supervision  Quality of Gait: good osito, NBOS, decreased bilateral heel strike, slight path deviations  Gait Deviations: Decreased step length;Decreased step height  Distance: 250 feet    Stairs/Curb  Stairs?: Yes  Stairs  # Steps : 12  Stairs Height: 8\"  Rails: Right ascending;Bilateral  Curbs: 6\" carrying bolsters   Device: No Device  Assistance: Modified independent ;Supervision  Comment: demonstrates good safety      Exercises  Comments: Standing sink ex's x 15 ea     ASSESSMENT/COMMENTS:  Body structures, Functions, Activity limitations: Decreased functional mobility ; Decreased balance;Decreased endurance;Decreased strength;Decreased safe awareness;Decreased coordination;Decreased posture  Assessment: patient demonstrates several small path deviations durnig gait training/ dynamic balance actvities- self corrects appropriately. Continues to c/o decreased endurance/fatigue limiting ability to increase ambulatory distance.     PLAN OF CARE/Safety: all safety precautions in place     Therapy Time:   Individual   Time In 1037   Time Out 1107   Minutes 30     Minutes:30      Gait training:15      Neuro re education:15 [facility], PTA, 09/01/20 at 11:25 AM

## 2020-09-01 NOTE — PROGRESS NOTES
Occupational Therapy  Facility/Department: [facility]  Daily Treatment Note  NAME: Quan Yanes. : 1975  MRN: [id number]    Date of Service: 2020    Discharge Recommendations:  Continue to assess pending progress       Assessment      Activity Tolerance  Activity Tolerance: Patient Tolerated treatment well  Safety Devices  Safety Devices in place: Yes  Type of devices: All fall risk precautions in place         Patient Diagnosis(es): There were no encounter diagnoses. has a past medical history of CAD (coronary artery disease) and DM (diabetes mellitus), type 2, uncontrolled ([facility] [address] has no past surgical history on file. Restrictions  Restrictions/Precautions  Restrictions/Precautions: Fall Risk, Contact Precautions  Position Activity Restriction  Other position/activity restrictions: contact precautions MRSA     Subjective   General  Chart Reviewed: Yes  Patient assessed for rehabilitation services?: Yes  Response to previous treatment: Patient reporting fatigue but able to participate  Family / Caregiver Present: No  Referring Practitioner: Dr. Edy Borja  Diagnosis: Impaired mobility/ADLs 2° to hypoxic encephalopathy s/p cardiac arrest    Subjective  Subjective: No one told me anything about it. (d/c date)    Pain Assessment  Pain Level: 0  Pre Treatment Pain Screening  Pain at present: 0     Orientation  Orientation  Overall Orientation Status: Within Functional Limits     Objective      ADL    Grooming: Modified independent     UE Bathing: Modified independent     LE Bathing: Modified independent     UE Dressing: Independent    LE Dressing: Modified independent     Toileting: Independent    Toilet Transfers  Toilet - Technique: Ambulating  Equipment Used: Standard toilet  Toilet Transfer: Independent    Shower Transfers  Shower - Transfer Type: To and From  Shower - Transfer To:  Standing  Shower - Technique: Ambulating  Shower Transfers: Modified independence  Shower Transfers Comments:

## 2020-09-01 NOTE — FLOWSHEET NOTE
Patient assessment completed earlier this shift. The patient denies the need for pain meds however complains of chest/rib pain when he has a productive cough. He occasionally produces yellow sputum. He has a PICC line that has been flushed.  The patient has remained continent of bladder as he has ambulated to the bathroom with assist.

## 2020-09-01 NOTE — PROGRESS NOTES
[facility] Therapy Rehab Treatment Note  Facility/Department: [facility]  Room: [id number]       NAME: Johnson Seals : 1975 (37 y.o.)  MRN: [id number]  CODE STATUS: Full Code    Date of Service: 2020       Restrictions:  Restrictions/Precautions: Fall Risk, Contact Precautions(droplet precautions for MRSA in sputum)       SUBJECTIVE:            Pre and post Treatment Pain Screenin/10       OBJECTIVE:               Neuromuscular Education  PNF: in room mobility assessed. Standing and ambulatory balance facilitation completed with challenges including, narrow RADHA and cross body LE movement patterns, gait in varying directions  Neuromuscular Comments: pt demonstrated intermittent lat LOB with cross body movement patterns with ability to maintain balance without assistance    Bed mobility  Bridging: Independent  Rolling to Left: Independent  Rolling to Right: Independent  Supine to Sit: Independent  Sit to Supine: Independent    Transfers  Sit to Stand: Independent(multiple trials with intermittent unsteadiness however pt able to manage balance without assistance each time)  Stand to sit:  Independent  Bed to Chair: Independent  Comment: supervision for floor transfers    Ambulation  Ambulation?: Yes  More Ambulation?: Yes  Ambulation 1  Surface: level tile;uneven;ramp;outdoors;carpet  Device: No Device  Quality of Gait: mild decreased RLE stance time  Distance: 300 feet - multiple trials with variable settings; pt reports mild SOB with 300 feet and mild LE soreness - appropriately asked for rrest between trials and activities  Comments: supervision out of doors on curb steps and walking across stones only - indep on grass and paved pathway    Stairs/Curb  Stairs?: Yes  Stairs  # Steps : 22  Stairs Height: 6\"  Rails: Right ascending  Assistance: Modified independent          Activity Tolerance  Activity Tolerance: Patient Tolerated treatment well  Activity Tolerance: pt requires intermittent rest breaks and asks for them appropriately          ASSESSMENT/COMMENTS:  Assessment: In room assessment resulted in indep performance. Nursing notified. Pt has met goals established at this time. Discussed with pt follow up PT.  Plan is for pt to perform HEP and ask for referral from MD in future if he needs further assistance with mobility tasks    PLAN OF CARE/Safety:   Safety Devices  Type of devices: (pt now indep in room)      Therapy Time:   Individual   Time In 1430   Time Out 1530   Minutes 60     Minutes:      Transfer/Bed mobility training:15      Gait trainin      Neuro re education:20        Jammie Borrego, PT, 20 at 3:46 PM

## 2020-09-01 NOTE — PROGRESS NOTES
Infectious Disease     Patient Name: Sabine Ace. Date: 9/1/2020  YOB: 1975  Medical Record Number: [id number]            · Aspiration pneumonia with MRSA colonization/infection            Review of Systems   Respiratory: Negative. Cardiovascular: Negative. Gastrointestinal: Negative. Physical Exam   Cardiovascular: Normal heart sounds. No murmur heard. Pulmonary/Chest: Effort normal. No respiratory distress. He has no wheezes. He has no rales. He exhibits no tenderness. Abdominal: Soft. Bowel sounds are normal. He exhibits no distension and no mass. There is no abdominal tenderness. There is no rebound and no guarding. Blood pressure 100/67, pulse 79, temperature 97 °F (36.1 °C), temperature source Oral, resp. rate 17, height 5' 10\" (1.778 m), weight 172 lb (78 kg), SpO2 95 %.       .   Lab Results   Component Value Date    WBC 6.5 08/31/2020    HGB 12.5 (L) 08/31/2020    HCT 37.2 (L) 08/31/2020    MCV 84.9 08/31/2020     08/31/2020     Lab Results   Component Value Date     08/31/2020    K 4.4 08/31/2020    CL 98 08/31/2020    CO2 26 08/31/2020    BUN 10 08/31/2020    CREATININE 0.67 08/31/2020    GLUCOSE 312 08/31/2020    CALCIUM 8.5 08/31/2020            PLAN:  · Aspiration pneumonia with MRSA colonization/infection  Dc vancomycin and Zosyn
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
## 2020-09-01 NOTE — PROGRESS NOTES
[address] [facility] REHABILITATION  INDEPENDENT IN ROOM NOTE  Room: Y638/D673-63  Admit Date: 2020       Date: 2020  Patient Name: Supriya Manuel. MRN: [id number]    : 1975  (37 y.o.)  Gender: male      Diagnosis: Impaired mobility dt hypoxic encephalopathy--[facility] rehab admit 2020         Discipline pre admission summary:    Nursing:  Patient orientation to the room/suite:  [x] Yes    []   No  (Safety checks to consider: Oxygen, Fire Alarm, Stove safety, Call alarm, Bed alarm,   Bed power, TV, Phone)        1. Pt physical ability to be independent in room/suite:  [x] Yes    []   No   Comment:    2. Pt demonstrates cognitive ability to be independent in room/suite:  [x] Yes    []   No   Comment:    3. Pt demonstrates emotional ability to be independent in room/suite:  [x] Yes    []   No   Comment:    4. Special Considerations/Equipment if needed: [] NA   Comment: W/C out of room for safety. Recommend independent in room/suite:  [x] Yes    []   No            Signature: Electronically signed by Muriel Osborn RN on 2020 at 2:35 PM      Occupational Therapy:  1. Pt physical ability to be independent in room/suite:  [x] Yes    []   No   Comment:     2. Pt demonstrates cognitive ability to be independent in room/suite:  [x] Yes    []   No   Comment:    3. Pt demonstrates emotional ability to be independent in room/suite:  [x] Yes    []   No   Comment:    4. Special Considerations/Equipment if needed: [] NA   Comment: w/c to be out of the room    Recommend independent in room/suite:  [x] Yes    []   No       Signature: Electronically signed by BLADE Cintron on 20 at 10:18 AM EDT          Physical Therapy:  1. Pt physical ability to be independent in room/suite:  [x] Yes    []   No   Comment:     2. Pt demonstrates cognitive ability to be independent in room/suite:  [x] Yes    []   No   Comment:    3.  Pt demonstrates emotional ability to be independent in room/suite:  [x] Yes    []   No   Comment:    4. Special Considerations/Equipment if needed: [x] NA   Comment: no w/c in room    Recommend independent in room/suite:  [x] Yes    []   No       Signature: Electronically signed by Waddell Fabry, PT on 9/1/20 at 2:57 PM EDT      Physician: The recommendations and comments have been reviewed.           Physician Signature:Dr Octavia Marcum   Electronically signed by Cherylene Starr, RN on 9/1/2020 at 3:19 PM

## 2020-09-01 NOTE — PROGRESS NOTES
Endocrinology Progress Note    Assessment and Plan:   Assessment-  1. Type 2 diabetes, poorly controlled  2. AMI, status post PCI  3. Hypoxic encephalopathy  4. MRSA aspiration pneumonia      Plan-  1. Increase Lantus 15 units twice daily  2. Start Humalog 4 units 3 times daily before meals  3. Continue low-dose sliding scale coverage  4. Discontinue alogliptin  5. Monitor glycemic control closely  6. Target glucose range 150-200  7. Patient may be discharged from endocrinology standpoint    POC Glucose:   Recent Labs     08/31/20  1632 08/31/20  2106 09/01/20  0557 09/01/20  1133 09/01/20  1611   POCGLU 186* 274* 299* 331* 114     HGBA1C:  Lab Results   Component Value Date    LABA1C 12.7 (H) 08/26/2020    LABA1C 9.1 (H) 03/02/2015     CBC:   Recent Labs     08/31/20  0545   WBC 6.5   HGB 12.5*        CMP:    Recent Labs     08/30/20  0600 08/31/20  0556   NA  --  133*   K 4.1 4.4   CL  --  98   CO2  --  26   BUN  --  10   CREATININE  --  0.67*   GLUCOSE  --  312*   CALCIUM  --  8.5   LABGLOM  --  >60.0         CC: No chief complaint on file. Subjective: Interval History: Patient is a 54-year-old type II diabetic male admitted to our rehabilitation unit for strengthening and conditioning following an acute myocardial infarction. He underwent PCI of his left circumflex, EF is 35%, developed hypoxic encephalopathy due to aspiration pneumonia with MRSA. He has been a type II diabetic for at least 10 years and with poor compliance. His hemoglobin A1c is 12.7. He states that he feels better when his blood sugars are running in the 300s. I had a long discussion with him regarding normal glucose and the fact that he has gotten used to being hyperglycemic. Told him that we would slowly decrease his blood glucose levels over a period of time and that it was imperative that he maintain an A1c of 7% or less to decrease his risk of further cardiac, vascular, neurologic, or renal events.     Review of systems: denies polyuria, polydipsia, ABD pain, flank pain, N/V/D, or diaphoresis  Medications:   Scheduled Meds:   guaiFENesin  600 mg Oral BID    cinnamon oil  1 drop Oral 4x Daily    lactobacillus acidophilus  2 tablet Oral TID    sodium chloride flush  10 mL Intravenous 2 times per day    carvedilol  6.25 mg Oral BID    chlorhexidine   Topical QPM    Povidone-Iodine   Topical TID    Vitamin D  2,000 Units Oral Dinner    coenzyme Q10  100 mg Oral 2 times per day    cyanocobalamin  1,000 mcg Intramuscular Weekly    magic (miracle) mouthwash  5 mL Swish & Spit TID    insulin glargine  12 Units Subcutaneous BID    insulin lispro  0-12 Units Subcutaneous TID WC    insulin lispro  0-6 Units Subcutaneous Nightly    alogliptin  25 mg Oral Daily    aspirin  81 mg Oral Daily    atorvastatin  80 mg Oral Nightly    pantoprazole  40 mg Oral QAM AC    ticagrelor  90 mg Oral BID     Continuous Infusions:   dextrose         Objective:   Vitals: /67   Pulse 79   Temp 97 °F (36.1 °C) (Oral)   Resp 17   Ht 5' 10\" (1.778 m)   Wt 172 lb (78 kg)   SpO2 95%   BMI 24.68 kg/m²    Wt Readings from Last 3 Encounters:   08/28/20 172 lb (78 kg)   08/24/20 171 lb 1.6 oz (77.6 kg)   03/02/15 153 lb 9.6 oz (69.7 kg)        General appearance: alert, appears older than stated age, cooperative, no distress and Appears thin and undernourished  Skin: Poor oral hygiene  Neck: no lymphadenopathy  Lungs: Diffuse intermittent expiratory wheezes  Heart: regular rate and rhythm, S1, S2 normal, no murmur, click, rub or gallop  Abdomen: soft, non-tender. Bowel sounds normal. No masses,  no organomegaly.   Extremities: extremities normal, atraumatic, no cyanosis or edema    Patient Active Problem List:     DM (diabetes mellitus), type 2, uncontrolled ([address]     CAD (coronary artery disease)     Cardiac arrest ([facility] [address]     Severe hypoxic-ischemic encephalopathy     Smoker     Closed fracture of multiple ribs of left side with

## 2020-09-01 NOTE — PROGRESS NOTES
Pt was made independent in room after being evaluated by both therapies. Pt was educated about independence and that staff is still here if he needs us. Pt accepted and thanked me for this. Pt has been ambulating around the room after he completed his therapy for the day.
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
## 2020-09-01 NOTE — PROCEDURES
[address]                      [facility], [address] [facility]                          ELECTROENCEPHALOGRAM REPORT    PATIENT NAME: Ermelinda Givens                     :        1975  MED REC NO:   [id number]                            ROOM:       Q387  ACCOUNT NO:   [de-identified]                           ADMIT DATE: 2020  PROVIDER:     Alexey Kenny MD    DATE OF EE2020    MEDICATIONS:  Lantus insulin, aspirin. EEG FINDINGS:  This is a spontaneous 21-channel EEG recording for this  patient with cardiac arrest.    The background rhythm of this EEG shows low amplitude tracings with  intermittent minor bursts. The record remains though symmetrical.  In  between these, there appears to be suggestion of theta waves seen  throughout the EEG recording. The suppression patterns are very  minimal.  The record otherwise remains symmetrical.    IMPRESSION:  This is a mildly abnormal EEG recording by the virtue of  diffuse slowing consistent with diffuse cerebral dysfunction seen in  toxic metabolic or hypoxemic injuries. Clinically indicated the EEG may  be repeated in 24 hours.         Omar Andrews MD    D: 2020 11:09:12       T: 2020 11:10:55     [email or web address]  Job#: [id number]     Doc#: [id number]    CC:
cineangiography. This catheter was  removed and a pigtail catheter was placed in the left ventricle where a  bolus dose of contrast given for left ventriculography. Hemodynamic  measurements were made and pullback gradient was obtained. This  catheter was removed. The sheath left in place for later removal by  manual hemostasis. No immediate complications. FINDINGS:  1. LMT:  Left main trunk is angiographically normal.  This bifurcates  into a medium-sized left anterior descending artery and medium-sized  left circumflex coronary artery. 2.  LAD:  Left anterior descending artery has 70% proximal stenosis and  blood flow reaches the apex. 3.  LCX:  Left circumflex coronary artery has 100% occlusion in the mid  with a large territory, it is codominant and is small caliber. 4.  RCA:  Right coronary artery has 99% mid stenosis, small caliber,  diffuse disease, some collateralization from the left. 5.  Left ventriculography:  Ejection fraction 25% with inferior and  posterior akinesis. 6.  Hemodynamics:  Left ventricular end-diastolic pressure is 22 with no  gradient across the aortic valve. 7.  Dominance:  Mixed. PCI NOTE:  Successful percutaneous coronary intervention of mid left  circumflex coronary artery 100% stenosis URBANO-0 flow reduced to 0%  residual with URBANO flow after implantation of Resolute 2.0 x 15  drug-eluting stent, post-dilated with a 2.5 noncompliant balloon at high  pressure. CONCLUSIONS:  Dual antiplatelet therapy. Maximal medical therapy.    Consult Pulmonary, Trauma and hospitalist.        Christine Valladares MD    D: 09/01/2020 9:16:08       T: 09/01/2020 9:21:00     [email or web address]  Job#: [id number]     Doc#: [id number]      CC:

## 2020-09-02 LAB
GLUCOSE BLD-MCNC: 141 MG/DL (ref 60–115)
GLUCOSE BLD-MCNC: 221 MG/DL (ref 60–115)
GLUCOSE BLD-MCNC: 250 MG/DL (ref 60–115)
GLUCOSE BLD-MCNC: 286 MG/DL (ref 60–115)
PERFORMED ON: ABNORMAL

## 2020-09-02 PROCEDURE — 97530 THERAPEUTIC ACTIVITIES: CPT

## 2020-09-02 PROCEDURE — 99232 SBSQ HOSP IP/OBS MODERATE 35: CPT | Performed by: INTERNAL MEDICINE

## 2020-09-02 PROCEDURE — 97535 SELF CARE MNGMENT TRAINING: CPT

## 2020-09-02 PROCEDURE — 6370000000 HC RX 637 (ALT 250 FOR IP): Performed by: PHYSICIAN ASSISTANT

## 2020-09-02 PROCEDURE — 97112 NEUROMUSCULAR REEDUCATION: CPT

## 2020-09-02 PROCEDURE — 1180000000 HC REHAB R&B

## 2020-09-02 PROCEDURE — 6370000000 HC RX 637 (ALT 250 FOR IP): Performed by: PHYSICAL MEDICINE & REHABILITATION

## 2020-09-02 PROCEDURE — 2700000000 HC OXYGEN THERAPY PER DAY

## 2020-09-02 PROCEDURE — 97116 GAIT TRAINING THERAPY: CPT

## 2020-09-02 PROCEDURE — 97110 THERAPEUTIC EXERCISES: CPT

## 2020-09-02 PROCEDURE — 6360000002 HC RX W HCPCS: Performed by: PHYSICAL MEDICINE & REHABILITATION

## 2020-09-02 PROCEDURE — 6370000000 HC RX 637 (ALT 250 FOR IP): Performed by: INTERNAL MEDICINE

## 2020-09-02 PROCEDURE — 99232 SBSQ HOSP IP/OBS MODERATE 35: CPT | Performed by: PHYSICAL MEDICINE & REHABILITATION

## 2020-09-02 PROCEDURE — 2580000003 HC RX 258: Performed by: PHYSICAL MEDICINE & REHABILITATION

## 2020-09-02 RX ORDER — CARVEDILOL 6.25 MG/1
6.25 TABLET ORAL 2 TIMES DAILY
Qty: 60 TABLET | Refills: 3 | Status: SHIPPED | OUTPATIENT
Start: 2020-09-02 | End: 2020-12-13 | Stop reason: SDUPTHER

## 2020-09-02 RX ORDER — INSULIN GLARGINE 100 [IU]/ML
15 INJECTION, SOLUTION SUBCUTANEOUS 2 TIMES DAILY
Qty: 1 VIAL | Refills: 3 | Status: SHIPPED | OUTPATIENT
Start: 2020-09-02 | End: 2020-09-03 | Stop reason: HOSPADM

## 2020-09-02 RX ORDER — CHOLECALCIFEROL (VITAMIN D3) 50 MCG
2000 TABLET ORAL
Qty: 60 TABLET | Refills: 3 | Status: SHIPPED | OUTPATIENT
Start: 2020-09-02 | End: 2022-06-25

## 2020-09-02 RX ORDER — ATORVASTATIN CALCIUM 80 MG/1
80 TABLET, FILM COATED ORAL NIGHTLY
Qty: 30 TABLET | Refills: 3 | Status: SHIPPED | OUTPATIENT
Start: 2020-09-02 | End: 2020-12-13 | Stop reason: SDUPTHER

## 2020-09-02 RX ORDER — PANTOPRAZOLE SODIUM 40 MG/1
40 TABLET, DELAYED RELEASE ORAL
Qty: 30 TABLET | Refills: 3 | Status: SHIPPED | OUTPATIENT
Start: 2020-09-03 | End: 2020-12-13 | Stop reason: SDUPTHER

## 2020-09-02 RX ORDER — ASPIRIN 81 MG/1
81 TABLET ORAL DAILY
Qty: 30 TABLET | Refills: 3 | Status: SHIPPED | OUTPATIENT
Start: 2020-09-03 | End: 2022-06-25

## 2020-09-02 RX ADMIN — ATORVASTATIN CALCIUM 80 MG: 80 TABLET, FILM COATED ORAL at 20:51

## 2020-09-02 RX ADMIN — Medication 10 ML: at 09:08

## 2020-09-02 RX ADMIN — LACTOBACILLUS TAB 2 TABLET: TAB at 08:57

## 2020-09-02 RX ADMIN — Medication 2000 UNITS: at 17:04

## 2020-09-02 RX ADMIN — TICAGRELOR 90 MG: 90 TABLET ORAL at 20:51

## 2020-09-02 RX ADMIN — Medication 100 MG: at 08:57

## 2020-09-02 RX ADMIN — LACTOBACILLUS TAB 2 TABLET: TAB at 17:04

## 2020-09-02 RX ADMIN — LACTOBACILLUS TAB 2 TABLET: TAB at 12:41

## 2020-09-02 RX ADMIN — INSULIN GLARGINE 15 UNITS: 100 INJECTION, SOLUTION SUBCUTANEOUS at 20:55

## 2020-09-02 RX ADMIN — Medication 10 ML: at 20:55

## 2020-09-02 RX ADMIN — TICAGRELOR 90 MG: 90 TABLET ORAL at 08:57

## 2020-09-02 RX ADMIN — GUAIFENESIN 600 MG: 600 TABLET ORAL at 08:56

## 2020-09-02 RX ADMIN — CYANOCOBALAMIN 1000 MCG: 1000 INJECTION, SOLUTION INTRAMUSCULAR; SUBCUTANEOUS at 09:04

## 2020-09-02 RX ADMIN — CARVEDILOL 6.25 MG: 6.25 TABLET, FILM COATED ORAL at 20:51

## 2020-09-02 RX ADMIN — Medication 100 MG: at 12:41

## 2020-09-02 RX ADMIN — INSULIN GLARGINE 15 UNITS: 100 INJECTION, SOLUTION SUBCUTANEOUS at 09:04

## 2020-09-02 RX ADMIN — CARVEDILOL 6.25 MG: 6.25 TABLET, FILM COATED ORAL at 08:57

## 2020-09-02 RX ADMIN — ASPIRIN 81 MG: 81 TABLET, COATED ORAL at 08:57

## 2020-09-02 RX ADMIN — PANTOPRAZOLE SODIUM 40 MG: 40 TABLET, DELAYED RELEASE ORAL at 06:09

## 2020-09-02 RX ADMIN — GUAIFENESIN 600 MG: 600 TABLET ORAL at 20:51

## 2020-09-02 ASSESSMENT — ENCOUNTER SYMPTOMS
RESPIRATORY NEGATIVE: 1
GASTROINTESTINAL NEGATIVE: 1

## 2020-09-02 ASSESSMENT — PAIN SCALES - GENERAL
PAINLEVEL_OUTOF10: 0
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
## 2020-09-02 NOTE — CARE COORDINATION
Spoke with patient in regards to follow up. None needed at this time. Patient agreeable. Reviewed d/c plan with patient, stated understanding.  Electronically signed by Lucy Eagle RN on 9/2/2020 at 4:16 PM

## 2020-09-02 NOTE — PROGRESS NOTES
Agree with LPN assessment. Continue POC. Focus assessment complete.  Electronically signed by Maribell Howard RN on 9/2/20 at 4:19 PM EDT

## 2020-09-02 NOTE — FLOWSHEET NOTE
Patient assessment completed earlier this shift. The patient is alert and oriented and in good spirits. No distress noted. The patient is independent in his room.

## 2020-09-02 NOTE — PROGRESS NOTES
Physical Therapy  Facility/Department: [facility]  Rehabilitation Discharge note    NAME: Chau Damon : 1975  MRN: [id number]    Date of discharge: 9/3/20    Subjective: Pt states he is ready for discharge    Past Medical History:   Diagnosis Date    CAD (coronary artery disease) 3/2/2015    DM (diabetes mellitus), type 2, uncontrolled ([address] 2015     No past surgical history on file. Restrictions  Restrictions/Precautions  Restrictions/Precautions: Fall Risk, Contact Precautions  Position Activity Restriction  Other position/activity restrictions: contact precautions MRSA    Objective    Bed mobility  Bridging: Independent  Rolling to Left: Independent  Rolling to Right: Independent  Supine to Sit: Independent  Sit to Supine: Independent  Scooting:  independent    Transfers  Sit to Stand: Independent  Stand to sit: Independent  Bed to Chair: Independent  Car Transfer: Independent  Comment: indep for floor transfers    Ambulation  Ambulation?: Yes  More Ambulation?: Yes  Ambulation 1  Surface: level tile, uneven, carpet, ramp(elevator)  Device: No Device  Other Apparatus: (no O2)  Assistance: Independent  Quality of Gait: mild decreased RLE stance time  Gait Deviations: Decreased step length, Decreased step height  Distance: 300 feet - multiple trials based on task  Comments: no LOB out of doors on brick grass and concrete surfaces    Stairs/Curb  Stairs?: Yes  Stairs  # Steps : 22  Stairs Height: 6\"  Rails: Right ascending  Curbs: 6\"  Device: No Device  Assistance: Modified independent   Comment: demonstrates good safety - reciprocal pattern; pt declines trial without rail      Outcomes Measures:  [place] Balance Score: 52  Dynamic Gait Total Score: 21  Timed Up and Go: 10       Pt/ family education/training: Pt is indep in HEP  He has been educated in safe mobility and educated to avoid ladders and running activities.  Pt demonstrates good understanding and follow thru    Assessment: Pt demonstrates excellent gains. He is indep in all activities and has met his goals      LTG established:  Long term goal 1: pt to be indep with bed mobility  Long term goal 2: pt to be indep with bed and car transfers  Long term goal 3: indep gait with no device 50 feet - supervision for 150 feet  Long term goal 4: indep with 4 stairs - SBA for flight of stairs  Long term goal 5: 14/24 for DGI    Discharge Plan:  Pt given HEP.  No further PT recommended      Electronically signed by Brittany Fuentes PT on 9/2/2020 at 4:16 PM

## 2020-09-02 NOTE — PROGRESS NOTES
Assessment completed. A&O x3. Denies pain at the time. Triple lumen PICC in [facility] is patent with blood return. Drsg is clean, dry and intact. IV antibiotics are completed and pt being discharged tomorrow. Occasional cough with small amount if yellow sputum. Pt on RA. Independent in room. Call light in reach.  Electronically signed by Susanne Arreaga LPN on 5/6/5104 at 88:98 AM

## 2020-09-02 NOTE — PROGRESS NOTES
Occupational Therapy  Facility/Department: [facility]  Daily Treatment Note  NAME: Stewart Jones : 1975  MRN: [id number]    Date of Service: 2020    Discharge Recommendations:  Continue to assess pending progress       Assessment      Activity Tolerance  Activity Tolerance: Patient Tolerated treatment well  Safety Devices  Safety Devices in place: Yes  Type of devices: All fall risk precautions in place         Patient Diagnosis(es): There were no encounter diagnoses. has a past medical history of CAD (coronary artery disease) and DM (diabetes mellitus), type 2, uncontrolled ([facility] [address] has no past surgical history on file. Restrictions  Restrictions/Precautions  Restrictions/Precautions: Fall Risk, Contact Precautions  Position Activity Restriction  Other position/activity restrictions: contact precautions MRSA     Subjective   General  Chart Reviewed: Yes  Patient assessed for rehabilitation services?: Yes  Response to previous treatment: Patient reporting fatigue but able to participate  Family / Caregiver Present: No  Referring Practitioner: Dr. Marilyn Hess  Diagnosis: Impaired mobility/ADLs 2° to hypoxic encephalopathy s/p cardiac arrest    Subjective  Subjective: I'm ready. Pain Assessment  Pain Level: 0  Pre Treatment Pain Screening  Pain at present: 0     Orientation  Orientation  Overall Orientation Status: Within Functional Limits     Objective      ADL    Feeding: Independent    Grooming: Independent    UE Bathing: Modified independent     LE Bathing: Modified independent     UE Dressing: Independent    LE Dressing: Modified independent     Toileting: Independent     Toilet Transfers  Toilet - Technique: Ambulating  Equipment Used: Standard toilet  Toilet Transfer: Independent    Tub Transfers  Tub - Transfer Type: To and From  Tub - Transfer To: Standing  Tub - Technique: Ambulating  Tub Transfers: Independent    Shower Transfers  Shower - Transfer Type:  To and From  Shower - Transfer To: Standing  Shower - Technique: Ambulating  Shower Transfers: Modified independence  Shower Transfers Comments: grab bars; 0 AD    UE Strengthening: Patient engaged in B UE ROM/strengthening to increase I with ADL's and transfers. Patient provided written HEP. Patient able to utilize 2 # hand weight 1 X 15 repetitions in various planes. Patient required 0 verbal cues for proper technique. Patient required 0 verbal cues to keep correct count. RB's as needed. Patient engaged in B FM coordination and cognition to increase I with fasteners and small objects for ADL's and IADL's. Patient able to string small sized beads with MIN difficulty.           Plan   Plan  Times per week: 5-7x/week  Plan weeks: 1-1.5 weeks  Current Treatment Recommendations: Strengthening, Endurance Training, Neuromuscular Re-education, Self-Care / ADL, Balance Training, Functional Mobility Training, Safety Education & Training, ROM    Plan Comment: Continue per OT POC for updated planned d/c on 9-3-20         Goals  Patient Goals   Patient goals : \"I want to get home\"       Therapy Time   Individual Concurrent Group Co-treatment   Time In 930         Time Out 1030         Minutes 60             ADL trainin minutes  Therapeutic activities: 20 minutes       Electronically signed by BLADE Sarmiento on 20 at 11:58 AM EDT    BLADE Sarmiento

## 2020-09-02 NOTE — PROGRESS NOTES
Physical Therapy Rehab Treatment Note  Facility/Department: [facility]  [facility]       NAME: Yolanda Warren. : 1975 (38 y.o.)  MRN: [id number]  CODE STATUS: Full Code    Date of Service: 2020       Restrictions:  Restrictions/Precautions: Fall Risk, Contact Precautions(droplet precautions for MRSA in sputum)       SUBJECTIVE:            Pre and post Treatment Pain Screenin/10       OBJECTIVE:               Neuromuscular Education  PNF: standing and ambulatory balance facilitation. Challenges included outcomes testing, weight shift tasks with LE movement patterns in varying planes of motion and gait with varying directions, speed and paths taken  Neuromuscular Comments: 5 x sit to  16 sec; able to perform gait over 6 min of time with no LOB, no SOB and no rest required. Pt with mild unsteadiness with cross body movement tasks with pt able to control balance without assistance  Outcomes Measures:  [place] Balance Score: 52  Dynamic Gait Total Score: 21  Timed Up and Go: 10       Bed mobility  Bridging: Independent  Rolling to Left: Independent  Rolling to Right: Independent  Supine to Sit: Independent  Sit to Supine: Independent    Transfers  Sit to Stand: Independent  Stand to sit:  Independent  Bed to Chair: Independent  Car Transfer: Independent  Comment: indep for floor transfers    Ambulation  Ambulation?: Yes  More Ambulation?: Yes  Ambulation 1  Surface: level tile;uneven;carpet;ramp;outdoors  Device: No Device  Other Apparatus: (no O2)  Assistance: Independent  Distance: 300-2000 feet - multiple trials based on task  Comments: no LOB out of doors on brick grass and concrete surfaces    Stairs/Curb  Stairs?: Yes  Stairs  # Steps : 12  Stairs Height: 6\"  Rails: Right ascending  Assistance: Modified independent   Comment: demonstrates good safety - reciprocal pattern; pt declines trial without rail         Activity Tolerance  Activity Tolerance: Patient Tolerated treatment well  Activity Tolerance: intermittent rest required with pt demonstrating appropriate requests          ASSESSMENT/COMMENTS:  Assessment: Pt has made excellent gains. He is indep in performance of all mobility tasks. He has met goals at this time  PT Education: Home Exercise Program;General Safety  Patient Education: Pt demonstrates good safety follow thru and recognition of need for rest. He stated he will utilize HEP for increasing endurance but did not want specific ex for strengthening.  He understands instructinos provided    PLAN OF CARE/Safety:   Safety Devices  Type of devices: Left in chair      Therapy Time:   Individual   Time In 1030   Time Out 1130   Minutes 60     Minutes:      Transfer/Bed mobility training:10      Gait trainin      Neuro re education:25       Katrina Howard, PT, 20 at 11:45 AM

## 2020-09-02 NOTE — PROGRESS NOTES
Infectious Disease     Patient Name: Wesly Dale. Date: 9/2/2020  YOB: 1975  Medical Record Number: [id number]            · Aspiration pneumonia with MRSA colonization/infection        Review of Systems   Respiratory: Negative. Cardiovascular: Negative. Gastrointestinal: Negative. Physical Exam   Cardiovascular: Normal heart sounds. No murmur heard. Pulmonary/Chest: Effort normal. No respiratory distress. He has no wheezes. He has no rales. He exhibits no tenderness. Abdominal: Soft. Bowel sounds are normal. He exhibits no distension and no mass. There is no abdominal tenderness. There is no rebound and no guarding. Blood pressure 102/60, pulse 77, temperature 98 °F (36.7 °C), temperature source Oral, resp. rate 18, height 5' 10\" (1.778 m), weight 172 lb 8 oz (78.2 kg), SpO2 94 %.       .   Lab Results   Component Value Date    WBC 6.5 08/31/2020    HGB 12.5 (L) 08/31/2020    HCT 37.2 (L) 08/31/2020    MCV 84.9 08/31/2020     08/31/2020     Lab Results   Component Value Date     08/31/2020    K 4.4 08/31/2020    CL 98 08/31/2020    CO2 26 08/31/2020    BUN 10 08/31/2020    CREATININE 0.67 08/31/2020    GLUCOSE 312 08/31/2020    CALCIUM 8.5 08/31/2020            PLAN:  · Aspiration pneumonia with MRSA colonization/infection

## 2020-09-03 VITALS
OXYGEN SATURATION: 93 % | DIASTOLIC BLOOD PRESSURE: 73 MMHG | SYSTOLIC BLOOD PRESSURE: 113 MMHG | RESPIRATION RATE: 16 BRPM | HEART RATE: 79 BPM | BODY MASS INDEX: 24.69 KG/M2 | HEIGHT: 70 IN | WEIGHT: 172.5 LBS | TEMPERATURE: 98.3 F

## 2020-09-03 LAB
ANION GAP SERPL CALCULATED.3IONS-SCNC: 9 MEQ/L (ref 9–15)
BASOPHILS ABSOLUTE: 0.1 K/UL (ref 0–0.2)
BASOPHILS RELATIVE PERCENT: 0.8 %
BUN BLDV-MCNC: 12 MG/DL (ref 6–20)
CALCIUM SERPL-MCNC: 8.9 MG/DL (ref 8.5–9.9)
CHLORIDE BLD-SCNC: 99 MEQ/L (ref 95–107)
CO2: 27 MEQ/L (ref 20–31)
CREAT SERPL-MCNC: 0.66 MG/DL (ref 0.7–1.2)
EOSINOPHILS ABSOLUTE: 0.1 K/UL (ref 0–0.7)
EOSINOPHILS RELATIVE PERCENT: 1.4 %
GFR AFRICAN AMERICAN: >60
GFR NON-AFRICAN AMERICAN: >60
GLUCOSE BLD-MCNC: 162 MG/DL (ref 60–115)
GLUCOSE BLD-MCNC: 260 MG/DL (ref 70–99)
GLUCOSE BLD-MCNC: 306 MG/DL (ref 60–115)
GLUCOSE BLD-MCNC: 384 MG/DL (ref 60–115)
HCT VFR BLD CALC: 25.6 % (ref 42–52)
HEMOGLOBIN: 8.7 G/DL (ref 14–18)
LYMPHOCYTES ABSOLUTE: 1.2 K/UL (ref 1–4.8)
LYMPHOCYTES RELATIVE PERCENT: 18.7 %
MCH RBC QN AUTO: 28.4 PG (ref 27–31.3)
MCHC RBC AUTO-ENTMCNC: 33.8 % (ref 33–37)
MCV RBC AUTO: 84 FL (ref 80–100)
MONOCYTES ABSOLUTE: 0.6 K/UL (ref 0.2–0.8)
MONOCYTES RELATIVE PERCENT: 9.6 %
NEUTROPHILS ABSOLUTE: 4.6 K/UL (ref 1.4–6.5)
NEUTROPHILS RELATIVE PERCENT: 69.5 %
PDW BLD-RTO: 12.9 % (ref 11.5–14.5)
PERFORMED ON: ABNORMAL
PLATELET # BLD: 368 K/UL (ref 130–400)
PLATELET SLIDE REVIEW: NORMAL
POTASSIUM SERPL-SCNC: 4.4 MEQ/L (ref 3.4–4.9)
RBC # BLD: 3.05 M/UL (ref 4.7–6.1)
SODIUM BLD-SCNC: 135 MEQ/L (ref 135–144)
WBC # BLD: 6.7 K/UL (ref 4.8–10.8)

## 2020-09-03 PROCEDURE — 6370000000 HC RX 637 (ALT 250 FOR IP): Performed by: INTERNAL MEDICINE

## 2020-09-03 PROCEDURE — 99239 HOSP IP/OBS DSCHRG MGMT >30: CPT | Performed by: PHYSICAL MEDICINE & REHABILITATION

## 2020-09-03 PROCEDURE — 80048 BASIC METABOLIC PNL TOTAL CA: CPT

## 2020-09-03 PROCEDURE — 99232 SBSQ HOSP IP/OBS MODERATE 35: CPT | Performed by: INTERNAL MEDICINE

## 2020-09-03 PROCEDURE — 6370000000 HC RX 637 (ALT 250 FOR IP): Performed by: PHYSICIAN ASSISTANT

## 2020-09-03 PROCEDURE — 2580000003 HC RX 258: Performed by: PHYSICAL MEDICINE & REHABILITATION

## 2020-09-03 PROCEDURE — 6370000000 HC RX 637 (ALT 250 FOR IP): Performed by: PHYSICAL MEDICINE & REHABILITATION

## 2020-09-03 PROCEDURE — 85025 COMPLETE CBC W/AUTO DIFF WBC: CPT

## 2020-09-03 RX ORDER — BLOOD SUGAR DIAGNOSTIC
1 STRIP MISCELLANEOUS DAILY
Qty: 100 EACH | Refills: 3 | Status: SHIPPED | OUTPATIENT
Start: 2020-09-03 | End: 2020-10-20 | Stop reason: SDUPTHER

## 2020-09-03 RX ADMIN — Medication 100 MG: at 09:48

## 2020-09-03 RX ADMIN — LACTOBACILLUS TAB 2 TABLET: TAB at 12:13

## 2020-09-03 RX ADMIN — Medication 10 ML: at 09:47

## 2020-09-03 RX ADMIN — GUAIFENESIN 600 MG: 600 TABLET ORAL at 09:48

## 2020-09-03 RX ADMIN — LACTOBACILLUS TAB 2 TABLET: TAB at 09:48

## 2020-09-03 RX ADMIN — ASPIRIN 81 MG: 81 TABLET, COATED ORAL at 09:48

## 2020-09-03 RX ADMIN — Medication 100 MG: at 12:14

## 2020-09-03 RX ADMIN — INSULIN GLARGINE 15 UNITS: 100 INJECTION, SOLUTION SUBCUTANEOUS at 09:54

## 2020-09-03 RX ADMIN — TICAGRELOR 90 MG: 90 TABLET ORAL at 09:48

## 2020-09-03 RX ADMIN — CARVEDILOL 6.25 MG: 6.25 TABLET, FILM COATED ORAL at 09:48

## 2020-09-03 RX ADMIN — PANTOPRAZOLE SODIUM 40 MG: 40 TABLET, DELAYED RELEASE ORAL at 06:28

## 2020-09-03 ASSESSMENT — PAIN SCALES - GENERAL: PAINLEVEL_OUTOF10: 0

## 2020-09-03 ASSESSMENT — PAIN DESCRIPTION - PAIN TYPE: TYPE: ACUTE PAIN

## 2020-09-03 ASSESSMENT — PAIN DESCRIPTION - LOCATION: LOCATION: RIB CAGE

## 2020-09-03 NOTE — DISCHARGE INSTR - DIET
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
## 2020-09-03 NOTE — PROGRESS NOTES
[facility] OCCUPATIONAL THERAPY DISCHARGE SUMMARY- REHAB     Date: 9/3/2020  Patient Name: Yolanda Warren. MRN: [id number]  Account: [de-identified]   : 1975  (38 y.o.)  Room: [place] [address]    Diagnosis:  Impaired mobility/ADLs 2° to hypoxic encephalopathy s/p cardiac arrest    Past Medical History:   Diagnosis Date    CAD (coronary artery disease) 3/2/2015    DM (diabetes mellitus), type 2, uncontrolled ([facility] [address] 2015     No past surgical history on file. Precautions:   Restrictions/Precautions: Fall Risk, Contact Precautions  Other position/activity restrictions: contact precautions MRSA     Social/Functional History:  Social/Functional History  Lives With: Spouse  Type of Home: Apartment(pt stated that he lives in a hotel but when asked further questions he was describing an apartment)  Home Layout: One level  Home Access: Level entry  Bathroom Shower/Tub: Tub/Shower unit  Home Equipment: (no equipment)  ADL Assistance: Independent  Homemaking Assistance: Independent  Homemaking Responsibilities: Yes  Ambulation Assistance: Independent  Transfer Assistance: Independent  Active : Yes  Occupation: Full time employment  Type of occupation:   Leisure & Hobbies: watch TV    Current Functional Status:  ADL  Feeding: Independent  Grooming: Independent  UE Bathing: Modified independent   LE Bathing: Modified independent   UE Dressing: Independent  LE Dressing: Modified independent   Toileting: Independent  Toilet Transfers  Toilet - Technique: Ambulating  Equipment Used: Standard toilet  Toilet Transfer: Independent  Tub Transfers  Tub - Transfer Type: To and From  Tub - Transfer To: Standing  Tub - Technique: Ambulating  Tub Transfers: Independent  Shower Transfers  Shower - Transfer From: Kehinde Grooms - Transfer Type: To and From  Shower - Transfer To:  Standing  Shower - Technique: Ambulating  Shower Transfers: Modified independence  Shower Transfers Comments: grab bars; 0 AD    Orientation Status:  Orientation  Overall Orientation Status: Within Functional Limits    Cognition Status:  Cognition  Overall Cognitive Status: WFL  Arousal/Alertness: Appropriate responses to stimuli  Following Commands: Follows multistep commands with increased time  Attention Span: Appears intact  Memory: Decreased short term memory  Safety Judgement: Good awareness of safety precautions  Problem Solving: Able to problem solve independently  Insights: Fully aware of deficits  Initiation: Does not require cues  Sequencing: Does not require cues  Cognition Comment: comp: sup exp: mod I social int: mod I prob solving: mod I memory: sup    Perception Status:  Perception  Overall Perceptual Status: WFL    Sensation Status:  Sensation  Overall Sensation Status: Impaired(Patient reports neuropathy from diabetes.)    Vision and Hearing Status:  Vision  Vision: Impaired  Vision Exceptions: Wears glasses for reading  Hearing  Hearing: Within functional limits     UE Function Status:    ROM:   LUE AROM (degrees)  LUE AROM : WFL  LUE General AROM: Grossly assessed; limited shoulder flexion to about 110°  Left Hand AROM (degrees)  Left Hand AROM: WFL  RUE AROM (degrees)  RUE AROM : WFL  RUE General AROM: Grossly assessed; limited shoulder flexion to about 110°  Right Hand AROM (degrees)  Right Hand AROM: WFL    Strength:  LUE Strength  Gross LUE Strength: WFL  L Hand General: 3+/5  LUE Strength Comment: MMT grossly assessed; LUE 3/5  RUE Strength  Gross RUE Strength: WFL  R Hand General: 3+/5  RUE Strength Comment: MMT grossly assessed; RUE 3/5    Coordination, Tone, Quality of Movement:    Tone RUE  RUE Tone: Normotonic  Tone LUE  LUE Tone: Normotonic  Coordination  Movements Are Fluid And Coordinated: Yes  Coordination and Movement description: Decreased speed, Right UE, Left UE    D/C Recommendations:    Equipment Recommendations:  OT D/C Equipment  Equipment Needed: No    OT Follow Up:  OT D/C RECOMMENDATIONS  REQUIRES OT FOLLOW UP: Yes  Type: Home OT    Home Exercise Program Provided: [x] Yes [] No  If yes, type of HEP: UE strengthening    Electronically signed by:    TOPHER Shah  9/3/2020, 8:19 AM

## 2020-09-03 NOTE — PROGRESS NOTES
Assumed care for this patient at [phone]. He has slept soundly all night> He is to be Discharged home today. Bed alarm on call light within reach. Monitoring for safety and pt needs. No pain med's given . Lab draw this am for BMP and CBC from his triple lumen PICC line  F &P +Blood rtn. Will be D/C'd today after labs are cleared .

## 2020-09-03 NOTE — FLOWSHEET NOTE
Pt awake in room and up independent with a steady gait. Accepted AM meds without problem and ate good for breakfast. States readiness for discharge home today. [address] line removed without problem. Pt tolerated without complaint. 1630 Discharge instructions reviewed with pt. Understanding verbalized. Pt education provided for insulin injections. Pt did return demonstration and verbalized understanding. Diabetes education given to pt. Pt instructed to follow up with Dr. Shakira Doyle in two weeks. 1645 Pt off unit for discharge home.
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
## 2020-09-03 NOTE — DISCHARGE INSTR - COC
Last Indicated By Review Planned Expiration Resolved Resolved By    MRSA 20 Culture, Respiratory        Sputum,suctioned MRSA 20. Electronically signed by Andrew Mendoza RN on 20 at 7:36 AM EDT             Nurse Assessment:  Last Vital Signs: /73   Pulse 79   Temp 98.3 °F (36.8 °C)   Resp 16   Ht 5' 10\" (1.778 m)   Wt 172 lb 8 oz (78.2 kg)   SpO2 93%   BMI 24.75 kg/m²     Last documented pain score (0-10 scale): Pain Level: 0  Last Weight:   Wt Readings from Last 1 Encounters:   20 172 lb 8 oz (78.2 kg)     Mental Status:  {IP PT MENTAL STATUS:}    IV Access:  { ABELINO IV ACCESS:325840766}    Nursing Mobility/ADLs:  Walking   {CHP DME DASY:844540986}  Transfer  {CHP DME QPET:739798224}  Bathing  {CHP DME VNHA:689718826}  Dressing  {CHP DME RFRB:722955225}  Toileting  {CHP DME RVIS:310497551}  Feeding  {[facility] DME IUDL:810782819}  Med Admin  {P DME TKEI:086122834}  Med Delivery   { ABELINO MED Delivery:811648870}    Wound Care Documentation and Therapy:        Elimination:  Continence:   · Bowel: {YES / U}  · Bladder: {YES / RM:63964}  Urinary Catheter: {Urinary Catheter:361806353}   Colostomy/Ileostomy/Ileal Conduit: {YES / OR:44503}       Date of Last BM: ***  No intake or output data in the 24 hours ending 20 1412  No intake/output data recorded.     Safety Concerns:     [address] Safety Concerns:185546895}    Impairments/Disabilities:      [address] Impairments/Disabilities:322145026}    Nutrition Therapy:  Current Nutrition Therapy:   [address] Diet List:754239930}    Routes of Feeding: {CHP DME Other Feedings:038041134}  Liquids: {Slp liquid thickness:44431}  Daily Fluid Restriction: {CHP DME Yes amt example:524712496}  Last Modified Barium Swallow with Video (Video Swallowing Test): {Done Not Done [facility]:385866702}    Treatments at the Time of Hospital Discharge:   Respiratory Treatments: ***  Oxygen Therapy:  {Therapy; copd oxygen:07867}  Ventilator: [facility] Vent SBXL:263823441}    Rehab Therapies: {THERAPEUTIC INTERVENTION:1551708087}  Weight Bearing Status/Restrictions: 508 [facility] CC Weight Bearin}  Other Medical Equipment (for information only, NOT a DME order):  {EQUIPMENT:272433997}  Other Treatments: ***    Patient's personal belongings (please select all that are sent with patient):  {CHP DME Belongings:521920700}    RN SIGNATURE:  {Esignature:387340491}    CASE MANAGEMENT/SOCIAL WORK SECTION    Inpatient Status Date: ***    Readmission Risk Assessment Score:  Readmission Risk              Risk of Unplanned Readmission:        17           Discharging to Facility/ Agency   · Name:   · Address:  · Phone:  · Fax:    Dialysis Facility (if applicable)   · Name:  · Address:  · Dialysis Schedule:  · Phone:  · Fax:    / signature: {Esignature:842069321}    PHYSICIAN SECTION    Prognosis: {Prognosis:1603745967}    Condition at Discharge: [address] Patient Condition:679802238}    Rehab Potential (if transferring to Rehab): {Prognosis:6823704793}    Recommended Labs or Other Treatments After Discharge: ***    Physician Certification: I certify the above information and transfer of Alejandro Lew.  is necessary for the continuing treatment of the diagnosis listed and that he requires {Admit to Appropriate Level of Care:88151} for {GREATER/LESS:936149426} 30 days.      Update Admission H&P: {CHP DME Changes in MNIXA:888760677}    PHYSICIAN SIGNATURE:  {Esignature:466460947}
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
## 2020-09-04 NOTE — PROGRESS NOTES
Carolina Ng. is a 38 y.o. male patient. Chief complaint uncontrolled diabetes  No current facility-administered medications for this encounter. Current Outpatient Medications   Medication Sig Dispense Refill    insulin 70-30 (NOVOLIN 70/30) (70-30) 100 UNIT per ML injection vial 15 units twice a day 1 vial 3    Insulin Syringe-Needle U-100 ([employer] INSULIN SYRINGE) 31G X 5/16\" 0.5 ML MISC 1 each by Does not apply route daily 100 each 3    aspirin 81 MG EC tablet Take 1 tablet by mouth daily 30 tablet 3    atorvastatin (LIPITOR) 80 MG tablet Take 1 tablet by mouth nightly 30 tablet 3    carvedilol (COREG) 6.25 MG tablet Take 1 tablet by mouth 2 times daily 60 tablet 3    ticagrelor (BRILINTA) 90 MG TABS tablet Take 1 tablet by mouth 2 times daily 60 tablet 3    Vitamin D (CHOLECALCIFEROL) 50 MCG (2000 UT) TABS tablet Take 1 tablet by mouth Daily with supper 60 tablet 3    pantoprazole (PROTONIX) 40 MG tablet Take 1 tablet by mouth every morning (before breakfast) 30 tablet 3    glucose blood VI test strips (ONE TOUCH ULTRA TEST) strip Use to test blood sugar up to 2 times daily 100 each 3    lisinopril (PRINIVIL;ZESTRIL) 2.5 MG tablet Take 1 tablet by mouth daily. 90 tablet 3     Allergies   Allergen Reactions    Metformin And Related      Principal Problem:    Impaired mobility dt hypoxic encephalopathy--[facility] rehab admit 8/26/2020  Active Problems:    DM (diabetes mellitus), type 2, uncontrolled (HCC)    CAD (coronary artery disease)    Cardiac arrest (HCC)    Severe hypoxic-ischemic encephalopathy    Smoker    Closed fracture of multiple ribs of left side with routine healing    Dysphonia    Abnormality of gait and mobility    Ischemic cardiomyopathy    Acute respiratory failure with hypoxia (HCC)    BMI 24.0-24.9, adult    S/P percutaneous transluminal angioplasty (PTA) with stent placement  Resolved Problems:    * No resolved hospital problems.  *    Blood pressure 113/73, pulse 79, temperature 98.3 °F (36.8 °C), resp. rate 16, height 5' 10\" (1.778 m), weight 172 lb 8 oz (78.2 kg), SpO2 93 %. Subjective:  Symptoms:  Stable. Diet:  Adequate intake. Activity level: Impaired due to weakness. Pain:  He reports no pain. Objective:  General Appearance:  Comfortable. Vital signs: (most recent): Blood pressure 113/73, pulse 79, temperature 98.3 °F (36.8 °C), resp. rate 16, height 5' 10\" (1.778 m), weight 172 lb 8 oz (78.2 kg), SpO2 93 %. Vital signs are normal.    HEENT: Normal HEENT exam.    Lungs:  Normal effort. Heart: Normal rate. Abdomen: Abdomen is soft. Extremities: Normal range of motion. Results for Francisca Ya (MRN [id number]) as of 9/3/2020 22:11   Ref. Range 9/3/2020 06:00 9/3/2020 11:18 9/3/2020 16:19   Sodium Latest Ref Range: 135 - 144 mEq/L 135     Potassium Latest Ref Range: 3.4 - 4.9 mEq/L 4.4     Chloride Latest Ref Range: 95 - 107 mEq/L 99     CO2 Latest Ref Range: 20 - 31 mEq/L 27     BUN Latest Ref Range: 6 - 20 mg/dL 12     Creatinine Latest Ref Range: 0.70 - 1.20 mg/dL 0.66 (L)     Anion Gap Latest Ref Range: 9 - 15 mEq/L 9     GFR Non- Latest Ref Range: >60  >60.0     GFR African American Latest Ref Range: >60  >60.0     Glucose Latest Ref Range: 70 - 99 mg/dL 260 (H)     POC Glucose Latest Ref Range: 60 - 115 mg/dl  384 (H) 162 (H)   Calcium Latest Ref Range: 8.5 - 9.9 mg/dL 8.9       Assessment:    Condition: In stable condition. Improving. (Uncontrolled type 2 diabetes  Status post cardiopulmonary arrest  Coronary artery disease  Ischemic cardiomyopathy  ). Plan:   Discharge home. (Okay to discharge patient on Novolin 70/30 15 units twice daily  Patient to follow-up in 2 to 4 weeks for management).        Dinah Castellanos MD  9/3/2020

## 2020-09-22 ENCOUNTER — OFFICE VISIT (OUTPATIENT)
Dept: PRIMARY CARE CLINIC | Age: 45
End: 2020-09-22
Payer: MEDICAID

## 2020-09-22 VITALS
BODY MASS INDEX: 22.76 KG/M2 | OXYGEN SATURATION: 88 % | RESPIRATION RATE: 14 BRPM | SYSTOLIC BLOOD PRESSURE: 118 MMHG | DIASTOLIC BLOOD PRESSURE: 69 MMHG | WEIGHT: 159 LBS | HEIGHT: 70 IN | HEART RATE: 81 BPM

## 2020-09-22 DIAGNOSIS — E78.00 PURE HYPERCHOLESTEROLEMIA: ICD-10-CM

## 2020-09-22 DIAGNOSIS — E11.65 UNCONTROLLED TYPE 2 DIABETES MELLITUS WITH HYPERGLYCEMIA (HCC): ICD-10-CM

## 2020-09-22 DIAGNOSIS — Z12.5 PROSTATE CANCER SCREENING: ICD-10-CM

## 2020-09-22 DIAGNOSIS — I10 ESSENTIAL HYPERTENSION: ICD-10-CM

## 2020-09-22 LAB
ALBUMIN SERPL-MCNC: 4 G/DL (ref 3.5–4.6)
ALP BLD-CCNC: 122 U/L (ref 35–104)
ALT SERPL-CCNC: 15 U/L (ref 0–41)
ANION GAP SERPL CALCULATED.3IONS-SCNC: 13 MEQ/L (ref 9–15)
AST SERPL-CCNC: 21 U/L (ref 0–40)
BASOPHILS ABSOLUTE: 0 K/UL (ref 0–0.2)
BASOPHILS RELATIVE PERCENT: 0.5 %
BILIRUB SERPL-MCNC: 0.5 MG/DL (ref 0.2–0.7)
BUN BLDV-MCNC: 17 MG/DL (ref 6–20)
CALCIUM SERPL-MCNC: 9.4 MG/DL (ref 8.5–9.9)
CHLORIDE BLD-SCNC: 94 MEQ/L (ref 95–107)
CHOLESTEROL, TOTAL: 113 MG/DL (ref 0–199)
CO2: 24 MEQ/L (ref 20–31)
CREAT SERPL-MCNC: 0.67 MG/DL (ref 0.7–1.2)
EOSINOPHILS ABSOLUTE: 0.1 K/UL (ref 0–0.7)
EOSINOPHILS RELATIVE PERCENT: 1.2 %
GFR AFRICAN AMERICAN: >60
GFR NON-AFRICAN AMERICAN: >60
GLOBULIN: 4.2 G/DL (ref 2.3–3.5)
GLUCOSE BLD-MCNC: 349 MG/DL (ref 70–99)
HBA1C MFR BLD: 10.6 % (ref 4.8–5.9)
HCT VFR BLD CALC: 34.9 % (ref 42–52)
HDLC SERPL-MCNC: 32 MG/DL (ref 40–59)
HEMOGLOBIN: 11.4 G/DL (ref 14–18)
LDL CHOLESTEROL CALCULATED: 68 MG/DL (ref 0–129)
LYMPHOCYTES ABSOLUTE: 1.7 K/UL (ref 1–4.8)
LYMPHOCYTES RELATIVE PERCENT: 27.1 %
MCH RBC QN AUTO: 26.4 PG (ref 27–31.3)
MCHC RBC AUTO-ENTMCNC: 32.8 % (ref 33–37)
MCV RBC AUTO: 80.6 FL (ref 80–100)
MONOCYTES ABSOLUTE: 0.4 K/UL (ref 0.2–0.8)
MONOCYTES RELATIVE PERCENT: 5.8 %
NEUTROPHILS ABSOLUTE: 4.2 K/UL (ref 1.4–6.5)
NEUTROPHILS RELATIVE PERCENT: 65.4 %
PDW BLD-RTO: 14.3 % (ref 11.5–14.5)
PLATELET # BLD: 194 K/UL (ref 130–400)
POTASSIUM SERPL-SCNC: 5 MEQ/L (ref 3.4–4.9)
PROSTATE SPECIFIC ANTIGEN: 0.39 NG/ML
RBC # BLD: 4.33 M/UL (ref 4.7–6.1)
SODIUM BLD-SCNC: 131 MEQ/L (ref 135–144)
TOTAL PROTEIN: 8.2 G/DL (ref 6.3–8)
TRIGL SERPL-MCNC: 63 MG/DL (ref 0–150)
WBC # BLD: 6.4 K/UL (ref 4.8–10.8)

## 2020-09-22 PROCEDURE — 99212 OFFICE O/P EST SF 10 MIN: CPT | Performed by: INTERNAL MEDICINE

## 2020-09-22 ASSESSMENT — PATIENT HEALTH QUESTIONNAIRE - PHQ9
SUM OF ALL RESPONSES TO PHQ QUESTIONS 1-9: 0
2. FEELING DOWN, DEPRESSED OR HOPELESS: 0
SUM OF ALL RESPONSES TO PHQ9 QUESTIONS 1 & 2: 0
1. LITTLE INTEREST OR PLEASURE IN DOING THINGS: 0
SUM OF ALL RESPONSES TO PHQ QUESTIONS 1-9: 0

## 2020-09-27 ASSESSMENT — ENCOUNTER SYMPTOMS: BLURRED VISION: 0

## 2020-09-27 NOTE — PROGRESS NOTES
Salma Dee 38 y.o. male presents today with   Chief Complaint   Patient presents with   [address] [place] [address] patient to establish care, General check up. Diabetes   He presents for his follow-up diabetic visit. He has type 2 diabetes mellitus. His disease course has been stable. Pertinent negatives for hypoglycemia include no confusion. Pertinent negatives for diabetes include no blurred vision and no chest pain. Symptoms are stable. Hypertension   This is a chronic problem. The current episode started more than 1 year ago. The problem is unchanged. The problem is controlled. Pertinent negatives include no blurred vision or chest pain. Coronary Artery Disease   Presents for follow-up visit. Pertinent negatives include no chest pain. The symptoms have been stable. Compliance with diet is variable. Past Medical History:   Diagnosis Date    CAD (coronary artery disease) 3/2/2015, 2020    stents    DM (diabetes mellitus), type 2, uncontrolled ([address] 1/29/2015    Hyperlipidemia     Smoker      Patient Active Problem List    Diagnosis Date Noted    Abnormality of gait and mobility 08/26/2020    Ischemic cardiomyopathy 08/26/2020    Acute respiratory failure with hypoxia ([address] 08/26/2020    BMI 24.0-24.9, adult 08/26/2020    S/P percutaneous transluminal angioplasty (PTA) with stent placement 08/26/2020    Smoker 08/25/2020    Closed fracture of multiple ribs of left side with routine healing 08/25/2020    Dysphonia 08/25/2020    Impaired mobility dt hypoxic encephalopathy--[facility] rehab admit 8/26/2020 08/25/2020    Severe hypoxic-ischemic encephalopathy     Cardiac arrest ([address] 08/19/2020    CAD (coronary artery disease) 03/02/2015    DM (diabetes mellitus), type 2, uncontrolled ([address] 01/29/2015     Past Surgical History:   Procedure Laterality Date    CARDIAC SURGERY       History reviewed. No pertinent family history.   Social History     Socioeconomic History    Marital status:      Spouse name: None    Number of children: None    Years of education: None    Highest education level: None   Occupational History    None   Social Needs    Financial resource strain: None    Food insecurity     Worry: None     Inability: None    Transportation needs     Medical: None     Non-medical: None   Tobacco Use    Smoking status: Current Every Day Smoker     Packs/day: 0.25     Years: 25.00     Pack years: 6.25    Smokeless tobacco: Never Used   Substance and Sexual Activity    Alcohol use: Yes     Comment: rarely    Drug use: No    Sexual activity: Yes     Partners: Female   Lifestyle    Physical activity     Days per week: None     Minutes per session: None    Stress: None   Relationships    Social connections     Talks on phone: None     Gets together: None     Attends Caodaism service: None     Active member of club or organization: None     Attends meetings of clubs or organizations: None     Relationship status:     Intimate partner violence     Fear of current or ex partner: No     Emotionally abused: No     Physically abused: No     Forced sexual activity: No   Other Topics Concern    None   Social History Narrative         Lives With: Spouse    Type of Home: Apart[address] [place] [address] 83: One level Level entry    Bathroom Shower/Tub: Tub/Shower unit    Home Equipment: (no equipment)    ADL Assistance: Independent    Homemaking Assistance: Independent    Homemaking Responsibilities: Yes    Ambulation Assistance: Independent    Transfer Assistance: Independent    Active : Yes    Occupation: Full time employment    Type of occupation:      Allergies   Allergen Reactions    Metformin And Related     Simvastatin      Leg Pains    Metformin Nausea And Vomiting       Review of Systems   Eyes: Negative for blurred vision. Cardiovascular: Negative for chest pain. Psychiatric/Behavioral: Negative for confusion. Vitals:    09/22/20 1011   BP: 118/69   Pulse: 81   Resp: 14   SpO2: (!) 88%   Weight: 159 lb (72.1 kg)   Height: 5' 10\" (1.778 m)       Physical Exam Assessment/Plan  Ashly Dorna was seen today for establish care. Diagnoses and all orders for this visit:    Uncontrolled type 2 diabetes mellitus with hyperglycemia ([facility]  -     Suhail Rojas MD, Endocrinology, [place]  -     insulin 70-30 (NOVOLIN 70/30) (70-30) 100 UNIT per ML injection vial; 15 units twice a day  -     Comprehensive Metabolic Panel; Future  -     Hemoglobin A1C; Future    Pure hypercholesterolemia  -     Lipid Panel; Future    Prostate cancer screening  -     PSA Screening; Future    Essential hypertension  -     Comprehensive Metabolic Panel; Future  -     CBC With Auto Differential; Future    Coronary artery disease involving native coronary artery of native heart without angina pectoris  -     Milly Ragland MD, Invasive Cardiology, [facility]        Return in about 2 months (around 11/22/2020), or if symptoms worsen or fail to improve.     Latesha Castillo MD

## 2020-10-01 ENCOUNTER — OFFICE VISIT (OUTPATIENT)
Dept: CARDIOLOGY CLINIC | Age: 45
End: 2020-10-01
Payer: MEDICAID

## 2020-10-01 VITALS
DIASTOLIC BLOOD PRESSURE: 86 MMHG | HEART RATE: 91 BPM | SYSTOLIC BLOOD PRESSURE: 124 MMHG | BODY MASS INDEX: 22.76 KG/M2 | WEIGHT: 159 LBS | OXYGEN SATURATION: 98 % | HEIGHT: 70 IN | RESPIRATION RATE: 22 BRPM

## 2020-10-01 PROCEDURE — 99244 OFF/OP CNSLTJ NEW/EST MOD 40: CPT | Performed by: INTERNAL MEDICINE

## 2020-10-01 ASSESSMENT — ENCOUNTER SYMPTOMS
ABDOMINAL DISTENTION: 0
FACIAL SWELLING: 0
BLOOD IN STOOL: 0
COLOR CHANGE: 0
VOICE CHANGE: 0
VOMITING: 0
NAUSEA: 0
WHEEZING: 0
CHEST TIGHTNESS: 0
DIARRHEA: 0
ANAL BLEEDING: 0
APNEA: 0
SHORTNESS OF BREATH: 0
TROUBLE SWALLOWING: 0

## 2020-10-01 NOTE — PROGRESS NOTES
[address] [facility] OFFICE CONSULT        Patient: Scarlet Espitia YOB: 1975  MRN: [id number]    Chief Complaint:  Chief Complaint   Patient presents with   [facility] Establish Cardiologist     Referred by Dr. Krysta Aguilera at [facility] PCP    Coronary Artery Disease       Subjective/HPI:   10/1/2020: Patient presents today for follow-up of recent hospitalization for acute MI. She did by cardiac arrest.  Her cardiac catheterization and stent of the circumflex. EF is 20 to 25%. Gets dizzy lightheaded. Blood pressure today in the office is 120. He is a diabetic. Used to be a . Lives in [place]. Does not drink alcohol. He gets only 1 or 2 cigarettes a day. Down from a pack a day. Was doing well until recently when he started complaining of dizziness. I would repeat an echo to see what the LV function looks like and then see me again. Continue same medications. May need to be started on Entresto. Probably have to apply for disability          Past Medical History:   Diagnosis Date    CAD (coronary artery disease) 3/2/2015, 2020    stents    DM (diabetes mellitus), type 2, uncontrolled ([facility] 1/29/2015    Hyperlipidemia     Smoker        Past Surgical History:   Procedure Laterality Date    CARDIAC SURGERY         No family history on file.     Social History     Socioeconomic History    Marital status:      Spouse name: None    Number of children: None    Years of education: None    Highest education level: None   Occupational History    None   Social Needs    Financial resource strain: None    Food insecurity     Worry: None     Inability: None    Transportation needs     Medical: None     Non-medical: None   Tobacco Use    Smoking status: Current Every Day Smoker     Packs/day: 0.25     Years: 25.00     Pack years: 6.25    Smokeless tobacco: Never Used   Substance and Sexual Activity    Alcohol use: Yes     Comment: rarely    Drug use: No    supper 60 tablet 3    pantoprazole (PROTONIX) 40 MG tablet Take 1 tablet by mouth every morning (before breakfast) 30 tablet 3    glucose blood VI test strips (ONE TOUCH ULTRA TEST) strip Use to test blood sugar up to 2 times daily 100 each 3    lisinopril (PRINIVIL;ZESTRIL) 2.5 MG tablet Take 1 tablet by mouth daily. 90 tablet 3     No current facility-administered medications for this visit. Review of Systems:   Review of Systems   Constitutional: Negative for activity change, appetite change, diaphoresis, fatigue and unexpected weight change. HENT: Negative for facial swelling, nosebleeds, trouble swallowing and voice change. Eyes: Positive for visual disturbance. Respiratory: Negative for apnea, chest tightness, shortness of breath and wheezing. Cardiovascular: Negative for chest pain, palpitations and leg swelling. Gastrointestinal: Negative for abdominal distention, anal bleeding, blood in stool, diarrhea, nausea and vomiting. Genitourinary: Negative for decreased urine volume and dysuria. Musculoskeletal: Negative for gait problem, myalgias, neck pain and neck stiffness. Skin: Negative for color change, pallor, rash and wound. Neurological: Positive for dizziness. Negative for seizures, syncope, facial asymmetry, weakness, light-headedness, numbness and headaches. Hematological: Does not bruise/bleed easily. Psychiatric/Behavioral: Negative for agitation, behavioral problems, confusion, hallucinations and suicidal ideas. The patient is not nervous/anxious. All other systems reviewed and are negative. Review of System is negative except for as mentioned above. Physical Examination:    /86 (Site: Right Upper Arm, Position: Sitting, Cuff Size: Medium Adult)   Pulse 91   Resp 22   Ht 5' 10\" (1.778 m)   Wt 159 lb (72.1 kg)   SpO2 98%   BMI 22.81 kg/m²    Physical Exam   Constitutional: He appears healthy. No distress.    HENT:   Nose: Nose normal. Mouth/Throat: Dentition is normal. Oropharynx is clear. Eyes: Pupils are equal, round, and reactive to light. Conjunctivae are normal.   Neck: Normal range of motion and thyroid normal. Neck supple. Cardiovascular: Regular rhythm, S1 normal, S2 normal, normal heart sounds, intact distal pulses and normal pulses. PMI is not displaced. No murmur heard. Pulmonary/Chest: He has no wheezes. He has no rales. He exhibits no tenderness. Abdominal: Soft. Bowel sounds are normal. He exhibits no distension and no mass. There is no splenomegaly or hepatomegaly. There is no abdominal tenderness. No hernia. Neurological: He is alert and oriented to person, place, and time. He has normal motor skills. Gait normal.   Skin: Skin is warm and dry. No cyanosis. No jaundice. Nails show no clubbing. Patient Active Problem List   Diagnosis    DM (diabetes mellitus), type 2, uncontrolled ([facility]    CAD (coronary artery disease)    Cardiac arrest ([facility]    Severe hypoxic-ischemic encephalopathy    Smoker    Closed fracture of multiple ribs of left side with routine healing    Dysphonia    Impaired mobility dt hypoxic encephalopathy--[facility] rehab admit 8/26/2020    Abnormality of gait and mobility    Ischemic cardiomyopathy    Acute respiratory failure with hypoxia (HCC)    BMI 24.0-24.9, adult    S/P percutaneous transluminal angioplasty (PTA) with stent placement         No orders of the defined types were placed in this encounter. No orders of the defined types were placed in this encounter. Assessment:    1. Ischemic cardiomyopathy    2. Post PTCA    3. Dizziness of unknown cause         Plan:   Patient to repeat Echo to discard nay abnormalities. Stay on same medications. See me in 6 weeks after testing.       This note was partially generated using Dragon voice recognition system, and there may be some incorrect words, spellings, punctuation that were not noticed in checking the note before saving.         Electronically signed by Katlyn Mustafa MD on 10/1/2020 at 11:57 AM

## 2020-10-10 ENCOUNTER — OFFICE VISIT (OUTPATIENT)
Dept: ENDOCRINOLOGY | Age: 45
End: 2020-10-10
Payer: MEDICAID

## 2020-10-10 VITALS
DIASTOLIC BLOOD PRESSURE: 52 MMHG | HEIGHT: 70 IN | SYSTOLIC BLOOD PRESSURE: 94 MMHG | WEIGHT: 159 LBS | HEART RATE: 80 BPM | BODY MASS INDEX: 22.76 KG/M2

## 2020-10-10 LAB
CHP ED QC CHECK: NORMAL
GLUCOSE BLD-MCNC: 299 MG/DL

## 2020-10-10 PROCEDURE — 99212 OFFICE O/P EST SF 10 MIN: CPT | Performed by: INTERNAL MEDICINE

## 2020-10-10 PROCEDURE — 82962 GLUCOSE BLOOD TEST: CPT | Performed by: INTERNAL MEDICINE

## 2020-10-10 NOTE — PROGRESS NOTES
Subjective:      Patient ID: Leila Wilcox. is a 38 y.o. male. Follow-up on type 2 diabetes patient on Novolin 70/30 was admitted at [facility] for cardiac arrest after having another myocardial infarction blood sugars are still high reviewed blood sugar log testing twice a day ranging between mostly 2-400 range  Patient on Novolin 70/30 15 units twice daily  Diabetes   He presents for his follow-up diabetic visit. He has type 2 diabetes mellitus. Diabetic complications include heart disease. Current diabetic treatment includes insulin injections (Novolin 70/30). He is currently taking insulin pre-breakfast and pre-dinner. His overall blood glucose range is >200 mg/dl. (Lab Results       Component                Value               Date                       LABA1C                   10.6 (H)            09/22/2020                Average blood sugars in the mid 200 range  )       Results for Marie Palomo (MRN [id number]) as of 10/10/2020 11:33   Ref. Range 3/2/2015 16:50 8/26/2020 17:29 9/22/2020 10:47   Hemoglobin A1C Latest Ref Range: 4.8 - 5.9 % 9.1 (H) 12.7 (H) 10.6 (H)       Results for Marie Palomo (MRN [id number]) as of 10/10/2020 11:33   Ref.  Range 9/3/2020 16:19 9/22/2020 10:47 10/10/2020 11:30   Sodium Latest Ref Range: 135 - 144 mEq/L  131 (L)    Potassium Latest Ref Range: 3.4 - 4.9 mEq/L  5.0 (H)    Chloride Latest Ref Range: 95 - 107 mEq/L  94 (L)    CO2 Latest Ref Range: 20 - 31 mEq/L  24    BUN Latest Ref Range: 6 - 20 mg/dL  17    Creatinine Latest Ref Range: 0.70 - 1.20 mg/dL  0.67 (L)    Anion Gap Latest Ref Range: 9 - 15 mEq/L  13    GFR Non- Latest Ref Range: >60   >60.0    GFR African American Latest Ref Range: >60   >60.0    Glucose Latest Units: mg/dL  349 (H) 299   POC Glucose Latest Ref Range: 60 - 115 mg/dl 162 (H)     Calcium Latest Ref Range: 8.5 - 9.9 mg/dL  9.4    Total Protein Latest Ref Range: 6.3 - 8.0 g/dL  8.2 (H)    Cholesterol, Total Latest Ref Range: 0 - 199 mg/dL  113    HDL Cholesterol Latest Ref Range: 40 - 59 mg/dL  32 (L)    LDL Calculated Latest Ref Range: 0 - 129 mg/dL  68    Triglycerides Latest Ref Range: 0 - 150 mg/dL  63    Albumin Latest Ref Range: 3.5 - 4.6 g/dL  4.0    Globulin Latest Ref Range: 2.3 - 3.5 g/dL  4.2 (H)    Alk Phos Latest Ref Range: 35 - 104 U/L  122 (H)    ALT Latest Ref Range: 0 - 41 U/L  15    AST Latest Ref Range: 0 - 40 U/L  21    Bilirubin Latest Ref Range: 0.2 - 0.7 mg/dL  0.5    Hemoglobin A1C Latest Ref Range: 4.8 - 5.9 %  10.6 (H)      Patient Active Problem List   Diagnosis    DM (diabetes mellitus), type 2, uncontrolled (HCC)    CAD (coronary artery disease)    Cardiac arrest (HCC)    Severe hypoxic-ischemic encephalopathy    Smoker    Closed fracture of multiple ribs of left side with routine healing    Dysphonia    Impaired mobility dt hypoxic encephalopathy--[facility] rehab admit 8/26/2020    Abnormality of gait and mobility    Ischemic cardiomyopathy    Acute respiratory failure with hypoxia (HCC)    BMI 24.0-24.9, adult    S/P percutaneous transluminal angioplasty (PTA) with stent placement     Allergies   Allergen Reactions    Metformin And Related     Simvastatin      Leg Pains    Metformin Nausea And Vomiting       Current Outpatient Medications:     insulin 70-30 (NOVOLIN 70/30) (70-30) 100 UNIT per ML injection vial, 15 units twice a day, Disp: 2 vial, Rfl: 3    Insulin Syringe-Needle U-100 ([employer] INSULIN SYRINGE) 31G X 5/16\" 0.5 ML MISC, 1 each by Does not apply route daily, Disp: 100 each, Rfl: 3    aspirin 81 MG EC tablet, Take 1 tablet by mouth daily, Disp: 30 tablet, Rfl: 3    atorvastatin (LIPITOR) 80 MG tablet, Take 1 tablet by mouth nightly, Disp: 30 tablet, Rfl: 3    carvedilol (COREG) 6.25 MG tablet, Take 1 tablet by mouth 2 times daily, Disp: 60 tablet, Rfl: 3    ticagrelor (BRILINTA) 90 MG TABS tablet, Take 1 tablet by mouth 2 times daily, Disp: 60 tablet, Rfl: 3    Vitamin D (CHOLECALCIFEROL) 50 MCG (2000 UT) TABS tablet, Take 1 tablet by mouth Daily with supper, Disp: 60 tablet, Rfl: 3    pantoprazole (PROTONIX) 40 MG tablet, Take 1 tablet by mouth every morning (before breakfast), Disp: 30 tablet, Rfl: 3    glucose blood VI test strips (ONE TOUCH ULTRA TEST) strip, Use to test blood sugar up to 2 times daily, Disp: 100 each, Rfl: 3    lisinopril (PRINIVIL;ZESTRIL) 2.5 MG tablet, Take 1 tablet by mouth daily. , Disp: 90 tablet, Rfl: 3      Review of Systems    Vitals:    10/10/20 1124   BP: (!) 94/52   Site: Left Upper Arm   Position: Sitting   Cuff Size: Large Adult   Pulse: 80   Weight: 159 lb (72.1 kg)   Height: 5' 10\" (1.778 m)       Objective:   Physical Exam  Constitutional:       Appearance: Normal appearance. He is normal weight. HENT:      Head: Normocephalic and atraumatic. Neck:      Musculoskeletal: Normal range of motion and neck supple. Cardiovascular:      Rate and Rhythm: Normal rate. Musculoskeletal: Normal range of motion. Neurological:      General: No focal deficit present. Mental Status: He is alert and oriented to person, place, and time. Psychiatric:         Mood and Affect: Mood normal.         Assessment:       Diagnosis Orders   1.  Uncontrolled type 2 diabetes mellitus with hyperglycemia (HCC)  POCT Glucose    Basic Metabolic Panel    Hemoglobin A1C    Microalbumin / Creatinine Urine Ratio           Plan:      Orders Placed This Encounter   Procedures    Basic Metabolic Panel     Standing Status:   Future     Standing Expiration Date:   10/10/2021    Hemoglobin A1C     Standing Status:   Future     Standing Expiration Date:   10/10/2021    Microalbumin / Creatinine Urine Ratio     Standing Status:   Future     Standing Expiration Date:   10/10/2021    C-Peptide     Standing Status:   Future     Standing Expiration Date:   10/10/2021    POCT Glucose     Increase Novolin 70/30 to 25 units twice a day 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

## 2020-10-20 RX ORDER — BLOOD SUGAR DIAGNOSTIC
1 STRIP MISCELLANEOUS DAILY
Qty: 100 EACH | Refills: 3 | Status: SHIPPED | OUTPATIENT
Start: 2020-10-20 | End: 2021-04-06 | Stop reason: SDUPTHER

## 2020-10-20 RX ORDER — LISINOPRIL 2.5 MG/1
2.5 TABLET ORAL DAILY
Qty: 90 TABLET | Refills: 3 | Status: SHIPPED | OUTPATIENT
Start: 2020-10-20 | End: 2021-06-18 | Stop reason: CLARIF

## 2020-10-23 ENCOUNTER — TELEPHONE (OUTPATIENT)
Dept: CARDIOLOGY CLINIC | Age: 45
End: 2020-10-23

## 2020-10-23 NOTE — TELEPHONE ENCOUNTER
WIFE ROBERTA CALLED    STATES PATIENT CANNOT AFFORD BRILINTA DUE TO COST    IS ASKING IF PLAVIX CAN BE CONSIDERED FOR PATIENT TO TAKE          WILL VERIFY WITH DR. Ana Cristina Green ON MONDAY WHEN BACK IN OFFICE

## 2020-10-26 RX ORDER — CLOPIDOGREL BISULFATE 75 MG/1
75 TABLET ORAL DAILY
Qty: 30 TABLET | Refills: 9 | Status: SHIPPED | OUTPATIENT
Start: 2020-10-26 | End: 2021-09-14

## 2020-11-03 ENCOUNTER — HOSPITAL ENCOUNTER (OUTPATIENT)
Dept: NON INVASIVE DIAGNOSTICS | Age: 45
Discharge: HOME | End: 2020-11-03

## 2020-11-03 LAB
LV EF: 60 %
LVEF MODALITY: NORMAL

## 2020-11-03 PROCEDURE — 93306 TTE W/DOPPLER COMPLETE: CPT

## 2020-11-10 ENCOUNTER — OFFICE VISIT (OUTPATIENT)
Dept: PRIMARY CARE CLINIC | Age: 45
End: 2020-11-10
Payer: MEDICAID

## 2020-11-10 VITALS
BODY MASS INDEX: 24.6 KG/M2 | OXYGEN SATURATION: 96 % | SYSTOLIC BLOOD PRESSURE: 118 MMHG | RESPIRATION RATE: 18 BRPM | HEART RATE: 78 BPM | DIASTOLIC BLOOD PRESSURE: 78 MMHG | HEIGHT: 70 IN | WEIGHT: 171.8 LBS | TEMPERATURE: 97.6 F

## 2020-11-10 PROCEDURE — 99214 OFFICE O/P EST MOD 30 MIN: CPT | Performed by: NURSE PRACTITIONER

## 2020-11-10 RX ORDER — SULFAMETHOXAZOLE AND TRIMETHOPRIM 800; 160 MG/1; MG/1
1 TABLET ORAL 2 TIMES DAILY
Qty: 14 TABLET | Refills: 0 | Status: CANCELLED | OUTPATIENT
Start: 2020-11-10 | End: 2020-11-17

## 2020-11-10 RX ORDER — SULFAMETHOXAZOLE AND TRIMETHOPRIM 800; 160 MG/1; MG/1
1 TABLET ORAL 2 TIMES DAILY
Qty: 14 TABLET | Refills: 0 | Status: SHIPPED | OUTPATIENT
Start: 2020-11-10 | End: 2020-11-17

## 2020-11-10 ASSESSMENT — ENCOUNTER SYMPTOMS
TROUBLE SWALLOWING: 0
CHEST TIGHTNESS: 0
DIARRHEA: 0
EYE ITCHING: 0
SINUS PAIN: 0
VOMITING: 0
NAUSEA: 0
SHORTNESS OF BREATH: 0
ABDOMINAL DISTENTION: 0
APNEA: 0
CONSTIPATION: 0
EYE REDNESS: 0
SORE THROAT: 0
WHEEZING: 0

## 2020-11-10 NOTE — PROGRESS NOTES
Subjective:      Patient ID: Omar Shukla is a 38 y.o. male who presents today for:  No chief complaint on file. PT WAS EXAMINED TODAY AND ADVISED TO GO TO THE ER FOR FURTHER EVAL OF HIS INFECTION FOR POSSIBLE MRSA TOE INFECTION AND HIS S/S of GANGRENOUS GREAT TOE. HPI     Pt presents today with c/o infected great toe of his rt foot after his toe is warm, red, slightly swollen and gangrenous appearing and the toenail appears lifted x over a week. Pt denies any injury/fall. Pt is an uncontrolled diabetic and the last time pt states he has checked his BS was several days ago (possibly Sunday) which was 140. Pt denies SOB, chest pain, numbness weakness to legs or arm but pt has numbness to his bilateral feet today. Pt has weakness noted with walking due to the toe being sore. Pt is a diabetic and his rt great toe appears gangrenous today and pt was advised to go to the ER for further eval as he may need IV ATB's and to see general surgery or podiatry. Pt has a HX of being an  uncontrolled diabetic and a smoker with HLD. Pt denies any home treatments.     Past Medical History:   Diagnosis Date    CAD (coronary artery disease) 3/2/2015, 2020    stents    DM (diabetes mellitus), type 2, uncontrolled ([facility] [address] 1/29/2015    Hyperlipidemia     Smoker      Past Surgical History:   Procedure Laterality Date    CARDIAC SURGERY       Social History     Socioeconomic History    Marital status:      Spouse name: Not on file    Number of children: Not on file    Years of education: Not on file    Highest education level: Not on file   Occupational History    Not on file   Social Needs    Financial resource strain: Not on file    Food insecurity     Worry: Not on file     Inability: Not on file    Transportation needs     Medical: Not on file     Non-medical: Not on file   Tobacco Use    Smoking status: Current Every Day Smoker     Packs/day: 0.25     Years: 25.00     Pack years: 6.25    Smokeless Endocrine: Negative for heat intolerance. Genitourinary: Negative for difficulty urinating. Musculoskeletal: Negative for gait problem, myalgias and neck stiffness. Skin: Positive for wound. Negative for rash. Pt's rt great toe is infected and shows s/s of gangrene noted to the end of his toe today. Pt needs to see either general surgery/podiatry as he is an uncontrolled diabetic and possible needs IV ATB's. Neurological: Negative for dizziness, tremors, seizures, facial asymmetry and headaches. Hematological: Negative for adenopathy. Psychiatric/Behavioral: Negative for confusion. All other systems reviewed and are negative. Objective:   /78 (Site: Right Upper Arm, Position: Sitting, Cuff Size: Medium Adult)   Pulse 78   Temp 97.6 °F (36.4 °C)   Resp 18   Ht 5' 10\" (1.778 m)   Wt 171 lb 12.8 oz (77.9 kg)   SpO2 96%   BMI 24.65 kg/m²     Physical Exam  Vitals signs and nursing note reviewed. Constitutional:       General: He is awake. He is not in acute distress. Appearance: Normal appearance. He is well-developed, well-groomed and normal weight. He is not ill-appearing, toxic-appearing or diaphoretic. HENT:      Head: Normocephalic and atraumatic. Eyes:      Pupils: Pupils are equal, round, and reactive to light. Neck:      Musculoskeletal: Normal range of motion. Cardiovascular:      Rate and Rhythm: Normal rate and regular rhythm. Pulses: Normal pulses. Heart sounds: No murmur. Pulmonary:      Effort: Pulmonary effort is normal. No tachypnea, bradypnea or respiratory distress. Breath sounds: Normal breath sounds. No stridor. No wheezing. Chest:      Chest wall: No tenderness. Abdominal:      General: Bowel sounds are normal. There is no distension. Palpations: Abdomen is soft. Tenderness: There is no abdominal tenderness. Musculoskeletal:         General: No signs of injury.       Right foot: Decreased range of motion (decreased ROM to the great rt toe, has little feeling of his feet). Feet:    Feet:      Right foot:      Skin integrity: Erythema, warmth and dry skin present. Comments: Pt advised to go to ER for further eval as he may need IV atb's or to see podiatry today or general surgery. Lymphadenopathy:      Cervical: No cervical adenopathy. Skin:     General: Skin is warm and dry. Capillary Refill: Capillary refill takes less than 2 seconds. Findings: Erythema present. Comments: Pt's great toe of rt foot appears really infected and s/s of gangrene. Pt's toe is warm, swelling, erythema and crusted bloody drainage with his toenail lifting up. Neurological:      General: No focal deficit present. Mental Status: He is alert and oriented to person, place, and time. Mental status is at baseline. Motor: No weakness. Gait: Gait abnormal (on exam pt is walking slow and dragging hjis rt foot as he walks today). Psychiatric:         Attention and Perception: Attention and perception normal.         Mood and Affect: Mood and affect normal.         Behavior: Behavior is cooperative. Thought Content: Thought content normal.         Judgment: Judgment normal.         Assessment:       Diagnosis Orders   1. Toe infection  sulfamethoxazole-trimethoprim (BACTRIM DS) 800-160 MG per tablet   2. Gangrenous toe ([address]           Plan:      No orders of the defined types were placed in this encounter. Orders Placed This Encounter   Medications    sulfamethoxazole-trimethoprim (BACTRIM DS) 800-160 MG per tablet     Sig: Take 1 tablet by mouth 2 times daily for 7 days     Dispense:  14 tablet     Refill:  0     Pt WAS ADVISED TODAY TO GO TO THE ER FOR FURTHER EVALUATION OF HIS TOE TODAY. Pt verbalized when he can get a ride to the ER today he will go later. Pt left the [facility] today in stable condition. Return in about 1 week (around 11/17/2020) for follow up with PCP.     Reviewed with the patient: current clinical status, medications, activities and diet. Side effects, adverse effects of the medication prescribed today, as well as treatment plan and result expectations have been discussed with the patient who expresses understanding and desires to proceed. Close follow up to evaluate treatment results and for coordination of care. I have reviewed the patient's medical history in detail and updated the computerized patient record.       Julito Aden, APRN - CNP

## 2020-12-11 NOTE — TELEPHONE ENCOUNTER
Patient is requesting medication refill.  Please approve or deny this request.    Rx requested:  Requested Prescriptions      No prescriptions requested or ordered in this encounter         Last Office Visit:   11/12/2020      Next Visit Date:  Future Appointments   Date Time Provider Rani Perla   12/12/2020 12:15 PM Kailash Lugo MD [place] [id number]

## 2020-12-13 RX ORDER — PANTOPRAZOLE SODIUM 40 MG/1
40 TABLET, DELAYED RELEASE ORAL
Qty: 30 TABLET | Refills: 6 | Status: SHIPPED | OUTPATIENT
Start: 2020-12-13 | End: 2021-09-14

## 2020-12-13 RX ORDER — CARVEDILOL 6.25 MG/1
6.25 TABLET ORAL 2 TIMES DAILY
Qty: 60 TABLET | Refills: 6 | Status: SHIPPED | OUTPATIENT
Start: 2020-12-13 | End: 2021-06-18 | Stop reason: CLARIF

## 2020-12-13 RX ORDER — ATORVASTATIN CALCIUM 80 MG/1
80 TABLET, FILM COATED ORAL NIGHTLY
Qty: 30 TABLET | Refills: 6 | Status: SHIPPED | OUTPATIENT
Start: 2020-12-13 | End: 2021-04-16 | Stop reason: DRUGHIGH

## 2020-12-29 ENCOUNTER — TELEPHONE (OUTPATIENT)
Dept: PRIMARY CARE CLINIC | Age: 45
End: 2020-12-29

## 2020-12-29 ENCOUNTER — VIRTUAL VISIT (OUTPATIENT)
Dept: PRIMARY CARE CLINIC | Age: 45
End: 2020-12-29

## 2020-12-29 PROCEDURE — 81003 URINALYSIS AUTO W/O SCOPE: CPT | Performed by: INTERNAL MEDICINE

## 2020-12-29 PROCEDURE — 99443 PR PHYS/QHP TELEPHONE EVALUATION 21-30 MIN: CPT | Performed by: INTERNAL MEDICINE

## 2020-12-29 RX ORDER — GABAPENTIN 100 MG/1
300 CAPSULE ORAL NIGHTLY
Qty: 30 CAPSULE | Refills: 3 | Status: SHIPPED | OUTPATIENT
Start: 2020-12-29 | End: 2020-12-29 | Stop reason: SDUPTHER

## 2020-12-29 RX ORDER — GABAPENTIN 300 MG/1
300 CAPSULE ORAL NIGHTLY
Qty: 30 CAPSULE | Refills: 2 | Status: SHIPPED | OUTPATIENT
Start: 2020-12-29 | End: 2021-03-04 | Stop reason: SDUPTHER

## 2020-12-29 ASSESSMENT — ENCOUNTER SYMPTOMS
SHORTNESS OF BREATH: 0
CHOKING: 0
VOICE CHANGE: 0
NAUSEA: 0
TROUBLE SWALLOWING: 0
PHOTOPHOBIA: 0
VOMITING: 0
BLURRED VISION: 0

## 2020-12-29 NOTE — PROGRESS NOTES
Phone  Trina Claudio. is a 54 y.o. male evaluated via telephone on 12/29/2020. Consent:  He and/or health care decision maker is aware that that he may receive a bill for this telephone service, depending on his insurance coverage, and has provided verbal consent to proceed: Yes     Trina Claudio 54 y.o. male presents today with No chief complaint on file. Diabetes  He presents for his follow-up diabetic visit. He has type 2 diabetes mellitus. His disease course has been fluctuating. Hypoglycemia symptoms include nervousness/anxiousness. Pertinent negatives for hypoglycemia include no tremors. Pertinent negatives for diabetes include no blurred vision, no chest pain and no fatigue. Diabetic symptom progression: low at 4 a.m. Past Medical History:   Diagnosis Date    CAD (coronary artery disease) 3/2/2015, 2020    stents    DM (diabetes mellitus), type 2, uncontrolled ([facility] [address] 1/29/2015    Hyperlipidemia     Smoker      Patient Active Problem List    Diagnosis Date Noted    Abnormality of gait and mobility 08/26/2020    Ischemic cardiomyopathy 08/26/2020    Acute respiratory failure with hypoxia ([facility] [address] 08/26/2020    BMI 24.0-24.9, adult 08/26/2020    S/P percutaneous transluminal angioplasty (PTA) with stent placement 08/26/2020    Smoker 08/25/2020    Closed fracture of multiple ribs of left side with routine healing 08/25/2020    Dysphonia 08/25/2020    Impaired mobility dt hypoxic encephalopathy--[facility] rehab admit 8/26/2020 08/25/2020    Severe hypoxic-ischemic encephalopathy     Cardiac arrest ([facility] [address] 08/19/2020    CAD (coronary artery disease) 03/02/2015    DM (diabetes mellitus), type 2, uncontrolled ([address] 01/29/2015     Past Surgical History:   Procedure Laterality Date    CARDIAC SURGERY       No family history on file.   Social History     Socioeconomic History    Marital status:      Spouse name: Not on file    Number of children: Not on file  Years of education: Not on file    Highest education level: Not on file   Occupational History    Not on file   Social Needs    Financial resource strain: Not on file    Food insecurity     Worry: Not on file     Inability: Not on file    Transportation needs     Medical: Not on file     Non-medical: Not on file   Tobacco Use    Smoking status: Current Every Day Smoker     Packs/day: 0.25     Years: 25.00     Pack years: 6.25    Smokeless tobacco: Never Used   Substance and Sexual Activity    Alcohol use: Yes     Comment: rarely    Drug use: No    Sexual activity: Yes     Partners: Female   Lifestyle    Physical activity     Days per week: Not on file     Minutes per session: Not on file    Stress: Not on file   Relationships    Social connections     Talks on phone: Not on file     Gets together: Not on file     Attends Spiritism service: Not on file     Active member of club or organization: Not on file     Attends meetings of clubs or organizations: Not on file     Relationship status:     Intimate partner violence     Fear of current or ex partner: No     Emotionally abused: No     Physically abused: No     Forced sexual activity: No   Other Topics Concern    Not on file   Social History Narrative         Lives With: Spouse    [address] of [address] [facility] [address] One level Level entry    Bathroom Shower/Tub: Tub/Shower unit    Home Equipment: (no equipment)    ADL Assistance: Independent    Homemaking Assistance: Independent    Homemaking Responsibilities: Yes    Ambulation Assistance: Independent    Transfer Assistance: Independent    Active : Yes    Occupation: Full time employment    Type of occupation:      Allergies   Allergen Reactions    Metformin And Related     Simvastatin      Leg Pains    Metformin Nausea And Vomiting       Review of Systems   Constitutional: Negative for fatigue and fever. HENT: Negative for trouble swallowing and voice change. Eyes: Negative for blurred vision, photophobia and visual disturbance. Respiratory: Negative for choking and shortness of breath. Cardiovascular: Negative for chest pain and palpitations. Gastrointestinal: Negative for nausea and vomiting. Genitourinary: Negative for decreased urine volume, testicular pain and urgency. Skin: Negative for rash. Neurological: Positive for numbness (legs). Negative for tremors and syncope. Hematological: Does not bruise/bleed easily. Psychiatric/Behavioral: Positive for sleep disturbance. Negative for suicidal ideas. The patient is nervous/anxious. There were no vitals filed for this visit. Physical Exam  Neurological:      Mental Status: He is alert. Assessment/Plan  Diagnoses and all orders for this visit:    Uncontrolled type 2 diabetes mellitus with hyperglycemia ([facility] [address]  -     Hemoglobin A1C; Future  -     Comprehensive Metabolic Panel; Future  -     POCT Urinalysis No Micro (Auto)    Other orders  -     gabapentin (NEURONTIN) 100 MG capsule; Take 3 capsules by mouth nightly for 90 days. No follow-ups on file. Controlled Substances Monitoring:        Kamran Kincaid MD    Documentation:  I communicated with the patient and/or health care decision maker about dm  . Details of this discussion including any medical advice provided: meds      I affirm this is a Patient Initiated Episode with a Patient who has not had a related appointment within my department in the past 7 days or scheduled within the next 24 hours.     Patient identification was verified at the start of the visit: Yes    Total Time: minutes: 21-30 minutes    Note: not billable if this call serves to triage the patient into an appointment for the relevant concern      Kamran Kincaid

## 2020-12-29 NOTE — TELEPHONE ENCOUNTER
Blake Vargas from [employer] calling regarding gabapentin. He needs quantity and directions clarified? His # is [phone].

## 2021-01-07 ENCOUNTER — OFFICE VISIT (OUTPATIENT)
Dept: CARDIOLOGY CLINIC | Age: 46
End: 2021-01-07

## 2021-01-07 VITALS
WEIGHT: 166 LBS | HEIGHT: 70 IN | SYSTOLIC BLOOD PRESSURE: 118 MMHG | RESPIRATION RATE: 20 BRPM | BODY MASS INDEX: 23.77 KG/M2 | DIASTOLIC BLOOD PRESSURE: 82 MMHG | OXYGEN SATURATION: 98 % | HEART RATE: 81 BPM

## 2021-01-07 DIAGNOSIS — E11.65 UNCONTROLLED TYPE 2 DIABETES MELLITUS WITH HYPERGLYCEMIA (HCC): ICD-10-CM

## 2021-01-07 DIAGNOSIS — R42 DIZZINESS OF UNKNOWN CAUSE: ICD-10-CM

## 2021-01-07 DIAGNOSIS — Z98.61 POST PTCA: ICD-10-CM

## 2021-01-07 DIAGNOSIS — I25.5 ISCHEMIC CARDIOMYOPATHY: Primary | ICD-10-CM

## 2021-01-07 LAB
ALBUMIN SERPL-MCNC: 4.4 G/DL (ref 3.5–4.6)
ALP BLD-CCNC: 110 U/L (ref 35–104)
ALT SERPL-CCNC: 11 U/L (ref 0–41)
ANION GAP SERPL CALCULATED.3IONS-SCNC: 9 MEQ/L (ref 9–15)
AST SERPL-CCNC: 15 U/L (ref 0–40)
BILIRUB SERPL-MCNC: 0.3 MG/DL (ref 0.2–0.7)
BUN BLDV-MCNC: 20 MG/DL (ref 6–20)
CALCIUM SERPL-MCNC: 9.7 MG/DL (ref 8.5–9.9)
CHLORIDE BLD-SCNC: 101 MEQ/L (ref 95–107)
CO2: 27 MEQ/L (ref 20–31)
CREAT SERPL-MCNC: 0.94 MG/DL (ref 0.7–1.2)
CREATININE URINE: 99.2 MG/DL
GFR AFRICAN AMERICAN: >60
GFR NON-AFRICAN AMERICAN: >60
GLOBULIN: 3.2 G/DL (ref 2.3–3.5)
GLUCOSE BLD-MCNC: 248 MG/DL (ref 70–99)
HBA1C MFR BLD: 9.2 % (ref 4.8–5.9)
MICROALBUMIN UR-MCNC: 2.8 MG/DL
MICROALBUMIN/CREAT UR-RTO: 28.2 MG/G (ref 0–30)
POTASSIUM SERPL-SCNC: 5.4 MEQ/L (ref 3.4–4.9)
SODIUM BLD-SCNC: 137 MEQ/L (ref 135–144)
TOTAL PROTEIN: 7.6 G/DL (ref 6.3–8)

## 2021-01-07 PROCEDURE — 99214 OFFICE O/P EST MOD 30 MIN: CPT | Performed by: INTERNAL MEDICINE

## 2021-01-07 RX ORDER — CEPHALEXIN 500 MG/1
CAPSULE ORAL
COMMUNITY
Start: 2021-01-04 | End: 2021-03-04

## 2021-01-07 ASSESSMENT — ENCOUNTER SYMPTOMS
FACIAL SWELLING: 0
APNEA: 0
TROUBLE SWALLOWING: 0
ABDOMINAL DISTENTION: 0
DIARRHEA: 0
NAUSEA: 0
SHORTNESS OF BREATH: 0
VOMITING: 0
COLOR CHANGE: 0
VOICE CHANGE: 0
BLOOD IN STOOL: 0
CHEST TIGHTNESS: 0
WHEEZING: 0
ANAL BLEEDING: 0

## 2021-01-07 NOTE — PROGRESS NOTES
[address] OFFICE FOLLOW-UP      Patient: Daniella Rosenbaum. YOB: 1975  MRN: [id number]    Chief Complaint:  Chief Complaint   Patient presents with    Results     Echo test done    Cardiomyopathy         Subjective/HPI:  1/7/21: Patient presents today for follow-up of an echocardiogram.  This showed near complete normalization of LV ejection fraction. He had acute MI 6 months ago followed by angioplasty of the circumflex. At that time EF was 25%. He does not drink alcohol. He still smokes some. Currently not working. He is diabetic. Still has occasional dizziness. Dizziness is not related to any significant arrhythmias. I was concerned about arrhythmias therefore I got the LV ejection fraction evaluation and is normal.  Is on Plavix. Could not afford the Brilinta. LV ejection fraction 60%. No chest pain. And LV diastolic function is normal.  I have advised him to cut smoking completely. See me in 3 months. Has dyslipidemia that is controlled       10/1/2020: Patient presents today for follow-up of recent hospitalization for acute MI. She did by cardiac arrest.  Her cardiac catheterization and stent of the circumflex. EF is 20 to 25%. Gets dizzy lightheaded. Blood pressure today in the office is 120. He is a diabetic. Used to be a . Lives in [place]. Does not drink alcohol. He gets only 1 or 2 cigarettes a day. Down from a pack a day. Was doing well until recently when he started complaining of dizziness. I would repeat an echo to see what the LV function looks like and then see me again. Continue same medications. May need to be started on Entresto.   Probably have to apply for disability         Past Medical History:   Diagnosis Date    CAD (coronary artery disease) 3/2/2015, 2020    stents    DM (diabetes mellitus), type 2, uncontrolled ([facility] [address] 1/29/2015    Hyperlipidemia     Smoker        Past Surgical History:   Procedure Laterality Date    CARDIAC SURGERY         No family history on file.     Social History     Socioeconomic History    Marital status:      Spouse name: None    Number of children: None    Years of education: None    Highest education level: None   Occupational History    None   Social Needs    Financial resource strain: None    Food insecurity     Worry: None     Inability: None    Transportation needs     Medical: None     Non-medical: None   Tobacco Use    Smoking status: Current Every Day Smoker     Packs/day: 0.25     Years: 25.00     Pack years: 6.25    Smokeless tobacco: Never Used   Substance and Sexual Activity    Alcohol use: Yes     Comment: rarely    Drug use: No    Sexual activity: Yes     Partners: Female   Lifestyle    Physical activity     Days per week: None     Minutes per session: None    Stress: None   Relationships    Social connections     Talks on phone: None     Gets together: None     Attends Bahai service: None     Active member of club or organization: None     Attends meetings of clubs or organizations: None     Relationship status:     Intimate partner violence     Fear of current or ex partner: No     Emotionally abused: No     Physically abused: No     Forced sexual activity: No   Other Topics Concern    None   Social History Narrative         Lives With: Spouse    Type of Home: [address]: One level Level entry    Bathroom Shower/Tub: Tub/Shower unit    Home Equipment: (no equipment)    ADL Assistance: Independent    Homemaking Assistance: Independent    Homemaking Responsibilities: Yes    Ambulation Assistance: Independent    Transfer Assistance: Independent    Active : Yes    Occupation: Full time employment    Type of occupation:        Allergies   Allergen Reactions    Metformin And Related     Simvastatin      Leg Pains    Metformin Nausea And Vomiting       Current Outpatient Medications   Medication Sig Dispense Refill    cephALEXin (KEFLEX) 500 MG capsule TAKE 1 CAPSULE BY MOUTH THREE TIMES DAILY FOR 10 DAYS      gabapentin (NEURONTIN) 300 MG capsule Take 1 capsule by mouth nightly for 90 days. 30 capsule 2    atorvastatin (LIPITOR) 80 MG tablet Take 1 tablet by mouth nightly 30 tablet 6    carvedilol (COREG) 6.25 MG tablet Take 1 tablet by mouth 2 times daily 60 tablet 6    pantoprazole (PROTONIX) 40 MG tablet Take 1 tablet by mouth every morning (before breakfast) 30 tablet 6    insulin 70-30 (NOVOLIN 70/30) (70-30) 100 UNIT per ML injection vial 25 units twice a day 2 vial 3    clopidogrel (PLAVIX) 75 MG tablet Take 1 tablet by mouth daily 30 tablet 9    lisinopril (PRINIVIL;ZESTRIL) 2.5 MG tablet Take 1 tablet by mouth daily 90 tablet 3    Insulin Syringe-Needle U-100 ([employer] INSULIN SYRINGE) 31G X 5/16\" 0.5 ML MISC 1 each by Does not apply route daily 100 each 3    aspirin 81 MG EC tablet Take 1 tablet by mouth daily 30 tablet 3    Vitamin D (CHOLECALCIFEROL) 50 MCG (2000 UT) TABS tablet Take 1 tablet by mouth Daily with supper 60 tablet 3    glucose blood VI test strips (ONE TOUCH ULTRA TEST) strip Use to test blood sugar up to 2 times daily 100 each 3     No current facility-administered medications for this visit. Review of Systems:   Review of Systems   Constitutional: Negative for activity change, appetite change, diaphoresis, fatigue and unexpected weight change. HENT: Negative for facial swelling, nosebleeds, trouble swallowing and voice change. Respiratory: Negative for apnea, chest tightness, shortness of breath and wheezing. Cardiovascular: Negative for chest pain, palpitations and leg swelling. Gastrointestinal: Negative for abdominal distention, anal bleeding, blood in stool, diarrhea, nausea and vomiting. Genitourinary: Negative for decreased urine volume and dysuria.    Musculoskeletal: Negative for gait problem, myalgias, neck pain and neck stiffness. Skin: Negative for color change, pallor, rash and wound. Neurological: Negative for dizziness, seizures, syncope, facial asymmetry, weakness, light-headedness, numbness and headaches. Hematological: Does not bruise/bleed easily. Psychiatric/Behavioral: Negative for agitation, behavioral problems, confusion, hallucinations and suicidal ideas. The patient is not nervous/anxious. All other systems reviewed and are negative. Review of System is negative except for as mentioned above. Physical Examination:    /82 (Site: Right Upper Arm, Position: Sitting, Cuff Size: Medium Adult)   Pulse 81   Resp 20   Ht 5' 10\" (1.778 m)   Wt 166 lb (75.3 kg)   SpO2 98%   BMI 23.82 kg/m²    Physical Exam   Constitutional: He appears healthy. No distress. HENT:   Nose: Nose normal.   Mouth/Throat: Dentition is normal. Oropharynx is clear. Eyes: Pupils are equal, round, and reactive to light. Conjunctivae are normal.   Neck: Normal range of motion and thyroid normal. Neck supple. Cardiovascular: Regular rhythm, S1 normal, S2 normal, normal heart sounds, intact distal pulses and normal pulses. PMI is not displaced. No murmur heard. Pulmonary/Chest: He has no wheezes. He has no rales. He exhibits no tenderness. Abdominal: Soft. Bowel sounds are normal. He exhibits no distension and no mass. There is no splenomegaly or hepatomegaly. There is no abdominal tenderness. No hernia. Neurological: He is alert and oriented to person, place, and time. He has normal motor skills. Gait normal.   Skin: Skin is warm and dry. No cyanosis. No jaundice. Nails show no clubbing.            Patient Active Problem List   Diagnosis    DM (diabetes mellitus), type 2, uncontrolled ([address]    CAD (coronary artery disease)    [facility] [facility]    Severe hypoxic-ischemic encephalopathy    Smoker    Closed fracture of multiple ribs of left side with routine healing    Dysphonia    Impaired mobility dt hypoxic encephalopathy--[facility] rehab admit 8/26/2020    Abnormality of gait and mobility    Ischemic cardiomyopathy    Acute respiratory failure with hypoxia (H[facility])    BMI 24.0-24.9, adult    S/P percutaneous transluminal angioplasty (PTA) with stent placement           No orders of the defined types were placed in this encounter. No orders of the defined types were placed in this encounter. Assessment/Orders:       ICD-10-CM    1. Ischemic cardiomyopathy  I25.5    2. Post PTCA  Z98.61    3. Dizziness of unknown cause  R42        No orders of the defined types were placed in this encounter. Medications Discontinued During This Encounter   Medication Reason    ticagrelor (BRILINTA) 90 MG TABS tablet Alternate therapy       No orders of the defined types were placed in this encounter. Plan:    Stay on same medications. See me in 3 months.         Electronically signed by: Everton Barrera MD  1/11/2021 10:51 AM

## 2021-01-08 LAB — C-PEPTIDE: 2.5 NG/ML (ref 1.1–4.4)

## 2021-03-04 ENCOUNTER — OFFICE VISIT (OUTPATIENT)
Dept: PRIMARY CARE CLINIC | Age: 46
End: 2021-03-04

## 2021-03-04 VITALS
HEIGHT: 70 IN | RESPIRATION RATE: 16 BRPM | OXYGEN SATURATION: 98 % | DIASTOLIC BLOOD PRESSURE: 78 MMHG | BODY MASS INDEX: 24.71 KG/M2 | HEART RATE: 80 BPM | WEIGHT: 172.6 LBS | SYSTOLIC BLOOD PRESSURE: 123 MMHG

## 2021-03-04 DIAGNOSIS — E11.65 UNCONTROLLED TYPE 2 DIABETES MELLITUS WITH HYPERGLYCEMIA (HCC): Primary | ICD-10-CM

## 2021-03-04 PROCEDURE — 99213 OFFICE O/P EST LOW 20 MIN: CPT | Performed by: INTERNAL MEDICINE

## 2021-03-04 RX ORDER — GABAPENTIN 300 MG/1
300 CAPSULE ORAL 2 TIMES DAILY
Qty: 60 CAPSULE | Refills: 2 | Status: SHIPPED | OUTPATIENT
Start: 2021-03-04 | End: 2021-08-19 | Stop reason: SDUPTHER

## 2021-03-04 ASSESSMENT — PATIENT HEALTH QUESTIONNAIRE - PHQ9
1. LITTLE INTEREST OR PLEASURE IN DOING THINGS: 0
SUM OF ALL RESPONSES TO PHQ QUESTIONS 1-9: 0
2. FEELING DOWN, DEPRESSED OR HOPELESS: 0

## 2021-03-04 NOTE — PROGRESS NOTES
Natasha Kaur 54 y.o. male presents today with   Chief Complaint   Patient presents with    3 Month Follow-Up    Diabetes       Diabetes  He presents for his follow-up diabetic visit. He has type 2 diabetes mellitus. His disease course has been fluctuating. Pertinent negatives for hypoglycemia include no tremors. Pertinent negatives for diabetes include no chest pain and no fatigue. Past Medical History:   Diagnosis Date    CAD (coronary artery disease) 3/2/2015, 2020    stents    DM (diabetes mellitus), type 2, uncontrolled ([address] 1/29/2015    Hyperlipidemia     Smoker      Patient Active Problem List    Diagnosis Date Noted    Abnormality of gait and mobility 08/26/2020    Ischemic cardiomyopathy 08/26/2020    Acute respiratory failure with hypoxia ([facility] 08/26/2020    BMI 24.0-24.9, adult 08/26/2020    S/P percutaneous transluminal angioplasty (PTA) with stent placement 08/26/2020    Smoker 08/25/2020    Closed fracture of multiple ribs of left side with routine healing 08/25/2020    Dysphonia 08/25/2020    Impaired mobility dt hypoxic encephalopathy--[facility] rehab admit 8/26/2020 08/25/2020    Severe hypoxic-ischemic encephalopathy     Cardiac arrest ([facility] 08/19/2020    CAD (coronary artery disease) 03/02/2015    DM (diabetes mellitus), type 2, uncontrolled ([facility] 01/29/2015     Past Surgical History:   Procedure Laterality Date    CARDIAC SURGERY       History reviewed. No pertinent family history.   Social History     Socioeconomic History    Marital status:      Spouse name: None    Number of children: None    Years of education: None    Highest education level: None   Occupational History    None   Social Needs    Financial resource strain: None    Food insecurity     Worry: None     Inability: None    Transportation needs     Medical: None     Non-medical: None   Tobacco Use    Smoking status: Current Every Day Smoker     Packs/day: 0.25     Years: 25.00     Pack years: 6.25    Smokeless tobacco: Never Used   Substance and Sexual Activity    Alcohol use: Yes     Comment: rarely    Drug use: No    Sexual activity: Yes     Partners: Female   Lifestyle    Physical activity     Days per week: None     Minutes per session: None    Stress: None   Relationships    Social connections     Talks on phone: None     Gets together: None     Attends Druze service: None     Active member of club or organization: None     Attends meetings of clubs or organizations: None     Relationship status:     Intimate partner violence     Fear of current or ex partner: No     Emotionally abused: No     Physically abused: No     Forced sexual activity: No   Other Topics Concern    None   Social History Narrative         Lives With: Spouse    Type of Home: [id number] in [facility] [address]: One level Level entry    Bathroom Shower/Tub: Tub/Shower unit    Home Equipment: (no equipment)    ADL Assistance: Independent    Homemaking Assistance: Independent    Homemaking Responsibilities: Yes    Ambulation Assistance: Independent    Transfer Assistance: Independent    Active : Yes    Occupation: Full time employment    Type of occupation:      Allergies   Allergen Reactions    Metformin And Related     Simvastatin      Leg Pains    Metformin Nausea And Vomiting       Review of Systems   Constitutional: Negative for fatigue and fever. HENT: Negative for trouble swallowing and voice change. Eyes: Negative for photophobia and visual disturbance. Respiratory: Negative for choking and shortness of breath. Cardiovascular: Negative for chest pain and palpitations. Gastrointestinal: Negative for nausea. Genitourinary: Negative for decreased urine volume and urgency. Skin: Negative for rash. Neurological: Negative for tremors and syncope. Hematological: Does not bruise/bleed easily.    Psychiatric/Behavioral: Negative for suicidal ideas.           Vitals:    03/04/21 1458   BP: 123/78   Site: Left Upper Arm   Position: Sitting   Cuff Size: Medium Adult   Pulse: 80   Resp: 16   SpO2: 98%   Weight: 172 lb 9.6 oz (78.3 kg)   Height: 5' 10\" (1.778 m)       Physical Exam  Constitutional:       Appearance: He is well-developed. HENT:      Head: Normocephalic and atraumatic. Eyes:      Conjunctiva/sclera: Conjunctivae normal.      Pupils: Pupils are equal, round, and reactive to light. Neck:      Musculoskeletal: Normal range of motion. Cardiovascular:      Rate and Rhythm: Normal rate and regular rhythm. Pulmonary:      Effort: Pulmonary effort is normal.   Abdominal:      General: There is no distension. Musculoskeletal: Normal range of motion. Skin:     General: Skin is dry. Coloration: Skin is not jaundiced. Neurological:      Mental Status: He is alert. Cranial Nerves: No cranial nerve deficit. Psychiatric:         Mood and Affect: Mood normal.        Assessment/Plan  Jacob Yee was seen today for 3 month follow-up and diabetes. Diagnoses and all orders for this visit:    Uncontrolled type 2 diabetes mellitus with hyperglycemia ([address]  -      DIABETES FOOT EXAM    Other orders  -     gabapentin (NEURONTIN) 300 MG capsule; Take 1 capsule by mouth 2 times daily for 90 days. No follow-ups on file.     Vinnie Salazar MD

## 2021-03-13 ENCOUNTER — OFFICE VISIT (OUTPATIENT)
Dept: ENDOCRINOLOGY | Age: 46
End: 2021-03-13

## 2021-03-13 VITALS
DIASTOLIC BLOOD PRESSURE: 78 MMHG | HEIGHT: 70 IN | SYSTOLIC BLOOD PRESSURE: 128 MMHG | WEIGHT: 174 LBS | HEART RATE: 89 BPM | OXYGEN SATURATION: 98 % | BODY MASS INDEX: 24.91 KG/M2

## 2021-03-13 DIAGNOSIS — E11.65 UNCONTROLLED TYPE 2 DIABETES MELLITUS WITH HYPERGLYCEMIA (HCC): Primary | ICD-10-CM

## 2021-03-13 LAB
CHP ED QC CHECK: NORMAL
GLUCOSE BLD-MCNC: 227 MG/DL

## 2021-03-13 PROCEDURE — 99212 OFFICE O/P EST SF 10 MIN: CPT | Performed by: INTERNAL MEDICINE

## 2021-03-13 PROCEDURE — 82962 GLUCOSE BLOOD TEST: CPT | Performed by: INTERNAL MEDICINE

## 2021-03-13 NOTE — PROGRESS NOTES
Subjective:      Patient ID: Darleen Dean is a 54 y.o. male. Follow-up on type 2 diabetes complications include coronary artery disease patient using Novolin 70/30 adjusting dose based on glucose recently had amputation of the right big toe was following up with wound care managing the wound by himself overall is healing  Diabetes  He presents for his follow-up diabetic visit. He has type 2 diabetes mellitus. Symptoms are improving. Diabetic complications include heart disease. Current diabetic treatment includes insulin injections (Novolin 70/30). He is currently taking insulin pre-breakfast and pre-dinner. His overall blood glucose range is 180-200 mg/dl. (Lab Results       Component                Value               Date                       LABA1C                   9.2 (H)             01/07/2021            )        Results for Esvin Courtney (MRN [id number]) as of 3/13/2021 11:52   Ref.  Range 9/22/2020 10:47 10/10/2020 11:30 11/3/2020 09:51 1/7/2021 09:14 3/13/2021 11:48   Sodium Latest Ref Range: 135 - 144 mEq/L 131 (L)   137    Potassium Latest Ref Range: 3.4 - 4.9 mEq/L 5.0 (H)   5.4 (H)    Chloride Latest Ref Range: 95 - 107 mEq/L 94 (L)   101    CO2 Latest Ref Range: 20 - 31 mEq/L 24   27    BUN Latest Ref Range: 6 - 20 mg/dL 17   20    Creatinine Latest Ref Range: 0.70 - 1.20 mg/dL 0.67 (L)   0.94    Anion Gap Latest Ref Range: 9 - 15 mEq/L 13   9    GFR Non- Latest Ref Range: >60  >60.0   >60.0    GFR African American Latest Ref Range: >60  >60.0   >60.0    Glucose Latest Units: mg/dL 349 (H) 299  248 (H) 227   Calcium Latest Ref Range: 8.5 - 9.9 mg/dL 9.4   9.7    Total Protein Latest Ref Range: 6.3 - 8.0 g/dL 8.2 (H)   7.6    Cholesterol, Total Latest Ref Range: 0 - 199 mg/dL 113       HDL Cholesterol Latest Ref Range: 40 - 59 mg/dL 32 (L)       LDL Calculated Latest Ref Range: 0 - 129 mg/dL 68       Triglycerides Latest Ref Range: 0 - 150 mg/dL 63       Albumin Latest Ref Glucose    insulin 70-30 (NOVOLIN 70/30) (70-30) 100 UNIT per ML injection vial    Basic Metabolic Panel    Hemoglobin A1C           Plan:      Orders Placed This Encounter   Procedures    Basic Metabolic Panel     Standing Status:   Future     Standing Expiration Date:   3/13/2022    Hemoglobin A1C     Standing Status:   Future     Standing Expiration Date:   3/13/2022    POCT Glucose     Adjust dose of Novolin 70/30 to 20 to 25 units twice daily A1c goal of 7 or lower  Orders Placed This Encounter   Medications    insulin 70-30 (NOVOLIN 70/30) (70-30) 100 UNIT per ML injection vial     Si-25  units twice a day     Dispense:  2 vial     Refill:  3             Gloria August MD

## 2021-03-14 ASSESSMENT — ENCOUNTER SYMPTOMS
PHOTOPHOBIA: 0
SHORTNESS OF BREATH: 0
TROUBLE SWALLOWING: 0
VOICE CHANGE: 0
NAUSEA: 0
CHOKING: 0

## 2021-04-07 RX ORDER — BLOOD SUGAR DIAGNOSTIC
1 STRIP MISCELLANEOUS DAILY
Qty: 100 EACH | Refills: 3 | Status: SHIPPED | OUTPATIENT
Start: 2021-04-07

## 2021-04-15 ENCOUNTER — OFFICE VISIT (OUTPATIENT)
Dept: CARDIOLOGY CLINIC | Age: 46
End: 2021-04-15

## 2021-04-15 VITALS
WEIGHT: 172 LBS | OXYGEN SATURATION: 99 % | BODY MASS INDEX: 24.62 KG/M2 | HEART RATE: 82 BPM | DIASTOLIC BLOOD PRESSURE: 80 MMHG | HEIGHT: 70 IN | SYSTOLIC BLOOD PRESSURE: 130 MMHG

## 2021-04-15 DIAGNOSIS — I25.5 ISCHEMIC CARDIOMYOPATHY: Primary | ICD-10-CM

## 2021-04-15 PROCEDURE — 99214 OFFICE O/P EST MOD 30 MIN: CPT | Performed by: INTERNAL MEDICINE

## 2021-04-15 ASSESSMENT — ENCOUNTER SYMPTOMS
APNEA: 0
COLOR CHANGE: 0
VOMITING: 0
NAUSEA: 0
DIARRHEA: 0
CHEST TIGHTNESS: 0
ABDOMINAL DISTENTION: 0
SHORTNESS OF BREATH: 0

## 2021-04-15 NOTE — PROGRESS NOTES
[facility] OFFICE FOLLOW-UP      Patient: Darlene Patricia. YOB: 1975  MRN: [id number]    Chief Complaint:  Chief Complaint   Patient presents with    3 Month Follow-Up    Cardiomyopathy         Subjective/HPI:  4/15/21: Patient presents today for follow-up of coronary artery disease. He presented with acute MI which was followed by angioplasty of the circumflex. Ejection fraction was 25%. Last echo showed near normalization of the LV ejection fraction. He gets occasionally dizzy. He is diabetic. I want him to reduce the dose of Lipitor to 40 mg daily. He is a , but he stayes [place] the city. Patient is home every night. He still smokes a little. On Plavix. All his medication should continue except to reduce the dose of Lipitor to 40. He will see me in 3 months    1/7/21: Patient presents today for follow-up of an echocardiogram.  This showed near complete normalization of LV ejection fraction. He had acute MI 6 months ago followed by angioplasty of the circumflex. At that time EF was 25%. He does not drink alcohol. He still smokes some. Currently not working. He is diabetic. Still has occasional dizziness. Dizziness is not related to any significant arrhythmias. I was concerned about arrhythmias therefore I got the LV ejection fraction evaluation and is normal.  Is on Plavix. Could not afford the Brilinta. LV ejection fraction 60%. No chest pain. And LV diastolic function is normal.  I have advised him to cut smoking completely. See me in 3 months.   Has dyslipidemia that is controlled        10/1/2020: Patient presents today for follow-up of recent hospitalization for acute MI.  She did by cardiac arrest.  Her cardiac catheterization and stent of the circumflex.  EF is 20 to 25%.  Gets dizzy lightheaded.  Blood pressure today in the office is [address] [facility] Myrna is a diabetic.  Used to be a .  Lives in [address] not drink alcohol. [facility] gets only 1 or 2 cigarettes a day.  Down from a pack a day.  Was doing well until recently when he started complaining of dizziness.  I would repeat an echo to see what the LV function looks like and then see me again.  Continue same medications.  May need to be started on Entresto.  Probably have to apply for disability           Past Medical History:   Diagnosis Date    CAD (coronary artery disease) 3/2/2015, 2020    stents    DM (diabetes mellitus), type 2, uncontrolled ([facility] 1/29/2015    Hyperlipidemia     Smoker        Past Surgical History:   Procedure Laterality Date    CARDIAC SURGERY         No family history on file.     Social History     Socioeconomic History    Marital status:      Spouse name: Not on file    Number of children: Not on file    Years of education: Not on file    Highest education level: Not on file   Occupational History    Not on file   Social Needs    Financial resource strain: Not on file    Food insecurity     Worry: Not on file     Inability: Not on file    Transportation needs     Medical: Not on file     Non-medical: Not on file   Tobacco Use    Smoking status: Current Every Day Smoker     Packs/day: 0.25     Years: 25.00     Pack years: 6.25    Smokeless tobacco: Never Used   Substance and Sexual Activity    Alcohol use: Yes     Comment: rarely    Drug use: No    Sexual activity: Yes     Partners: Female   Lifestyle    Physical activity     Days per week: Not on file     Minutes per session: Not on file    Stress: Not on file   Relationships    Social connections     Talks on phone: Not on file     Gets together: Not on file     Attends Mormonism service: Not on file     Active member of club or organization: Not on file     Attends meetings of clubs or organizations: Not on file     Relationship status:     Intimate partner violence     Fear of current or ex partner: No     Emotionally abused: No     Physically abused: No     Forced sexual activity: No   Other Topics Concern    Not on file   Social History Narrative         Lives With: Spouse    Type of Home: [id number] [facility] [address]: One level Level entry    Bathroom Shower/Tub: Tub/Shower unit    Home Equipment: (no equipment)    ADL Assistance: Independent    Homemaking Assistance: Independent    Homemaking Responsibilities: Yes    Ambulation Assistance: Independent    Transfer Assistance: Independent    Active : Yes    Occupation: Full time employment    Type of occupation:        Allergies   Allergen Reactions    Metformin And Related     Simvastatin      Leg Pains    Metformin Nausea And Vomiting       Current Outpatient Medications   Medication Sig Dispense Refill    Insulin Syringe-Needle U-100 ([address]) 31G X 5/16\" 0.5 ML MISC 1 each by Does not apply route daily 100 each 3    insulin 70-30 (NOVOLIN 70/30) (70-30) 100 UNIT per ML injection vial 20-25  units twice a day 2 vial 3    gabapentin (NEURONTIN) 300 MG capsule Take 1 capsule by mouth 2 times daily for 90 days. (Patient taking differently: Take 300 mg by mouth 2 times daily.  Every other day) 60 capsule 2    carvedilol (COREG) 6.25 MG tablet Take 1 tablet by mouth 2 times daily (Patient taking differently: Take 6.25 mg by mouth daily Every other day) 60 tablet 6    pantoprazole (PROTONIX) 40 MG tablet Take 1 tablet by mouth every morning (before breakfast) (Patient taking differently: Take 40 mg by mouth every morning (before breakfast) Every other day) 30 tablet 6    clopidogrel (PLAVIX) 75 MG tablet Take 1 tablet by mouth daily (Patient taking differently: Take 75 mg by mouth daily Every other day) 30 tablet 9    lisinopril (PRINIVIL;ZESTRIL) 2.5 MG tablet Take 1 tablet by mouth daily 90 tablet 3    aspirin 81 MG EC tablet Take 1 tablet by mouth daily (Patient taking differently: Take 81 mg by mouth daily Every other day) 30 tablet 3    Vitamin D distal pulses and normal pulses. PMI is not displaced. No murmur heard. Pulmonary/Chest: He has no wheezes. He has no rales. He exhibits no tenderness. Abdominal: Soft. Bowel sounds are normal. He exhibits no distension and no mass. There is no splenomegaly or hepatomegaly. There is no abdominal tenderness. No hernia. Neurological: He is alert and oriented to person, place, and time. He has normal motor skills. Gait normal.   Skin: Skin is warm and dry. No cyanosis. No jaundice. Nails show no clubbing. Patient Active Problem List   Diagnosis    DM (diabetes mellitus), type 2, uncontrolled ([facility]    CAD (coronary artery disease)    Cardiac arrest ([facility]    Severe hypoxic-ischemic encephalopathy    Smoker    Closed fracture of multiple ribs of left side with routine healing    Dysphonia    Impaired mobility dt hypoxic encephalopathy--[facility] rehab admit 8/26/2020    Abnormality of gait and mobility    Ischemic cardiomyopathy    Acute respiratory failure with hypoxia (HCC)    BMI 24.0-24.9, adult    S/P percutaneous transluminal angioplasty (PTA) with stent placement           No orders of the defined types were placed in this encounter. No orders of the defined types were placed in this encounter. Assessment/Orders:       ICD-10-CM    1. Ischemic cardiomyopathy  I25.5        No orders of the defined types were placed in this encounter. There are no discontinued medications. No orders of the defined types were placed in this encounter. Plan:  Decrease the lipitor from 80 mg to 40 mg. Stay on same medications.     See me in 4 months        Electronically signed by: Milton Melton MD  4/16/2021 8:17 AM

## 2021-04-16 RX ORDER — ATORVASTATIN CALCIUM 80 MG/1
40 TABLET, FILM COATED ORAL NIGHTLY
Qty: 30 TABLET | Refills: 6 | Status: SHIPPED | OUTPATIENT
Start: 2021-04-16 | End: 2021-06-18 | Stop reason: CLARIF

## 2021-05-11 DIAGNOSIS — E11.65 UNCONTROLLED TYPE 2 DIABETES MELLITUS WITH HYPERGLYCEMIA (HCC): ICD-10-CM

## 2021-05-18 DIAGNOSIS — E11.65 UNCONTROLLED TYPE 2 DIABETES MELLITUS WITH HYPERGLYCEMIA (HCC): ICD-10-CM

## 2021-06-02 DIAGNOSIS — E11.65 UNCONTROLLED TYPE 2 DIABETES MELLITUS WITH HYPERGLYCEMIA (HCC): ICD-10-CM

## 2021-06-02 LAB
ANION GAP SERPL CALCULATED.3IONS-SCNC: 9 MEQ/L (ref 9–15)
BUN BLDV-MCNC: 22 MG/DL (ref 6–20)
CALCIUM SERPL-MCNC: 9.1 MG/DL (ref 8.5–9.9)
CHLORIDE BLD-SCNC: 104 MEQ/L (ref 95–107)
CO2: 24 MEQ/L (ref 20–31)
CREAT SERPL-MCNC: 1.01 MG/DL (ref 0.7–1.2)
GFR AFRICAN AMERICAN: >60
GFR NON-AFRICAN AMERICAN: >60
GLUCOSE BLD-MCNC: 216 MG/DL (ref 70–99)
HBA1C MFR BLD: 8.6 % (ref 4.8–5.9)
POTASSIUM SERPL-SCNC: 4.7 MEQ/L (ref 3.4–4.9)
SODIUM BLD-SCNC: 137 MEQ/L (ref 135–144)

## 2021-06-12 ENCOUNTER — OFFICE VISIT (OUTPATIENT)
Dept: ENDOCRINOLOGY | Age: 46
End: 2021-06-12

## 2021-06-12 VITALS
SYSTOLIC BLOOD PRESSURE: 124 MMHG | DIASTOLIC BLOOD PRESSURE: 72 MMHG | HEART RATE: 84 BPM | WEIGHT: 177 LBS | HEIGHT: 70 IN | BODY MASS INDEX: 25.34 KG/M2

## 2021-06-12 DIAGNOSIS — E11.65 UNCONTROLLED TYPE 2 DIABETES MELLITUS WITH HYPERGLYCEMIA (HCC): Primary | ICD-10-CM

## 2021-06-12 LAB
CHP ED QC CHECK: NORMAL
GLUCOSE BLD-MCNC: 188 MG/DL

## 2021-06-12 PROCEDURE — 82962 GLUCOSE BLOOD TEST: CPT | Performed by: INTERNAL MEDICINE

## 2021-06-12 PROCEDURE — 99213 OFFICE O/P EST LOW 20 MIN: CPT | Performed by: INTERNAL MEDICINE

## 2021-06-12 PROCEDURE — 3052F HG A1C>EQUAL 8.0%<EQUAL 9.0%: CPT | Performed by: INTERNAL MEDICINE

## 2021-06-12 NOTE — PROGRESS NOTES
6/12/2021    Assessment:       Diagnosis Orders   1. Uncontrolled type 2 diabetes mellitus with hyperglycemia (HCC)  POCT Glucose    Basic Metabolic Panel    Hemoglobin A1C         PLAN:     Orders Placed This Encounter   Procedures    Basic Metabolic Panel     Standing Status:   Future     Standing Expiration Date:   6/12/2022    Hemoglobin A1C     Standing Status:   Future     Standing Expiration Date:   6/12/2022    POCT Glucose     Continue Novolin 70/30 20 to 25 units twice a day A1c goal of 7      Orders Placed This Encounter   Procedures    POCT Glucose       Subjective:     Chief Complaint   Patient presents with    Diabetes     Vitals:    06/12/21 1125   BP: 124/72   Site: Left Upper Arm   Position: Sitting   Cuff Size: Large Adult   Pulse: 84   Weight: 177 lb (80.3 kg)   Height: 5' 10\" (1.778 m)     Wt Readings from Last 3 Encounters:   06/12/21 177 lb (80.3 kg)   04/15/21 172 lb (78 kg)   03/13/21 174 lb (78.9 kg)     BP Readings from Last 3 Encounters:   06/12/21 124/72   04/15/21 130/80   03/13/21 128/78     Follow-up on type 2 diabetes patient on Novolin 70/30 twice daily blood sugars are still high although improving patient also avoiding hypoglycemia  Complications of diabetes include heart disease    Diabetes  He presents for his follow-up diabetic visit. He has type 2 diabetes mellitus. Symptoms are improving. Diabetic complications include heart disease. Current diabetic treatment includes insulin injections (Novolin 70/30). He is currently taking insulin pre-breakfast and pre-dinner. His overall blood glucose range is 180-200 mg/dl. (Lab Results       Component                Value               Date                       LABA1C                   8.6 (H)             06/02/2021            ) An ACE inhibitor/angiotensin II receptor blocker is being taken.      Past Medical History:   Diagnosis Date    CAD (coronary artery disease) 3/2/2015, 2020    stents    DM (diabetes mellitus), type 2,  Attends Club or Organization Meetings: Not on file    Marital Status:    Intimate Partner Violence: Not At Risk    Fear of Current or Ex-Partner: No    Emotionally Abused: No    Physically Abused: No    Sexually Abused: No     History reviewed. No pertinent family history.   Allergies   Allergen Reactions    Metformin And Related     Simvastatin      Leg Pains    Metformin Nausea And Vomiting       Current Outpatient Medications:     insulin 70-30 (NOVOLIN 70/30) (70-30) 100 UNIT per ML injection vial, 20-25  units twice a day, Disp: 2 vial, Rfl: 3    atorvastatin (LIPITOR) 80 MG tablet, Take 0.5 tablets by mouth nightly, Disp: 30 tablet, Rfl: 6    Insulin Syringe-Needle U-100 ([employer] INSULIN SYRINGE) 31G X 5/16\" 0.5 ML MISC, 1 each by Does not apply route daily, Disp: 100 each, Rfl: 3    carvedilol (COREG) 6.25 MG tablet, Take 1 tablet by mouth 2 times daily (Patient taking differently: Take 6.25 mg by mouth daily Every other day), Disp: 60 tablet, Rfl: 6    pantoprazole (PROTONIX) 40 MG tablet, Take 1 tablet by mouth every morning (before breakfast) (Patient taking differently: Take 40 mg by mouth every morning (before breakfast) Every other day), Disp: 30 tablet, Rfl: 6    clopidogrel (PLAVIX) 75 MG tablet, Take 1 tablet by mouth daily (Patient taking differently: Take 75 mg by mouth daily Every other day), Disp: 30 tablet, Rfl: 9    lisinopril (PRINIVIL;ZESTRIL) 2.5 MG tablet, Take 1 tablet by mouth daily, Disp: 90 tablet, Rfl: 3    aspirin 81 MG EC tablet, Take 1 tablet by mouth daily (Patient taking differently: Take 81 mg by mouth daily Every other day), Disp: 30 tablet, Rfl: 3    Vitamin D (CHOLECALCIFEROL) 50 MCG (2000 UT) TABS tablet, Take 1 tablet by mouth Daily with supper (Patient taking differently: Take 2,000 Units by mouth Daily with supper Taking every other day), Disp: 60 tablet, Rfl: 3    glucose blood VI test strips (ONE TOUCH ULTRA TEST) strip, Use to test blood sugar up to 2 times daily, Disp: 100 each, Rfl: 3    gabapentin (NEURONTIN) 300 MG capsule, Take 1 capsule by mouth 2 times daily for 90 days. (Patient taking differently: Take 300 mg by mouth 2 times daily. Every other day), Disp: 60 capsule, Rfl: 2  Lab Results   Component Value Date     06/02/2021    K 4.7 06/02/2021     06/02/2021    CO2 24 06/02/2021    BUN 22 (H) 06/02/2021    CREATININE 1.01 06/02/2021    GLUCOSE 188 06/12/2021    CALCIUM 9.1 06/02/2021    PROT 7.6 01/07/2021    LABALBU 4.4 01/07/2021    BILITOT 0.3 01/07/2021    ALKPHOS 110 (H) 01/07/2021    AST 15 01/07/2021    ALT 11 01/07/2021    LABGLOM >60.0 06/02/2021    GFRAA >60.0 06/02/2021    GLOB 3.2 01/07/2021     Lab Results   Component Value Date    WBC 6.4 09/22/2020    HGB 11.4 (L) 09/22/2020    HCT 34.9 (L) 09/22/2020    MCV 80.6 09/22/2020     09/22/2020     Lab Results   Component Value Date    LABA1C 8.6 (H) 06/02/2021    LABA1C 9.2 (H) 01/07/2021    LABA1C 10.6 (H) 09/22/2020     Lab Results   Component Value Date    HDL 32 (L) 09/22/2020    HDL 36 (L) 03/02/2015    LDLCALC 68 09/22/2020    CHOL 113 09/22/2020    CHOL 149 03/02/2015    TRIG 63 09/22/2020       Review of Systems   Cardiovascular: Negative. Endocrine: Negative. All other systems reviewed and are negative. Objective:   Physical Exam  Vitals reviewed. Constitutional:       Appearance: Normal appearance. He is normal weight. HENT:      Head: Normocephalic and atraumatic. Hair is normal.      Right Ear: External ear normal.      Left Ear: External ear normal.      Nose: Nose normal.   Eyes:      General: No scleral icterus. Right eye: No discharge. Left eye: No discharge. Extraocular Movements: Extraocular movements intact. Conjunctiva/sclera: Conjunctivae normal.   Neck:      Trachea: Trachea normal.   Cardiovascular:      Rate and Rhythm: Normal rate. Musculoskeletal:         General: Normal range of motion.

## 2021-06-18 ENCOUNTER — OFFICE VISIT (OUTPATIENT)
Dept: PRIMARY CARE CLINIC | Age: 46
End: 2021-06-18

## 2021-06-18 VITALS
WEIGHT: 176 LBS | TEMPERATURE: 97.9 F | OXYGEN SATURATION: 98 % | BODY MASS INDEX: 25.2 KG/M2 | HEIGHT: 70 IN | DIASTOLIC BLOOD PRESSURE: 74 MMHG | SYSTOLIC BLOOD PRESSURE: 134 MMHG | HEART RATE: 76 BPM

## 2021-06-18 DIAGNOSIS — Z51.81 THERAPEUTIC DRUG MONITORING: ICD-10-CM

## 2021-06-18 DIAGNOSIS — E78.00 PURE HYPERCHOLESTEROLEMIA: ICD-10-CM

## 2021-06-18 DIAGNOSIS — G57.93 NEUROPATHIC PAIN OF BOTH LEGS: Primary | ICD-10-CM

## 2021-06-18 DIAGNOSIS — I10 ESSENTIAL HYPERTENSION: ICD-10-CM

## 2021-06-18 LAB
AMPHETAMINE SCREEN, URINE: NORMAL
BARBITURATE SCREEN URINE: NORMAL
BENZODIAZEPINE SCREEN, URINE: NORMAL
CANNABINOID SCREEN URINE: NORMAL
COCAINE METABOLITE SCREEN URINE: NORMAL
Lab: NORMAL
METHADONE SCREEN, URINE: NORMAL
OPIATE SCREEN URINE: NORMAL
OXYCODONE URINE: NORMAL
PHENCYCLIDINE SCREEN URINE: NORMAL
PROPOXYPHENE SCREEN: NORMAL

## 2021-06-18 PROCEDURE — 99212 OFFICE O/P EST SF 10 MIN: CPT | Performed by: INTERNAL MEDICINE

## 2021-06-18 RX ORDER — GABAPENTIN 300 MG/1
300 CAPSULE ORAL 3 TIMES DAILY
Qty: 90 CAPSULE | Refills: 2 | Status: SHIPPED | OUTPATIENT
Start: 2021-06-18 | End: 2021-09-17 | Stop reason: SDUPTHER

## 2021-06-18 SDOH — ECONOMIC STABILITY: TRANSPORTATION INSECURITY
IN THE PAST 12 MONTHS, HAS LACK OF TRANSPORTATION KEPT YOU FROM MEETINGS, WORK, OR FROM GETTING THINGS NEEDED FOR DAILY LIVING?: NO

## 2021-06-18 SDOH — ECONOMIC STABILITY: FOOD INSECURITY: WITHIN THE PAST 12 MONTHS, YOU WORRIED THAT YOUR FOOD WOULD RUN OUT BEFORE YOU GOT MONEY TO BUY MORE.: NEVER TRUE

## 2021-06-18 SDOH — ECONOMIC STABILITY: FOOD INSECURITY: WITHIN THE PAST 12 MONTHS, THE FOOD YOU BOUGHT JUST DIDN'T LAST AND YOU DIDN'T HAVE MONEY TO GET MORE.: NEVER TRUE

## 2021-06-18 SDOH — ECONOMIC STABILITY: TRANSPORTATION INSECURITY
IN THE PAST 12 MONTHS, HAS THE LACK OF TRANSPORTATION KEPT YOU FROM MEDICAL APPOINTMENTS OR FROM GETTING MEDICATIONS?: NO

## 2021-06-18 ASSESSMENT — ENCOUNTER SYMPTOMS
PHOTOPHOBIA: 0
NAUSEA: 0
VOICE CHANGE: 0
TROUBLE SWALLOWING: 0
CHOKING: 0
VOMITING: 0
SHORTNESS OF BREATH: 0

## 2021-06-18 ASSESSMENT — SOCIAL DETERMINANTS OF HEALTH (SDOH): HOW HARD IS IT FOR YOU TO PAY FOR THE VERY BASICS LIKE FOOD, HOUSING, MEDICAL CARE, AND HEATING?: NOT HARD AT ALL

## 2021-06-18 NOTE — PROGRESS NOTES
John Camp 54 y.o. male presents today with   Chief Complaint   Patient presents with    Follow-up    Diabetes    Hyperglycemia    Hypertension    Hyperlipidemia       Hypertension  Pertinent negatives include no palpitations or shortness of breath. Hyperlipidemia  This is a chronic problem. The current episode started more than 1 year ago. The problem is controlled. Recent lipid tests were reviewed and are normal. Associated symptoms include leg pain. Pertinent negatives include no shortness of breath. Current antihyperlipidemic treatment includes statins. The current treatment provides moderate improvement of lipids. Leg Pain   The incident occurred more than 1 week ago. The incident occurred at home. Injury mechanism: dm. The pain is present in the left leg, left foot, right foot and right leg. The quality of the pain is described as aching. The pain is at a severity of 2/10. The pain is mild. Associated symptoms include numbness. Treatments tried: neurontin. The treatment provided mild relief.        Past Medical History:   Diagnosis Date    CAD (coronary artery disease) 3/2/2015, 2020    stents    DM (diabetes mellitus), type 2, uncontrolled ([address] 1/29/2015    Hyperlipidemia     Smoker      Patient Active Problem List    Diagnosis Date Noted    Abnormality of gait and mobility 08/26/2020    Ischemic cardiomyopathy 08/26/2020    Acute respiratory failure with hypoxia ([address] 08/26/2020    BMI 24.0-24.9, adult 08/26/2020    S/P percutaneous transluminal angioplasty (PTA) with stent placement 08/26/2020    Smoker 08/25/2020    Closed fracture of multiple ribs of left side with routine healing 08/25/2020    Dysphonia 08/25/2020    Impaired mobility dt hypoxic encephalopathy--[facility] rehab admit 8/26/2020 08/25/2020    Severe hypoxic-ischemic encephalopathy     Cardiac arrest ([facility] 08/19/2020    CAD (coronary artery disease) 03/02/2015    DM (diabetes mellitus), type 2, uncontrolled Clubs or Organizations: Not on file    Attends Club or Organization Meetings: Not on file    Marital Status:    Intimate Partner Violence: Not At Risk    Fear of Current or Ex-Partner: No    Emotionally Abused: No    Physically Abused: No    Sexually Abused: No     Allergies   Allergen Reactions    Metformin And Related     Simvastatin      Leg Pains    Metformin Nausea And Vomiting       Review of Systems   Constitutional: Negative for fever. HENT: Negative for trouble swallowing and voice change. Eyes: Negative for photophobia and visual disturbance. Respiratory: Negative for choking and shortness of breath. Cardiovascular: Negative for palpitations. Gastrointestinal: Negative for nausea and vomiting. Genitourinary: Negative for decreased urine volume, testicular pain and urgency. Skin: Negative for rash. Neurological: Positive for numbness. Negative for syncope. Hematological: Does not bruise/bleed easily. Psychiatric/Behavioral: Negative for suicidal ideas. Vitals:    06/18/21 0820   BP: 134/74   Pulse: 76   Temp: 97.9 °F (36.6 °C)   SpO2: 98%   Weight: 176 lb (79.8 kg)   Height: 5' 10\" (1.778 m)       Physical Exam  Constitutional:       Appearance: He is well-developed. HENT:      Head: Normocephalic and atraumatic. Eyes:      Conjunctiva/sclera: Conjunctivae normal.      Pupils: Pupils are equal, round, and reactive to light. Cardiovascular:      Rate and Rhythm: Normal rate and regular rhythm. Pulmonary:      Effort: Pulmonary effort is normal. No respiratory distress. Breath sounds: No wheezing or rales. Abdominal:      General: Bowel sounds are normal. There is no distension. Tenderness: There is no abdominal tenderness. Musculoskeletal:         General: Normal range of motion. Cervical back: Normal range of motion. Skin:     General: Skin is dry. Neurological:      Mental Status: He is alert.    Psychiatric:         Mood and Affect: Mood normal.       Assessment/Plan  Verner Fails was seen today for follow-up, diabetes, hyperglycemia, hypertension and hyperlipidemia. Diagnoses and all orders for this visit:    Neuropathic pain of both legs  -     gabapentin (NEURONTIN) 300 MG capsule; Take 1 capsule by mouth 3 times daily for 90 days. Intended supply: 30 days    Pure hypercholesterolemia    Essential hypertension    Therapeutic drug monitoring  -     Urine Drug Screen; Future      Controlled Substances Monitoring: Periodic Controlled Substance Monitoring: Possible medication side effects, risk of tolerance/dependence & alternative treatments discussed., No signs of potential drug abuse or diversion identified. , Assessed functional status. Gerardo Lefort, MD)      Return in about 3 months (around 9/18/2021), or if symptoms worsen or fail to improve.     Gerardo Lefort, MD

## 2021-07-14 DIAGNOSIS — E11.65 UNCONTROLLED TYPE 2 DIABETES MELLITUS WITH HYPERGLYCEMIA (HCC): ICD-10-CM

## 2021-08-04 NOTE — DISCHARGE INSTR - ACTIVITY
As tolerated. Hypertriglyceridemia Monitoring: I explained this is common when taking isotretinoin. If this worsens they will contact us.

## 2021-08-19 ENCOUNTER — OFFICE VISIT (OUTPATIENT)
Dept: CARDIOLOGY CLINIC | Age: 46
End: 2021-08-19

## 2021-08-19 VITALS
DIASTOLIC BLOOD PRESSURE: 88 MMHG | WEIGHT: 181 LBS | OXYGEN SATURATION: 98 % | BODY MASS INDEX: 25.91 KG/M2 | SYSTOLIC BLOOD PRESSURE: 130 MMHG | HEIGHT: 70 IN | HEART RATE: 87 BPM

## 2021-08-19 DIAGNOSIS — I25.10 CORONARY ARTERY DISEASE INVOLVING NATIVE HEART WITHOUT ANGINA PECTORIS, UNSPECIFIED VESSEL OR LESION TYPE: Primary | ICD-10-CM

## 2021-08-19 DIAGNOSIS — I25.5 ISCHEMIC CARDIOMYOPATHY: ICD-10-CM

## 2021-08-19 PROCEDURE — 99214 OFFICE O/P EST MOD 30 MIN: CPT | Performed by: INTERNAL MEDICINE

## 2021-08-19 PROCEDURE — 93000 ELECTROCARDIOGRAM COMPLETE: CPT | Performed by: INTERNAL MEDICINE

## 2021-08-19 ASSESSMENT — ENCOUNTER SYMPTOMS
COUGH: 0
BLOOD IN STOOL: 0
VOMITING: 0
NAUSEA: 0
WHEEZING: 0
DIARRHEA: 0
SHORTNESS OF BREATH: 0
STRIDOR: 0

## 2021-08-19 NOTE — PROGRESS NOTES
[facility] OFFICE FOLLOW-UP      Patient: Leila Wilcox. YOB: 1975  MRN: [id number]    Chief Complaint:  Chief Complaint   Patient presents with    Follow-up    Cardiomyopathy         Subjective/HPI:  8/19/21: Patient presents today for follow-up of cardiac disease with angioplasty stent. He is diabetic. Does not want to take statin because he said he got so dizzy. Last evaluation of echo, EF was normal.  He had significantly diminished LV ejection fraction with acute MI. He has severe hypoxic encephalopathy which has resolved. He still smokes. 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 Is still on Plavix and he needs to be on it for at least another 6 months. I told him he could stop the aspirin. Patient is somewhat noncompliant. Does not want to get the Covid vaccine although I urged him to do so. See me in 6 months. 4/15/21: Patient presents today for follow-up of coronary artery disease. He presented with acute MI which was followed by angioplasty of the circumflex. Ejection fraction was 25%. Last echo showed near normalization of the LV ejection fraction. He gets occasionally dizzy. He is diabetic. I want him to reduce the dose of Lipitor to 40 mg daily. He is a , but he stayes within the city. Patient is home every night. He still smokes a little. On Plavix. All his medication should continue except to reduce the dose of Lipitor to 40.   He will see me in 3 months     1/7/21: Patient presents today for follow-up of an echocardiogram.  This showed near complete normalization of LV ejection fraction.  He had acute MI 6 months ago followed by angioplasty of the circumflex.  At that time EF was 25%.  He does not drink alcohol.  He still smokes some.  Currently not working. Dipika Koo is diabetic. Jake Sibley has occasional dizziness.  Dizziness is not related to any significant arrhythmias.  I was concerned about arrhythmias therefore I got the LV ejection fraction evaluation and is normal.  Is on Plavix.  Could not afford the Brilinta.  LV ejection fraction 60%.  No chest pain.  And LV diastolic function is normal.  I have advised him to cut smoking completely.  See me in 3 months.  Has dyslipidemia that is controlled        10/1/2020: Patient presents today for follow-up of recent hospitalization for acute MI.  She did by cardiac arrest.  Her cardiac catheterization and stent of the circumflex.  EF is 20 to 25%.  Gets dizzy lightheaded.  Blood pressure today in the office is 80.  He is a diabetic.  Used to be a .  Lives in [address] [place] [address] not drink alcohol. Abhilash Abraham gets only 1 or 2 cigarettes a day.  Down from a pack a day.  Was doing well until recently when he started complaining of dizziness.  I would repeat an echo to see what the LV function looks like and then see me again.  Continue same medications.  May need to be started on Entresto.  Probably have to apply for disability                Past Medical History:   Diagnosis Date    CAD (coronary artery disease) 3/2/2015, 2020    stents    DM (diabetes mellitus), type 2, uncontrolled ([facility] 1/29/2015    Hyperlipidemia     Smoker        Past Surgical History:   Procedure Laterality Date    CARDIAC SURGERY         No family history on file.     Social History     Socioeconomic History    Marital status:      Spouse name: Not on file    Number of children: Not on file    Years of education: Not on file    Highest education level: Not on file   Occupational History    Not on file   Tobacco Use    Smoking status: Current Every Day Smoker     Packs/day: 0.25     Years: 25.00     Pack years: 6.25    Smokeless tobacco: Never Used   Substance and Sexual Activity    Alcohol use: Yes     Comment: rarely    Drug use: No    Sexual activity: Yes     Partners: Female   Other Topics Concern    Not on file   Social History Narrative         Lives With: Spouse    Type of Home: [place] in [facility] [address]: One level Level entry    Bathroom Shower/Tub: Tub/Shower unit    Home Equipment: (no equipment)    ADL Assistance: Independent    Homemaking Assistance: Independent    Homemaking Responsibilities: Yes    Ambulation Assistance: Independent    Transfer Assistance: Independent    Active : Yes    Occupation: Full time employment    Type of occupation:      Social Determinants of [address] Strain: Low Risk     Difficulty of Paying Living Expenses: Not hard at [address]: No Food Insecurity    Worried About [facility] in the Last Year: Never true   [address] in the Last Year: Never true   Transportation Needs: No Transportation Needs    Lack of Transportation (Medical): No    Lack of Transportation (Non-Medical): No   Physical Activity:     Days of Exercise per Week:     Minutes of Exercise per Session:    Stress:     Feeling of Stress :    Social Connections: Unknown    Frequency of Communication with Friends and Family: Not on file    Frequency of Social Gatherings with Friends and Family: Not on file    Attends [employer] Services: Not on file    Active Member of [address] [place] [address] or Organizations: Not on file    Attends Club or Organization Meetings: Not on file    Marital Status:    Intimate Partner Violence: Not At Risk    Fear of Current or Ex-Partner: No    Emotionally Abused: No    Physically Abused: No    Sexually Abused: No       Allergies   Allergen Reactions    Metformin And Related     Simvastatin      Leg Pains    Metformin Nausea And Vomiting       Current Outpatient Medications   Medication Sig Dispense Refill    insulin 70-30 (NOVOLIN 70/30) (70-30) 100 UNIT per ML injection vial 20-25  units twice a day 2 vial 3    gabapentin (NEURONTIN) 300 MG capsule Take 1 capsule by mouth 3 times daily for 90 days.  Intended supply: 30 days 90 capsule 2    Insulin Syringe-Needle U-100 ([employer] INSULIN SYRINGE) 31G X 5/16\" 0.5 ML MISC 1 each by Does not apply route daily 100 each 3    pantoprazole (PROTONIX) 40 MG tablet Take 1 tablet by mouth every morning (before breakfast) (Patient taking differently: Take 40 mg by mouth every morning (before breakfast) Every other day) 30 tablet 6    clopidogrel (PLAVIX) 75 MG tablet Take 1 tablet by mouth daily (Patient taking differently: Take 75 mg by mouth daily Every other day) 30 tablet 9    aspirin 81 MG EC tablet Take 1 tablet by mouth daily (Patient taking differently: Take 81 mg by mouth daily Every other day) 30 tablet 3    Vitamin D (CHOLECALCIFEROL) 50 MCG (2000 UT) TABS tablet Take 1 tablet by mouth Daily with supper (Patient taking differently: Take 2,000 Units by mouth Daily with supper Taking every other day) 60 tablet 3    glucose blood VI test strips (ONE TOUCH ULTRA TEST) strip Use to test blood sugar up to 2 times daily 100 each 3     No current facility-administered medications for this visit. Review of Systems:   Review of Systems   Constitutional: Negative for diaphoresis. HENT: Negative for nosebleeds. Respiratory: Negative for cough, shortness of breath, wheezing and stridor. Cardiovascular: Negative for chest pain, palpitations and leg swelling. Gastrointestinal: Negative for blood in stool, diarrhea, nausea and vomiting. Musculoskeletal: Negative for myalgias. Neurological: Negative for dizziness, seizures, weakness and headaches. Hematological: Does not bruise/bleed easily. Psychiatric/Behavioral: Negative for suicidal ideas. The patient is not nervous/anxious. All other systems reviewed and are negative. Review of System is negative except for as mentioned above.       Physical Examination:    /88 (Site: Right Upper Arm, Position: Sitting, Cuff Size: Medium Adult)   Pulse 87   Ht 5' 10\" (1.778 m)   Wt 181 lb (82.1 kg)   SpO2 98%   BMI 25.97 kg/m²    Physical Exam Constitutional: He appears healthy. HENT:   Nose: Nose normal.   Mouth/Throat: Dentition is normal. Oropharynx is clear. Eyes: Pupils are equal, round, and reactive to light. Cardiovascular: Normal rate, regular rhythm, S1 normal, S2 normal, normal heart sounds, intact distal pulses and normal pulses. No extrasystoles are present. Exam reveals no gallop. No murmur heard. Pulmonary/Chest: Effort normal and breath sounds normal. He has no wheezes. He has no rales. He exhibits no tenderness. Abdominal: Soft. Bowel sounds are normal. He exhibits no distension and no mass. There is no splenomegaly or hepatomegaly. There is no abdominal tenderness. Musculoskeletal:         General: No tenderness, deformity or edema. Normal range of motion. Cervical back: Normal range of motion. Neurological: He is alert and oriented to person, place, and time. He has normal motor skills and normal reflexes. Gait normal.   Skin: Skin is warm and dry. Patient Active Problem List   Diagnosis    DM (diabetes mellitus), type 2, uncontrolled ([facility]    CAD (coronary artery disease)    Cardiac arrest ([facility]    Severe hypoxic-ischemic encephalopathy    Smoker    Closed fracture of multiple ribs of left side with routine healing    Dysphonia    Impaired mobility dt hypoxic encephalopathy--[facility] rehab admit 8/26/2020    Abnormality of gait and mobility    Ischemic cardiomyopathy    Acute respiratory failure with hypoxia (HCC)    BMI 24.0-24.9, adult    S/P percutaneous transluminal angioplasty (PTA) with stent placement           Orders Placed This Encounter   Procedures    EKG 12 lead     Order Specific Question:   Reason for Exam?     Answer: Other         No orders of the defined types were placed in this encounter. Assessment/Orders:       ICD-10-CM    1. Coronary artery disease involving native heart without angina pectoris, unspecified vessel or lesion type  I25.10 EKG 12 lead   2. Ischemic cardiomyopathy  I25.5 EKG 12 lead       No orders of the defined types were placed in this encounter. Medications Discontinued During This Encounter   Medication Reason    gabapentin (NEURONTIN) 252 MG capsule DUPLICATE       Orders Placed This Encounter   Procedures    EKG 12 lead     Order Specific Question:   Reason for Exam?     Answer: Other         Plan:  Continue with Plavix, he can stop aspirin. Stay on same medications.     See me in 6 months         Electronically signed by: Sarah Dickinson MD  8/19/2021 2:31 PM

## 2021-08-20 DIAGNOSIS — N52.9 ERECTILE DYSFUNCTION, UNSPECIFIED ERECTILE DYSFUNCTION TYPE: Primary | ICD-10-CM

## 2021-08-20 DIAGNOSIS — N52.9 ERECTILE DYSFUNCTION, UNSPECIFIED ERECTILE DYSFUNCTION TYPE: ICD-10-CM

## 2021-08-20 RX ORDER — SILDENAFIL 100 MG/1
100 TABLET, FILM COATED ORAL PRN
Qty: 5 TABLET | Refills: 5 | Status: SHIPPED | OUTPATIENT
Start: 2021-08-20 | End: 2021-08-20 | Stop reason: SDUPTHER

## 2021-08-20 RX ORDER — SILDENAFIL 100 MG/1
100 TABLET, FILM COATED ORAL DAILY PRN
Qty: 5 TABLET | Refills: 5 | Status: SHIPPED | OUTPATIENT
Start: 2021-08-20 | End: 2021-11-04 | Stop reason: SDUPTHER

## 2021-09-14 DIAGNOSIS — Z98.61 POST PTCA: Primary | ICD-10-CM

## 2021-09-14 NOTE — TELEPHONE ENCOUNTER
Requesting medication refill. Please approve or deny this request.    Rx requested:  Requested Prescriptions     Pending Prescriptions Disp Refills    clopidogrel (PLAVIX) 75 MG tablet [Pharmacy Med Name: Clopidogrel Bisulfate 75 MG Oral Tablet] 30 tablet 0     Sig: Take 1 tablet by mouth once daily    pantoprazole (PROTONIX) 40 MG tablet [Pharmacy Med Name: Pantoprazole Sodium 40 MG Oral Tablet Delayed Release] 30 tablet 0     Sig: TAKE 1 TABLET BY MOUTH ONCE DAILY IN THE MORNING BEFORE BREAKFAST         Last Office Visit:   8/19/2021      Next Visit Date:  Future Appointments   Date Time Provider Rani Perla   9/17/2021  8:30 AM [facility]   10/9/2021 10:00 AM Dory Puckett MD [place] [id number]   2/17/2022  8:15 AM Glen Newell MD [facility]               Last refill 12/13/20. Please approve or deny.

## 2021-09-15 RX ORDER — CLOPIDOGREL BISULFATE 75 MG/1
75 TABLET ORAL DAILY
Qty: 90 TABLET | Refills: 2 | Status: SHIPPED | OUTPATIENT
Start: 2021-09-15 | End: 2021-11-04 | Stop reason: SDUPTHER

## 2021-09-15 RX ORDER — PANTOPRAZOLE SODIUM 40 MG/1
TABLET, DELAYED RELEASE ORAL
Qty: 90 TABLET | Refills: 2 | Status: SHIPPED | OUTPATIENT
Start: 2021-09-15 | End: 2021-11-04 | Stop reason: SDUPTHER

## 2021-09-17 ENCOUNTER — OFFICE VISIT (OUTPATIENT)
Dept: PRIMARY CARE CLINIC | Age: 46
End: 2021-09-17

## 2021-09-17 VITALS
BODY MASS INDEX: 26.48 KG/M2 | TEMPERATURE: 98.2 F | HEIGHT: 70 IN | WEIGHT: 185 LBS | DIASTOLIC BLOOD PRESSURE: 72 MMHG | HEART RATE: 72 BPM | OXYGEN SATURATION: 98 % | SYSTOLIC BLOOD PRESSURE: 106 MMHG

## 2021-09-17 DIAGNOSIS — G57.93 NEUROPATHIC PAIN OF BOTH LEGS: ICD-10-CM

## 2021-09-17 PROCEDURE — 99213 OFFICE O/P EST LOW 20 MIN: CPT | Performed by: INTERNAL MEDICINE

## 2021-09-17 RX ORDER — GABAPENTIN 300 MG/1
300 CAPSULE ORAL 3 TIMES DAILY
Qty: 90 CAPSULE | Refills: 2 | Status: SHIPPED | OUTPATIENT
Start: 2021-09-17 | End: 2021-11-04 | Stop reason: SDUPTHER

## 2021-09-17 ASSESSMENT — ENCOUNTER SYMPTOMS
SHORTNESS OF BREATH: 0
VOICE CHANGE: 0
PHOTOPHOBIA: 0
VOMITING: 0
COUGH: 0
NAUSEA: 0
TROUBLE SWALLOWING: 0

## 2021-09-17 NOTE — PROGRESS NOTES
Use    Smoking status: Current Every Day Smoker     Packs/day: 0.25     Years: 25.00     Pack years: 6.25    Smokeless tobacco: Never Used   Substance and Sexual Activity    Alcohol use: Yes     Comment: rarely    Drug use: No    Sexual activity: Yes     Partners: Female   Other Topics Concern    None   Social History Narrative         Lives With: Spouse    Type of Home: [id number] in [facility] [address]: One level Level entry    Bathroom Shower/Tub: Tub/Shower unit    Home Equipment: (no equipment)    ADL Assistance: Independent    Homemaking Assistance: Independent    Homemaking Responsibilities: Yes    Ambulation Assistance: Independent    Transfer Assistance: Independent    Active : Yes    Occupation: Full time employment    Type of occupation:      Social Determinants of [address] Strain: [address] Difficulty of Paying Living Expenses: Not hard at [address]: [address] Worried About [address] in the Last Year: Never true   [address] in the Last Year: Never true   Transportation Needs: No Transportation Needs    Lack of Transportation (Medical): No    Lack of Transportation (Non-Medical): No   Physical Activity:     Days of Exercise per Week:     Minutes of Exercise per Session:    Stress:     Feeling of Stress :    Social Connections:     Frequency of Communication with Friends and Family:     Frequency of Social Gatherings with Friends and Family:     Attends [employer] Services:     Active Member of Clubs or Organizations:     Attends Club or Organization Meetings:     Marital Status:    Intimate Partner Violence:     Fear of Current or Ex-Partner:     Emotionally Abused:     Physically Abused:     Sexually Abused:       Allergies   Allergen Reactions    Metformin And Related     Simvastatin      Leg Pains    Metformin Nausea And Vomiting       Review of Systems   Constitutional:

## 2021-11-04 DIAGNOSIS — Z98.61 POST PTCA: ICD-10-CM

## 2021-11-04 DIAGNOSIS — N52.9 ERECTILE DYSFUNCTION, UNSPECIFIED ERECTILE DYSFUNCTION TYPE: ICD-10-CM

## 2021-11-04 DIAGNOSIS — E11.65 UNCONTROLLED TYPE 2 DIABETES MELLITUS WITH HYPERGLYCEMIA (HCC): ICD-10-CM

## 2021-11-04 RX ORDER — SILDENAFIL 100 MG/1
100 TABLET, FILM COATED ORAL DAILY PRN
Qty: 5 TABLET | Refills: 5 | Status: SHIPPED | OUTPATIENT
Start: 2021-11-04 | End: 2022-06-25

## 2021-11-04 NOTE — TELEPHONE ENCOUNTER
Please approve or deny this refill request. The order is pended. Thank you.     LOV 8/19/2021    Next Visit Date:  Future Appointments   Date Time Provider Rani Perla   11/13/2021 10:45 AM Anant Aneta Boeck, MD [place] [address]   12/17/2021  8:45 AM Vy Higginbotham MD [facility]   2/17/2022  8:15 AM Sveta Haq MD [facility]

## 2021-11-04 NOTE — TELEPHONE ENCOUNTER
Future Appointments     Provider   11/13/2021 Clive Conrad MD   Department: [facility] Endocrinology   Appt Notes: O   [id number]  Rescheduled from 10/9/2021   [facility]   12/17/2021 Phillip Patel MD   Department: [facility] Primary Care   Appt Notes: 3 month follow up   2/17/2022 Yogesh Lamb MD   Department: [facility] Cardiology   Appt Notes: 6 month cardio   Recent Visits    09/17/2021 Neuropathic pain of both legs   75 Reena Dejesus MD

## 2021-11-05 RX ORDER — PANTOPRAZOLE SODIUM 40 MG/1
TABLET, DELAYED RELEASE ORAL
Qty: 90 TABLET | Refills: 3 | Status: SHIPPED | OUTPATIENT
Start: 2021-11-05 | End: 2022-06-25

## 2021-11-05 RX ORDER — CLOPIDOGREL BISULFATE 75 MG/1
75 TABLET ORAL DAILY
Qty: 90 TABLET | Refills: 3 | Status: SHIPPED | OUTPATIENT
Start: 2021-11-05

## 2021-11-29 ENCOUNTER — NURSE TRIAGE (OUTPATIENT)
Dept: OTHER | Facility: CLINIC | Age: 46
End: 2021-11-29

## 2021-11-29 NOTE — TELEPHONE ENCOUNTER
Reason for Disposition   [1] Caller is not with the adult (patient) AND [2] probable NON-URGENT symptoms    Answer Assessment - Initial Assessment Questions  1. REASON FOR CALL or QUESTION: \"What is your reason for calling today? \" or \"How can I best help you? \" or \"What question do you have that I can help answer? \"      Maury Donis is pt's wife VICKI. Pt is at work and unable to be reached. Wife calling in r/g to infected finger. Warm transfer to Elke Kyle at PCP office to assist wife appt scheduling.     Protocols used: INFORMATION ONLY CALL - NO TRIAGE-[facility]

## 2021-12-10 ENCOUNTER — OFFICE VISIT (OUTPATIENT)
Dept: PRIMARY CARE CLINIC | Age: 46
End: 2021-12-10

## 2021-12-10 VITALS
WEIGHT: 191 LBS | OXYGEN SATURATION: 99 % | BODY MASS INDEX: 27.35 KG/M2 | SYSTOLIC BLOOD PRESSURE: 128 MMHG | HEART RATE: 92 BPM | HEIGHT: 70 IN | TEMPERATURE: 98.1 F | DIASTOLIC BLOOD PRESSURE: 80 MMHG

## 2021-12-10 DIAGNOSIS — L98.492 SKIN ULCER OF FINGER WITH FAT LAYER EXPOSED (HCC): Primary | ICD-10-CM

## 2021-12-10 DIAGNOSIS — G57.93 NEUROPATHIC PAIN OF BOTH LEGS: ICD-10-CM

## 2021-12-10 PROCEDURE — 99212 OFFICE O/P EST SF 10 MIN: CPT | Performed by: INTERNAL MEDICINE

## 2021-12-10 RX ORDER — SULFAMETHOXAZOLE AND TRIMETHOPRIM 800; 160 MG/1; MG/1
TABLET ORAL
COMMUNITY
Start: 2021-11-22 | End: 2022-06-25

## 2021-12-10 RX ORDER — CEPHALEXIN 500 MG/1
CAPSULE ORAL
COMMUNITY
Start: 2021-11-22 | End: 2022-06-25

## 2021-12-10 RX ORDER — SULFAMETHOXAZOLE AND TRIMETHOPRIM 800; 160 MG/1; MG/1
1 TABLET ORAL 2 TIMES DAILY
Qty: 20 TABLET | Refills: 0 | Status: SHIPPED | OUTPATIENT
Start: 2021-12-10 | End: 2021-12-20

## 2021-12-10 RX ORDER — GABAPENTIN 300 MG/1
300 CAPSULE ORAL 3 TIMES DAILY
Qty: 90 CAPSULE | Refills: 2 | Status: SHIPPED | OUTPATIENT
Start: 2021-12-10 | End: 2022-03-11 | Stop reason: SDUPTHER

## 2021-12-10 ASSESSMENT — ENCOUNTER SYMPTOMS
PHOTOPHOBIA: 0
TROUBLE SWALLOWING: 0
VOICE CHANGE: 0
VOMITING: 0
CHOKING: 0
NAUSEA: 0
SHORTNESS OF BREATH: 0

## 2021-12-10 NOTE — PROGRESS NOTES
Marta Perez 51 y.o. male presents today with   Chief Complaint   Patient presents with    Wound Infection     right pointer finger seen in ED [facility] x1 month. Tx antibitoc       HPI had a cut. On antibiotic x 3 weeks    Past Medical History:   Diagnosis Date    CAD (coronary artery disease) 3/2/2015, 2020    stents    DM (diabetes mellitus), type 2, uncontrolled ([address] 1/29/2015    Hyperlipidemia     Smoker      Patient Active Problem List    Diagnosis Date Noted    Abnormality of gait and mobility 08/26/2020    Ischemic cardiomyopathy 08/26/2020    Acute respiratory failure with hypoxia ([address] 08/26/2020    BMI 24.0-24.9, adult 08/26/2020    S/P percutaneous transluminal angioplasty (PTA) with stent placement 08/26/2020    Smoker 08/25/2020    Closed fracture of multiple ribs of left side with routine healing 08/25/2020    Dysphonia 08/25/2020    Impaired mobility dt hypoxic encephalopathy--[facility] rehab admit 8/26/2020 08/25/2020    Severe hypoxic-ischemic encephalopathy     Cardiac arrest ([facility] 08/19/2020    CAD (coronary artery disease) 03/02/2015    DM (diabetes mellitus), type 2, uncontrolled ([address] 01/29/2015     Past Surgical History:   Procedure Laterality Date    CARDIAC SURGERY       No family history on file.   Social History     Socioeconomic History    Marital status:      Spouse name: Not on file    Number of children: Not on file    Years of education: Not on file    Highest education level: Not on file   Occupational History    Not on file   Tobacco Use    Smoking status: Current Every Day Smoker     Packs/day: 0.25     Years: 25.00     Pack years: 6.25    Smokeless tobacco: Never Used   Substance and Sexual Activity    Alcohol use: Yes     Comment: rarely    Drug use: No    Sexual activity: Yes     Partners: Female   Other Topics Concern    Not on file   Social History Narrative         Lives With: Spouse    Type of Home: [facility] swallowing and voice change. Eyes: Negative for photophobia and visual disturbance. Respiratory: Negative for choking and shortness of breath. Cardiovascular: Negative for chest pain and palpitations. Gastrointestinal: Negative for nausea and vomiting. Genitourinary: Negative for decreased urine volume and urgency. Skin: Negative for rash. Neurological: Positive for numbness (lower legs). Negative for tremors and syncope. Hematological: Does not bruise/bleed easily. Psychiatric/Behavioral: Negative for suicidal ideas. Vitals:    12/10/21 1126   BP: 128/80   Site: Left Upper Arm   Cuff Size: Large Adult   Pulse: 92   Temp: 98.1 °F (36.7 °C)   SpO2: 99%   Weight: 191 lb (86.6 kg)   Height: 5' 10\" (1.778 m)       Physical Exam  Constitutional:       Appearance: He is well-developed. HENT:      Head: Normocephalic and atraumatic. Eyes:      Conjunctiva/sclera: Conjunctivae normal.      Pupils: Pupils are equal, round, and reactive to light. Cardiovascular:      Rate and Rhythm: Normal rate and regular rhythm. Pulmonary:      Effort: Pulmonary effort is normal. No respiratory distress. Breath sounds: No wheezing. Abdominal:      General: Bowel sounds are normal. There is no distension. Tenderness: There is no abdominal tenderness. Musculoskeletal:         General: Normal range of motion. Cervical back: Normal range of motion. Skin:     General: Skin is warm. Coloration: Skin is not jaundiced. Neurological:      Mental Status: He is alert. Cranial Nerves: Cranial nerve deficit present. Psychiatric:         Mood and Affect: Mood normal.     it went from a cut to an ulcer? Assessment/Plan  Giuliana Tiwari was seen today for wound infection.     Diagnoses and all orders for this visit:    Skin ulcer of finger with fat layer exposed ([facility]  -     [facility], [place]  -     sulfamethoxazole-trimethoprim (BACTRIM DS;SEPTRA DS) 800-160 MG per tablet; Take 1 tablet by mouth 2 times daily for 10 days  -     mupirocin (BACTROBAN) 2 % ointment; Apply topically 3 times daily. -     XR FINGER RIGHT (MIN 2 VIEWS); Future    Neuropathic pain of both legs  -     gabapentin (NEURONTIN) 300 MG capsule; Take 1 capsule by mouth 3 times daily for 90 days. Intended supply: 30 days        No follow-ups on file.     Rabia Pal MD

## 2021-12-17 ENCOUNTER — TELEPHONE (OUTPATIENT)
Dept: PRIMARY CARE CLINIC | Age: 46
End: 2021-12-17

## 2021-12-17 DIAGNOSIS — L98.492 SKIN ULCER OF FINGER WITH FAT LAYER EXPOSED (HCC): Primary | ICD-10-CM

## 2021-12-17 NOTE — TELEPHONE ENCOUNTER
He needs a referral to wound care in the [place] area and his wife Jessy Bo will check with his insurance and call back with a name of where he can go.

## 2022-03-02 NOTE — PROGRESS NOTES
Catalina Rosario. is a 38 y.o. male patient. pt was seen and evaluated, no overnight events    Current Facility-Administered Medications   Medication Dose Route Frequency Provider Last Rate Last Dose    carvedilol (COREG) tablet 3.125 mg  3.125 mg Oral BID Ivette Almaraz MD   3.125 mg at 08/25/20 9247    insulin glargine (LANTUS) injection vial 10 Units  10 Units Subcutaneous Daily Cinthia Manley MD   10 Units at 08/25/20 1039    pantoprazole (PROTONIX) tablet 40 mg  40 mg Oral QAM AC Yazid R Ashok, DO   40 mg at 08/25/20 0534    docusate (COLACE) 50 MG/5ML liquid 100 mg  100 mg Oral BID Cinthia Manley MD   100 mg at 08/24/20 2234    polyethylene glycol (GLYCOLAX) packet 17 g  17 g Oral Daily Cinthia Manley MD   17 g at 08/24/20 0932    insulin lispro (HUMALOG) injection vial 0-18 Units  0-18 Units Subcutaneous Q6H Cinthia Manley MD   9 Units at 08/25/20 1159    glucose (GLUTOSE) 40 % oral gel 15 g  15 g Oral PRN Deion Cotter MD        dextrose 50 % IV solution  12.5 g Intravenous PRN Deion Cotter MD        glucagon (rDNA) injection 1 mg  1 mg Intramuscular PRN Deion Cotter MD        dextrose 5 % solution  100 mL/hr Intravenous PRN Deion Cotter MD        piperacillin-tazobactam (ZOSYN) 3.375 g in dextrose 5 % 50 mL IVPB extended infusion (mini-bag)  3.375 g Intravenous Q8H Dakotah Mcintosh MD 12.5 mL/hr at 08/25/20 1314 3.375 g at 08/25/20 1314    heparin (porcine) injection 4,000 Units  4,000 Units Intravenous Once Korina Johnson, DO        aspirin suppository 300 mg  300 mg Rectal Once Korina Johnson, DO        insulin regular (HUMULIN R;NOVOLIN R) injection 15 Units  15 Units Intravenous Once Korina Johnson, DO        sodium bicarbonate 8.4 % injection 50 mEq  50 mEq Intravenous Once Korina Saintclair Italo, DO        ticagrelor (BRILINTA) tablet 90 mg  90 mg Oral BID Ivette Almaraz MD   90 mg at 08/25/20 1061    aspirin EC tablet 81 mg  81 mg Oral Daily Donnell Shukla Tere Godwin MD   81 mg at 08/25/20 6139    atorvastatin (LIPITOR) tablet 80 mg  80 mg Oral Nightly Gil Dubois MD   80 mg at 08/24/20 2234    dextrose 50 % IV solution  12.5 g Intravenous PRN Laneta [place] Sedar, DO        promethazine (PHENERGAN) tablet 12.5 mg  12.5 mg Oral Q6H PRN Araceli Ruelas MD        Or    ondansetron (ZOFRAN) injection 4 mg  4 mg Intravenous Q6H PRN Araceli Ruelas MD        chlorhexidine (PERIDEX) 0.12 % solution 15 mL  15 mL Mouth/Throat BID Araceli Ruelas MD   15 mL at 08/23/20 2141     Allergies   Allergen Reactions    Metformin And Related      Active Problems:    DM (diabetes mellitus), type 2, uncontrolled (HCC)    CAD (coronary artery disease)    Cardiac arrest (HCC)    Moderate hypoxic-ischemic encephalopathy    Gait abnormality    Smoker    Closed fracture of multiple ribs of left side with routine healing    Dysphonia  Resolved Problems:    * No resolved hospital problems. *    Blood pressure 109/66, pulse 86, temperature 97.7 °F (36.5 °C), temperature source Oral, resp. rate 18, height 5' 10\" (1.778 m), weight 171 lb 1.6 oz (77.6 kg), SpO2 94 %. Subjective:  Symptoms:  He reports malaise and weakness. No shortness of breath, cough, chest pain, headache, chest pressure, anorexia, diarrhea or anxiety. Diet:  No nausea or vomiting. Objective:  General Appearance:  Ill-appearing. Vital signs: (most recent): Blood pressure 109/66, pulse 86, temperature 97.7 °F (36.5 °C), temperature source Oral, resp. rate 18, height 5' 10\" (1.778 m), weight 171 lb 1.6 oz (77.6 kg), SpO2 94 %. HEENT: Normal HEENT exam.    Lungs:  He is not in respiratory distress. (Decrease BS)  Heart: S1 normal and S2 normal.    Abdomen: Abdomen is soft. Neurological: Patient is alert. Pupils:  Pupils are equal, round, and reactive to light. Skin:  Warm and dry.       Lab Results   Component Value Date    WBC 9.9 08/25/2020    HGB 9.0 (L) 08/25/2020    HCT 26.6 (L) 08/25/2020 MCV 85.3 08/25/2020     08/25/2020     Lab Results   Component Value Date     08/25/2020    K 3.5 08/25/2020     08/25/2020    CO2 27 08/25/2020    BUN 19 08/25/2020    CREATININE 0.79 08/25/2020    GLUCOSE 149 08/25/2020    CALCIUM 8.4 08/25/2020          Assessment & Plan  V.fib arrest/stemi  Management as per cardiology  2) cAD no CP, no SOB  3)MRSA PNA and possible aspiration  ID eval  4) acute respiratory failure  C/w oxygen therapy to keep O@ sat > 88 %  5) DM2 with hyperglycemia better  Management as per primary service  Discharge summary per primary  Cesar Hughes MD  8/25/2020 92

## 2022-03-11 ENCOUNTER — OFFICE VISIT (OUTPATIENT)
Dept: PRIMARY CARE CLINIC | Age: 47
End: 2022-03-11

## 2022-03-11 VITALS
BODY MASS INDEX: 28.49 KG/M2 | TEMPERATURE: 98.1 F | DIASTOLIC BLOOD PRESSURE: 84 MMHG | HEART RATE: 90 BPM | SYSTOLIC BLOOD PRESSURE: 138 MMHG | HEIGHT: 70 IN | OXYGEN SATURATION: 99 % | WEIGHT: 199 LBS

## 2022-03-11 DIAGNOSIS — G57.93 NEUROPATHIC PAIN OF BOTH LEGS: ICD-10-CM

## 2022-03-11 PROCEDURE — 99213 OFFICE O/P EST LOW 20 MIN: CPT | Performed by: INTERNAL MEDICINE

## 2022-03-11 RX ORDER — GABAPENTIN 300 MG/1
300 CAPSULE ORAL 3 TIMES DAILY
Qty: 90 CAPSULE | Refills: 2 | Status: SHIPPED | OUTPATIENT
Start: 2022-03-11 | End: 2022-06-13 | Stop reason: SDUPTHER

## 2022-03-11 ASSESSMENT — PATIENT HEALTH QUESTIONNAIRE - PHQ9
SUM OF ALL RESPONSES TO PHQ QUESTIONS 1-9: 0
SUM OF ALL RESPONSES TO PHQ9 QUESTIONS 1 & 2: 0
2. FEELING DOWN, DEPRESSED OR HOPELESS: 0
SUM OF ALL RESPONSES TO PHQ QUESTIONS 1-9: 0
1. LITTLE INTEREST OR PLEASURE IN DOING THINGS: 0

## 2022-03-11 NOTE — PROGRESS NOTES
Freddie Rizzo 51 y.o. male presents today with   Chief Complaint   Patient presents with    3 Month Follow-Up    Neurologic Problem    Leg Pain    Medication Refill       Leg Pain   The incident occurred more than 1 week ago. The incident occurred at home. There was no injury mechanism. The pain is present in the left leg and right leg. The quality of the pain is described as aching. The pain is at a severity of 4/10. The pain is moderate. Associated symptoms include numbness. Past Medical History:   Diagnosis Date    CAD (coronary artery disease) 3/2/2015, 2020    stents    DM (diabetes mellitus), type 2, uncontrolled ([facility] 1/29/2015    Hyperlipidemia     Smoker      Patient Active Problem List    Diagnosis Date Noted    Abnormality of gait and mobility 08/26/2020    Ischemic cardiomyopathy 08/26/2020    Acute respiratory failure with hypoxia ([facility] 08/26/2020    BMI 24.0-24.9, adult 08/26/2020    S/P percutaneous transluminal angioplasty (PTA) with stent placement 08/26/2020    Smoker 08/25/2020    Closed fracture of multiple ribs of left side with routine healing 08/25/2020    Dysphonia 08/25/2020    Impaired mobility dt hypoxic encephalopathy--[facility] rehab admit 8/26/2020 08/25/2020    Severe hypoxic-ischemic encephalopathy     Cardiac arrest ([facility] 08/19/2020    CAD (coronary artery disease) 03/02/2015    DM (diabetes mellitus), type 2, uncontrolled ([facility] 01/29/2015     Past Surgical History:   Procedure Laterality Date    CARDIAC SURGERY       No family history on file.   Social History     Socioeconomic History    Marital status:      Spouse name: Not on file    Number of children: Not on file    Years of education: Not on file    Highest education level: Not on file   Occupational History    Not on file   Tobacco Use    Smoking status: Current Every Day Smoker     Packs/day: 0.25     Years: 25.00     Pack years: 6.25    Smokeless tobacco: Never Used   Substance and Sexual Activity    Alcohol use: Yes     Comment: rarely    Drug use: No    Sexual activity: Yes     Partners: Female   Other Topics Concern    Not on file   Social History Narrative         Lives With: Spouse    Type of Home: [address] [facility] in [facility] [address]: One level Level entry    Bathroom Shower/Tub: Tub/Shower unit    Home Equipment: (no equipment)    ADL Assistance: Independent    Homemaking Assistance: Independent    Homemaking Responsibilities: Yes    Ambulation Assistance: Independent    Transfer Assistance: Independent    Active : Yes    Occupation: Full time employment    Type of occupation:      Social Determinants of [address] Strain: [address] Difficulty of Paying Living Expenses: Not hard at [address]: No [address] Worried About [address] [facility] in the Last Year: Never true   [address] [facility] in the Last Year: Never true   Transportation Needs: No Transportation Needs    Lack of Transportation (Medical): No    Lack of Transportation (Non-Medical):  No   Physical Activity:     Days of Exercise per Week: Not on file    Minutes of Exercise per Session: Not on file   Stress:     Feeling of Stress : Not on file   Social Connections:     Frequency of Communication with Friends and Family: Not on file    Frequency of Social Gatherings with Friends and Family: Not on file    Attends Worship Services: Not on file    Active Member of Clubs or Organizations: Not on file    Attends Club or Organization Meetings: Not on file    Marital Status: Not on file   Intimate Partner Violence:     Fear of Current or Ex-Partner: Not on file    Emotionally Abused: Not on file    Physically Abused: Not on file    Sexually Abused: Not on file   Housing Stability:     Unable to Pay for Housing in the Last Year: Not on file    Number of [place] in the Last Year: Not on file    Unstable Housing in the Last Year: Not on file     Allergies   Allergen Reactions    Metformin And Related     Simvastatin      Leg Pains    Metformin Nausea And Vomiting       Review of Systems   Constitutional: Negative for fatigue and fever. HENT: Negative for trouble swallowing and voice change. Eyes: Negative for visual disturbance. Respiratory: Negative for choking and shortness of breath. Cardiovascular: Negative for chest pain and palpitations. Gastrointestinal: Negative for nausea and vomiting. Genitourinary: Negative for decreased urine volume and urgency. Skin: Negative for rash. Neurological: Positive for numbness. Hematological: Does not bruise/bleed easily. Psychiatric/Behavioral: Negative for suicidal ideas. Vitals:    03/11/22 0854 03/11/22 0857   BP: (!) 140/86 138/84   Site: Left Upper Arm    Cuff Size: Large Adult    Pulse: 90    Temp: 98.1 °F (36.7 °C)    SpO2: 99%    Weight: 199 lb (90.3 kg)    Height: 5' 10\" (1.778 m)        Physical Exam  Constitutional:       Appearance: He is well-developed. HENT:      Head: Normocephalic. Eyes:      Conjunctiva/sclera: Conjunctivae normal.   Cardiovascular:      Rate and Rhythm: Normal rate and regular rhythm. Pulmonary:      Effort: Pulmonary effort is normal. No respiratory distress. Breath sounds: No wheezing. Abdominal:      General: There is no distension. Musculoskeletal:         General: Normal range of motion. Cervical back: Normal range of motion. Skin:     General: Skin is warm. Neurological:      Mental Status: He is alert. Assessment/Plan  Malou Stanley was seen today for 3 month follow-up, neurologic problem, leg pain and medication refill. Diagnoses and all orders for this visit:    Neuropathic pain of both legs  -     gabapentin (NEURONTIN) 300 MG capsule; Take 1 capsule by mouth 3 times daily for 90 days. Intended supply: 30 days        No follow-ups on file.     Chinedu Perez MD

## 2022-03-20 ASSESSMENT — ENCOUNTER SYMPTOMS
VOMITING: 0
VOICE CHANGE: 0
NAUSEA: 0
SHORTNESS OF BREATH: 0
TROUBLE SWALLOWING: 0
CHOKING: 0

## 2022-06-13 ENCOUNTER — OFFICE VISIT (OUTPATIENT)
Dept: PRIMARY CARE CLINIC | Age: 47
End: 2022-06-13

## 2022-06-13 VITALS
OXYGEN SATURATION: 98 % | SYSTOLIC BLOOD PRESSURE: 122 MMHG | HEART RATE: 82 BPM | HEIGHT: 70 IN | BODY MASS INDEX: 28.49 KG/M2 | TEMPERATURE: 97.8 F | DIASTOLIC BLOOD PRESSURE: 94 MMHG | WEIGHT: 199 LBS

## 2022-06-13 DIAGNOSIS — E78.00 PURE HYPERCHOLESTEROLEMIA: ICD-10-CM

## 2022-06-13 DIAGNOSIS — E11.65 UNCONTROLLED TYPE 2 DIABETES MELLITUS WITH HYPERGLYCEMIA (HCC): ICD-10-CM

## 2022-06-13 DIAGNOSIS — R19.7 DIARRHEA, UNSPECIFIED TYPE: Primary | ICD-10-CM

## 2022-06-13 DIAGNOSIS — G57.93 NEUROPATHIC PAIN OF BOTH LEGS: ICD-10-CM

## 2022-06-13 DIAGNOSIS — R19.7 DIARRHEA, UNSPECIFIED TYPE: ICD-10-CM

## 2022-06-13 LAB
ALBUMIN SERPL-MCNC: 4.1 G/DL (ref 3.5–4.6)
ALP BLD-CCNC: 106 U/L (ref 35–104)
ALT SERPL-CCNC: 21 U/L (ref 0–41)
AMYLASE: 96 U/L (ref 22–93)
ANION GAP SERPL CALCULATED.3IONS-SCNC: 13 MEQ/L (ref 9–15)
AST SERPL-CCNC: 29 U/L (ref 0–40)
BASOPHILS ABSOLUTE: 0 K/UL (ref 0–0.2)
BASOPHILS RELATIVE PERCENT: 0.4 %
BILIRUB SERPL-MCNC: 0.3 MG/DL (ref 0.2–0.7)
BUN BLDV-MCNC: 28 MG/DL (ref 6–20)
CALCIUM SERPL-MCNC: 9.2 MG/DL (ref 8.5–9.9)
CHLORIDE BLD-SCNC: 99 MEQ/L (ref 95–107)
CHOLESTEROL, FASTING: 195 MG/DL (ref 0–199)
CO2: 25 MEQ/L (ref 20–31)
CREAT SERPL-MCNC: 1.18 MG/DL (ref 0.7–1.2)
CREATININE URINE: 185.8 MG/DL
EOSINOPHILS ABSOLUTE: 0.1 K/UL (ref 0–0.7)
EOSINOPHILS RELATIVE PERCENT: 1.8 %
GFR AFRICAN AMERICAN: >60
GFR NON-AFRICAN AMERICAN: >60
GLOBULIN: 3.1 G/DL (ref 2.3–3.5)
GLUCOSE BLD-MCNC: 156 MG/DL (ref 70–99)
HBA1C MFR BLD: 9 % (ref 4.8–5.9)
HCT VFR BLD CALC: 40 % (ref 42–52)
HDLC SERPL-MCNC: 34 MG/DL (ref 40–59)
HEMOGLOBIN: 13.4 G/DL (ref 14–18)
LDL CHOLESTEROL CALCULATED: 130 MG/DL (ref 0–129)
LYMPHOCYTES ABSOLUTE: 2 K/UL (ref 1–4.8)
LYMPHOCYTES RELATIVE PERCENT: 32.8 %
MCH RBC QN AUTO: 28.2 PG (ref 27–31.3)
MCHC RBC AUTO-ENTMCNC: 33.4 % (ref 33–37)
MCV RBC AUTO: 84.3 FL (ref 80–100)
MICROALBUMIN UR-MCNC: 33.6 MG/DL
MICROALBUMIN/CREAT UR-RTO: 180.8 MG/G (ref 0–30)
MONOCYTES ABSOLUTE: 0.4 K/UL (ref 0.2–0.8)
MONOCYTES RELATIVE PERCENT: 7 %
NEUTROPHILS ABSOLUTE: 3.6 K/UL (ref 1.4–6.5)
NEUTROPHILS RELATIVE PERCENT: 58 %
PDW BLD-RTO: 13.2 % (ref 11.5–14.5)
PLATELET # BLD: 150 K/UL (ref 130–400)
POTASSIUM SERPL-SCNC: 4.4 MEQ/L (ref 3.4–4.9)
RBC # BLD: 4.75 M/UL (ref 4.7–6.1)
SODIUM BLD-SCNC: 137 MEQ/L (ref 135–144)
TOTAL PROTEIN: 7.2 G/DL (ref 6.3–8)
TRIGLYCERIDE, FASTING: 157 MG/DL (ref 0–150)
WBC # BLD: 6.1 K/UL (ref 4.8–10.8)

## 2022-06-13 PROCEDURE — 3046F HEMOGLOBIN A1C LEVEL >9.0%: CPT | Performed by: INTERNAL MEDICINE

## 2022-06-13 PROCEDURE — 99213 OFFICE O/P EST LOW 20 MIN: CPT | Performed by: INTERNAL MEDICINE

## 2022-06-13 RX ORDER — GABAPENTIN 300 MG/1
300 CAPSULE ORAL 3 TIMES DAILY
Qty: 90 CAPSULE | Refills: 2 | Status: SHIPPED | OUTPATIENT
Start: 2022-06-13 | End: 2022-09-13 | Stop reason: SDUPTHER

## 2022-06-13 RX ORDER — DIPHENOXYLATE HYDROCHLORIDE AND ATROPINE SULFATE 2.5; .025 MG/1; MG/1
1 TABLET ORAL 2 TIMES DAILY PRN
Qty: 40 TABLET | Refills: 0 | Status: SHIPPED | OUTPATIENT
Start: 2022-06-13 | End: 2022-07-03

## 2022-06-13 ASSESSMENT — PATIENT HEALTH QUESTIONNAIRE - PHQ9
SUM OF ALL RESPONSES TO PHQ9 QUESTIONS 1 & 2: 0
SUM OF ALL RESPONSES TO PHQ QUESTIONS 1-9: 0
SUM OF ALL RESPONSES TO PHQ QUESTIONS 1-9: 0
4. FEELING TIRED OR HAVING LITTLE ENERGY: 0
7. TROUBLE CONCENTRATING ON THINGS, SUCH AS READING THE NEWSPAPER OR WATCHING TELEVISION: 0
9. THOUGHTS THAT YOU WOULD BE BETTER OFF DEAD, OR OF HURTING YOURSELF: 0
SUM OF ALL RESPONSES TO PHQ QUESTIONS 1-9: 0
8. MOVING OR SPEAKING SO SLOWLY THAT OTHER PEOPLE COULD HAVE NOTICED. OR THE OPPOSITE, BEING SO FIGETY OR RESTLESS THAT YOU HAVE BEEN MOVING AROUND A LOT MORE THAN USUAL: 0
SUM OF ALL RESPONSES TO PHQ QUESTIONS 1-9: 0
3. TROUBLE FALLING OR STAYING ASLEEP: 0
6. FEELING BAD ABOUT YOURSELF - OR THAT YOU ARE A FAILURE OR HAVE LET YOURSELF OR YOUR FAMILY DOWN: 0
5. POOR APPETITE OR OVEREATING: 0
2. FEELING DOWN, DEPRESSED OR HOPELESS: 0
10. IF YOU CHECKED OFF ANY PROBLEMS, HOW DIFFICULT HAVE THESE PROBLEMS MADE IT FOR YOU TO DO YOUR WORK, TAKE CARE OF THINGS AT HOME, OR GET ALONG WITH OTHER PEOPLE: 0
1. LITTLE INTEREST OR PLEASURE IN DOING THINGS: 0

## 2022-06-13 ASSESSMENT — ENCOUNTER SYMPTOMS
DIARRHEA: 1
ABDOMINAL PAIN: 0

## 2022-06-13 NOTE — PROGRESS NOTES
Marcel Medina 51 y.o. male presents today with   Chief Complaint   Patient presents with    3 Month Follow-Up    Neurologic Problem    Leg Pain    Medication Refill    Diarrhea     x \"a couple of months\" soils bed through the night    Gas       Neurologic Problem  The patient's pertinent negatives include no syncope. Pertinent negatives include no abdominal pain, chest pain, fatigue, fever, nausea, palpitations, shortness of breath or vomiting. Leg Pain   Incident onset: months. The incident occurred at home. Injury mechanism: diabetes. The pain is present in the left foot and right foot. The quality of the pain is described as aching. The pain is at a severity of 3/10. The pain is mild. Associated symptoms include numbness. Diarrhea   This is a recurrent problem. The current episode started more than 1 year ago. The problem occurs 2 to 4 times per day (mainly at night). The problem has been waxing and waning. The stool consistency is described as watery (yellow). Associated symptoms include myalgias. Pertinent negatives include no abdominal pain, fever or vomiting. Diabetes  He presents for his follow-up diabetic visit. He has type 2 diabetes mellitus. His disease course has been stable. Pertinent negatives for hypoglycemia include no tremors. Pertinent negatives for diabetes include no blurred vision, no chest pain and no fatigue. Symptoms are stable.        Past Medical History:   Diagnosis Date    CAD (coronary artery disease) 3/2/2015, 2020    stents    DM (diabetes mellitus), type 2, uncontrolled ([address] 1/29/2015    Hyperlipidemia     Smoker      Patient Active Problem List    Diagnosis Date Noted    Abnormality of gait and mobility 08/26/2020    Ischemic cardiomyopathy 08/26/2020    Acute respiratory failure with hypoxia ([facility] 08/26/2020    BMI 24.0-24.9, adult 08/26/2020    S/P percutaneous transluminal angioplasty (PTA) with stent placement 08/26/2020    Smoker 08/25/2020    Closed fracture of multiple ribs of left side with routine healing 08/25/2020    Dysphonia 08/25/2020    Impaired mobility dt hypoxic encephalopathy--[facility] rehab admit 8/26/2020 08/25/2020    Severe hypoxic-ischemic encephalopathy     Cardiac arrest ([facility] 08/19/2020    CAD (coronary artery disease) 03/02/2015    DM (diabetes mellitus), type 2, uncontrolled ([facility] 01/29/2015     Past Surgical History:   Procedure Laterality Date    CARDIAC SURGERY       No family history on file. Social History     Socioeconomic History    Marital status:      Spouse name: None    Number of children: None    Years of education: None    Highest education level: None   Occupational History    None   Tobacco Use    Smoking status: Current Every Day Smoker     Packs/day: 0.25     Years: 25.00     Pack years: 6.25    Smokeless tobacco: Never Used   Substance and Sexual Activity    Alcohol use: Yes     Comment: rarely    Drug use: No    Sexual activity: Yes     Partners: Female   Other Topics Concern    None   Social History Narrative         Lives With: Spouse    Type of Home: [address] [place] [address] 83: One level Level entry    Bathroom Shower/Tub: Tub/Shower unit    Home Equipment: (no equipment)    ADL Assistance: Independent    Homemaking Assistance: Independent    Homemaking Responsibilities: Yes    Ambulation Assistance: Independent    Transfer Assistance: Independent    Active : Yes    Occupation: Full time employment    Type of occupation:      Social Determinants of [address] Strain: [address] Difficulty of Paying Living Expenses: Not hard at [address]: [address] Worried About [address] [facility] in the Last Year: Never true   [address] in the Last Year: Never true   Transportation Needs: No Transportation Needs    Lack of Transportation (Medical): No    Lack of Transportation (Non-Medical):  No Physical Activity:     Days of Exercise per Week: Not on file    Minutes of Exercise per Session: Not on file   Stress:     Feeling of Stress : Not on file   Social Connections:     Frequency of Communication with Friends and Family: Not on file    Frequency of Social Gatherings with Friends and Family: Not on file    Attends Yazidism Services: Not on file    Active Member of [address] [place] [address] or Organizations: Not on file    Attends Club or Organization Meetings: Not on file    Marital Status: Not on file   Intimate Partner Violence:     Fear of Current or Ex-Partner: Not on file    Emotionally Abused: Not on file    Physically Abused: Not on file    Sexually Abused: Not on file   Housing Stability:     Unable to Pay for Housing in the Last Year: Not on file    Number of [place] in the Last Year: Not on file    Unstable Housing in the Last Year: Not on file     Allergies   Allergen Reactions    Metformin And Related     Simvastatin      Leg Pains    Metformin Nausea And Vomiting       Review of Systems   Constitutional: Negative for fatigue and fever. HENT: Negative for trouble swallowing and voice change. Eyes: Negative for blurred vision, photophobia and visual disturbance. Respiratory: Negative for choking and shortness of breath. Cardiovascular: Negative for chest pain and palpitations. Gastrointestinal: Positive for diarrhea. Negative for abdominal pain, nausea and vomiting. Genitourinary: Negative for decreased urine volume, testicular pain and urgency. Musculoskeletal: Positive for myalgias. Skin: Negative for rash. Neurological: Positive for numbness. Negative for tremors and syncope. Hematological: Does not bruise/bleed easily. Psychiatric/Behavioral: Positive for agitation. Negative for suicidal ideas.            Vitals:    06/13/22 0935 06/13/22 0939   BP: (!) 122/92 (!) 122/94   Pulse: 82    Temp: 97.8 °F (36.6 °C)    SpO2: 98%    Weight: 199 lb (90.3 kg)    Height: 5' 10\" (1.778 m)        Physical Exam  Constitutional:       Appearance: He is well-developed. HENT:      Head: Normocephalic and atraumatic. Eyes:      Conjunctiva/sclera: Conjunctivae normal.   Cardiovascular:      Rate and Rhythm: Normal rate and regular rhythm. Pulmonary:      Effort: Pulmonary effort is normal. No respiratory distress. Breath sounds: No wheezing. Abdominal:      General: There is no distension. Musculoskeletal:         General: Normal range of motion. Cervical back: Normal range of motion. Skin:     General: Skin is dry. Coloration: Skin is not jaundiced. Neurological:      Mental Status: He is alert. Assessment/Plan  Alison Felder was seen today for 3 month follow-up, neurologic problem, leg pain, medication refill, diarrhea and gas. Diagnoses and all orders for this visit:    Diarrhea, unspecified type  -     Gastrointestinal Panel by DNA; Future  -     Clostridium Difficile Toxin/Antigen; Future  -     US GALLBLADDER RUQ; Future  -     US ABDOMEN COMPLETE; Future  -     Ambulatory referral to Gastroenterology  -     Comprehensive Metabolic Panel; Future  -     CBC with Auto Differential; Future  -     Amylase; Future  -     diphenoxylate-atropine (DIPHENATOL) 2.5-0.025 MG per tablet; Take 1 tablet by mouth 2 times daily as needed for Diarrhea for up to 20 days. Uncontrolled type 2 diabetes mellitus with hyperglycemia (HCC)  -     Hemoglobin A1C; Future  -     Microalbumin / Creatinine Urine Ratio; Future    Pure hypercholesterolemia  -     Lipid, Fasting; Future    Neuropathic pain of both legs  -     gabapentin (NEURONTIN) 300 MG capsule; Take 1 capsule by mouth 3 times daily for 90 days. Intended supply: 30 days        Return in about 6 months (around 12/13/2022), or if symptoms worsen or fail to improve.     Yoon Goodman MD

## 2022-06-19 ASSESSMENT — ENCOUNTER SYMPTOMS
VOICE CHANGE: 0
PHOTOPHOBIA: 0
VOMITING: 0
BLURRED VISION: 0
CHOKING: 0
NAUSEA: 0
SHORTNESS OF BREATH: 0
TROUBLE SWALLOWING: 0

## 2022-06-25 ENCOUNTER — OFFICE VISIT (OUTPATIENT)
Dept: ENDOCRINOLOGY | Age: 47
End: 2022-06-25

## 2022-06-25 VITALS
SYSTOLIC BLOOD PRESSURE: 120 MMHG | DIASTOLIC BLOOD PRESSURE: 68 MMHG | OXYGEN SATURATION: 98 % | HEART RATE: 87 BPM | BODY MASS INDEX: 27.72 KG/M2 | WEIGHT: 198 LBS | HEIGHT: 71 IN

## 2022-06-25 DIAGNOSIS — E11.65 UNCONTROLLED TYPE 2 DIABETES MELLITUS WITH HYPERGLYCEMIA (HCC): Primary | ICD-10-CM

## 2022-06-25 LAB
CHP ED QC CHECK: ABNORMAL
GLUCOSE BLD-MCNC: 190 MG/DL

## 2022-06-25 PROCEDURE — 82962 GLUCOSE BLOOD TEST: CPT | Performed by: INTERNAL MEDICINE

## 2022-06-25 PROCEDURE — 3046F HEMOGLOBIN A1C LEVEL >9.0%: CPT | Performed by: INTERNAL MEDICINE

## 2022-06-25 PROCEDURE — 99213 OFFICE O/P EST LOW 20 MIN: CPT | Performed by: INTERNAL MEDICINE

## 2022-06-25 RX ORDER — BEMPEDOIC ACID AND EZETIMIBE 180; 10 MG/1; MG/1
TABLET, FILM COATED ORAL
Qty: 30 TABLET | Refills: 3 | Status: SHIPPED | OUTPATIENT
Start: 2022-06-25

## 2022-06-25 NOTE — PROGRESS NOTES
6/25/2022    Assessment:       Diagnosis Orders   1. Uncontrolled type 2 diabetes mellitus with hyperglycemia (HCC)  POCT Glucose         PLAN:     Orders Placed This Encounter   Procedures    Basic Metabolic Panel     Standing Status:   Future     Standing Expiration Date:   6/25/2023    Hemoglobin A1C     Standing Status:   Future     Standing Expiration Date:   6/25/2023    Lipid Panel     Standing Status:   Future     Standing Expiration Date:   6/25/2023     Order Specific Question:   Is Patient Fasting?/# of Hours     Answer:   y    POCT Glucose       Continue current dose of Novolin 70/30  A1c goal of 7 or lower  Start patient on   Orders Placed This Encounter   Medications    Bempedoic Acid-Ezetimibe (NEXLIZET) 180-10 MG TABS     Sig: Once a day     Dispense:  30 tablet     Refill:  3       Subjective:     Chief Complaint   Patient presents with    Follow-up    Diabetes     Vitals:    06/25/22 1248   BP: 120/68   Pulse: 87   SpO2: 98%   Weight: 198 lb (89.8 kg)   Height: 5' 10.5\" (1.791 m)     Wt Readings from Last 3 Encounters:   06/25/22 198 lb (89.8 kg)   06/13/22 199 lb (90.3 kg)   03/11/22 199 lb (90.3 kg)     BP Readings from Last 3 Encounters:   06/25/22 120/68   06/13/22 (!) 122/94   03/11/22 138/84     Follow-up on type 2 diabetes history of coronary artery disease patient has continued to smoke  Novolin 70/30 20 to 25 units twice a day    Unable to take statins last A1c was elevated blood sugars close to 200  Hemoglobin A1c was 9.0 LDL at 130    Diabetes  He presents for his follow-up diabetic visit. He has type 2 diabetes mellitus. Symptoms are stable. Diabetic complications include heart disease and impotence. Risk factors for coronary artery disease include tobacco exposure. He is currently taking insulin pre-breakfast and pre-dinner. His overall blood glucose range is >200 mg/dl.  (Lab Results       Component                Value               Date                       LABA1C 9.0 (H)             06/13/2022              )     Past Medical History:   Diagnosis Date    CAD (coronary artery disease) 3/2/2015, 2020    stents    DM (diabetes mellitus), type 2, uncontrolled ([facility] [address] 1/29/2015    Hyperlipidemia     Smoker      Past Surgical History:   Procedure Laterality Date    CARDIAC SURGERY       Social History     Socioeconomic History    Marital status:      Spouse name: Not on file    Number of children: Not on file    Years of education: Not on file    Highest education level: Not on file   Occupational History    Not on file   Tobacco Use    Smoking status: Current Every Day Smoker     Packs/day: 0.25     Years: 25.00     Pack years: 6.25    Smokeless tobacco: Never Used   Substance and Sexual Activity    Alcohol use: Yes     Comment: rarely    Drug use: No    Sexual activity: Yes     Partners: Female   Other Topics Concern    Not on file   Social History Narrative         Lives With: Spouse    Type of Home: [address] [place] [address] 83: One level Level entry    Bathroom Shower/Tub: Tub/Shower unit    Home Equipment: (no equipment)    ADL Assistance: Independent    Homemaking Assistance: Independent    Homemaking Responsibilities: Yes    Ambulation Assistance: Independent    Transfer Assistance: Independent    Active : Yes    Occupation: Full time employment    Type of occupation:      Social Determinants of Health     Financial Resource Strain:     Difficulty of Paying Living Expenses: Not on [address]:     Worried About [address] in the Last Year: Not on file    [facility] in the Last Year: Not on file   Transportation Needs:     Lack of Transportation (Medical): Not on file    Lack of Transportation (Non-Medical):  Not on file   Physical Activity:     Days of Exercise per Week: Not on file    Minutes of Exercise per Session: Not on file   Stress:     Feeling of Stress : Not on file   Social Connections:     Frequency of Communication with Friends and Family: Not on file    Frequency of Social Gatherings with Friends and Family: Not on file    Attends Gnosticism Services: Not on file    Active Member of Clubs or Organizations: Not on file    Attends Club or Organization Meetings: Not on file    Marital Status: Not on file   Intimate Partner Violence:     Fear of Current or Ex-Partner: Not on file    Emotionally Abused: Not on file    Physically Abused: Not on file    Sexually Abused: Not on file   Housing Stability:     Unable to Pay for Housing in the Last Year: Not on file    Number of [place] in the Last Year: Not on file    Unstable Housing in the Last Year: Not on file     No family history on file. Allergies   Allergen Reactions    Metformin And Related     Simvastatin      Leg Pains    Metformin Nausea And Vomiting       Current Outpatient Medications:     gabapentin (NEURONTIN) 300 MG capsule, Take 1 capsule by mouth 3 times daily for 90 days. Intended supply: 30 days, Disp: 90 capsule, Rfl: 2    diphenoxylate-atropine (DIPHENATOL) 2.5-0.025 MG per tablet, Take 1 tablet by mouth 2 times daily as needed for Diarrhea for up to 20 days. , Disp: 40 tablet, Rfl: 0    clopidogrel (PLAVIX) 75 MG tablet, Take 1 tablet by mouth daily One tablet by mouth daily, Disp: 90 tablet, Rfl: 3    insulin 70-30 (NOVOLIN 70/30) (70-30) 100 UNIT per ML injection vial, 20-25  units twice a day, Disp: 20 mL, Rfl: 3    sildenafil (VIAGRA) 100 MG tablet, Take 1 tablet by mouth daily as needed for Erectile Dysfunction, Disp: 5 tablet, Rfl: 5    Insulin Syringe-Needle U-100 ([employer] INSULIN SYRINGE) 31G X 5/16\" 0.5 ML MISC, 1 each by Does not apply route daily, Disp: 100 each, Rfl: 3    glucose blood VI test strips (ONE TOUCH ULTRA TEST) strip, Use to test blood sugar up to 2 times daily, Disp: 100 each, Rfl: 3  Lab Results   Component Value Date     06/13/2022    K 4.4 06/13/2022    CL 99 06/13/2022    CO2 25 06/13/2022    BUN 28 (H) 06/13/2022    CREATININE 1.18 06/13/2022    GLUCOSE 190 (H) 06/25/2022    CALCIUM 9.2 06/13/2022    PROT 7.2 06/13/2022    LABALBU 4.1 06/13/2022    BILITOT 0.3 06/13/2022    ALKPHOS 106 (H) 06/13/2022    AST 29 06/13/2022    ALT 21 06/13/2022    LABGLOM >60.0 06/13/2022    GFRAA >60.0 06/13/2022    GLOB 3.1 06/13/2022     Lab Results   Component Value Date    WBC 6.1 06/13/2022    HGB 13.4 (L) 06/13/2022    HCT 40.0 (L) 06/13/2022    MCV 84.3 06/13/2022     06/13/2022     Lab Results   Component Value Date    LABA1C 9.0 (H) 06/13/2022    LABA1C 8.6 (H) 06/02/2021    LABA1C 9.2 (H) 01/07/2021     Lab Results   Component Value Date    CHOLFAST 195 06/13/2022    TRIGLYCFAST 157 (H) 06/13/2022    HDL 34 (L) 06/13/2022    HDL 32 (L) 09/22/2020    HDL 36 (L) 03/02/2015    LDLCALC 130 (H) 06/13/2022    LDLCALC 68 09/22/2020    CHOL 113 09/22/2020    CHOL 149 03/02/2015    TRIG 63 09/22/2020     No results found for: TESTM  No results found for: TSH, TSHREFLEX, FT3, T4FREE  No results found for: TPOABS    Review of Systems   Cardiovascular: Negative. Endocrine: Negative. Genitourinary: Positive for impotence. All other systems reviewed and are negative. Objective:   Physical Exam  Vitals reviewed. Constitutional:       General: He is not in acute distress. Appearance: Normal appearance. HENT:      Head: Normocephalic and atraumatic. Right Ear: External ear normal.      Left Ear: External ear normal.      Nose: Nose normal.   Eyes:      General: No scleral icterus. Right eye: No discharge. Left eye: No discharge. Extraocular Movements: Extraocular movements intact. Conjunctiva/sclera: Conjunctivae normal.   Cardiovascular:      Rate and Rhythm: Normal rate. Pulmonary:      Effort: Pulmonary effort is normal.   Musculoskeletal:         General: Normal range of motion.       Cervical back: Normal range of motion and neck supple. Neurological:      General: No focal deficit present. Mental Status: He is alert and oriented to person, place, and time.    Psychiatric:         Mood and Affect: Mood normal.         Behavior: Behavior normal.

## 2022-06-27 ENCOUNTER — HOSPITAL ENCOUNTER (OUTPATIENT)
Dept: ULTRASOUND IMAGING | Age: 47
Discharge: HOME | End: 2022-06-29

## 2022-06-27 DIAGNOSIS — R19.7 DIARRHEA, UNSPECIFIED TYPE: ICD-10-CM

## 2022-06-27 PROCEDURE — 76700 US EXAM ABDOM COMPLETE: CPT

## 2022-07-06 RX ORDER — BLOOD SUGAR DIAGNOSTIC
1 STRIP MISCELLANEOUS DAILY
Qty: 100 EACH | Refills: 3 | OUTPATIENT
Start: 2022-07-06

## 2022-09-13 ENCOUNTER — TELEPHONE (OUTPATIENT)
Dept: PRIMARY CARE CLINIC | Age: 47
End: 2022-09-13

## 2022-09-13 ENCOUNTER — OFFICE VISIT (OUTPATIENT)
Dept: PRIMARY CARE CLINIC | Age: 47
End: 2022-09-13

## 2022-09-13 VITALS
BODY MASS INDEX: 23.14 KG/M2 | HEART RATE: 82 BPM | WEIGHT: 200 LBS | HEIGHT: 78 IN | DIASTOLIC BLOOD PRESSURE: 84 MMHG | SYSTOLIC BLOOD PRESSURE: 138 MMHG | OXYGEN SATURATION: 99 %

## 2022-09-13 DIAGNOSIS — E11.65 UNCONTROLLED TYPE 2 DIABETES MELLITUS WITH HYPERGLYCEMIA (HCC): ICD-10-CM

## 2022-09-13 DIAGNOSIS — G57.93 NEUROPATHIC PAIN OF BOTH LEGS: ICD-10-CM

## 2022-09-13 LAB
ANION GAP SERPL CALCULATED.3IONS-SCNC: 10 MEQ/L (ref 9–15)
BUN BLDV-MCNC: 20 MG/DL (ref 6–20)
CALCIUM SERPL-MCNC: 9.3 MG/DL (ref 8.5–9.9)
CHLORIDE BLD-SCNC: 99 MEQ/L (ref 95–107)
CHOLESTEROL, TOTAL: 211 MG/DL (ref 0–199)
CO2: 28 MEQ/L (ref 20–31)
CREAT SERPL-MCNC: 1.17 MG/DL (ref 0.7–1.2)
GFR AFRICAN AMERICAN: >60
GFR NON-AFRICAN AMERICAN: >60
GLUCOSE BLD-MCNC: 283 MG/DL (ref 70–99)
HBA1C MFR BLD: 9 % (ref 4.8–5.9)
HDLC SERPL-MCNC: 32 MG/DL (ref 40–59)
LDL CHOLESTEROL CALCULATED: 140 MG/DL (ref 0–129)
POTASSIUM SERPL-SCNC: 4.6 MEQ/L (ref 3.4–4.9)
SODIUM BLD-SCNC: 137 MEQ/L (ref 135–144)
TRIGL SERPL-MCNC: 193 MG/DL (ref 0–150)

## 2022-09-13 PROCEDURE — 99213 OFFICE O/P EST LOW 20 MIN: CPT | Performed by: INTERNAL MEDICINE

## 2022-09-13 RX ORDER — GABAPENTIN 300 MG/1
300 CAPSULE ORAL 3 TIMES DAILY
Qty: 90 CAPSULE | Refills: 2 | Status: SHIPPED | OUTPATIENT
Start: 2022-09-13 | End: 2022-12-12

## 2022-09-13 SDOH — ECONOMIC STABILITY: FOOD INSECURITY: WITHIN THE PAST 12 MONTHS, THE FOOD YOU BOUGHT JUST DIDN'T LAST AND YOU DIDN'T HAVE MONEY TO GET MORE.: NEVER TRUE

## 2022-09-13 SDOH — ECONOMIC STABILITY: FOOD INSECURITY: WITHIN THE PAST 12 MONTHS, YOU WORRIED THAT YOUR FOOD WOULD RUN OUT BEFORE YOU GOT MONEY TO BUY MORE.: NEVER TRUE

## 2022-09-13 ASSESSMENT — PATIENT HEALTH QUESTIONNAIRE - PHQ9
2. FEELING DOWN, DEPRESSED OR HOPELESS: 0
SUM OF ALL RESPONSES TO PHQ QUESTIONS 1-9: 0
1. LITTLE INTEREST OR PLEASURE IN DOING THINGS: 0
SUM OF ALL RESPONSES TO PHQ9 QUESTIONS 1 & 2: 0
SUM OF ALL RESPONSES TO PHQ QUESTIONS 1-9: 0

## 2022-09-13 ASSESSMENT — SOCIAL DETERMINANTS OF HEALTH (SDOH): HOW HARD IS IT FOR YOU TO PAY FOR THE VERY BASICS LIKE FOOD, HOUSING, MEDICAL CARE, AND HEATING?: NOT HARD AT ALL

## 2022-09-13 NOTE — TELEPHONE ENCOUNTER
Patient made aware that Dr Cooper Seat away the week of Dec. 12th when he is due back for refill medication appt and so tried to scheduled him for the end of week, Dec 5th. He stated that would not work for him. I told him I didn't want him to be short if he was taking a controlled medication.   He chose to make it on Dec 19th instead and was well aware of the above if he calls in

## 2022-09-13 NOTE — PROGRESS NOTES
Shannon Shirley 51 y.o. male presents today with   Chief Complaint   Patient presents with    3 Month Follow-Up    Neurologic Problem    Medication Refill       Leg Pain   The incident occurred more than 1 week ago. The incident occurred at home. There was no injury mechanism. The pain is present in the left leg and right leg. The quality of the pain is described as aching. The pain is at a severity of 7/10. The pain is severe. Associated symptoms include numbness. Past Medical History:   Diagnosis Date    CAD (coronary artery disease) 3/2/2015, 2020    stents    DM (diabetes mellitus), type 2, uncontrolled ([facility] [address] 1/29/2015    Hyperlipidemia     Smoker      Patient Active Problem List    Diagnosis Date Noted    Abnormality of gait and mobility 08/26/2020    Ischemic cardiomyopathy 08/26/2020    Acute respiratory failure with hypoxia ([facility] [address] 08/26/2020    BMI 24.0-24.9, adult 08/26/2020    S/P percutaneous transluminal angioplasty (PTA) with stent placement 08/26/2020    Smoker 08/25/2020    Closed fracture of multiple ribs of left side with routine healing 08/25/2020    Dysphonia 08/25/2020    Impaired mobility dt hypoxic encephalopathy--[facility] rehab admit 8/26/2020 08/25/2020    Severe hypoxic-ischemic encephalopathy     Cardiac arrest ([facility] [address] 08/19/2020    CAD (coronary artery disease) 03/02/2015    DM (diabetes mellitus), type 2, uncontrolled ([facility] [address] 01/29/2015     Past Surgical History:   Procedure Laterality Date    CARDIAC SURGERY       No family history on file.   Social History     Socioeconomic History    Marital status:      Spouse name: None    Number of children: None    Years of education: None    Highest education level: None   Tobacco Use    Smoking status: Every Day     Packs/day: 0.25     Years: 25.00     Pack years: 6.25     Types: Cigarettes    Smokeless tobacco: Never   Substance and Sexual Activity    Alcohol use: Yes     Comment: rarely    Drug use: No    Sexual activity: Yes Partners: Female   Social History Narrative         Lives With: Spouse    Type of Home: [id number] in [facility] [address]: One level Level entry    Bathroom Shower/Tub: Tub/Shower unit    Home Equipment: (no equipment)    ADL Assistance: Independent    Homemaking Assistance: Independent    Homemaking Responsibilities: Yes    Ambulation Assistance: Independent    Transfer Assistance: Independent    Active : Yes    Occupation: Full time employment    Type of occupation:      Social Determinants of Health     Financial Resource Strain: Low Risk     Difficulty of Paying Living Expenses: Not hard at [address]: No Food Insecurity    Worried About [address] in the Last Year: Never true    [address] in the Last Year: Never true   Transportation Needs: No Transportation Needs    Lack of Transportation (Medical): No    Lack of Transportation (Non-Medical): No     Allergies   Allergen Reactions    Metformin And Related     Simvastatin      Leg Pains    Metformin Nausea And Vomiting       Review of Systems   Constitutional:  Negative for fatigue and fever. HENT:  Negative for trouble swallowing and voice change. Eyes:  Negative for photophobia and visual disturbance. Respiratory:  Negative for choking and shortness of breath. Cardiovascular:  Negative for chest pain and palpitations. Gastrointestinal:  Negative for nausea and vomiting. Genitourinary:  Negative for decreased urine volume, testicular pain and urgency. Skin:  Negative for rash. Neurological:  Positive for numbness. Negative for tremors and syncope. Hematological:  Does not bruise/bleed easily. Psychiatric/Behavioral:  Negative for suicidal ideas.           Vitals:    09/13/22 0956   BP: 138/84   Site: Left Upper Arm   Cuff Size: Large Adult   Pulse: 82   SpO2: 99%   Weight: 200 lb (90.7 kg)   Height: 6' 10\" (2.083 m)       Physical Exam  Constitutional:       Appearance: He is well-developed. HENT:      Head: Normocephalic. Eyes:      Conjunctiva/sclera: Conjunctivae normal.   Cardiovascular:      Rate and Rhythm: Regular rhythm. Pulmonary:      Effort: Pulmonary effort is normal.   Abdominal:      General: There is no distension. Musculoskeletal:         General: Normal range of motion. Cervical back: Normal range of motion. Skin:     Coloration: Skin is not jaundiced. Neurological:      Mental Status: He is alert. Assessment/Plan  Klarissa Fox was seen today for 3 month follow-up, neurologic problem and medication refill. Diagnoses and all orders for this visit:    Neuropathic pain of both legs  -     gabapentin (NEURONTIN) 300 MG capsule; Take 1 capsule by mouth 3 times daily for 90 days. Intended supply: 30 days  Controlled Substances Monitoring: Periodic Controlled Substance Monitoring: Possible medication side effects, risk of tolerance/dependence & alternative treatments discussed., No signs of potential drug abuse or diversion identified. , Assessed functional status. Winter Fernando MD)      No follow-ups on file.     Winter Fernando MD

## 2022-09-19 ASSESSMENT — ENCOUNTER SYMPTOMS
PHOTOPHOBIA: 0
NAUSEA: 0
VOICE CHANGE: 0
CHOKING: 0
SHORTNESS OF BREATH: 0
VOMITING: 0
TROUBLE SWALLOWING: 0

## 2022-12-02 NOTE — RESULT ENCOUNTER NOTE
Patient labs from June- patient has likely discussed these as he has been to see Dr. Cee Calzada and PCP since the labs were resulted. Keep regular follow up with Endo and PCP.

## 2022-12-19 ENCOUNTER — OFFICE VISIT (OUTPATIENT)
Dept: PRIMARY CARE CLINIC | Age: 47
End: 2022-12-19

## 2022-12-19 VITALS
DIASTOLIC BLOOD PRESSURE: 80 MMHG | WEIGHT: 203 LBS | HEART RATE: 68 BPM | OXYGEN SATURATION: 100 % | HEIGHT: 70 IN | TEMPERATURE: 98.1 F | BODY MASS INDEX: 29.06 KG/M2 | SYSTOLIC BLOOD PRESSURE: 132 MMHG

## 2022-12-19 DIAGNOSIS — E11.65 UNCONTROLLED TYPE 2 DIABETES MELLITUS WITH HYPERGLYCEMIA (HCC): Primary | ICD-10-CM

## 2022-12-19 DIAGNOSIS — G57.93 NEUROPATHIC PAIN OF BOTH LEGS: ICD-10-CM

## 2022-12-19 PROCEDURE — 99213 OFFICE O/P EST LOW 20 MIN: CPT | Performed by: INTERNAL MEDICINE

## 2022-12-19 PROCEDURE — 3046F HEMOGLOBIN A1C LEVEL >9.0%: CPT | Performed by: INTERNAL MEDICINE

## 2022-12-19 RX ORDER — GABAPENTIN 400 MG/1
400 CAPSULE ORAL 3 TIMES DAILY
Qty: 90 CAPSULE | Refills: 2 | Status: SHIPPED | OUTPATIENT
Start: 2022-12-19 | End: 2023-03-19

## 2022-12-19 ASSESSMENT — PATIENT HEALTH QUESTIONNAIRE - PHQ9
SUM OF ALL RESPONSES TO PHQ QUESTIONS 1-9: 0
1. LITTLE INTEREST OR PLEASURE IN DOING THINGS: 0
SUM OF ALL RESPONSES TO PHQ9 QUESTIONS 1 & 2: 0
SUM OF ALL RESPONSES TO PHQ QUESTIONS 1-9: 0
SUM OF ALL RESPONSES TO PHQ QUESTIONS 1-9: 0
2. FEELING DOWN, DEPRESSED OR HOPELESS: 0
SUM OF ALL RESPONSES TO PHQ QUESTIONS 1-9: 0

## 2022-12-19 ASSESSMENT — ENCOUNTER SYMPTOMS
VOMITING: 0
PHOTOPHOBIA: 0
NAUSEA: 0
TROUBLE SWALLOWING: 0
SHORTNESS OF BREATH: 0
CHOKING: 0
VOICE CHANGE: 0

## 2022-12-19 NOTE — PROGRESS NOTES
Nilson Kruger 49 y.o. male presents today with   Chief Complaint   Patient presents with    3 Month Follow-Up    Diabetes    Neurologic Problem    Medication Refill       Diabetes  He presents for his follow-up diabetic visit. He has type 2 diabetes mellitus. His disease course has been stable. Pertinent negatives for hypoglycemia include no confusion, dizziness or tremors. Pertinent negatives for diabetes include no chest pain and no fatigue. Pertinent negatives for hypoglycemia complications include no blackouts. Neurologic Problem  The patient's pertinent negatives include no syncope. Pertinent negatives include no chest pain, confusion, dizziness, fatigue, fever, nausea, palpitations, shortness of breath or vomiting. Medication Refill  Associated symptoms include numbness. Pertinent negatives include no chest pain, fatigue, fever, nausea, rash or vomiting. Past Medical History:   Diagnosis Date    CAD (coronary artery disease) 3/2/2015, 2020    stents    DM (diabetes mellitus), type 2, uncontrolled 1/29/2015    Hyperlipidemia     Smoker      Patient Active Problem List    Diagnosis Date Noted    Abnormality of gait and mobility 08/26/2020    Ischemic cardiomyopathy 08/26/2020    Acute respiratory failure with hypoxia ([facility] [address] 08/26/2020    BMI 24.0-24.9, adult 08/26/2020    S/P percutaneous transluminal angioplasty (PTA) with stent placement 08/26/2020    Smoker 08/25/2020    Closed fracture of multiple ribs of left side with routine healing 08/25/2020    Dysphonia 08/25/2020    Impaired mobility dt hypoxic encephalopathy--[facility] rehab admit 8/26/2020 08/25/2020    Severe hypoxic-ischemic encephalopathy     Cardiac arrest ([facility] [address] 08/19/2020    CAD (coronary artery disease) 03/02/2015    DM (diabetes mellitus), type 2, uncontrolled 01/29/2015     Past Surgical History:   Procedure Laterality Date    CARDIAC SURGERY       No family history on file.   Social History     Socioeconomic History    Marital status:      Spouse name: None    Number of children: None    Years of education: None    Highest education level: None   Tobacco Use    Smoking status: Every Day     Packs/day: 0.25     Years: 25.00     Pack years: 6.25     Types: Cigarettes    Smokeless tobacco: Never   Substance and Sexual Activity    Alcohol use: Yes     Comment: rarely    Drug use: No    Sexual activity: Yes     Partners: Female   Social History Narrative         Lives With: Spouse    Type of Home: [address] [place] [address] in [facility] [address]: One level Level entry    Bathroom Shower/Tub: Tub/Shower unit    Home Equipment: (no equipment)    ADL Assistance: Independent    Homemaking Assistance: Independent    Homemaking Responsibilities: Yes    Ambulation Assistance: Independent    Transfer Assistance: Independent    Active : Yes    Occupation: Full time employment    Type of occupation:      Social Determinants of Health     Financial Resource Strain: Low Risk     Difficulty of Paying Living Expenses: Not hard at [address]: No Food Insecurity    Worried About [address] in the Last Year: Never true    [address] [employer] in the Last Year: Never true   Transportation Needs: No Transportation Needs    Lack of Transportation (Medical): No    Lack of Transportation (Non-Medical): No     Allergies   Allergen Reactions    Metformin And Related     Simvastatin      Leg Pains    Metformin Nausea And Vomiting       Review of Systems   Constitutional:  Negative for fatigue and fever. HENT:  Negative for trouble swallowing and voice change. Eyes:  Negative for photophobia and visual disturbance. Respiratory:  Negative for choking and shortness of breath. Cardiovascular:  Negative for chest pain and palpitations. Gastrointestinal:  Negative for nausea and vomiting. Genitourinary:  Negative for decreased urine volume, testicular pain and urgency. Skin:  Negative for rash. Neurological:  Positive for numbness. Negative for dizziness, tremors and syncope. Hematological:  Does not bruise/bleed easily. Psychiatric/Behavioral:  Negative for confusion and suicidal ideas. Vitals:    12/19/22 1008   BP: 132/80   Site: Left Upper Arm   Position: Sitting   Cuff Size: Large Adult   Pulse: 68   Temp: 98.1 °F (36.7 °C)   SpO2: 100%   Weight: 203 lb (92.1 kg)   Height: 5' 10\" (1.778 m)       Physical Exam  Constitutional:       Appearance: He is well-developed. HENT:      Head: Normocephalic. Eyes:      Conjunctiva/sclera: Conjunctivae normal.   Cardiovascular:      Rate and Rhythm: Regular rhythm. Pulmonary:      Effort: Pulmonary effort is normal. No respiratory distress. Abdominal:      General: There is no distension. Musculoskeletal:         General: Normal range of motion. Cervical back: Normal range of motion. Skin:     Coloration: Skin is not jaundiced. Neurological:      Mental Status: He is alert. Psychiatric:         Mood and Affect: Mood normal.     Assessment/Plan  Litzy Nuñez was seen today for 3 month follow-up, diabetes, neurologic problem and medication refill. Diagnoses and all orders for this visit:    Uncontrolled type 2 diabetes mellitus with hyperglycemia ([address]  -     Hemoglobin A1C; Future    Neuropathic pain of both legs  -     gabapentin (NEURONTIN) 400 MG capsule; Take 1 capsule by mouth 3 times daily for 90 days. Intended supply: 30 days    Controlled Substances Monitoring: Periodic Controlled Substance Monitoring: Possible medication side effects, risk of tolerance/dependence & alternative treatments discussed., No signs of potential drug abuse or diversion identified. , Assessed functional status. Queen Adriel MD)    Return in about 3 months (around 3/19/2023), or if symptoms worsen or fail to improve.     Queen Adriel MD

## 2022-12-22 DIAGNOSIS — N52.9 ERECTILE DYSFUNCTION, UNSPECIFIED ERECTILE DYSFUNCTION TYPE: ICD-10-CM

## 2022-12-22 RX ORDER — SILDENAFIL 100 MG/1
100 TABLET, FILM COATED ORAL DAILY PRN
Qty: 5 TABLET | Refills: 5 | Status: SHIPPED | OUTPATIENT
Start: 2022-12-22 | End: 2023-06-20

## 2022-12-22 NOTE — TELEPHONE ENCOUNTER
Patient requesting medication refill.  Please approve or deny this request.    Rx requested:  Requested Prescriptions     Pending Prescriptions Disp Refills    sildenafil (VIAGRA) 100 MG tablet 5 tablet 5     Sig: Take 1 tablet by mouth daily as needed for Erectile Dysfunction         Last Office Visit:   12/19/2022      Next Visit Date:  Future Appointments   Date Time Provider Rani Perla   3/20/2023  9:30 AM Reina Pereyra, Pedro Pablo Chemin Rahul Letty

## 2023-03-20 ENCOUNTER — TELEPHONE (OUTPATIENT)
Dept: PRIMARY CARE CLINIC | Age: 48
End: 2023-03-20

## 2023-03-20 ENCOUNTER — OFFICE VISIT (OUTPATIENT)
Dept: PRIMARY CARE CLINIC | Age: 48
End: 2023-03-20

## 2023-03-20 VITALS
BODY MASS INDEX: 29.13 KG/M2 | HEIGHT: 70 IN | OXYGEN SATURATION: 98 % | HEART RATE: 72 BPM | SYSTOLIC BLOOD PRESSURE: 122 MMHG | DIASTOLIC BLOOD PRESSURE: 82 MMHG

## 2023-03-20 DIAGNOSIS — M79.605 LEG PAIN, LEFT: ICD-10-CM

## 2023-03-20 DIAGNOSIS — E11.65 UNCONTROLLED TYPE 2 DIABETES MELLITUS WITH HYPERGLYCEMIA (HCC): Primary | ICD-10-CM

## 2023-03-20 DIAGNOSIS — G57.93 NEUROPATHIC PAIN OF BOTH LEGS: ICD-10-CM

## 2023-03-20 DIAGNOSIS — R60.0 EDEMA, LEG: ICD-10-CM

## 2023-03-20 LAB — HBA1C MFR BLD: 10.9 %

## 2023-03-20 PROCEDURE — 83036 HEMOGLOBIN GLYCOSYLATED A1C: CPT | Performed by: INTERNAL MEDICINE

## 2023-03-20 PROCEDURE — 99212 OFFICE O/P EST SF 10 MIN: CPT | Performed by: INTERNAL MEDICINE

## 2023-03-20 PROCEDURE — 3046F HEMOGLOBIN A1C LEVEL >9.0%: CPT | Performed by: INTERNAL MEDICINE

## 2023-03-20 RX ORDER — INSULIN HUMAN 100 [IU]/ML
INJECTION, SUSPENSION SUBCUTANEOUS
COMMUNITY
Start: 2023-02-23

## 2023-03-20 RX ORDER — GABAPENTIN 400 MG/1
400 CAPSULE ORAL 3 TIMES DAILY
Qty: 90 CAPSULE | Refills: 2 | Status: SHIPPED | OUTPATIENT
Start: 2023-03-20 | End: 2023-06-18

## 2023-03-20 RX ORDER — POTASSIUM CHLORIDE 20 MEQ/1
20 TABLET, EXTENDED RELEASE ORAL DAILY
Qty: 30 TABLET | Refills: 0 | Status: SHIPPED | OUTPATIENT
Start: 2023-03-20

## 2023-03-20 RX ORDER — LISINOPRIL 10 MG/1
10 TABLET ORAL DAILY
COMMUNITY

## 2023-03-20 RX ORDER — FUROSEMIDE 20 MG/1
TABLET ORAL
Qty: 30 TABLET | Refills: 0 | Status: SHIPPED | OUTPATIENT
Start: 2023-03-20

## 2023-03-20 SDOH — ECONOMIC STABILITY: FOOD INSECURITY: WITHIN THE PAST 12 MONTHS, YOU WORRIED THAT YOUR FOOD WOULD RUN OUT BEFORE YOU GOT MONEY TO BUY MORE.: NEVER TRUE

## 2023-03-20 SDOH — ECONOMIC STABILITY: INCOME INSECURITY: HOW HARD IS IT FOR YOU TO PAY FOR THE VERY BASICS LIKE FOOD, HOUSING, MEDICAL CARE, AND HEATING?: NOT HARD AT ALL

## 2023-03-20 SDOH — ECONOMIC STABILITY: FOOD INSECURITY: WITHIN THE PAST 12 MONTHS, THE FOOD YOU BOUGHT JUST DIDN'T LAST AND YOU DIDN'T HAVE MONEY TO GET MORE.: NEVER TRUE

## 2023-03-20 SDOH — ECONOMIC STABILITY: HOUSING INSECURITY
IN THE LAST 12 MONTHS, WAS THERE A TIME WHEN YOU DID NOT HAVE A STEADY PLACE TO SLEEP OR SLEPT IN A SHELTER (INCLUDING NOW)?: NO

## 2023-03-20 ASSESSMENT — PATIENT HEALTH QUESTIONNAIRE - PHQ9
SUM OF ALL RESPONSES TO PHQ QUESTIONS 1-9: 0
SUM OF ALL RESPONSES TO PHQ9 QUESTIONS 1 & 2: 0
1. LITTLE INTEREST OR PLEASURE IN DOING THINGS: 0
2. FEELING DOWN, DEPRESSED OR HOPELESS: 0
SUM OF ALL RESPONSES TO PHQ QUESTIONS 1-9: 0

## 2023-03-20 NOTE — PROGRESS NOTES
Debora Gotti 49 y.o. male presents today with   Chief Complaint   Patient presents with    3 Month Follow-Up    Neurologic Problem    Medication Refill    Foot Swelling     Ulcers on both lower legs    Cholesterol Problem     Pt states that he d/c medication \"leg pain\"       Diabetes  He presents for his follow-up diabetic visit. His disease course has been fluctuating. Pertinent negatives for hypoglycemia include no confusion, dizziness or tremors. Pertinent negatives for diabetes include no chest pain and no fatigue. Pertinent negatives for hypoglycemia complications include no blackouts. Leg Pain   Incident onset: years. Injury mechanism: neuropathy. The pain is present in the left leg, left foot, right leg and right foot. Associated symptoms include numbness. Past Medical History:   Diagnosis Date    CAD (coronary artery disease) 3/2/2015, 2020    stents    DM (diabetes mellitus), type 2, uncontrolled 01/29/2015    Hyperlipidemia     Neuropathy     legs bilateral    Smoker      Patient Active Problem List    Diagnosis Date Noted    Abnormality of gait and mobility 08/26/2020    Ischemic cardiomyopathy 08/26/2020    Acute respiratory failure with hypoxia ([facility] [address] 08/26/2020    BMI 24.0-24.9, adult 08/26/2020    S/P percutaneous transluminal angioplasty (PTA) with stent placement 08/26/2020    Smoker 08/25/2020    Closed fracture of multiple ribs of left side with routine healing 08/25/2020    Dysphonia 08/25/2020    Impaired mobility dt hypoxic encephalopathy--[facility] rehab admit 8/26/2020 08/25/2020    Severe hypoxic-ischemic encephalopathy     Cardiac arrest ([facility] [address] 08/19/2020    CAD (coronary artery disease) 03/02/2015    DM (diabetes mellitus), type 2, uncontrolled 01/29/2015     Past Surgical History:   Procedure Laterality Date    CARDIAC SURGERY       No family history on file.   Social History     Socioeconomic History    Marital status:      Spouse name: None    Number of children: None

## 2023-03-26 ASSESSMENT — ENCOUNTER SYMPTOMS
TROUBLE SWALLOWING: 0
SHORTNESS OF BREATH: 0
NAUSEA: 0
VOICE CHANGE: 0
VOMITING: 0
PHOTOPHOBIA: 0
CHOKING: 0

## 2023-05-23 NOTE — PROGRESS NOTES
Occupational Therapy  Facility/Department: [facility]  Daily Treatment Note  NAME: John Ford. : 1975  MRN: [id number]    Date of Service: 2020    Discharge Recommendations:  Continue to assess pending progress       Assessment      REQUIRES OT FOLLOW UP: Yes  Activity Tolerance  Activity Tolerance: Patient Tolerated treatment well  Safety Devices  Safety Devices in place: Yes  Type of devices: All fall risk precautions in place         Patient Diagnosis(es): There were no encounter diagnoses. has a past medical history of CAD (coronary artery disease) and DM (diabetes mellitus), type 2, uncontrolled ([facility] [address] has no past surgical history on file. Restrictions  Restrictions/Precautions  Restrictions/Precautions: Fall Risk, Contact Precautions  Position Activity Restriction  Other position/activity restrictions: contact precautions MRSA     Subjective   General  Chart Reviewed: Yes  Patient assessed for rehabilitation services?: Yes  Response to previous treatment: Patient reporting fatigue but able to participate  Family / Caregiver Present: No  Referring Practitioner: Dr. Jorgito Zuniga  Diagnosis: Impaired mobility/ADLs 2° to hypoxic encephalopathy s/p cardiac arrest    Subjective  Subjective: Okay, I'm here. Pain Assessment  Pain Level: 0  Pre Treatment Pain Screening  Pain at present: 0     Orientation  Orientation  Overall Orientation Status: Within Functional Limits     Objective      Patient engaged in B FM coordination/strengthening, B UE ROM/strengthening and cognition to increase I with ADL's , IADL's and transfers. Patient donned B 1 # wrist weights, able to reach in vertical planes with 0 difficulty with B UE to complete HiQ activity on incline. Patient able to remove HiQ circular pieces from wooden dowels on board with 0 difficulty. Patient able to place HiQ circular pieces on wooden dowels on board with 0 difficulty.    Patient with 0 problem solving activity leaving 5 pieces on board. Patient engaged in therapeutic activity to increase B FM coordination/strengthening, B UE ROM/strengthening and cognition to increase I with ADL's, IADL's and transfers. Patient donned B 1 # wrist weights. Patient able to reach in lateral planes with 0 difficulty. Patient able to reach in forward planes with 0 difficulty to assemble blocks and bolts design. Patient able to assemble 28 piece blocks and bolts design following visual design. Patient with 0 difficulty inserting bolts into blocks. Patient with 0 difficulty threading wing nuts onto bolts. Patient noted to require 0 verbal cues for correct assembly.           Plan   Plan  Times per week: 5-7x/week  Plan weeks: 1-1.5 weeks  Current Treatment Recommendations: Strengthening, Endurance Training, Neuromuscular Re-education, Self-Care / ADL, Balance Training, Functional Mobility Training, Safety Education & Training, ROM    Plan Comment: Continue per OT POC for updated planned d/c on 9-3-20         Goals  Patient Goals   Patient goals : \"I want to get home\"       Therapy Time   Individual Concurrent Group Co-treatment   Time In 1300         Time Out 1330         Minutes 30             Therapeutic activities: 30 minutes       Electronically signed by Argenis Freed on 9/2/20 at 2:00 PM [facility]

## 2023-06-20 ENCOUNTER — OFFICE VISIT (OUTPATIENT)
Dept: PRIMARY CARE CLINIC | Age: 48
End: 2023-06-20

## 2023-06-20 ENCOUNTER — TELEPHONE (OUTPATIENT)
Dept: PRIMARY CARE CLINIC | Age: 48
End: 2023-06-20

## 2023-06-20 VITALS
OXYGEN SATURATION: 99 % | BODY MASS INDEX: 28.2 KG/M2 | SYSTOLIC BLOOD PRESSURE: 138 MMHG | DIASTOLIC BLOOD PRESSURE: 72 MMHG | HEART RATE: 80 BPM | WEIGHT: 197 LBS | HEIGHT: 70 IN

## 2023-06-20 DIAGNOSIS — G57.93 NEUROPATHIC PAIN OF BOTH LEGS: Primary | ICD-10-CM

## 2023-06-20 DIAGNOSIS — E11.65 UNCONTROLLED TYPE 2 DIABETES MELLITUS WITH HYPERGLYCEMIA (HCC): ICD-10-CM

## 2023-06-20 DIAGNOSIS — E11.65 UNCONTROLLED TYPE 2 DIABETES MELLITUS WITH HYPERGLYCEMIA (HCC): Primary | ICD-10-CM

## 2023-06-20 PROCEDURE — 3046F HEMOGLOBIN A1C LEVEL >9.0%: CPT | Performed by: INTERNAL MEDICINE

## 2023-06-20 PROCEDURE — 99212 OFFICE O/P EST SF 10 MIN: CPT | Performed by: INTERNAL MEDICINE

## 2023-06-20 RX ORDER — INSULIN HUMAN 100 [IU]/ML
15 INJECTION, SUSPENSION SUBCUTANEOUS
Qty: 5 ADJUSTABLE DOSE PRE-FILLED PEN SYRINGE | Refills: 5 | Status: SHIPPED | OUTPATIENT
Start: 2023-06-20

## 2023-06-20 RX ORDER — INSULIN HUMAN 100 [IU]/ML
5 INJECTION, SUSPENSION SUBCUTANEOUS
Qty: 5 ADJUSTABLE DOSE PRE-FILLED PEN SYRINGE | Refills: 3 | Status: SHIPPED | OUTPATIENT
Start: 2023-06-20

## 2023-06-20 RX ORDER — GABAPENTIN 400 MG/1
400 CAPSULE ORAL 3 TIMES DAILY
Qty: 90 CAPSULE | Refills: 2 | Status: SHIPPED | OUTPATIENT
Start: 2023-06-20 | End: 2023-09-18

## 2023-06-20 ASSESSMENT — ENCOUNTER SYMPTOMS
NAUSEA: 0
VOICE CHANGE: 0
VOMITING: 0
TROUBLE SWALLOWING: 0
SHORTNESS OF BREATH: 0
CHOKING: 0
PHOTOPHOBIA: 0
BLURRED VISION: 0

## 2023-06-20 NOTE — TELEPHONE ENCOUNTER
[facility] calling to see if you can change the Humulin N Kwikpen to Novolin N due to price? His # is [phone].

## 2023-06-21 ENCOUNTER — TELEPHONE (OUTPATIENT)
Dept: PRIMARY CARE CLINIC | Age: 48
End: 2023-06-21

## 2023-06-21 NOTE — TELEPHONE ENCOUNTER
[id number] Vang is calling, wants to change the medication Humulin N to Novolin N instead (cheaper one patient doesn't have insurance)  Wants to know is this okay with you

## 2023-09-19 ENCOUNTER — OFFICE VISIT (OUTPATIENT)
Dept: PRIMARY CARE CLINIC | Age: 48
End: 2023-09-19

## 2023-09-19 VITALS
DIASTOLIC BLOOD PRESSURE: 86 MMHG | HEART RATE: 78 BPM | BODY MASS INDEX: 28.92 KG/M2 | OXYGEN SATURATION: 98 % | SYSTOLIC BLOOD PRESSURE: 142 MMHG | HEIGHT: 70 IN | WEIGHT: 202 LBS

## 2023-09-19 DIAGNOSIS — G57.93 NEUROPATHIC PAIN OF BOTH LEGS: ICD-10-CM

## 2023-09-19 PROCEDURE — 99212 OFFICE O/P EST SF 10 MIN: CPT | Performed by: INTERNAL MEDICINE

## 2023-09-19 RX ORDER — GABAPENTIN 400 MG/1
400 CAPSULE ORAL 3 TIMES DAILY
Qty: 90 CAPSULE | Refills: 2 | Status: SHIPPED | OUTPATIENT
Start: 2023-09-19 | End: 2023-09-22 | Stop reason: SDUPTHER

## 2023-09-19 NOTE — PROGRESS NOTES
Israel Rich 49 y.o. male presents today with   Chief Complaint   Patient presents with    3 Month Follow-Up    Neurologic Problem    Medication Refill       Leg Pain   Incident onset: years. Injury mechanism: neuropathy. The pain is present in the left leg and right leg. The pain is at a severity of 6/10. The pain is severe. The pain has been Fluctuating since onset. Associated symptoms include numbness. Past Medical History:   Diagnosis Date    CAD (coronary artery disease) 3/2/2015, 2020    stents    DM (diabetes mellitus), type 2, uncontrolled 01/29/2015    Hyperlipidemia     Neuropathy     legs bilateral    Smoker      Patient Active Problem List    Diagnosis Date Noted    Abnormality of gait and mobility 08/26/2020    Ischemic cardiomyopathy 08/26/2020    Acute respiratory failure with hypoxia ([address]) 08/26/2020    BMI 24.0-24.9, adult 08/26/2020    S/P percutaneous transluminal angioplasty (PTA) with stent placement 08/26/2020    Smoker 08/25/2020    Closed fracture of multiple ribs of left side with routine healing 08/25/2020    Dysphonia 08/25/2020    Impaired mobility dt hypoxic encephalopathy--[facility] rehab admit 8/26/2020 08/25/2020    Severe hypoxic-ischemic encephalopathy     Cardiac arrest ([address]) 08/19/2020    CAD (coronary artery disease) 03/02/2015    DM (diabetes mellitus), type 2, uncontrolled 01/29/2015     Past Surgical History:   Procedure Laterality Date    CARDIAC SURGERY       No family history on file.   Social History     Socioeconomic History    Marital status:      Spouse name: None    Number of children: None    Years of education: None    Highest education level: None   Tobacco Use    Smoking status: Every Day     Packs/day: 0.25     Years: 25.00     Additional pack years: 0.00     Total pack years: 6.25     Types: Cigarettes    Smokeless tobacco: Never   Substance and Sexual Activity    Alcohol use: Yes     Comment: rarely    Drug use: No    Sexual activity: Yes

## 2023-09-22 RX ORDER — GABAPENTIN 400 MG/1
400 CAPSULE ORAL 3 TIMES DAILY
Qty: 90 CAPSULE | Refills: 2 | Status: SHIPPED | OUTPATIENT
Start: 2023-09-22 | End: 2023-12-21

## 2023-09-22 ASSESSMENT — ENCOUNTER SYMPTOMS
VOMITING: 0
PHOTOPHOBIA: 0
SHORTNESS OF BREATH: 0
NAUSEA: 0
VOICE CHANGE: 0
TROUBLE SWALLOWING: 0
CHOKING: 0

## 2023-12-19 ENCOUNTER — OFFICE VISIT (OUTPATIENT)
Dept: PRIMARY CARE CLINIC | Age: 48
End: 2023-12-19

## 2023-12-19 VITALS
SYSTOLIC BLOOD PRESSURE: 138 MMHG | WEIGHT: 204 LBS | OXYGEN SATURATION: 96 % | BODY MASS INDEX: 29.27 KG/M2 | HEART RATE: 85 BPM | DIASTOLIC BLOOD PRESSURE: 84 MMHG

## 2023-12-19 DIAGNOSIS — E11.65 UNCONTROLLED TYPE 2 DIABETES MELLITUS WITH HYPERGLYCEMIA (HCC): Primary | ICD-10-CM

## 2023-12-19 DIAGNOSIS — G57.93 NEUROPATHIC PAIN OF BOTH LEGS: ICD-10-CM

## 2023-12-19 LAB — HBA1C MFR BLD: 9.5 %

## 2023-12-19 PROCEDURE — 99213 OFFICE O/P EST LOW 20 MIN: CPT | Performed by: INTERNAL MEDICINE

## 2023-12-19 PROCEDURE — 83036 HEMOGLOBIN GLYCOSYLATED A1C: CPT | Performed by: INTERNAL MEDICINE

## 2023-12-19 PROCEDURE — 3046F HEMOGLOBIN A1C LEVEL >9.0%: CPT | Performed by: INTERNAL MEDICINE

## 2023-12-19 RX ORDER — GABAPENTIN 400 MG/1
400 CAPSULE ORAL 3 TIMES DAILY
Qty: 90 CAPSULE | Refills: 2 | Status: SHIPPED | OUTPATIENT
Start: 2023-12-19 | End: 2024-03-18

## 2023-12-19 RX ORDER — INSULIN HUMAN 100 [IU]/ML
INJECTION, SUSPENSION SUBCUTANEOUS
Qty: 5 ADJUSTABLE DOSE PRE-FILLED PEN SYRINGE | Refills: 3 | Status: SHIPPED | OUTPATIENT
Start: 2023-12-19

## 2023-12-19 NOTE — PROGRESS NOTES
Onel Rapp 51 y.o. male presents today with   Chief Complaint   Patient presents with    3 Month Follow-Up    Neurologic Problem    Medication Refill       Neurologic Problem  The patient's primary symptoms include focal sensory loss. Primary symptoms comment: legs. This is a chronic problem. The current episode started more than 1 year ago. The neurological problem developed gradually. The problem has been waxing and waning since onset. Pertinent negatives include no chest pain, confusion, dizziness, fatigue, fever, nausea, palpitations, shortness of breath or vomiting. Medication Refill  Associated symptoms include myalgias. Pertinent negatives include no chest pain, fatigue, fever, nausea, rash or vomiting. Diabetes  He presents for his follow-up diabetic visit. He has type 2 diabetes mellitus. His disease course has been stable. Pertinent negatives for hypoglycemia include no confusion, dizziness or tremors. Pertinent negatives for diabetes include no blurred vision, no chest pain and no fatigue. Pertinent negatives for hypoglycemia complications include no blackouts. Symptoms are stable.        Past Medical History:   Diagnosis Date    CAD (coronary artery disease) 3/2/2015, 2020    stents    DM (diabetes mellitus), type 2, uncontrolled 01/29/2015    Hyperlipidemia     Neuropathy     legs bilateral    Smoker      Patient Active Problem List    Diagnosis Date Noted    Abnormality of gait and mobility 08/26/2020    Ischemic cardiomyopathy 08/26/2020    Acute respiratory failure with hypoxia ([address]) 08/26/2020    BMI 24.0-24.9, adult 08/26/2020    S/P percutaneous transluminal angioplasty (PTA) with stent placement 08/26/2020    Smoker 08/25/2020    Closed fracture of multiple ribs of left side with routine healing 08/25/2020    Dysphonia 08/25/2020    Impaired mobility dt hypoxic encephalopathy--[facility] rehab admit 8/26/2020 08/25/2020    Severe hypoxic-ischemic encephalopathy     Cardiac arrest ([address])

## 2023-12-26 ENCOUNTER — TELEPHONE (OUTPATIENT)
Dept: PRIMARY CARE CLINIC | Age: 48
End: 2023-12-26

## 2023-12-26 NOTE — TELEPHONE ENCOUNTER
[facility]       Does the pt need to be on both the       insulin 70-30 (NOVOLIN 70/30) (70-30) 100 UNIT per ML injection vial       insulin NPH (HUMULIN N KWIKPEN) 100 UNIT/ML injection pen       Please clarify thanks      [facility] [address] [place] [address] - [address] [place] [address] - P [phone] - F [phone] [id number] [facility] [address]  Phone: [phone]  Fax: [phone]

## 2023-12-29 NOTE — TELEPHONE ENCOUNTER
Wife is not 100% she knows he takes 2 insulins 1 vial and 1 is a pen but she will confirm once she gets off work

## 2024-03-19 ENCOUNTER — OFFICE VISIT (OUTPATIENT)
Dept: PRIMARY CARE CLINIC | Age: 49
End: 2024-03-19
Payer: COMMERCIAL

## 2024-03-19 VITALS
BODY MASS INDEX: 27.69 KG/M2 | WEIGHT: 193 LBS | OXYGEN SATURATION: 98 % | DIASTOLIC BLOOD PRESSURE: 78 MMHG | SYSTOLIC BLOOD PRESSURE: 138 MMHG | HEART RATE: 82 BPM

## 2024-03-19 DIAGNOSIS — E11.65 UNCONTROLLED TYPE 2 DIABETES MELLITUS WITH HYPERGLYCEMIA (HCC): ICD-10-CM

## 2024-03-19 DIAGNOSIS — L97.501 ULCER OF FOOT, LIMITED TO BREAKDOWN OF SKIN, UNSPECIFIED LATERALITY (HCC): Primary | ICD-10-CM

## 2024-03-19 DIAGNOSIS — G57.93 NEUROPATHIC PAIN OF BOTH LEGS: ICD-10-CM

## 2024-03-19 PROCEDURE — 99214 OFFICE O/P EST MOD 30 MIN: CPT | Performed by: INTERNAL MEDICINE

## 2024-03-19 RX ORDER — AMOXICILLIN AND CLAVULANATE POTASSIUM 875; 125 MG/1; MG/1
1 TABLET, FILM COATED ORAL 2 TIMES DAILY
Qty: 14 TABLET | Refills: 0 | Status: SHIPPED | OUTPATIENT
Start: 2024-03-19 | End: 2024-03-26

## 2024-03-19 RX ORDER — GABAPENTIN 400 MG/1
400 CAPSULE ORAL 3 TIMES DAILY
Qty: 90 CAPSULE | Refills: 2 | Status: SHIPPED | OUTPATIENT
Start: 2024-03-19 | End: 2024-06-17

## 2024-03-19 ASSESSMENT — PATIENT HEALTH QUESTIONNAIRE - PHQ9
SUM OF ALL RESPONSES TO PHQ QUESTIONS 1-9: 0
SUM OF ALL RESPONSES TO PHQ QUESTIONS 1-9: 0
SUM OF ALL RESPONSES TO PHQ9 QUESTIONS 1 & 2: 0
SUM OF ALL RESPONSES TO PHQ QUESTIONS 1-9: 0
1. LITTLE INTEREST OR PLEASURE IN DOING THINGS: NOT AT ALL
2. FEELING DOWN, DEPRESSED OR HOPELESS: NOT AT ALL
SUM OF ALL RESPONSES TO PHQ QUESTIONS 1-9: 0

## 2024-03-19 ASSESSMENT — ENCOUNTER SYMPTOMS
TROUBLE SWALLOWING: 0
SHORTNESS OF BREATH: 0
VOICE CHANGE: 0
PHOTOPHOBIA: 0
CHOKING: 0
VOMITING: 0

## 2024-03-19 NOTE — PROGRESS NOTES
Ant Lora 48 y.o. male presents today with   Chief Complaint   Patient presents with    3 Month Follow-Up    Neurologic Problem    Medication Refill    Foot Ulcer     RIGHT BOTTOM FOOT.       Neurologic Problem  The patient's pertinent negatives include no syncope. Pertinent negatives include no chest pain, fatigue, fever, nausea, palpitations, shortness of breath or vomiting.   Medication Refill  Associated symptoms include numbness. Pertinent negatives include no chest pain, fatigue, fever, nausea, rash or vomiting.    right foot ulcer seen it one month.      Past Medical History:   Diagnosis Date    CAD (coronary artery disease) 3/2/2015, 2020    stents    DM (diabetes mellitus), type 2, uncontrolled 01/29/2015    Hyperlipidemia     Neuropathy     legs bilateral    Smoker      Patient Active Problem List    Diagnosis Date Noted    Abnormality of gait and mobility 08/26/2020    Ischemic cardiomyopathy 08/26/2020    Acute respiratory failure with hypoxia (HCC) 08/26/2020    BMI 24.0-24.9, adult 08/26/2020    S/P percutaneous transluminal angioplasty (PTA) with stent placement 08/26/2020    Smoker 08/25/2020    Closed fracture of multiple ribs of left side with routine healing 08/25/2020    Dysphonia 08/25/2020    Impaired mobility dt hypoxic encephalopathy--[facility] rehab admit 8/26/2020 08/25/2020    Severe hypoxic-ischemic encephalopathy     Cardiac arrest (HCC) 08/19/2020    CAD (coronary artery disease) 03/02/2015    DM (diabetes mellitus), type 2, uncontrolled 01/29/2015     Past Surgical History:   Procedure Laterality Date    CARDIAC SURGERY       No family history on file.  Social History     Socioeconomic History    Marital status:      Spouse name: None    Number of children: None    Years of education: None    Highest education level: None   Tobacco Use    Smoking status: Every Day     Current packs/day: 0.25     Average packs/day: 0.3 packs/day for 25.0 years (6.3 ttl pk-yrs)     Types:

## 2024-03-21 DIAGNOSIS — N52.9 ERECTILE DYSFUNCTION, UNSPECIFIED ERECTILE DYSFUNCTION TYPE: ICD-10-CM

## 2024-03-21 RX ORDER — SILDENAFIL 100 MG/1
TABLET, FILM COATED ORAL
Qty: 5 TABLET | Refills: 5 | Status: SHIPPED | OUTPATIENT
Start: 2024-03-21

## 2024-03-28 ENCOUNTER — TELEPHONE (OUTPATIENT)
Dept: PRIMARY CARE CLINIC | Age: 49
End: 2024-03-28

## 2024-03-28 NOTE — TELEPHONE ENCOUNTER
Pt wont be using [facility] wound care due to location.   Pt cannot be seen with Endo for a diagnosis of ulcer of foot.   Needs to be different diagnosis.

## 2024-05-16 ENCOUNTER — OFFICE VISIT (OUTPATIENT)
Dept: PRIMARY CARE CLINIC | Age: 49
End: 2024-05-16
Payer: COMMERCIAL

## 2024-05-16 VITALS
DIASTOLIC BLOOD PRESSURE: 78 MMHG | BODY MASS INDEX: 29.13 KG/M2 | SYSTOLIC BLOOD PRESSURE: 128 MMHG | OXYGEN SATURATION: 98 % | HEART RATE: 83 BPM | WEIGHT: 203 LBS

## 2024-05-16 DIAGNOSIS — L97.519 ULCER OF FOOT, RIGHT, WITH UNSPECIFIED SEVERITY (HCC): Primary | ICD-10-CM

## 2024-05-16 PROCEDURE — 99213 OFFICE O/P EST LOW 20 MIN: CPT | Performed by: INTERNAL MEDICINE

## 2024-05-16 RX ORDER — AMOXICILLIN AND CLAVULANATE POTASSIUM 875; 125 MG/1; MG/1
1 TABLET, FILM COATED ORAL 2 TIMES DAILY
Qty: 14 TABLET | Refills: 0 | Status: SHIPPED | OUTPATIENT
Start: 2024-05-16 | End: 2024-05-23

## 2024-05-16 SDOH — ECONOMIC STABILITY: FOOD INSECURITY: WITHIN THE PAST 12 MONTHS, YOU WORRIED THAT YOUR FOOD WOULD RUN OUT BEFORE YOU GOT MONEY TO BUY MORE.: NEVER TRUE

## 2024-05-16 SDOH — ECONOMIC STABILITY: INCOME INSECURITY: HOW HARD IS IT FOR YOU TO PAY FOR THE VERY BASICS LIKE FOOD, HOUSING, MEDICAL CARE, AND HEATING?: NOT HARD AT ALL

## 2024-05-16 SDOH — ECONOMIC STABILITY: FOOD INSECURITY: WITHIN THE PAST 12 MONTHS, THE FOOD YOU BOUGHT JUST DIDN'T LAST AND YOU DIDN'T HAVE MONEY TO GET MORE.: NEVER TRUE

## 2024-05-16 NOTE — PROGRESS NOTES
Ant Lora 48 y.o. male presents today with   Chief Complaint   Patient presents with    Follow-up    Foot Ulcer     Odor and blackright foot       HPI he did not see podiatry  Ulcer bigger.      Past Medical History:   Diagnosis Date    CAD (coronary artery disease) 3/2/2015, 2020    stents    DM (diabetes mellitus), type 2, uncontrolled 01/29/2015    Hyperlipidemia     Neuropathy     legs bilateral    Smoker      Patient Active Problem List    Diagnosis Date Noted    Abnormality of gait and mobility 08/26/2020    Ischemic cardiomyopathy 08/26/2020    Acute respiratory failure with hypoxia (HCC) 08/26/2020    BMI 24.0-24.9, adult 08/26/2020    S/P percutaneous transluminal angioplasty (PTA) with stent placement 08/26/2020    Smoker 08/25/2020    Closed fracture of multiple ribs of left side with routine healing 08/25/2020    Dysphonia 08/25/2020    Impaired mobility dt hypoxic encephalopathy--[facility] rehab admit 8/26/2020 08/25/2020    Severe hypoxic-ischemic encephalopathy     Cardiac arrest (HCC) 08/19/2020    CAD (coronary artery disease) 03/02/2015    DM (diabetes mellitus), type 2, uncontrolled 01/29/2015     Past Surgical History:   Procedure Laterality Date    CARDIAC SURGERY       No family history on file.  Social History     Socioeconomic History    Marital status:    Tobacco Use    Smoking status: Every Day     Current packs/day: 0.25     Average packs/day: 0.3 packs/day for 25.0 years (6.3 ttl pk-yrs)     Types: Cigarettes    Smokeless tobacco: Never   Substance and Sexual Activity    Alcohol use: Yes     Comment: rarely    Drug use: No    Sexual activity: Yes     Partners: Female   Social History Narrative         Lives With: Spouse    Type of Home: [address] [place] [address] in [facility]     Home Layout: One level Level entry    Bathroom Shower/Tub: Tub/Shower unit    Home Equipment: (no equipment)    ADL Assistance: Independent    Homemaking Assistance: Independent    Homemaking

## 2024-06-17 ENCOUNTER — TELEPHONE (OUTPATIENT)
Dept: DIABETES SERVICES | Age: 49
End: 2024-06-17

## 2024-06-18 ENCOUNTER — TELEPHONE (OUTPATIENT)
Dept: PODIATRY | Age: 49
End: 2024-06-18

## 2024-06-18 ENCOUNTER — OFFICE VISIT (OUTPATIENT)
Dept: PRIMARY CARE CLINIC | Age: 49
End: 2024-06-18
Payer: COMMERCIAL

## 2024-06-18 VITALS
BODY MASS INDEX: 29.7 KG/M2 | WEIGHT: 207 LBS | OXYGEN SATURATION: 99 % | SYSTOLIC BLOOD PRESSURE: 132 MMHG | HEART RATE: 75 BPM | DIASTOLIC BLOOD PRESSURE: 72 MMHG

## 2024-06-18 DIAGNOSIS — L97.519 DIABETIC ULCER OF RIGHT FOOT ASSOCIATED WITH TYPE 2 DIABETES MELLITUS, UNSPECIFIED PART OF FOOT, UNSPECIFIED ULCER STAGE (HCC): Primary | ICD-10-CM

## 2024-06-18 DIAGNOSIS — Z51.81 THERAPEUTIC DRUG MONITORING: ICD-10-CM

## 2024-06-18 DIAGNOSIS — L97.501 ULCER OF FOOT, LIMITED TO BREAKDOWN OF SKIN, UNSPECIFIED LATERALITY (HCC): ICD-10-CM

## 2024-06-18 DIAGNOSIS — E11.65 UNCONTROLLED TYPE 2 DIABETES MELLITUS WITH HYPERGLYCEMIA (HCC): ICD-10-CM

## 2024-06-18 DIAGNOSIS — G57.93 NEUROPATHIC PAIN OF BOTH LEGS: Primary | ICD-10-CM

## 2024-06-18 DIAGNOSIS — E11.621 DIABETIC ULCER OF RIGHT FOOT ASSOCIATED WITH TYPE 2 DIABETES MELLITUS, UNSPECIFIED PART OF FOOT, UNSPECIFIED ULCER STAGE (HCC): Primary | ICD-10-CM

## 2024-06-18 PROCEDURE — 99214 OFFICE O/P EST MOD 30 MIN: CPT | Performed by: INTERNAL MEDICINE

## 2024-06-18 RX ORDER — CEPHALEXIN 500 MG/1
500 CAPSULE ORAL 3 TIMES DAILY
Qty: 90 CAPSULE | Refills: 0 | Status: SHIPPED | OUTPATIENT
Start: 2024-06-18

## 2024-06-18 RX ORDER — GABAPENTIN 400 MG/1
400 CAPSULE ORAL 3 TIMES DAILY
Qty: 90 CAPSULE | Refills: 2 | Status: SHIPPED | OUTPATIENT
Start: 2024-06-18 | End: 2024-09-16

## 2024-06-18 NOTE — TELEPHONE ENCOUNTER
Patient is being referred for Ulcer of foot, limited to breakdown of skin.  He is scheduled to see Dr. Valladares 8-.  Will Dr. Valladares please review patient's chart to determine if he should be seen sooner than 8-?

## 2024-06-18 NOTE — PROGRESS NOTES
Abused: No     Physically Abused: No     Sexually Abused: No   Housing Stability: Unknown (5/16/2024)    Housing Stability Vital Sign     Unstable Housing in the Last Year: No     Allergies   Allergen Reactions    Metformin And Related     Simvastatin      Leg Pains    Metformin Nausea And Vomiting       Review of Systems   Constitutional:  Negative for fatigue and fever.   HENT:  Negative for trouble swallowing and voice change.    Eyes:  Negative for photophobia and visual disturbance.   Respiratory:  Negative for choking and shortness of breath.    Cardiovascular:  Negative for chest pain and palpitations.   Gastrointestinal:  Negative for nausea and vomiting.   Genitourinary:  Negative for decreased urine volume, testicular pain and urgency.   Skin:  Negative for rash.   Neurological:  Positive for numbness. Negative for dizziness, tremors and syncope.   Hematological:  Does not bruise/bleed easily.   Psychiatric/Behavioral:  Negative for confusion and suicidal ideas.            Vitals:    06/18/24 0820   BP: 132/72   Pulse: 75   SpO2: 99%   Weight: 93.9 kg (207 lb)       Physical Exam  Constitutional:       Appearance: He is well-developed.   HENT:      Head: Normocephalic.   Eyes:      Conjunctiva/sclera: Conjunctivae normal.   Cardiovascular:      Rate and Rhythm: Regular rhythm.   Pulmonary:      Effort: Pulmonary effort is normal.   Abdominal:      General: There is no distension.   Musculoskeletal:         General: Normal range of motion.      Cervical back: Normal range of motion.   Skin:     Coloration: Skin is not jaundiced.   Neurological:      Mental Status: He is alert.         Assessment/Plan  Ant was seen today for 3 month follow-up, neurologic problem and medication refill.    Diagnoses and all orders for this visit:    Therapeutic drug monitoring  -     Pain Management Drug Screen; Future    Neuropathic pain of both legs  -     gabapentin (NEURONTIN) 400 MG capsule; Take 1 capsule by mouth 3

## 2024-06-18 NOTE — TELEPHONE ENCOUNTER
I called and spoke with Betsey, to have patient return our call.     Please advise patient Dr. Valladares's message:   Please let the patient know I submitted a referral to the [facility] wound care center, the center should be calling him to schedule an appointment for either this Thursday or next Thursday.

## 2024-06-20 ENCOUNTER — HOSPITAL ENCOUNTER (OUTPATIENT)
Dept: WOUND CARE | Age: 49
Discharge: HOME | End: 2024-06-20
Attending: PODIATRIST
Payer: COMMERCIAL

## 2024-06-20 VITALS
SYSTOLIC BLOOD PRESSURE: 159 MMHG | DIASTOLIC BLOOD PRESSURE: 80 MMHG | HEART RATE: 78 BPM | TEMPERATURE: 97.7 F | RESPIRATION RATE: 17 BRPM

## 2024-06-20 DIAGNOSIS — L97.512 ULCER OF RIGHT FOOT WITH FAT LAYER EXPOSED (HCC): Primary | ICD-10-CM

## 2024-06-20 DIAGNOSIS — E11.621 TYPE 2 DIABETES MELLITUS WITH FOOT ULCER (CODE) (HCC): ICD-10-CM

## 2024-06-20 LAB
6MAM UR QL: NOT DETECTED
7-AMINOCLONAZEPAM: NOT DETECTED
ALPHA-OH-ALPRAZOLAM: NOT DETECTED
ALPHA-OH-MIDAZOLAM, URINE: NOT DETECTED
ALPRAZOLAM: NOT DETECTED
AMPHET UR QL SCN: NOT DETECTED
BARBITURATES: NEGATIVE
BENZOYLECGONINE: NEGATIVE
BUPRENORPHINE: NOT DETECTED
CARISOPRODOL UR QL: NEGATIVE
CLONAZEPAM UR QL: NOT DETECTED
CODEINE: NOT DETECTED
CREAT UR-MCNC: 62.5 MG/DL (ref 20–400)
DIAZEPAM: NOT DETECTED
ETHYL GLUCURONIDE: NEGATIVE
FENTANYL UR QL: NOT DETECTED
GABAPENTIN: PRESENT
HYDROCODONE UR QL: NOT DETECTED
HYDROMORPHONE: NOT DETECTED
LORAZEPAM UR QL: NOT DETECTED
MARIJUANA METABOLITE: NEGATIVE
MDA: NOT DETECTED
MDEA: NOT DETECTED
MDMA UR QL: NOT DETECTED
MEPERIDINE: NOT DETECTED
METHADONE: NEGATIVE
METHAMPHETAMINE: NOT DETECTED
METHYLPHENIDATE: NOT DETECTED
MIDAZOLAM UR QL SCN: NOT DETECTED
MORPHINE: NOT DETECTED
NALOXONE: NOT DETECTED
NORBUPRENORPHINE, FREE: NOT DETECTED
NORDIAZEPAM: NOT DETECTED
NORFENTANYL: NOT DETECTED
NORHYDROCODONE, URINE: NOT DETECTED
NOROXYCODONE: NOT DETECTED
NOROXYMORPHONE, URINE: NOT DETECTED
OXAZEPAM UR QL: NOT DETECTED
OXYCODONE UR QL: NOT DETECTED
OXYMORPHONE UR QL: NOT DETECTED
PAIN MANAGEMENT DRUG PANEL: NORMAL
PATHOLOGY STUDY: NORMAL
PCP: NEGATIVE
PHENTERMINE: NOT DETECTED
PREGABALIN: NOT DETECTED
TAPENTADOL, URINE: NOT DETECTED
TAPENTADOL-O-SULFATE, URINE: NOT DETECTED
TEMAZEPAM: NOT DETECTED
TRAMADOL: NEGATIVE
ZOLPIDEM: NOT DETECTED

## 2024-06-20 PROCEDURE — 99213 OFFICE O/P EST LOW 20 MIN: CPT

## 2024-06-20 PROCEDURE — 99203 OFFICE O/P NEW LOW 30 MIN: CPT | Performed by: PODIATRIST

## 2024-06-20 PROCEDURE — 11042 DBRDMT SUBQ TIS 1ST 20SQCM/<: CPT | Performed by: PODIATRIST

## 2024-06-20 PROCEDURE — 11042 DBRDMT SUBQ TIS 1ST 20SQCM/<: CPT

## 2024-06-20 RX ORDER — LIDOCAINE HYDROCHLORIDE 20 MG/ML
JELLY TOPICAL ONCE
OUTPATIENT
Start: 2024-06-20 | End: 2024-06-20

## 2024-06-20 RX ORDER — SODIUM CHLOR/HYPOCHLOROUS ACID 0.033 %
SOLUTION, IRRIGATION IRRIGATION ONCE
OUTPATIENT
Start: 2024-06-20 | End: 2024-06-20

## 2024-06-20 RX ORDER — IBUPROFEN 200 MG
TABLET ORAL ONCE
OUTPATIENT
Start: 2024-06-20 | End: 2024-06-20

## 2024-06-20 RX ORDER — TRIAMCINOLONE ACETONIDE 1 MG/G
OINTMENT TOPICAL ONCE
OUTPATIENT
Start: 2024-06-20 | End: 2024-06-20

## 2024-06-20 RX ORDER — LIDOCAINE HYDROCHLORIDE 40 MG/ML
SOLUTION TOPICAL ONCE
OUTPATIENT
Start: 2024-06-20 | End: 2024-06-20

## 2024-06-20 RX ORDER — CLOBETASOL PROPIONATE 0.5 MG/G
OINTMENT TOPICAL ONCE
OUTPATIENT
Start: 2024-06-20 | End: 2024-06-20

## 2024-06-20 RX ORDER — BETAMETHASONE DIPROPIONATE 0.5 MG/G
CREAM TOPICAL ONCE
OUTPATIENT
Start: 2024-06-20 | End: 2024-06-20

## 2024-06-20 RX ORDER — LIDOCAINE 40 MG/G
CREAM TOPICAL ONCE
OUTPATIENT
Start: 2024-06-20 | End: 2024-06-20

## 2024-06-20 RX ORDER — BACITRACIN ZINC AND POLYMYXIN B SULFATE 500; 1000 [USP'U]/G; [USP'U]/G
OINTMENT TOPICAL ONCE
OUTPATIENT
Start: 2024-06-20 | End: 2024-06-20

## 2024-06-20 RX ORDER — LIDOCAINE 50 MG/G
OINTMENT TOPICAL ONCE
OUTPATIENT
Start: 2024-06-20 | End: 2024-06-20

## 2024-06-20 RX ORDER — GENTAMICIN SULFATE 1 MG/G
OINTMENT TOPICAL ONCE
OUTPATIENT
Start: 2024-06-20 | End: 2024-06-20

## 2024-06-20 RX ORDER — GINSENG 100 MG
CAPSULE ORAL ONCE
OUTPATIENT
Start: 2024-06-20 | End: 2024-06-20

## 2024-06-20 RX ORDER — GENTAMICIN SULFATE 1 MG/G
CREAM TOPICAL
Qty: 15 G | Refills: 1 | Status: SHIPPED | OUTPATIENT
Start: 2024-06-20

## 2024-06-20 NOTE — DISCHARGE INSTRUCTIONS
[facility] and Hyperbaric Medicine   Physician Orders and Discharge Instructions  [facility]  [address] [place]  [address]  Telephone: [phone]      [phone]      NAME:  Ant Lora          YOB: 1975  MEDICAL RECORD NUMBER:  [id number]    Your  is:  Davina    Home Care/Facility: None    Wound Location: Right Plantar Foot    Dressing orders:.1.Cleanse wound(s) with soap and water. Then apply a thing layer of Gentamicin ointment to the wound bed then:  2. Apply dry JANETT  Or equivalent to wound bed.  3. Moisten JANETT with a few drops of normal saline.  4. Cover with 4x4's and wrap with gauze (emily or kerlix)  5. Change  Every other day or Monday, Wednesday, and Friday    Compression: None    Offloading Device:Post op shoe with peg assist (when not driving) felt placed in work shoe.    Other Instructions: Post op shoe with a peg Assist.  your Gentamicin ointment at your pharmacy and use as prescribed.    Keep all dressings clean, dry and intact.  Keep pressure off the wound(s) at all times.     Follow up visit   1 Week June 27, 2024 @ 09:30    Please give 24 hour notice if unable to keep appointment. [phone]    If you experience any of the following, please call the Wound Care Service at  [phone] or go to the nearest emergency room.   *Increase in pain *Temperature over 101 *Increase in drainage from your wound or a foul odor  *Uncontrolled swelling *Need for compression bandage changes due to slippage, breakthrough drainage       PLEASE NOTE: IF YOU ARE UNABLE TO OBTAIN WOUND SUPPLIES, CONTINUE TO USE THE SUPPLIES YOU HAVE AVAILABLE UNTIL YOU ARE ABLE TO REACH US. IT IS MOST IMPORTANT TO KEEP THE WOUND COVERED AT ALL TIMES

## 2024-06-20 NOTE — PROGRESS NOTES
[facility]                                                   Progress Note and Procedure Note      Ant Lora  MEDICAL RECORD NUMBER:  [id number]  AGE: 48 y.o.   GENDER: male  : 1975  EPISODE DATE:  2024    Subjective:     Chief Complaint   Patient presents with    Wound Check         HISTORY of PRESENT ILLNESS HPI     Ant Lora is a 48 y.o. male who presents today for wound/ulcer evaluation.   History of Wound Context: Patient presents with a complaint of a diabetic ulcer of the right foot.  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  Patient states he has been performing dressing changes himself.  Patient reports that he is a  who has to wear his work boots on a daily basis.  Patient has not observed signs or symptoms of systemic infection.    Patient denies nausea, vomiting, fever, chills, chest pain, or shortness of breath.     Wound/Ulcer Pain Timing/Severity: none  Quality of pain: N/A  Severity:  0 / 10   Modifying Factors: None  Associated Signs/Symptoms: drainage    Ulcer Identification:  Ulcer Type: diabetic  Contributing Factors: shear force    Wound:  Full-thickness diabetic ulceration right plantar foot        PAST MEDICAL HISTORY        Diagnosis Date    CAD (coronary artery disease) 3/2/2015, 2020    stents    DM (diabetes mellitus), type 2, uncontrolled 2015    Hyperlipidemia     Neuropathy     legs bilateral    Smoker        PAST SURGICAL HISTORY    Past Surgical History:   Procedure Laterality Date    CARDIAC SURGERY         FAMILY HISTORY    History reviewed. No pertinent family history.    SOCIAL HISTORY    Social History     Tobacco Use    Smoking status: Every Day     Current packs/day: 0.25     Average packs/day: 0.3 packs/day for 25.0 years (6.3 ttl pk-yrs)     Types: Cigarettes    Smokeless tobacco: Never   Substance Use Topics    Alcohol use: Yes     Comment: rarely    Drug use: No

## 2024-06-27 ENCOUNTER — HOSPITAL ENCOUNTER (OUTPATIENT)
Dept: WOUND CARE | Age: 49
Discharge: HOME | End: 2024-06-27
Attending: PODIATRIST
Payer: COMMERCIAL

## 2024-06-27 VITALS
SYSTOLIC BLOOD PRESSURE: 133 MMHG | DIASTOLIC BLOOD PRESSURE: 72 MMHG | TEMPERATURE: 98.1 F | RESPIRATION RATE: 19 BRPM | HEART RATE: 77 BPM

## 2024-06-27 DIAGNOSIS — L97.512 ULCER OF RIGHT FOOT WITH FAT LAYER EXPOSED (HCC): Primary | ICD-10-CM

## 2024-06-27 PROCEDURE — 11042 DBRDMT SUBQ TIS 1ST 20SQCM/<: CPT | Performed by: PODIATRIST

## 2024-06-27 PROCEDURE — 11042 DBRDMT SUBQ TIS 1ST 20SQCM/<: CPT

## 2024-06-27 PROCEDURE — 6370000000 HC RX 637 (ALT 250 FOR IP): Performed by: PODIATRIST

## 2024-06-27 RX ORDER — LIDOCAINE 50 MG/G
OINTMENT TOPICAL ONCE
OUTPATIENT
Start: 2024-06-27 | End: 2024-06-27

## 2024-06-27 RX ORDER — SODIUM CHLOR/HYPOCHLOROUS ACID 0.033 %
SOLUTION, IRRIGATION IRRIGATION ONCE
OUTPATIENT
Start: 2024-06-27 | End: 2024-06-27

## 2024-06-27 RX ORDER — GENTAMICIN SULFATE 1 MG/G
OINTMENT TOPICAL ONCE
Status: COMPLETED | OUTPATIENT
Start: 2024-06-27 | End: 2024-06-27

## 2024-06-27 RX ORDER — TRIAMCINOLONE ACETONIDE 1 MG/G
OINTMENT TOPICAL ONCE
OUTPATIENT
Start: 2024-06-27 | End: 2024-06-27

## 2024-06-27 RX ORDER — LIDOCAINE 40 MG/G
CREAM TOPICAL ONCE
OUTPATIENT
Start: 2024-06-27 | End: 2024-06-27

## 2024-06-27 RX ORDER — CLOBETASOL PROPIONATE 0.5 MG/G
OINTMENT TOPICAL ONCE
OUTPATIENT
Start: 2024-06-27 | End: 2024-06-27

## 2024-06-27 RX ORDER — GINSENG 100 MG
CAPSULE ORAL ONCE
OUTPATIENT
Start: 2024-06-27 | End: 2024-06-27

## 2024-06-27 RX ORDER — BACITRACIN ZINC AND POLYMYXIN B SULFATE 500; 1000 [USP'U]/G; [USP'U]/G
OINTMENT TOPICAL ONCE
OUTPATIENT
Start: 2024-06-27 | End: 2024-06-27

## 2024-06-27 RX ORDER — IBUPROFEN 200 MG
TABLET ORAL ONCE
OUTPATIENT
Start: 2024-06-27 | End: 2024-06-27

## 2024-06-27 RX ORDER — LIDOCAINE HYDROCHLORIDE 20 MG/ML
JELLY TOPICAL ONCE
OUTPATIENT
Start: 2024-06-27 | End: 2024-06-27

## 2024-06-27 RX ORDER — BETAMETHASONE DIPROPIONATE 0.5 MG/G
CREAM TOPICAL ONCE
OUTPATIENT
Start: 2024-06-27 | End: 2024-06-27

## 2024-06-27 RX ORDER — GENTAMICIN SULFATE 1 MG/G
OINTMENT TOPICAL ONCE
OUTPATIENT
Start: 2024-06-27 | End: 2024-06-27

## 2024-06-27 RX ORDER — LIDOCAINE HYDROCHLORIDE 40 MG/ML
SOLUTION TOPICAL ONCE
OUTPATIENT
Start: 2024-06-27 | End: 2024-06-27

## 2024-06-27 RX ORDER — LIDOCAINE 40 MG/G
CREAM TOPICAL ONCE
Status: COMPLETED | OUTPATIENT
Start: 2024-06-27 | End: 2024-06-27

## 2024-06-27 RX ADMIN — GENTAMICIN SULFATE: 1 OINTMENT TOPICAL at 10:47

## 2024-06-27 RX ADMIN — LIDOCAINE 4%: 4 CREAM TOPICAL at 09:42

## 2024-06-27 NOTE — DISCHARGE INSTRUCTIONS
[facility]   Physician Orders and Discharge Instructions  [facility]  [address] [place]  [address]  Telephone: [phone]      [phone]        NAME:  Ant Lora                                                                                                  YOB: 1975  MEDICAL RECORD NUMBER:  [id number]     Your  is:  Davina     Home Care/Facility: None     Wound Location: Right Plantar Foot     Dressing orders:.1.Cleanse wound(s) with soap and water. Then apply a thing layer of Gentamicin ointment to the wound bed then:  2. Apply dry JANETT  Or equivalent to wound bed.  3. Moisten JANETT with a few drops of normal saline.  4. Cover with 4x4's and wrap with gauze (emily or kerlix)  5. Change  Every other day or Monday, Wednesday, and Friday     Compression: None     Offloading Device:Post op shoe with peg assist (when not driving) felt placed in work shoe.     Other Instructions: Post op shoe with a peg Assist. Felt placed in regular shoe     Keep all dressings clean, dry and intact.  Keep pressure off the wound(s) at all times.      Follow up visit   2 Weeks July 11, 2024 @ 09:30     Please give 24 hour notice if unable to keep appointment. [phone]     If you experience any of the following, please call the Wound Care Service at  [phone] or go to the nearest emergency room.        *Increase in pain         *Temperature over 101           *Increase in drainage from your wound or a foul odor  *Uncontrolled swelling            *Need for compression bandage changes due to slippage, breakthrough drainage       PLEASE NOTE: IF YOU ARE UNABLE TO OBTAIN WOUND SUPPLIES, CONTINUE TO USE THE SUPPLIES YOU HAVE AVAILABLE UNTIL YOU ARE ABLE TO REACH US. IT IS MOST IMPORTANT TO KEEP THE WOUND COVERED AT ALL TIMES

## 2024-06-27 NOTE — PROGRESS NOTES
Anesthesia Volume In Cc: 5 Did You Provide Opioid Counseling: No Repair Type: Intermediate Layered Repair Descriptions are Pre Debridement except measurements:    Wound 06/20/24 Foot Right;Plantar #1 (Active)   Wound Image   06/27/24 0940   Wound Etiology Diabetic 06/27/24 0940   Wound Cleansed Cleansed with saline 06/27/24 0940   Dressing/Treatment Collagen with Ag;Dry dressing;Antibacterial ointment 06/20/24 1047   Offloading for Diabetic Foot Ulcers Offloading ordered;Post op shoe 06/20/24 1047   Wound Length (cm) 1 cm 06/27/24 0940   Wound Width (cm) 1.3 cm 06/27/24 0940   Wound Depth (cm) 0.3 cm 06/27/24 0940   Wound Surface Area (cm^2) 1.3 cm^2 06/27/24 0940   Change in Wound Size % (l*w) 13.33 06/27/24 0940   Wound Volume (cm^3) 0.39 cm^3 06/27/24 0940   Wound Healing % 57 06/27/24 0940   Post-Procedure Length (cm) 1.1 cm 06/27/24 1035   Post-Procedure Width (cm) 1.4 cm 06/27/24 1035   Post-Procedure Depth (cm) 0.4 cm 06/27/24 1035   Post-Procedure Surface Area (cm^2) 1.54 cm^2 06/27/24 1035   Post-Procedure Volume (cm^3) 0.616 cm^3 06/27/24 1035   Wound Assessment Pink/red;Slough 06/27/24 0940   Drainage Amount Small (< 25%) 06/27/24 0940   Drainage Description Serosanguinous 06/27/24 0940   Odor None 06/27/24 0940   Farida-wound Assessment Hyperkeratosis (callous) 06/27/24 0940   Margins Attached edges 06/27/24 0940   Wound Thickness Description not for Pressure Injury Full thickness 06/27/24 0940   Number of days: 7            Percent of Wound/Ulcer Debrided: 100%    Total Surface Area Debrided:  1.54 sq cm     Diabetic/Pressure/Non Pressure Ulcers:  Ulcer: Diabetic ulcer, fat layer exposed      Bleeding:  Minimal    Hemostasis Achieved:  by pressure    Procedural Pain:  0  / 10     Post Procedural Pain:  0 / 10     Response to treatment:  Well tolerated by patient.       Plan:     The right plantar foot wound was sharply debrided.  Patient instructed to continue the current wound care regimen including use of offloading padding or the peg assist device and topical gentamicin.  Patient should monitor for signs  Which Eyelid Repair Cpt Are You Using?: 17904 Suturegard Retention Suture: 2-0 Nylon Retention Suture Bite Size: 3 mm Length To Time In Minutes Device Was In Place: 10 Number Of Hemigard Strips Per Side: 1 Simple / Intermediate / Complex Repair - Final Wound Length In Cm: 7.9 Undermining Type: Entire Wound Debridement Text: The wound edges were debrided prior to proceeding with the closure to facilitate wound healing. Helical Rim Text: The closure involved the helical rim. Vermilion Border Text: The closure involved the vermilion border. Nostril Rim Text: The closure involved the nostril rim. Retention Suture Text: Retention sutures were placed to support the closure and prevent dehiscence. Primary Defect Length (In Cm): 2.7 Primary Defect Width (In Cm): 2.4 Secondary Defect Length (In Cm): 0 Skin Substitute: EpiFix Micronized Suture Removal: 14 days Type Of Previous Surgery (Optional- Ie Mohs Surgery): Mohs Date Of Previous Surgery (Optional): 4/4/24 Include The Following Details In The Note (If No Details Will Only Be Reflected In The Surgical Fax): Yes Pre-Op Size Of Lesion Removed (Optional): 2.5 X Size Of Lesion In Cm (Optional): 2.2 Detail Level: Detailed Anesthesia Type: 1% lidocaine with 1:100,000 epinephrine and a 1:10 solution of 8.4% sodium bicarbonate Hemostasis: Electrocautery Estimated Blood Loss (Cc): minimal Brow Lift Text: A midfrontal incision was made medially to the defect to allow access to the tissues just superior to the left eyebrow. Following careful dissection inferiorly in a supraperiosteal plane to the level of the left eyebrow, several 3-0 monocryl sutures were used to resuspend the eyebrow orbicularis oculi muscular unit to the superior frontal bone periosteum. This resulted in an appropriate reapproximation of static eyebrow symmetry and correction of the left brow ptosis. Deep Sutures: 3-0 Vicryl Epidermal Sutures: 5-0 Ethilon Epidermal Closure: running Wound Care: Vaseline Dressing: pressure dressing with telfa Suturegard Intro: Intraoperative tissue expansion was performed, utilizing the SUTUREGARD device, in order to reduce wound tension. Suturegard Body: The suture ends were repeatedly re-tightened and re-clamped to achieve the desired tissue expansion. Hemigard Intro: Due to skin fragility and wound tension, it was decided to use HEMIGARD adhesive retention suture devices to permit a linear closure. The skin was cleaned and dried for a 6cm distance away from the wound. Excessive hair, if present, was removed to allow for adhesion. Hemigard Postcare Instructions: The HEMIGARD strips are to remain completely dry for at least 5-7 days. Graft Basting Suture (Optional): 5-0 Plain Gut Closure 2 Information: This tab is for additional flaps and grafts, including complex repair and grafts and complex repair and flaps. You can also specify a different location for the additional defect, if the location is the same you do not need to select a new one. We will insert the automated text for the repair you select below just as we do for solitary flaps and grafts. Please note that at this time if you select a location with a different insurance zone you will need to override the ICD10 and CPT if appropriate. Closure 3 Information: This tab is for additional flaps and grafts above and beyond our usual structured repairs.  Please note if you enter information here it will not currently bill and you will need to add the billing information manually. Complex Repair Preamble Text (Leave Blank If You Do Not Want): The defect edges were debeveled with a #15 scalpel blade. Given the location of the defect a complex repair was deemed most appropriate.  Using a sterile surgical marker, burrow triangles were drawn incorporating the defect and placing the expected incisions within the relaxed skin tension lines where possible.    The area thus outlined was incised to the appropriate tissue plane with a scalpel blade. Extensive wide undermining was performed in all directions to allow proper closure of the defect.  Hemostasis was obtained by cautery. Intermediate Repair Preamble Text (Leave Blank If You Do Not Want): Undermining was performed with blunt dissection. Crescentic Complex Repair Preamble Text (Leave Blank If You Do Not Want): Extensive wide undermining was performed. Flap Thinning Complex Repair Preamble Text (Leave Blank If You Do Not Want): An incision was made along the previous flap suture line. Undermining was performed beneath the flap and redundant tissue was removed to restore the normal contour of the skin. Non-Graft Cartilage Fenestration Text: The cartilage was fenestrated with a 2mm punch biopsy to help facilitate healing. Graft Cartilage Fenestration Text: The cartilage was fenestrated with a 2mm punch biopsy to help facilitate graft survival and healing. Preparation Of Recipient Site - Flap: The eschar was removed surgically with sharp dissection to facilitate appropriate wound healing of the following adjacent tissue rearrangement. Preparation Of Recipient Site - Graft: The eschar was removed surgically with sharp dissection to facilitate appropriate survival of the following graft. Preparation Of Recipient Site - Flap Takedown: The eschar and granulation tissue was removed surgically with sharp dissection to facilitate appropriate healing after division and inset of the proximal and distal interpolation flap. Secondary Intention Text (Leave Blank If You Do Not Want): The defect will heal with secondary intention. No Repair - Repaired With Adjacent Surgical Defect Text (Leave Blank If You Do Not Want): After obtaining clear surgical margins the defect was repaired concurrently with another surgical defect which was in close approximation. Referred To Oculoplastics For Closure Text (Leave Blank If You Do Not Want): After obtaining clear surgical margins the patient was sent to oculoplastics for surgical repair.  The patient understands they will receive post-surgical care and follow-up from the referring physician's office. Referred To Otolaryngology For Closure Text (Leave Blank If You Do Not Want): After obtaining clear surgical margins the patient was sent to otolaryngology for surgical repair.  The patient understands they will receive post-surgical care and follow-up from the referring physician's office. Referred To Plastics For Closure Text (Leave Blank If You Do Not Want): After obtaining clear surgical margins the patient was sent to plastics for surgical repair.  The patient understands they will receive post-surgical care and follow-up from the referring physician's office. Referred To Asc For Closure Text (Leave Blank If You Do Not Want): After obtaining clear surgical margins the patient was sent to an ASC for surgical repair.  The patient understands they will receive post-surgical care and follow-up from the ASC physician. Referred To Mid-Level For Closure Text (Leave Blank If You Do Not Want): After obtaining clear surgical margins the patient was sent to a mid-level provider for surgical repair.  The patient understands they will receive post-surgical care and follow-up from the mid-level provider. Repair Performed By Another Provider Text (Leave Blank If You Do Not Want): After obtaining clear surgical margins the defect was repaired by another provider. Adjacent Tissue Transfer Text: The defect edges were debeveled with a #15 scalpel blade. Given the location of the defect and the proximity to free margins an adjacent tissue transfer was deemed most appropriate. Using a sterile surgical marker, an appropriate flap was drawn incorporating the defect and placing the expected incisions within the relaxed skin tension lines where possible. The area thus outlined was incised deep to adipose tissue with a #15 scalpel blade. The skin margins were undermined to an appropriate distance in all directions utilizing iris scissors and carried over to close the primary defect. Advancement Flap (Single) Text: The defect edges were debeveled with a #15 scalpel blade.  Given the location of the defect and the proximity to free margins a single advancement flap was deemed most appropriate.  Using a sterile surgical marker, an appropriate advancement flap was drawn incorporating the defect and placing the expected incisions within the relaxed skin tension lines where possible.    The area thus outlined was incised deep to adipose tissue with a #15 scalpel blade.  The skin margins were undermined to an appropriate distance in all directions utilizing iris scissors. Advancement Flap (Double) Text: The defect edges were debeveled with a #15 scalpel blade.  Given the location of the defect and the proximity to free margins a double advancement flap was deemed most appropriate.  Using a sterile surgical marker, the appropriate advancement flaps were drawn incorporating the defect and placing the expected incisions within the relaxed skin tension lines where possible.    The area thus outlined was incised deep to adipose tissue with a #15 scalpel blade.  The skin margins were undermined to an appropriate distance in all directions utilizing iris scissors. Burow's Advancement Flap Text: The defect edges were debeveled with a #15 scalpel blade.  Given the location of the defect and the proximity to free margins a Burow's advancement flap was deemed most appropriate.  Using a sterile surgical marker, the appropriate advancement flap was drawn incorporating the defect and placing the expected incisions within the relaxed skin tension lines where possible.    The area thus outlined was incised deep to adipose tissue with a #15 scalpel blade.  The skin margins were undermined to an appropriate distance in all directions utilizing iris scissors. Chonodrocutaneous Helical Advancement Flap Text: The defect edges were debeveled with a #15 scalpel blade. Given the location of the defect and the proximity to free margins a chondrocutaneous helical advancement flap was deemed most appropriate. Using a sterile surgical marker, the appropriate advancement flap was drawn incorporating the defect and placing the expected incisions within the relaxed skin tension lines where possible. The area thus outlined was incised deep to adipose tissue with a #15 scalpel blade. The skin margins were undermined to an appropriate distance in all directions utilizing iris scissors. Following this, the designed flap was advanced and carried over into the primary defect and sutured into place. Crescentic Advancement Flap Text: The defect edges were debeveled with a #15 scalpel blade.  Given the location of the defect and the proximity to free margins a crescentic advancement flap was deemed most appropriate.  Using a sterile surgical marker, the appropriate advancement flap was drawn incorporating the defect and placing the expected incisions within the relaxed skin tension lines where possible.    The area thus outlined was incised deep to adipose tissue with a #15 scalpel blade.  The skin margins were undermined to an appropriate distance in all directions utilizing iris scissors. A-T Advancement Flap Text: The defect edges were debeveled with a #15 scalpel blade.  Given the location of the defect, shape of the defect and the proximity to free margins an A-T advancement flap was deemed most appropriate.  Using a sterile surgical marker, an appropriate advancement flap was drawn incorporating the defect and placing the expected incisions within the relaxed skin tension lines where possible.    The area thus outlined was incised deep to adipose tissue with a #15 scalpel blade.  The skin margins were undermined to an appropriate distance in all directions utilizing iris scissors. O-T Advancement Flap Text: The defect edges were debeveled with a #15 scalpel blade.  Given the location of the defect, shape of the defect and the proximity to free margins an O-T advancement flap was deemed most appropriate.  Using a sterile surgical marker, an appropriate advancement flap was drawn incorporating the defect and placing the expected incisions within the relaxed skin tension lines where possible.    The area thus outlined was incised deep to adipose tissue with a #15 scalpel blade.  The skin margins were undermined to an appropriate distance in all directions utilizing iris scissors. O-L Flap Text: The defect edges were debeveled with a #15 scalpel blade.  Given the location of the defect, shape of the defect and the proximity to free margins an O-L flap was deemed most appropriate.  Using a sterile surgical marker, an appropriate advancement flap was drawn incorporating the defect and placing the expected incisions within the relaxed skin tension lines where possible.    The area thus outlined was incised deep to adipose tissue with a #15 scalpel blade.  The skin margins were undermined to an appropriate distance in all directions utilizing iris scissors. O-Z Flap Text: The defect edges were debeveled with a #15 scalpel blade. Given the location of the defect, shape of the defect and the proximity to free margins an O-Z flap was deemed most appropriate. Using a sterile surgical marker, an appropriate transposition flap was drawn incorporating the defect and placing the expected incisions within the relaxed skin tension lines where possible. The area thus outlined was incised deep to adipose tissue with a #15 scalpel blade. The skin margins were undermined to an appropriate distance in all directions utilizing iris scissors. Following this, the designed flap was carried over into the primary defect and sutured into place. Double O-Z Flap Text: The defect edges were debeveled with a #15 scalpel blade. Given the location of the defect, shape of the defect and the proximity to free margins a Double O-Z flap was deemed most appropriate. Using a sterile surgical marker, an appropriate transposition flap was drawn incorporating the defect and placing the expected incisions within the relaxed skin tension lines where possible. The area thus outlined was incised deep to adipose tissue with a #15 scalpel blade. The skin margins were undermined to an appropriate distance in all directions utilizing iris scissors. Following this, the designed flap was carried over into the primary defect and sutured into place. V-Y Flap Text: The defect edges were debeveled with a #15 scalpel blade.  Given the location of the defect, shape of the defect and the proximity to free margins a V-Y flap was deemed most appropriate.  Using a sterile surgical marker, an appropriate advancement flap was drawn incorporating the defect and placing the expected incisions within the relaxed skin tension lines where possible.    The area thus outlined was incised deep to adipose tissue with a #15 scalpel blade.  The skin margins were undermined to an appropriate distance in all directions utilizing iris scissors. Advancement-Rotation Flap Text: The defect edges were debeveled with a #15 scalpel blade.  Given the location of the defect, shape of the defect and the proximity to free margins an advancement-rotation flap was deemed most appropriate.  Using a sterile surgical marker, an appropriate advancement flap was drawn incorporating the defect and placing the expected incisions within the relaxed skin tension lines where possible.    The area thus outlined was incised deep to adipose tissue with a #15 scalpel blade.  The skin margins were undermined to an appropriate distance in all directions utilizing iris scissors. Mercedes Flap Text: The defect edges were debeveled with a #15 scalpel blade.  Given the location of the defect, shape of the defect and the proximity to free margins a Mercedes flap was deemed most appropriate.  Using a sterile surgical marker, an appropriate advancement flap was drawn incorporating the defect and placing the expected incisions within the relaxed skin tension lines where possible. The area thus outlined was incised deep to adipose tissue with a #15 scalpel blade.  The skin margins were undermined to an appropriate distance in all directions utilizing iris scissors. Modified Advancement Flap Text: The defect edges were debeveled with a #15 scalpel blade.  Given the location of the defect, shape of the defect and the proximity to free margins a modified advancement flap was deemed most appropriate.  Using a sterile surgical marker, an appropriate advancement flap was drawn incorporating the defect and placing the expected incisions within the relaxed skin tension lines where possible.    The area thus outlined was incised deep to adipose tissue with a #15 scalpel blade.  The skin margins were undermined to an appropriate distance in all directions utilizing iris scissors. Mucosal Advancement Flap Text: Given the location of the defect, shape of the defect and the proximity to free margins a mucosal advancement flap was deemed most appropriate. Incisions were made with a 15 blade scalpel in the appropriate fashion along the cutaneous vermillion border and the mucosal lip. The remaining actinically damaged mucosal tissue was excised.  The mucosal advancement flap was then elevated to the gingival sulcus with care taken to preserve the neurovascular structures and advanced into the primary defect. Care was taken to ensure that precise realignment of the vermillion border was achieved. Peng Advancement Flap Text: The defect edges were debeveled with a #15 scalpel blade. Given the location of the defect, shape of the defect and the proximity to free margins a Peng advancement flap was deemed most appropriate. Using a sterile surgical marker, an appropriate advancement flap was drawn incorporating the defect and placing the expected incisions within the relaxed skin tension lines where possible. The area thus outlined was incised deep to adipose tissue with a #15 scalpel blade. The skin margins were undermined to an appropriate distance in all directions utilizing iris scissors. Following this, the designed flap was advanced and carried over into the primary defect and sutured into place. Hatchet Flap Text: The defect edges were debeveled with a #15 scalpel blade.  Given the location of the defect, shape of the defect and the proximity to free margins a hatchet flap was deemed most appropriate.  Using a sterile surgical marker, an appropriate hatchet flap was drawn incorporating the defect and placing the expected incisions within the relaxed skin tension lines where possible.    The area thus outlined was incised deep to adipose tissue with a #15 scalpel blade.  The skin margins were undermined to an appropriate distance in all directions utilizing iris scissors. Rotation Flap Text: The defect edges were debeveled with a #15 scalpel blade.  Given the location of the defect, shape of the defect and the proximity to free margins a rotation flap was deemed most appropriate.  Using a sterile surgical marker, an appropriate rotation flap was drawn incorporating the defect and placing the expected incisions within the relaxed skin tension lines where possible.    The area thus outlined was incised deep to adipose tissue with a #15 scalpel blade.  The skin margins were undermined to an appropriate distance in all directions utilizing iris scissors. Bilateral Rotation Flap Text: The defect edges were debeveled with a #15 scalpel blade. Given the location of the defect, shape of the defect and the proximity to free margins a bilateral rotation flap was deemed most appropriate. Using a sterile surgical marker, an appropriate rotation flap was drawn incorporating the defect and placing the expected incisions within the relaxed skin tension lines where possible. The area thus outlined was incised deep to adipose tissue with a #15 scalpel blade. The skin margins were undermined to an appropriate distance in all directions utilizing iris scissors. Following this, the designed flap was carried over into the primary defect and sutured into place. Spiral Flap Text: The defect edges were debeveled with a #15 scalpel blade.  Given the location of the defect, shape of the defect and the proximity to free margins a spiral flap was deemed most appropriate.  Using a sterile surgical marker, an appropriate rotation flap was drawn incorporating the defect and placing the expected incisions within the relaxed skin tension lines where possible. The area thus outlined was incised deep to adipose tissue with a #15 scalpel blade.  The skin margins were undermined to an appropriate distance in all directions utilizing iris scissors. Staged Advancement Flap Text: The defect edges were debeveled with a #15 scalpel blade. Given the location of the defect, shape of the defect and the proximity to free margins a staged advancement flap was deemed most appropriate. Using a sterile surgical marker, an appropriate advancement flap was drawn incorporating the defect and placing the expected incisions within the relaxed skin tension lines where possible. The area thus outlined was incised deep to adipose tissue with a #15 scalpel blade. The skin margins were undermined to an appropriate distance in all directions utilizing iris scissors. Following this, the designed flap was carried over into the primary defect and sutured into place. Star Wedge Flap Text: The defect edges were debeveled with a #15 scalpel blade.  Given the location of the defect, shape of the defect and the proximity to free margins a star wedge flap was deemed most appropriate.  Using a sterile surgical marker, an appropriate rotation flap was drawn incorporating the defect and placing the expected incisions within the relaxed skin tension lines where possible. The area thus outlined was incised deep to adipose tissue with a #15 scalpel blade.  The skin margins were undermined to an appropriate distance in all directions utilizing iris scissors. Transposition Flap Text: The defect edges were debeveled with a #15 scalpel blade.  Given the location of the defect and the proximity to free margins a transposition flap was deemed most appropriate.  Using a sterile surgical marker, an appropriate transposition flap was drawn incorporating the defect.    The area thus outlined was incised deep to adipose tissue with a #15 scalpel blade.  The skin margins were undermined to an appropriate distance in all directions utilizing iris scissors. Muscle Hinge Flap Text: The defect edges were debeveled with a #15 scalpel blade.  Given the size, depth and location of the defect and the proximity to free margins a muscle hinge flap was deemed most appropriate.  Using a sterile surgical marker, an appropriate hinge flap was drawn incorporating the defect. The area thus outlined was incised with a #15 scalpel blade.  The skin margins were undermined to an appropriate distance in all directions utilizing iris scissors. Mustarde Flap Text: The defect edges were debeveled with a #15 scalpel blade.  Given the size, depth and location of the defect and the proximity to free margins a Mustarde flap was deemed most appropriate. Using a sterile surgical marker, an appropriate flap was drawn incorporating the defect. The area thus outlined was incised with a #15 scalpel blade. The skin margins were undermined to an appropriate distance in all directions utilizing iris scissors. Following this, the designed flap was carried into the primary defect and sutured into place. Nasal Turnover Hinge Flap Text: The defect edges were debeveled with a #15 scalpel blade.  Given the size, depth, location of the defect and the defect being full thickness a nasal turnover hinge flap was deemed most appropriate. Using a sterile surgical marker, an appropriate hinge flap was drawn incorporating the defect. The area thus outlined was incised with a #15 scalpel blade. The flap was designed to recreate the nasal mucosal lining and the alar rim. The skin margins were undermined to an appropriate distance in all directions utilizing iris scissors. Following this, the designed flap was carried over into the primary defect and sutured into place Nasalis-Muscle-Based Myocutaneous Island Pedicle Flap Text: Using a #15 blade, an incision was made around the donor flap to the level of the nasalis muscle. Wide lateral undermining was then performed in both the subcutaneous plane above the nasalis muscle, and in a submuscular plane just above periosteum. This allowed the formation of a free nasalis muscle axial pedicle (based on the angular artery) which was still attached to the actual cutaneous flap, increasing its mobility and vascular viability. Hemostasis was obtained with pinpoint electrocoagulation. The flap was mobilized into position and the pivotal anchor points positioned and stabilized with buried interrupted sutures. Subcutaneous and dermal tissues were closed in a multilayered fashion with sutures. Tissue redundancies were excised, and the epidermal edges were apposed without significant tension and sutured with sutures. Nasalis Myocutaneous Flap Text: Using a #15 blade, an incision was made around the donor flap to the level of the nasalis muscle. Wide lateral undermining was then performed in both the subcutaneous plane above the nasalis muscle, and in a submuscular plane just above periosteum. This allowed the formation of a free nasalis muscle axial pedicle which was still attached to the actual cutaneous flap, increasing its mobility and vascular viability. Hemostasis was obtained with pinpoint electrocoagulation. The flap was mobilized into position and the pivotal anchor points positioned and stabilized with buried interrupted sutures. Subcutaneous and dermal tissues were closed in a multilayered fashion with sutures. Tissue redundancies were excised, and the epidermal edges were apposed without significant tension and sutured with sutures. Orbicularis Oris Muscle Flap Text: The defect edges were debeveled with a #15 scalpel blade.  Given that the defect affected the competency of the oral sphincter an orbicularis oris muscle flap was deemed most appropriate to restore this competency and normal muscle function.  Using a sterile surgical marker, an appropriate flap was drawn incorporating the defect. The area thus outlined was incised with a #15 scalpel blade. Following this, the designed flap was carried over into the primary defect and sutured into place. Melolabial Transposition Flap Text: The defect edges were debeveled with a #15 scalpel blade.  Given the location of the defect and the proximity to free margins a melolabial flap was deemed most appropriate.  Using a sterile surgical marker, an appropriate melolabial transposition flap was drawn incorporating the defect.    The area thus outlined was incised deep to adipose tissue with a #15 scalpel blade.  The skin margins were undermined to an appropriate distance in all directions utilizing iris scissors. Rectangular Flap Text: The defect edges were debeveled with a #15 scalpel blade. Given the location of the defect and the proximity to free margins a rectangular flap was deemed most appropriate. Using a sterile surgical marker, an appropriate rectangular flap was drawn incorporating the defect. The area thus outlined was incised deep to adipose tissue with a #15 scalpel blade. The skin margins were undermined to an appropriate distance in all directions utilizing iris scissors. Following this, the designed flap was carried over into the primary defect and sutured into place. Rhombic Flap Text: The defect edges were debeveled with a #15 scalpel blade.  Given the location of the defect and the proximity to free margins a rhombic flap was deemed most appropriate.  Using a sterile surgical marker, an appropriate rhombic flap was drawn incorporating the defect.    The area thus outlined was incised deep to adipose tissue with a #15 scalpel blade.  The skin margins were undermined to an appropriate distance in all directions utilizing iris scissors. Rhomboid Transposition Flap Text: The defect edges were debeveled with a #15 scalpel blade.  Given the location of the defect and the proximity to free margins a rhomboid transposition flap was deemed most appropriate.  Using a sterile surgical marker, an appropriate rhomboid flap was drawn incorporating the defect.    The area thus outlined was incised deep to adipose tissue with a #15 scalpel blade.  The skin margins were undermined to an appropriate distance in all directions utilizing iris scissors. Bi-Rhombic Flap Text: The defect edges were debeveled with a #15 scalpel blade.  Given the location of the defect and the proximity to free margins a bi-rhombic flap was deemed most appropriate.  Using a sterile surgical marker, an appropriate rhombic flap was drawn incorporating the defect. The area thus outlined was incised deep to adipose tissue with a #15 scalpel blade.  The skin margins were undermined to an appropriate distance in all directions utilizing iris scissors. Helical Rim Advancement Flap Text: The defect edges were debeveled with a #15 blade scalpel.  Given the location of the defect and the proximity to free margins (helical rim) a double helical rim advancement flap was deemed most appropriate.  Using a sterile surgical marker, the appropriate advancement flaps were drawn incorporating the defect and placing the expected incisions between the helical rim and antihelix where possible.  The area thus outlined was incised through and through with a #15 scalpel blade.  With a skin hook and iris scissors, the flaps were gently and sharply undermined and freed up. Bilateral Helical Rim Advancement Flap Text: The defect edges were debeveled with a #15 blade scalpel.  Given the location of the defect and the proximity to free margins (helical rim) a bilateral helical rim advancement flap was deemed most appropriate.  Using a sterile surgical marker, the appropriate advancement flaps were drawn incorporating the defect and placing the expected incisions between the helical rim and antihelix where possible.  The area thus outlined was incised through and through with a #15 scalpel blade.  With a skin hook and iris scissors, the flaps were gently and sharply undermined and freed up. Ear Star Wedge Flap Text: The defect edges were debeveled with a #15 blade scalpel.  Given the location of the defect and the proximity to free margins (helical rim) an ear star wedge flap was deemed most appropriate.  Using a sterile surgical marker, the appropriate flap was drawn incorporating the defect and placing the expected incisions between the helical rim and antihelix where possible.  The area thus outlined was incised through and through with a #15 scalpel blade. Banner Transposition Flap Text: The defect edges were debeveled with a #15 scalpel blade.  Given the location of the defect and the proximity to free margins a Banner transposition flap was deemed most appropriate.  Using a sterile surgical marker, an appropriate flap drawn around the defect. The area thus outlined was incised deep to adipose tissue with a #15 scalpel blade.  The skin margins were undermined to an appropriate distance in all directions utilizing iris scissors. Bilobed Flap Text: The defect edges were debeveled with a #15 scalpel blade.  Given the location of the defect and the proximity to free margins a bilobe flap was deemed most appropriate.  Using a sterile surgical marker, an appropriate bilobe flap drawn around the defect.    The area thus outlined was incised deep to adipose tissue with a #15 scalpel blade.  The skin margins were undermined to an appropriate distance in all directions utilizing iris scissors. Bilobed Transposition Flap Text: The defect edges were debeveled with a #15 scalpel blade.  Given the location of the defect and the proximity to free margins a bilobed transposition flap was deemed most appropriate.  Using a sterile surgical marker, an appropriate bilobe flap drawn around the defect.    The area thus outlined was incised deep to adipose tissue with a #15 scalpel blade.  The skin margins were undermined to an appropriate distance in all directions utilizing iris scissors. Trilobed Flap Text: The defect edges were debeveled with a #15 scalpel blade.  Given the location of the defect and the proximity to free margins a trilobed flap was deemed most appropriate.  Using a sterile surgical marker, an appropriate trilobed flap drawn around the defect.    The area thus outlined was incised deep to adipose tissue with a #15 scalpel blade.  The skin margins were undermined to an appropriate distance in all directions utilizing iris scissors. Dorsal Nasal Flap Text: The defect edges were debeveled with a #15 scalpel blade.  Given the location of the defect and the proximity to free margins a dorsal nasal flap was deemed most appropriate.  Using a sterile surgical marker, an appropriate dorsal nasal flap was drawn around the defect.    The area thus outlined was incised deep to adipose tissue with a #15 scalpel blade.  The skin margins were undermined to an appropriate distance in all directions utilizing iris scissors. Island Pedicle Flap Text: The defect edges were debeveled with a #15 scalpel blade.  Given the location of the defect, shape of the defect and the proximity to free margins an island pedicle advancement flap was deemed most appropriate.  Using a sterile surgical marker, an appropriate advancement flap was drawn incorporating the defect, outlining the appropriate donor tissue and placing the expected incisions within the relaxed skin tension lines where possible.    The area thus outlined was incised deep to adipose tissue with a #15 scalpel blade.  The skin margins were undermined to an appropriate distance in all directions around the primary defect and laterally outward around the island pedicle utilizing iris scissors.  There was minimal undermining beneath the pedicle flap. Island Pedicle Flap With Canthal Suspension Text: The defect edges were debeveled with a #15 scalpel blade.  Given the location of the defect, shape of the defect and the proximity to free margins an island pedicle advancement flap was deemed most appropriate.  Using a sterile surgical marker, an appropriate advancement flap was drawn incorporating the defect, outlining the appropriate donor tissue and placing the expected incisions within the relaxed skin tension lines where possible. The area thus outlined was incised deep to adipose tissue with a #15 scalpel blade.  The skin margins were undermined to an appropriate distance in all directions around the primary defect and laterally outward around the island pedicle utilizing iris scissors.  There was minimal undermining beneath the pedicle flap. A suspension suture was placed in the canthal tendon to prevent tension and prevent ectropion. Alar Island Pedicle Flap Text: The defect edges were debeveled with a #15 scalpel blade.  Given the location of the defect, shape of the defect and the proximity to the alar rim an island pedicle advancement flap was deemed most appropriate.  Using a sterile surgical marker, an appropriate advancement flap was drawn incorporating the defect, outlining the appropriate donor tissue and placing the expected incisions within the nasal ala running parallel to the alar rim. The area thus outlined was incised with a #15 scalpel blade.  The skin margins were undermined minimally to an appropriate distance in all directions around the primary defect and laterally outward around the island pedicle utilizing iris scissors.  There was minimal undermining beneath the pedicle flap. Double Island Pedicle Flap Text: The defect edges were debeveled with a #15 scalpel blade.  Given the location of the defect, shape of the defect and the proximity to free margins a double island pedicle advancement flap was deemed most appropriate.  Using a sterile surgical marker, an appropriate advancement flap was drawn incorporating the defect, outlining the appropriate donor tissue and placing the expected incisions within the relaxed skin tension lines where possible.    The area thus outlined was incised deep to adipose tissue with a #15 scalpel blade.  The skin margins were undermined to an appropriate distance in all directions around the primary defect and laterally outward around the island pedicle utilizing iris scissors.  There was minimal undermining beneath the pedicle flap. Island Pedicle Flap-Requiring Vessel Identification Text: The defect edges were debeveled with a #15 scalpel blade.  Given the location of the defect, shape of the defect and the proximity to free margins an island pedicle advancement flap was deemed most appropriate.  Using a sterile surgical marker, an appropriate advancement flap was drawn, based on the axial vessel mentioned above, incorporating the defect, outlining the appropriate donor tissue and placing the expected incisions within the relaxed skin tension lines where possible.    The area thus outlined was incised deep to adipose tissue with a #15 scalpel blade.  The skin margins were undermined to an appropriate distance in all directions around the primary defect and laterally outward around the island pedicle utilizing iris scissors.  There was minimal undermining beneath the pedicle flap. Keystone Flap Text: The defect edges were debeveled with a #15 scalpel blade.  Given the location of the defect, shape of the defect a keystone flap was deemed most appropriate.  Using a sterile surgical marker, an appropriate keystone flap was drawn incorporating the defect, outlining the appropriate donor tissue and placing the expected incisions within the relaxed skin tension lines where possible. The area thus outlined was incised deep to adipose tissue with a #15 scalpel blade.  The skin margins were undermined to an appropriate distance in all directions around the primary defect and laterally outward around the flap utilizing iris scissors. O-T Plasty Text: The defect edges were debeveled with a #15 scalpel blade.  Given the location of the defect, shape of the defect and the proximity to free margins an O-T plasty was deemed most appropriate.  Using a sterile surgical marker, an appropriate O-T plasty was drawn incorporating the defect and placing the expected incisions within the relaxed skin tension lines where possible.    The area thus outlined was incised deep to adipose tissue with a #15 scalpel blade.  The skin margins were undermined to an appropriate distance in all directions utilizing iris scissors. O-Z Plasty Text: The defect edges were debeveled with a #15 scalpel blade.  Given the location of the defect, shape of the defect and the proximity to free margins an O-Z plasty (double transposition flap) was deemed most appropriate.  Using a sterile surgical marker, the appropriate transposition flaps were drawn incorporating the defect and placing the expected incisions within the relaxed skin tension lines where possible.    The area thus outlined was incised deep to adipose tissue with a #15 scalpel blade.  The skin margins were undermined to an appropriate distance in all directions utilizing iris scissors.  Hemostasis was achieved with electrocautery.  The flaps were then transposed into place, one clockwise and the other counterclockwise, and anchored with interrupted buried subcutaneous sutures. Double O-Z Plasty Text: The defect edges were debeveled with a #15 scalpel blade.  Given the location of the defect, shape of the defect and the proximity to free margins a Double O-Z plasty (double transposition flap) was deemed most appropriate.  Using a sterile surgical marker, the appropriate transposition flaps were drawn incorporating the defect and placing the expected incisions within the relaxed skin tension lines where possible. The area thus outlined was incised deep to adipose tissue with a #15 scalpel blade.  The skin margins were undermined to an appropriate distance in all directions utilizing iris scissors.  Hemostasis was achieved with electrocautery.  The flaps were then transposed into place, one clockwise and the other counterclockwise, and anchored with interrupted buried subcutaneous sutures. V-Y Plasty Text: The defect edges were debeveled with a #15 scalpel blade.  Given the location of the defect, shape of the defect and the proximity to free margins an V-Y advancement flap was deemed most appropriate.  Using a sterile surgical marker, an appropriate advancement flap was drawn incorporating the defect and placing the expected incisions within the relaxed skin tension lines where possible.    The area thus outlined was incised deep to adipose tissue with a #15 scalpel blade.  The skin margins were undermined to an appropriate distance in all directions utilizing iris scissors. H Plasty Text: Given the location of the defect, shape of the defect and the proximity to free margins a H-plasty was deemed most appropriate for repair.  Using a sterile surgical marker, the appropriate advancement arms of the H-plasty were drawn incorporating the defect and placing the expected incisions within the relaxed skin tension lines where possible. The area thus outlined was incised deep to adipose tissue with a #15 scalpel blade. The skin margins were undermined to an appropriate distance in all directions utilizing iris scissors.  The opposing advancement arms were then advanced into place in opposite direction and anchored with interrupted buried subcutaneous sutures. W Plasty Text: The lesion was extirpated to the level of the fat with a #15 scalpel blade.  Given the location of the defect, shape of the defect and the proximity to free margins a W-plasty was deemed most appropriate for repair.  Using a sterile surgical marker, the appropriate transposition arms of the W-plasty were drawn incorporating the defect and placing the expected incisions within the relaxed skin tension lines where possible.    The area thus outlined was incised deep to adipose tissue with a #15 scalpel blade.  The skin margins were undermined to an appropriate distance in all directions utilizing iris scissors.  The opposing transposition arms were then transposed into place in opposite direction and anchored with interrupted buried subcutaneous sutures. Z Plasty Text: The lesion was extirpated to the level of the fat with a #15 scalpel blade.  Given the location of the defect, shape of the defect and the proximity to free margins a Z-plasty was deemed most appropriate for repair.  Using a sterile surgical marker, the appropriate transposition arms of the Z-plasty were drawn incorporating the defect and placing the expected incisions within the relaxed skin tension lines where possible.    The area thus outlined was incised deep to adipose tissue with a #15 scalpel blade.  The skin margins were undermined to an appropriate distance in all directions utilizing iris scissors.  The opposing transposition arms were then transposed into place in opposite direction and anchored with interrupted buried subcutaneous sutures. Double Z Plasty Text: The lesion was extirpated to the level of the fat with a #15 scalpel blade. Given the location of the defect, shape of the defect and the proximity to free margins a double Z-plasty was deemed most appropriate for repair. Using a sterile surgical marker, the appropriate transposition arms of the double Z-plasty were drawn incorporating the defect and placing the expected incisions within the relaxed skin tension lines where possible. The area thus outlined was incised deep to adipose tissue with a #15 scalpel blade. The skin margins were undermined to an appropriate distance in all directions utilizing iris scissors. The opposing transposition arms were then transposed and carried over into place in opposite direction and anchored with interrupted buried subcutaneous sutures. Zygomaticofacial Flap Text: Given the location of the defect, shape of the defect and the proximity to free margins a zygomaticofacial flap was deemed most appropriate for repair. Using a sterile surgical marker, the appropriate flap was drawn incorporating the defect and placing the expected incisions within the relaxed skin tension lines where possible. The area thus outlined was incised deep to adipose tissue with a #15 scalpel blade with preservation of a vascular pedicle.  The skin margins were undermined to an appropriate distance in all directions utilizing iris scissors. The flap was then carried over into the defect and anchored with interrupted buried subcutaneous sutures. Cheek Interpolation Flap Text: A decision was made to reconstruct the defect utilizing an interpolation axial flap and a staged reconstruction.  A telfa template was made of the defect.  This telfa template was then used to outline the Cheek Interpolation flap.  The donor area for the pedicle flap was then injected with anesthesia.  The flap was excised through the skin and subcutaneous tissue down to the layer of the underlying musculature.  The interpolation flap was carefully excised within this deep plane to maintain its blood supply.  The edges of the donor site were undermined.   The donor site was closed in a primary fashion.  The pedicle was then rotated into position and sutured.  Once the tube was sutured into place, adequate blood supply was confirmed with blanching and refill.  The pedicle was then wrapped with xeroform gauze and dressed appropriately with a telfa and gauze bandage to ensure continued blood supply and protect the attached pedicle. Cheek-To-Nose Interpolation Flap Text: A decision was made to reconstruct the defect utilizing an interpolation axial flap and a staged reconstruction.  A telfa template was made of the defect.  This telfa template was then used to outline the Cheek-To-Nose Interpolation flap.  The donor area for the pedicle flap was then injected with anesthesia.  The flap was excised through the skin and subcutaneous tissue down to the layer of the underlying musculature.  The interpolation flap was carefully excised within this deep plane to maintain its blood supply.  The edges of the donor site were undermined.   The donor site was closed in a primary fashion.  The pedicle was then rotated into position and sutured.  Once the tube was sutured into place, adequate blood supply was confirmed with blanching and refill.  The pedicle was then wrapped with xeroform gauze and dressed appropriately with a telfa and gauze bandage to ensure continued blood supply and protect the attached pedicle. Interpolation Flap Text: A decision was made to reconstruct the defect utilizing an interpolation axial flap and a staged reconstruction.  A telfa template was made of the defect.  This telfa template was then used to outline the interpolation flap.  The donor area for the pedicle flap was then injected with anesthesia.  The flap was excised through the skin and subcutaneous tissue down to the layer of the underlying musculature.  The interpolation flap was carefully excised within this deep plane to maintain its blood supply.  The edges of the donor site were undermined.   The donor site was closed in a primary fashion.  The pedicle was then rotated into position and sutured.  Once the tube was sutured into place, adequate blood supply was confirmed with blanching and refill.  The pedicle was then wrapped with xeroform gauze and dressed appropriately with a telfa and gauze bandage to ensure continued blood supply and protect the attached pedicle. Melolabial Interpolation Flap Text: A decision was made to reconstruct the defect utilizing an interpolation axial flap and a staged reconstruction.  A telfa template was made of the defect.  This telfa template was then used to outline the melolabial interpolation flap.  The donor area for the pedicle flap was then injected with anesthesia.  The flap was excised through the skin and subcutaneous tissue down to the layer of the underlying musculature.  The pedicle flap was carefully excised within this deep plane to maintain its blood supply.  The edges of the donor site were undermined.   The donor site was closed in a primary fashion.  The pedicle was then rotated into position and sutured.  Once the tube was sutured into place, adequate blood supply was confirmed with blanching and refill.  The pedicle was then wrapped with xeroform gauze and dressed appropriately with a telfa and gauze bandage to ensure continued blood supply and protect the attached pedicle. Mastoid Interpolation Flap Text: A decision was made to reconstruct the defect utilizing an interpolation axial flap and a staged reconstruction.  A telfa template was made of the defect.  This telfa template was then used to outline the mastoid interpolation flap.  The donor area for the pedicle flap was then injected with anesthesia.  The flap was excised through the skin and subcutaneous tissue down to the layer of the underlying musculature.  The pedicle flap was carefully excised within this deep plane to maintain its blood supply.  The edges of the donor site were undermined.   The donor site was closed in a primary fashion.  The pedicle was then rotated into position and sutured.  Once the tube was sutured into place, adequate blood supply was confirmed with blanching and refill.  The pedicle was then wrapped with xeroform gauze and dressed appropriately with a telfa and gauze bandage to ensure continued blood supply and protect the attached pedicle. Posterior Auricular Interpolation Flap Text: A decision was made to reconstruct the defect utilizing an interpolation axial flap and a staged reconstruction.  A telfa template was made of the defect.  This telfa template was then used to outline the posterior auricular interpolation flap.  The donor area for the pedicle flap was then injected with anesthesia.  The flap was excised through the skin and subcutaneous tissue down to the layer of the underlying musculature.  The pedicle flap was carefully excised within this deep plane to maintain its blood supply.  The edges of the donor site were undermined.   The donor site was closed in a primary fashion.  The pedicle was then rotated into position and sutured.  Once the tube was sutured into place, adequate blood supply was confirmed with blanching and refill.  The pedicle was then wrapped with xeroform gauze and dressed appropriately with a telfa and gauze bandage to ensure continued blood supply and protect the attached pedicle. Paramedian Forehead Flap Text: A decision was made to reconstruct the defect utilizing an interpolation axial flap and a staged reconstruction.  A telfa template was made of the defect.  This telfa template was then used to outline the paramedian forehead pedicle flap.  The donor area for the pedicle flap was then injected with anesthesia.  The flap was excised through the skin and subcutaneous tissue down to the layer of the underlying musculature.  The pedicle flap was carefully excised within this deep plane to maintain its blood supply.  The edges of the donor site were undermined.   The donor site was closed in a primary fashion.  The pedicle was then rotated into position and sutured.  Once the tube was sutured into place, adequate blood supply was confirmed with blanching and refill.  The pedicle was then wrapped with xeroform gauze and dressed appropriately with a telfa and gauze bandage to ensure continued blood supply and protect the attached pedicle. Abbe Flap (Upper To Lower Lip) Text: The defect of the lower lip was assessed and measured.  Given the location and size of the defect, an Abbe flap was deemed most appropriate. Using a sterile surgical marker, an appropriate Abbe flap was measured and drawn on the upper lip. Local anesthesia was then infiltrated.  A scalpel was then used to incise the upper lip through and through the skin, vermilion, muscle and mucosa, leaving the flap pedicled on the opposite side.  The flap was then rotated and transferred to the lower lip defect.  The flap was then sutured into place with a three layer technique, closing the orbicularis oris muscle layer with subcutaneous buried sutures, followed by a mucosal layer and an epidermal layer. Abbe Flap (Lower To Upper Lip) Text: The defect of the upper lip was assessed and measured.  Given the location and size of the defect, an Abbe flap was deemed most appropriate. Using a sterile surgical marker, an appropriate Abbe flap was measured and drawn on the lower lip. Local anesthesia was then infiltrated. A scalpel was then used to incise the upper lip through and through the skin, vermilion, muscle and mucosa, leaving the flap pedicled on the opposite side.  The flap was then rotated and transferred to the lower lip defect.  The flap was then sutured into place with a three layer technique, closing the orbicularis oris muscle layer with subcutaneous buried sutures, followed by a mucosal layer and an epidermal layer. Estlander Flap (Upper To Lower Lip) Text: The defect of the lower lip was assessed and measured.  Given the location and size of the defect, an Estlander flap was deemed most appropriate. Using a sterile surgical marker, an appropriate Estlander flap was measured and drawn on the upper lip. Local anesthesia was then infiltrated. A scalpel was then used to incise the lateral aspect of the flap, through skin, muscle and mucosa, leaving the flap pedicled medially.  The flap was then rotated and positioned to fill the lower lip defect.  The flap was then sutured into place with a three layer technique, closing the orbicularis oris muscle layer with subcutaneous buried sutures, followed by a mucosal layer and an epidermal layer. Cheiloplasty (Less Than 50%) Text: A decision was made to reconstruct the defect with a  cheiloplasty.  The defect was undermined extensively.  Additional obicularis oris muscle was excised with a 15 blade scalpel.  The defect was converted into a full thickness wedge, of less than 50% of the vertical height of the lip, to facilite a better cosmetic result.  Small vessels were then tied off with 5-0 monocyrl. The obicularis oris, superficial fascia, adipose and dermis were then reapproximated.  After the deeper layers were approximated the epidermis was reapproximated with particular care given to realign the vermillion border. Cheiloplasty (Complex) Text: A decision was made to reconstruct the defect with a  cheiloplasty.  The defect was undermined extensively.  Additional obicularis oris muscle was excised with a 15 blade scalpel.  The defect was converted into a full thickness wedge to facilite a better cosmetic result.  Small vessels were then tied off with 5-0 monocyrl. The obicularis oris, superficial fascia, adipose and dermis were then reapproximated.  After the deeper layers were approximated the epidermis was reapproximated with particular care given to realign the vermillion border. Ear Wedge Repair Text: A wedge excision was completed by carrying down an excision through the full thickness of the ear and cartilage with an inward facing Burow's triangle. The wound was then closed in a layered fashion. Full Thickness Lip Wedge Repair (Flap) Text: Given the location of the defect and the proximity to free margins a full thickness wedge repair was deemed most appropriate.  Using a sterile surgical marker, the appropriate repair was drawn incorporating the defect and placing the expected incisions perpendicular to the vermilion border.  The vermilion border was also meticulously outlined to ensure appropriate reapproximation during the repair.  The area thus outlined was incised through and through with a #15 scalpel blade.  The muscularis and dermis were reaproximated with deep sutures following hemostasis. Care was taken to realign the vermilion border before proceeding with the superficial closure.  Once the vermilion was realigned the superfical and mucosal closure was finished. Ftsg Text: The defect edges were debeveled with a #15 scalpel blade.  Given the location of the defect, shape of the defect and the proximity to free margins a full thickness skin graft was deemed most appropriate.  Using a sterile surgical marker, the primary defect shape was transferred to the donor site. The area thus outlined was incised deep to adipose tissue with a #15 scalpel blade.  The harvested graft was then trimmed of adipose tissue until only dermis and epidermis was left.  The skin margins of the secondary defect were undermined to an appropriate distance in all directions utilizing iris scissors.  The secondary defect was closed with interrupted buried subcutaneous sutures.  The skin edges were then re-apposed with running  sutures.  The skin graft was then placed in the primary defect and oriented appropriately. Split-Thickness Skin Graft Text: The defect edges were debeveled with a #15 scalpel blade.  Given the location of the defect, shape of the defect and the proximity to free margins a split thickness skin graft was deemed most appropriate.  Using a sterile surgical marker, the primary defect shape was transferred to the donor site. The split thickness graft was then harvested.  The skin graft was then placed in the primary defect and oriented appropriately. Pinch Graft Text: The defect edges were debeveled with a #15 scalpel blade. Given the location of the defect, shape of the defect and the proximity to free margins a pinch graft was deemed most appropriate. Using a sterile surgical marker, the primary defect shape was transferred to the donor site. The area thus outlined was incised deep to adipose tissue with a #15 scalpel blade.  The harvested graft was then trimmed of adipose tissue until only dermis and epidermis was left. The skin margins of the secondary defect were undermined to an appropriate distance in all directions utilizing iris scissors.  The secondary defect was closed with interrupted buried subcutaneous sutures.  The skin edges were then re-apposed with running  sutures.  The skin graft was then placed in the primary defect and oriented appropriately. Burow's Graft Text: The defect edges were debeveled with a #15 scalpel blade. Given the location of the defect, shape of the defect, the proximity to free margins and the presence of a standing cone deformity a Burow's skin graft was deemed most appropriate. The standing cone was removed and this tissue was then trimmed to the shape of the primary defect. The adipose tissue was also removed until only dermis and epidermis were left.  The skin margins of the secondary defect were undermined to an appropriate distance in all directions utilizing iris scissors.  The secondary defect was closed with interrupted buried subcutaneous sutures.  The skin edges were then re-apposed with running  sutures.  The skin graft was then placed in the primary defect and oriented appropriately. Cartilage Graft Text: The defect edges were debeveled with a #15 scalpel blade.  Given the location of the defect, shape of the defect, the fact the defect involved a full thickness cartilage defect a cartilage graft was deemed most appropriate.  An appropriate donor site was identified, cleansed, and anesthetized. The cartilage graft was then harvested and transferred to the recipient site, oriented appropriately and then sutured into place.  The secondary defect was then repaired using a primary closure. Composite Graft Text: The defect edges were debeveled with a #15 scalpel blade.  Given the location of the defect, shape of the defect, the proximity to free margins and the fact the defect was full thickness a composite graft was deemed most appropriate.  The defect was outline and then transferred to the donor site.  A full thickness graft was then excised from the donor site. The graft was then placed in the primary defect, oriented appropriately and then sutured into place.  The secondary defect was then repaired using a primary closure. Epidermal Autograft Text: The defect edges were debeveled with a #15 scalpel blade.  Given the location of the defect, shape of the defect and the proximity to free margins an epidermal autograft was deemed most appropriate.  Using a sterile surgical marker, the primary defect shape was transferred to the donor site. The epidermal graft was then harvested.  The skin graft was then placed in the primary defect and oriented appropriately. Dermal Autograft Text: The defect edges were debeveled with a #15 scalpel blade.  Given the location of the defect, shape of the defect and the proximity to free margins a dermal autograft was deemed most appropriate.  Using a sterile surgical marker, the primary defect shape was transferred to the donor site. The area thus outlined was incised deep to adipose tissue with a #15 scalpel blade.  The harvested graft was then trimmed of adipose and epidermal tissue until only dermis was left.  The skin graft was then placed in the primary defect and oriented appropriately. Skin Substitute Text: The defect edges were debeveled with a #15 scalpel blade.  Given the location of the defect, shape of the defect and the proximity to free margins a skin substitute graft was deemed most appropriate.  The graft material was trimmed to fit the size of the defect. The graft was then placed in the primary defect and oriented appropriately. Skin Substitute Paste Text: The defect edges were debeveled with a #15 scalpel blade.  Given the location of the defect, shape of the defect and the proximity to free margins a skin substitute micronized graft was deemed most appropriate.  The entire vial contents were admixed with 0.5ccs of sterile saline, formed into a paste and then evenly spread over the entire wound bed. Skin Substitute Injection Text: The defect edges were debeveled with a #15 scalpel blade.  Given the location of the defect, shape of the defect and the proximity to free margins a skin substitute micronized graft was deemed most appropriate.  The entire vial contents were admixed with 3.0ccs of sterile saline and then injected subcutaneously throughout the entire wound bed. Tissue Cultured Epidermal Autograft Text: The defect edges were debeveled with a #15 scalpel blade.  Given the location of the defect, shape of the defect and the proximity to free margins a tissue cultured epidermal autograft was deemed most appropriate.  The graft was then trimmed to fit the size of the defect.  The graft was then placed in the primary defect and oriented appropriately. Xenograft Text: The defect edges were debeveled with a #15 scalpel blade.  Given the location of the defect, shape of the defect and the proximity to free margins a xenograft was deemed most appropriate.  The graft was then trimmed to fit the size of the defect.  The graft was then placed in the primary defect and oriented appropriately. Purse String (Simple) Text: Given the location of the defect and the characteristics of the surrounding skin a pursestring closure was deemed most appropriate.  Undermining was performed circumfirentially around the surgical defect.  A purstring suture was then placed and tightened. Purse String (Intermediate) Text: Given the location of the defect and the characteristics of the surrounding skin a pursestring intermediate closure was deemed most appropriate.  Undermining was performed circumfirentially around the surgical defect.  A purstring suture was then placed and tightened. Partial Purse String (Simple) Text: Given the location of the defect and the characteristics of the surrounding skin a simple purse string closure was deemed most appropriate.  Undermining was performed circumfirentially around the surgical defect.  A purse string suture was then placed and tightened. Wound tension only allowed a partial closure of the circular defect. Partial Purse String (Intermediate) Text: Given the location of the defect and the characteristics of the surrounding skin an intermediate purse string closure was deemed most appropriate.  Undermining was performed circumfirentially around the surgical defect.  A purse string suture was then placed and tightened. Wound tension only allowed a partial closure of the circular defect. Localized Dermabrasion With Wire Brush Text: The patient was draped in routine manner.  Localized dermabrasion using 3 x 17 mm wire brush was performed in routine manner to papillary dermis. This spot dermabrasion is being performed to complete skin cancer reconstruction. It also will eliminate the other sun damaged precancerous cells that are known to be part of the regional effect of a lifetime's worth of sun exposure. This localized dermabrasion is therapeutic and should not be considered cosmetic in any regard. Localized Dermabrasion With Sand Papertext: The patient was draped in routine manner.  Localized dermabrasion using sterile sand paper was performed in routine manner to papillary dermis. This spot dermabrasion is being performed to complete skin cancer reconstruction. It also will eliminate the other sun damaged precancerous cells that are known to be part of the regional effect of a lifetime's worth of sun exposure. This localized dermabrasion is therapeutic and should not be considered cosmetic in any regard. Localized Dermabrasion With 15 Blade Text: The patient was draped in routine manner.  Localized dermabrasion using a 15 blade was performed in routine manner to papillary dermis. This spot dermabrasion is being performed to complete skin cancer reconstruction. It also will eliminate the other sun damaged precancerous cells that are known to be part of the regional effect of a lifetime's worth of sun exposure. This localized dermabrasion is therapeutic and should not be considered cosmetic in any regard. Tarsorrhaphy Text: A tarsorrhaphy was performed using Frost sutures. Intermediate Repair And Flap Additional Text (Will Appearing After The Standard Complex Repair Text): The intermediate repair was not sufficient to completely close the primary defect. The remaining additional defect was repaired with the flap mentioned below. Intermediate Repair And Graft Additional Text (Will Appearing After The Standard Complex Repair Text): The intermediate repair was not sufficient to completely close the primary defect. The remaining additional defect was repaired with the graft mentioned below. Complex Repair And Flap Additional Text (Will Appearing After The Standard Complex Repair Text): The complex repair was not sufficient to completely close the primary defect. The remaining additional defect was repaired with the flap mentioned below. Complex Repair And Graft Additional Text (Will Appearing After The Standard Complex Repair Text): The complex repair was not sufficient to completely close the primary defect. The remaining additional defect was repaired with the graft mentioned below. Eyelid Full Thickness Repair - 38354: The eyelid defect was full thickness which required a wedge repair of the eyelid. Special care was taken to ensure that the eyelid margin was realligned when placing sutures. Eyelid Partial Thickness Repair - 60947: The eyelid defect was partial thickness which required a wedge repair of the eyelid. Special care was taken to ensure that the eyelid margin was realligned when placing sutures. Cheek Interpolation Flap Division And Inset Text: Division and inset of the cheek interpolation flap was performed to achieve optimal aesthetic result, restore normal anatomic appearance and avoid distortion of normal anatomy, expedite and facilitate wound healing, achieve optimal functional result and because linear closure either not possible or would produce suboptimal result. The patient was prepped and draped in the usual manner. The pedicle was infiltrated with local anesthesia. The pedicle was sectioned with a #15 blade. The pedicle was de-bulked and trimmed to match the shape of the defect. Hemostasis was achieved. The flap donor site and free margin of the flap were secured with deep buried sutures and the wound edges were re-approximated. Cheek To Nose Interpolation Flap Division And Inset Text: Division and inset of the cheek to nose interpolation flap was performed to achieve optimal aesthetic result, restore normal anatomic appearance and avoid distortion of normal anatomy, expedite and facilitate wound healing, achieve optimal functional result and because linear closure either not possible or would produce suboptimal result. The patient was prepped and draped in the usual manner. The pedicle was infiltrated with local anesthesia. The pedicle was sectioned with a #15 blade. The pedicle was de-bulked and trimmed to match the shape of the defect. Hemostasis was achieved. The flap donor site and free margin of the flap were secured with deep buried sutures and the wound edges were re-approximated. Melolabial Interpolation Flap Division And Inset Text: Division and inset of the melolabial interpolation flap was performed to achieve optimal aesthetic result, restore normal anatomic appearance and avoid distortion of normal anatomy, expedite and facilitate wound healing, achieve optimal functional result and because linear closure either not possible or would produce suboptimal result. The patient was prepped and draped in the usual manner. The pedicle was infiltrated with local anesthesia. The pedicle was sectioned with a #15 blade. The pedicle was de-bulked and trimmed to match the shape of the defect. Hemostasis was achieved. The flap donor site and free margin of the flap were secured with deep buried sutures and the wound edges were re-approximated. Mastoid Interpolation Flap Division And Inset Text: Division and inset of the mastoid interpolation flap was performed to achieve optimal aesthetic result, restore normal anatomic appearance and avoid distortion of normal anatomy, expedite and facilitate wound healing, achieve optimal functional result and because linear closure either not possible or would produce suboptimal result. The patient was prepped and draped in the usual manner. The pedicle was infiltrated with local anesthesia. The pedicle was sectioned with a #15 blade. The pedicle was de-bulked and trimmed to match the shape of the defect. Hemostasis was achieved. The flap donor site and free margin of the flap were secured with deep buried sutures and the wound edges were re-approximated. Paramedian Forehead Flap Division And Inset Text: Division and inset of the paramedian forehead flap was performed to achieve optimal aesthetic result, restore normal anatomic appearance and avoid distortion of normal anatomy, expedite and facilitate wound healing, achieve optimal functional result and because linear closure either not possible or would produce suboptimal result. The patient was prepped and draped in the usual manner. The pedicle was infiltrated with local anesthesia. The pedicle was sectioned with a #15 blade. The pedicle was de-bulked and trimmed to match the shape of the defect. Hemostasis was achieved. The flap donor site and free margin of the flap were secured with deep buried sutures and the wound edges were re-approximated. Posterior Auricular Interpolation Flap Division And Inset Text: Division and inset of the posterior auricular interpolation flap was performed to achieve optimal aesthetic result, restore normal anatomic appearance and avoid distortion of normal anatomy, expedite and facilitate wound healing, achieve optimal functional result and because linear closure either not possible or would produce suboptimal result. The patient was prepped and draped in the usual manner. The pedicle was infiltrated with local anesthesia. The pedicle was sectioned with a #15 blade. The pedicle was de-bulked and trimmed to match the shape of the defect. Hemostasis was achieved. The flap donor site and free margin of the flap were secured with deep buried sutures and the wound edges were re-approximated. Manual Repair Warning Statement: We plan on removing the manually selected variable below in favor of our much easier automatic structured text blocks found in the previous tab. We decided to do this to help make the flow better and give you the full power of structured data. Manual selection is never going to be ideal in our platform and I would encourage you to avoid using manual selection from this point on, especially since I will be sunsetting this feature. It is important that you do one of two things with the customized text below. First, you can save all of the text in a word file so you can have it for future reference. Second, transfer the text to the appropriate area in the Library tab. Lastly, if there is a flap or graft type which we do not have you need to let us know right away so I can add it in before the variable is hidden. No need to panic, we plan to give you roughly 6 months to make the change. Consent: The rationale for Repairs was explained to the patient and consent was obtained. The risks, benefits and alternatives to therapy were discussed in detail. Specifically, the risks of infection, scarring, bleeding, prolonged wound healing, incomplete removal, allergy to anesthesia, nerve injury and recurrence were addressed. Prior to the procedure, the treatment site was clearly identified and confirmed by the patient. All components of Universal Protocol/PAUSE Rule completed. Post-Care Instructions: I reviewed with the patient in detail post-care instructions. Patient is not to engage in any heavy lifting, exercise, or swimming for the next 7 days. Should the patient develop any fevers, chills, bleeding, severe pain patient will contact the office immediately. Pain Refusal Text: I offered to prescribe pain medication but the patient refused to take this medication. Where Do You Want The Question To Include Opioid Counseling Located?: Case Summary Tab Information: Selecting Yes will display possible errors in your note based on the variables you have selected. This validation is only offered as a suggestion for you. PLEASE NOTE THAT THE VALIDATION TEXT WILL BE REMOVED WHEN YOU FINALIZE YOUR NOTE. IF YOU WANT TO FAX A PRELIMINARY NOTE YOU WILL NEED TO TOGGLE THIS TO 'NO' IF YOU DO NOT WANT IT IN YOUR FAXED NOTE.

## 2024-07-11 ENCOUNTER — HOSPITAL ENCOUNTER (OUTPATIENT)
Dept: WOUND CARE | Age: 49
Discharge: HOME | End: 2024-07-11
Attending: PODIATRIST
Payer: COMMERCIAL

## 2024-07-11 VITALS
RESPIRATION RATE: 18 BRPM | HEART RATE: 72 BPM | TEMPERATURE: 96.8 F | SYSTOLIC BLOOD PRESSURE: 153 MMHG | DIASTOLIC BLOOD PRESSURE: 87 MMHG

## 2024-07-11 DIAGNOSIS — L97.512 ULCER OF RIGHT FOOT WITH FAT LAYER EXPOSED (HCC): Primary | ICD-10-CM

## 2024-07-11 PROCEDURE — 6370000000 HC RX 637 (ALT 250 FOR IP): Performed by: PODIATRIST

## 2024-07-11 PROCEDURE — 11042 DBRDMT SUBQ TIS 1ST 20SQCM/<: CPT | Performed by: PODIATRIST

## 2024-07-11 PROCEDURE — 11042 DBRDMT SUBQ TIS 1ST 20SQCM/<: CPT

## 2024-07-11 RX ORDER — LIDOCAINE 50 MG/G
OINTMENT TOPICAL ONCE
OUTPATIENT
Start: 2024-07-11 | End: 2024-07-11

## 2024-07-11 RX ORDER — LIDOCAINE HYDROCHLORIDE 20 MG/ML
JELLY TOPICAL ONCE
OUTPATIENT
Start: 2024-07-11 | End: 2024-07-11

## 2024-07-11 RX ORDER — GINSENG 100 MG
CAPSULE ORAL ONCE
OUTPATIENT
Start: 2024-07-11 | End: 2024-07-11

## 2024-07-11 RX ORDER — CLOBETASOL PROPIONATE 0.5 MG/G
OINTMENT TOPICAL ONCE
OUTPATIENT
Start: 2024-07-11 | End: 2024-07-11

## 2024-07-11 RX ORDER — SODIUM CHLOR/HYPOCHLOROUS ACID 0.033 %
SOLUTION, IRRIGATION IRRIGATION ONCE
OUTPATIENT
Start: 2024-07-11 | End: 2024-07-11

## 2024-07-11 RX ORDER — BACITRACIN ZINC AND POLYMYXIN B SULFATE 500; 1000 [USP'U]/G; [USP'U]/G
OINTMENT TOPICAL ONCE
OUTPATIENT
Start: 2024-07-11 | End: 2024-07-11

## 2024-07-11 RX ORDER — TRIAMCINOLONE ACETONIDE 1 MG/G
OINTMENT TOPICAL ONCE
OUTPATIENT
Start: 2024-07-11 | End: 2024-07-11

## 2024-07-11 RX ORDER — IBUPROFEN 200 MG
TABLET ORAL ONCE
OUTPATIENT
Start: 2024-07-11 | End: 2024-07-11

## 2024-07-11 RX ORDER — LIDOCAINE 40 MG/G
CREAM TOPICAL ONCE
Status: COMPLETED | OUTPATIENT
Start: 2024-07-11 | End: 2024-07-11

## 2024-07-11 RX ORDER — LIDOCAINE HYDROCHLORIDE 40 MG/ML
SOLUTION TOPICAL ONCE
OUTPATIENT
Start: 2024-07-11 | End: 2024-07-11

## 2024-07-11 RX ORDER — BETAMETHASONE DIPROPIONATE 0.5 MG/G
CREAM TOPICAL ONCE
OUTPATIENT
Start: 2024-07-11 | End: 2024-07-11

## 2024-07-11 RX ORDER — GENTAMICIN SULFATE 1 MG/G
OINTMENT TOPICAL ONCE
OUTPATIENT
Start: 2024-07-11 | End: 2024-07-11

## 2024-07-11 RX ORDER — LIDOCAINE 40 MG/G
CREAM TOPICAL ONCE
OUTPATIENT
Start: 2024-07-11 | End: 2024-07-11

## 2024-07-11 RX ADMIN — LIDOCAINE 4%: 4 CREAM TOPICAL at 09:37

## 2024-07-11 NOTE — DISCHARGE INSTRUCTIONS
[facility]   Physician Orders and Discharge Instructions  [facility]  [address] [place]  [address]  Telephone: [phone]      [phone]        NAME:  Ant Lora                                                                                                  YOB: 1975  MEDICAL RECORD NUMBER:  [id number]     Your  is:  Davina     Home Care/Facility: None     Wound Location: Right Plantar Foot     Dressing orders:.1.Cleanse wound(s) with soap and water.  2. Apply dry JANETT  Or equivalent to wound bed.  3. Moisten JANETT with a few drops of normal saline.  4. Cover with 4x4's and wrap with gauze (emily or kerlix)  5. Change  Every other day or Monday, Wednesday, and Friday     Compression: None     Offloading Device:Post op shoe with peg assist (when not driving) felt placed in work shoe.     Other Instructions: Post op shoe with a peg Assist. Felt placed in regular shoe     Keep all dressings clean, dry and intact.  Keep pressure off the wound(s) at all times.      Follow up visit   2 Weeks July 25, 2024 @ 08:45     Please give 24 hour notice if unable to keep appointment. [phone]     If you experience any of the following, please call the Wound Care Service at  [phone] or go to the nearest emergency room.        *Increase in pain         *Temperature over 101           *Increase in drainage from your wound or a foul odor  *Uncontrolled swelling            *Need for compression bandage changes due to slippage, breakthrough drainage       PLEASE NOTE: IF YOU ARE UNABLE TO OBTAIN WOUND SUPPLIES, CONTINUE TO USE THE SUPPLIES YOU HAVE AVAILABLE UNTIL YOU ARE ABLE TO REACH US. IT IS MOST IMPORTANT TO KEEP THE WOUND COVERED AT ALL TIMES

## 2024-07-11 NOTE — PROGRESS NOTES
[facility]                                                   Progress Note and Procedure Note      Ant Lora  MEDICAL RECORD NUMBER:  [id number]  AGE: 48 y.o.   GENDER: male  : 1975  EPISODE DATE:  2024    Subjective:     Chief Complaint   Patient presents with    Wound Check         HISTORY of PRESENT ILLNESS HPI     Ant Lora is a 48 y.o. male who presents today for wound/ulcer evaluation.   History of Wound Context: Patient presents for continued care for chronic diabetic ulceration of the right foot.  Patient was compliance with offloading the wound.  He states he has been applying gentamicin to the wound base and using Savita collagen product.  Patient has not observed signs of infection.    Patient denies nausea, vomiting, fever, chills, chest pain, or shortness of breath.     Wound/Ulcer Pain Timing/Severity: none  Quality of pain: N/A  Severity:  0 / 10   Modifying Factors: None  Associated Signs/Symptoms: drainage    Ulcer Identification:  Ulcer Type: diabetic  Contributing Factors: shear force    Wound:  Full-thickness diabetic ulceration right foot        PAST MEDICAL HISTORY        Diagnosis Date    CAD (coronary artery disease) 3/2/2015,     stents    DM (diabetes mellitus), type 2, uncontrolled 2015    Hyperlipidemia     Neuropathy     legs bilateral    Smoker        PAST SURGICAL HISTORY    Past Surgical History:   Procedure Laterality Date    CARDIAC SURGERY         FAMILY HISTORY    History reviewed. No pertinent family history.    SOCIAL HISTORY    Social History     Tobacco Use    Smoking status: Every Day     Current packs/day: 0.25     Average packs/day: 0.3 packs/day for 25.0 years (6.3 ttl pk-yrs)     Types: Cigarettes    Smokeless tobacco: Never   Substance Use Topics    Alcohol use: Yes     Comment: rarely    Drug use: No       ALLERGIES    Allergies   Allergen Reactions    Metformin And Related     Simvastatin      Leg Pains    Metformin Nausea

## 2024-07-25 ENCOUNTER — HOSPITAL ENCOUNTER (OUTPATIENT)
Dept: WOUND CARE | Age: 49
Discharge: HOME | End: 2024-07-25
Attending: PODIATRIST
Payer: COMMERCIAL

## 2024-07-25 VITALS
TEMPERATURE: 96.7 F | RESPIRATION RATE: 18 BRPM | HEART RATE: 71 BPM | DIASTOLIC BLOOD PRESSURE: 88 MMHG | SYSTOLIC BLOOD PRESSURE: 154 MMHG

## 2024-07-25 DIAGNOSIS — L97.512 ULCER OF RIGHT FOOT WITH FAT LAYER EXPOSED (HCC): Primary | ICD-10-CM

## 2024-07-25 PROCEDURE — 6370000000 HC RX 637 (ALT 250 FOR IP): Performed by: PODIATRIST

## 2024-07-25 PROCEDURE — 11042 DBRDMT SUBQ TIS 1ST 20SQCM/<: CPT

## 2024-07-25 PROCEDURE — 11042 DBRDMT SUBQ TIS 1ST 20SQCM/<: CPT | Performed by: PODIATRIST

## 2024-07-25 RX ORDER — GINSENG 100 MG
CAPSULE ORAL ONCE
OUTPATIENT
Start: 2024-07-25 | End: 2024-07-25

## 2024-07-25 RX ORDER — GENTAMICIN SULFATE 1 MG/G
OINTMENT TOPICAL ONCE
Status: COMPLETED | OUTPATIENT
Start: 2024-07-25 | End: 2024-07-25

## 2024-07-25 RX ORDER — LIDOCAINE 40 MG/G
CREAM TOPICAL ONCE
Status: COMPLETED | OUTPATIENT
Start: 2024-07-25 | End: 2024-07-25

## 2024-07-25 RX ORDER — LIDOCAINE HYDROCHLORIDE 20 MG/ML
JELLY TOPICAL ONCE
OUTPATIENT
Start: 2024-07-25 | End: 2024-07-25

## 2024-07-25 RX ORDER — BETAMETHASONE DIPROPIONATE 0.5 MG/G
CREAM TOPICAL ONCE
OUTPATIENT
Start: 2024-07-25 | End: 2024-07-25

## 2024-07-25 RX ORDER — LIDOCAINE HYDROCHLORIDE 40 MG/ML
SOLUTION TOPICAL ONCE
OUTPATIENT
Start: 2024-07-25 | End: 2024-07-25

## 2024-07-25 RX ORDER — TRIAMCINOLONE ACETONIDE 1 MG/G
OINTMENT TOPICAL ONCE
OUTPATIENT
Start: 2024-07-25 | End: 2024-07-25

## 2024-07-25 RX ORDER — SODIUM HYPOCHLORITE 1.25 MG/ML
SOLUTION TOPICAL DAILY
Qty: 473 ML | Refills: 3 | Status: SHIPPED | OUTPATIENT
Start: 2024-07-25

## 2024-07-25 RX ORDER — CLOBETASOL PROPIONATE 0.5 MG/G
OINTMENT TOPICAL ONCE
OUTPATIENT
Start: 2024-07-25 | End: 2024-07-25

## 2024-07-25 RX ORDER — LIDOCAINE 50 MG/G
OINTMENT TOPICAL ONCE
OUTPATIENT
Start: 2024-07-25 | End: 2024-07-25

## 2024-07-25 RX ORDER — GENTAMICIN SULFATE 1 MG/G
OINTMENT TOPICAL ONCE
OUTPATIENT
Start: 2024-07-25 | End: 2024-07-25

## 2024-07-25 RX ORDER — IBUPROFEN 200 MG
TABLET ORAL ONCE
OUTPATIENT
Start: 2024-07-25 | End: 2024-07-25

## 2024-07-25 RX ORDER — SODIUM CHLOR/HYPOCHLOROUS ACID 0.033 %
SOLUTION, IRRIGATION IRRIGATION ONCE
OUTPATIENT
Start: 2024-07-25 | End: 2024-07-25

## 2024-07-25 RX ORDER — LIDOCAINE 40 MG/G
CREAM TOPICAL ONCE
OUTPATIENT
Start: 2024-07-25 | End: 2024-07-25

## 2024-07-25 RX ORDER — BACITRACIN ZINC AND POLYMYXIN B SULFATE 500; 1000 [USP'U]/G; [USP'U]/G
OINTMENT TOPICAL ONCE
OUTPATIENT
Start: 2024-07-25 | End: 2024-07-25

## 2024-07-25 RX ADMIN — LIDOCAINE 4%: 4 CREAM TOPICAL at 09:07

## 2024-07-25 RX ADMIN — GENTAMICIN SULFATE: 1 OINTMENT TOPICAL at 09:31

## 2024-07-25 NOTE — PROGRESS NOTES
[facility]                                                   Progress Note and Procedure Note      Ant Lora  MEDICAL RECORD NUMBER:  [id number]  AGE: 48 y.o.   GENDER: male  : 1975  EPISODE DATE:  2024    Subjective:     Chief Complaint   Patient presents with    Wound Check         HISTORY of PRESENT ILLNESS HPI     Ant Lora is a 48 y.o. male who presents today for wound/ulcer evaluation.   History of Wound Context: Patient presents for continued care of a diabetic ulceration of the right foot.  Patient reports compliance with use of the peg assist device while at home, while he is working he is wearing his steel toe work boots with a modified insole to offload the wound.  Patient reports compliance with use of Savita Ag to the wound bed.  Patient has not observed signs of acute bacterial infection, he has noticed increased drainage.    Patient denies nausea, vomiting, fever, chills, chest pain, or shortness of breath.     Wound/Ulcer Pain Timing/Severity: none  Quality of pain: N/A  Severity:  0 / 10   Modifying Factors: None  Associated Signs/Symptoms: drainage    Ulcer Identification:  Ulcer Type: diabetic  Contributing Factors: shear force    Wound:  Diabetic ulceration right plantar foot        PAST MEDICAL HISTORY        Diagnosis Date    CAD (coronary artery disease) 3/2/2015,     stents    DM (diabetes mellitus), type 2, uncontrolled 2015    Hyperlipidemia     Neuropathy     legs bilateral    Smoker        PAST SURGICAL HISTORY    Past Surgical History:   Procedure Laterality Date    CARDIAC SURGERY         FAMILY HISTORY    History reviewed. No pertinent family history.    SOCIAL HISTORY    Social History     Tobacco Use    Smoking status: Every Day     Current packs/day: 0.25     Average packs/day: 0.3 packs/day for 25.0 years (6.3 ttl pk-yrs)     Types: Cigarettes    Smokeless tobacco: Never   Substance Use Topics    Alcohol use: Yes     Comment: rarely

## 2024-07-25 NOTE — DISCHARGE INSTRUCTIONS
[facility]   Physician Orders and Discharge Instructions  [facility]  [address] [place]  [address]  Telephone: [phone]      [phone]        NAME:  Ant Lora                                                                                                  YOB: 1975  MEDICAL RECORD NUMBER:  [id number]     Your  is:  Davina     Home Care/Facility: None     Wound Location: Right Plantar Foot     Dressing orders:.1.Cleanse wound(s)  with Dakin's Solution  2. Apply a thin layer of Gentamicin ointment to wound bed  3. Cover with a dry dressing  4. Change Daily     Compression: None     Offloading Device:Post op shoe with peg assist (when not driving) felt placed in work shoe.     Other Instructions: Post op shoe with a peg Assist. Felt placed in regular shoe.  the Dakin's Solution and Gentamicin Ointment at your pharmacy and use as prescribed.     Keep all dressings clean, dry and intact.  Keep pressure off the wound(s) at all times.      Follow up visit   1 Week August 1, 2024 @ 0796     Please give 24 hour notice if unable to keep appointment. [phone]     If you experience any of the following, please call the Wound Care Service at  [phone] or go to the nearest emergency room.        *Increase in pain         *Temperature over 101           *Increase in drainage from your wound or a foul odor  *Uncontrolled swelling            *Need for compression bandage changes due to slippage, breakthrough drainage       PLEASE NOTE: IF YOU ARE UNABLE TO OBTAIN WOUND SUPPLIES, CONTINUE TO USE THE SUPPLIES YOU HAVE AVAILABLE UNTIL YOU ARE ABLE TO REACH US. IT IS MOST IMPORTANT TO KEEP THE WOUND COVERED AT ALL TIMES

## 2024-08-01 ENCOUNTER — HOSPITAL ENCOUNTER (OUTPATIENT)
Dept: WOUND CARE | Age: 49
Discharge: HOME | End: 2024-08-01
Attending: PODIATRIST
Payer: COMMERCIAL

## 2024-08-01 VITALS
SYSTOLIC BLOOD PRESSURE: 142 MMHG | TEMPERATURE: 97 F | DIASTOLIC BLOOD PRESSURE: 74 MMHG | HEART RATE: 83 BPM | RESPIRATION RATE: 18 BRPM

## 2024-08-01 DIAGNOSIS — L97.512 ULCER OF RIGHT FOOT WITH FAT LAYER EXPOSED (HCC): Primary | ICD-10-CM

## 2024-08-01 PROCEDURE — 11042 DBRDMT SUBQ TIS 1ST 20SQCM/<: CPT | Performed by: PODIATRIST

## 2024-08-01 PROCEDURE — 87075 CULTR BACTERIA EXCEPT BLOOD: CPT

## 2024-08-01 PROCEDURE — 87186 SC STD MICRODIL/AGAR DIL: CPT

## 2024-08-01 PROCEDURE — 11042 DBRDMT SUBQ TIS 1ST 20SQCM/<: CPT

## 2024-08-01 PROCEDURE — 87070 CULTURE OTHR SPECIMN AEROBIC: CPT

## 2024-08-01 PROCEDURE — 6370000000 HC RX 637 (ALT 250 FOR IP): Performed by: PODIATRIST

## 2024-08-01 RX ORDER — LIDOCAINE 40 MG/G
CREAM TOPICAL ONCE
OUTPATIENT
Start: 2024-08-01 | End: 2024-08-01

## 2024-08-01 RX ORDER — LIDOCAINE HYDROCHLORIDE 20 MG/ML
JELLY TOPICAL ONCE
Status: CANCELLED | OUTPATIENT
Start: 2024-08-01 | End: 2024-08-01

## 2024-08-01 RX ORDER — CLOBETASOL PROPIONATE 0.5 MG/G
OINTMENT TOPICAL ONCE
OUTPATIENT
Start: 2024-08-01 | End: 2024-08-01

## 2024-08-01 RX ORDER — TRIAMCINOLONE ACETONIDE 1 MG/G
OINTMENT TOPICAL ONCE
Status: CANCELLED | OUTPATIENT
Start: 2024-08-01 | End: 2024-08-01

## 2024-08-01 RX ORDER — GENTAMICIN SULFATE 1 MG/G
OINTMENT TOPICAL ONCE
Status: CANCELLED | OUTPATIENT
Start: 2024-08-01 | End: 2024-08-01

## 2024-08-01 RX ORDER — BETAMETHASONE DIPROPIONATE 0.5 MG/G
CREAM TOPICAL ONCE
Status: CANCELLED | OUTPATIENT
Start: 2024-08-01 | End: 2024-08-01

## 2024-08-01 RX ORDER — GINSENG 100 MG
CAPSULE ORAL ONCE
OUTPATIENT
Start: 2024-08-01 | End: 2024-08-01

## 2024-08-01 RX ORDER — BACITRACIN ZINC AND POLYMYXIN B SULFATE 500; 1000 [USP'U]/G; [USP'U]/G
OINTMENT TOPICAL ONCE
Status: CANCELLED | OUTPATIENT
Start: 2024-08-01 | End: 2024-08-01

## 2024-08-01 RX ORDER — BETAMETHASONE DIPROPIONATE 0.5 MG/G
CREAM TOPICAL ONCE
OUTPATIENT
Start: 2024-08-01 | End: 2024-08-01

## 2024-08-01 RX ORDER — LIDOCAINE HYDROCHLORIDE 40 MG/ML
SOLUTION TOPICAL ONCE
OUTPATIENT
Start: 2024-08-01 | End: 2024-08-01

## 2024-08-01 RX ORDER — GENTAMICIN SULFATE 1 MG/G
OINTMENT TOPICAL ONCE
OUTPATIENT
Start: 2024-08-01 | End: 2024-08-01

## 2024-08-01 RX ORDER — SODIUM CHLOR/HYPOCHLOROUS ACID 0.033 %
SOLUTION, IRRIGATION IRRIGATION ONCE
Status: CANCELLED | OUTPATIENT
Start: 2024-08-01 | End: 2024-08-01

## 2024-08-01 RX ORDER — TRIAMCINOLONE ACETONIDE 1 MG/G
OINTMENT TOPICAL ONCE
OUTPATIENT
Start: 2024-08-01 | End: 2024-08-01

## 2024-08-01 RX ORDER — CLOBETASOL PROPIONATE 0.5 MG/G
OINTMENT TOPICAL ONCE
Status: CANCELLED | OUTPATIENT
Start: 2024-08-01 | End: 2024-08-01

## 2024-08-01 RX ORDER — LIDOCAINE HYDROCHLORIDE 20 MG/ML
JELLY TOPICAL ONCE
OUTPATIENT
Start: 2024-08-01 | End: 2024-08-01

## 2024-08-01 RX ORDER — LIDOCAINE HYDROCHLORIDE 40 MG/ML
SOLUTION TOPICAL ONCE
Status: CANCELLED | OUTPATIENT
Start: 2024-08-01 | End: 2024-08-01

## 2024-08-01 RX ORDER — IBUPROFEN 200 MG
TABLET ORAL ONCE
Status: CANCELLED | OUTPATIENT
Start: 2024-08-01 | End: 2024-08-01

## 2024-08-01 RX ORDER — GENTAMICIN SULFATE 1 MG/G
OINTMENT TOPICAL ONCE
Status: COMPLETED | OUTPATIENT
Start: 2024-08-01 | End: 2024-08-01

## 2024-08-01 RX ORDER — GINSENG 100 MG
CAPSULE ORAL ONCE
Status: CANCELLED | OUTPATIENT
Start: 2024-08-01 | End: 2024-08-01

## 2024-08-01 RX ORDER — LIDOCAINE 50 MG/G
OINTMENT TOPICAL ONCE
Status: CANCELLED | OUTPATIENT
Start: 2024-08-01 | End: 2024-08-01

## 2024-08-01 RX ORDER — SODIUM CHLOR/HYPOCHLOROUS ACID 0.033 %
SOLUTION, IRRIGATION IRRIGATION ONCE
OUTPATIENT
Start: 2024-08-01 | End: 2024-08-01

## 2024-08-01 RX ORDER — BACITRACIN ZINC AND POLYMYXIN B SULFATE 500; 1000 [USP'U]/G; [USP'U]/G
OINTMENT TOPICAL ONCE
OUTPATIENT
Start: 2024-08-01 | End: 2024-08-01

## 2024-08-01 RX ORDER — IBUPROFEN 200 MG
TABLET ORAL ONCE
OUTPATIENT
Start: 2024-08-01 | End: 2024-08-01

## 2024-08-01 RX ORDER — LIDOCAINE 50 MG/G
OINTMENT TOPICAL ONCE
OUTPATIENT
Start: 2024-08-01 | End: 2024-08-01

## 2024-08-01 RX ORDER — LIDOCAINE 40 MG/G
CREAM TOPICAL ONCE
Status: CANCELLED | OUTPATIENT
Start: 2024-08-01 | End: 2024-08-01

## 2024-08-01 RX ADMIN — GENTAMICIN SULFATE: 1 OINTMENT TOPICAL at 10:59

## 2024-08-01 NOTE — DISCHARGE INSTRUCTIONS
[facility]   Physician Orders and Discharge Instructions  [facility]  [address] [place]  [address]  Telephone: [phone]      [phone]        NAME:  Ant Lora                                                                                                  YOB: 1975  MEDICAL RECORD NUMBER:  [id number]     Your  is:  Davina     Home Care/Facility: None     Wound Location: Right Plantar Foot     Dressing orders:.1.Cleanse wound(s)  with Dakin's Solution.1.Cleanse wound(s) with normal saline. Apply a thin layer of Gentamicin ointment then:  2. Apply dry HYDROFERA BLUE READY FOAM or equivalent to wound bed.  NOTE: If your HYDROFRERA BLUE is not soft and pliable, moisten with saline until it is sponge like and then ring out excess saline and apply to wound bed.  3. Cover with 4x4's and wrap with gauze (emily or kerlix)  4. Change  Daily     Compression: None     Offloading Device:Post op shoe with peg assist (when not driving) felt placed in work shoe.     Other Instructions:   the Dakin's Solution at your pharmacy and use as prescribed. A Culture was taken today in the wound clinic.   70% Rubbing Alcohol and spray in your work shoes Daily - let air dry.      Keep all dressings clean, dry and intact.  Keep pressure off the wound(s) at all times.      Follow up visit   1 Week August 8, 2024 @ 9:15     Please give 24 hour notice if unable to keep appointment. [phone]     If you experience any of the following, please call the Wound Care Service at  [phone] or go to the nearest emergency room.        *Increase in pain         *Temperature over 101           *Increase in drainage from your wound or a foul odor  *Uncontrolled swelling            *Need for compression bandage changes due to slippage, breakthrough drainage       PLEASE NOTE: IF YOU ARE UNABLE TO OBTAIN WOUND SUPPLIES,

## 2024-08-01 NOTE — PROGRESS NOTES
70% Rubbing Alcohol and spray in your work shoes Daily - let air dry.      Keep all dressings clean, dry and intact.  Keep pressure off the wound(s) at all times.      Follow up visit   1 Week August 8, 2024 @ 9:15     Please give 24 hour notice if unable to keep appointment. [phone]     If you experience any of the following, please call the Wound Care Service at  [phone] or go to the nearest emergency room.        *Increase in pain         *Temperature over 101           *Increase in drainage from your wound or a foul odor  *Uncontrolled swelling            *Need for compression bandage changes due to slippage, breakthrough drainage       PLEASE NOTE: IF YOU ARE UNABLE TO OBTAIN WOUND SUPPLIES, CONTINUE TO USE THE SUPPLIES YOU HAVE AVAILABLE UNTIL YOU ARE ABLE TO REACH US. IT IS MOST IMPORTANT TO KEEP THE WOUND COVERED AT ALL TIMES                       Electronically signed by Anjel Valladares DPM on 8/1/2024 at 10:48 AM

## 2024-08-04 LAB
CULTURE WOUND: ABNORMAL
ORGANISM: ABNORMAL
ORGANISM: ABNORMAL

## 2024-08-06 LAB
CULTURE WOUND: ABNORMAL
ORGANISM: ABNORMAL
ORGANISM: ABNORMAL

## 2024-08-08 ENCOUNTER — HOSPITAL ENCOUNTER (OUTPATIENT)
Dept: WOUND CARE | Age: 49
Discharge: HOME | End: 2024-08-08
Attending: PODIATRIST
Payer: COMMERCIAL

## 2024-08-08 VITALS
RESPIRATION RATE: 17 BRPM | SYSTOLIC BLOOD PRESSURE: 169 MMHG | DIASTOLIC BLOOD PRESSURE: 74 MMHG | TEMPERATURE: 97 F | HEART RATE: 72 BPM

## 2024-08-08 DIAGNOSIS — L97.512 ULCER OF RIGHT FOOT WITH FAT LAYER EXPOSED (HCC): Primary | ICD-10-CM

## 2024-08-08 PROCEDURE — 11042 DBRDMT SUBQ TIS 1ST 20SQCM/<: CPT

## 2024-08-08 PROCEDURE — 6370000000 HC RX 637 (ALT 250 FOR IP): Performed by: PODIATRIST

## 2024-08-08 PROCEDURE — 11042 DBRDMT SUBQ TIS 1ST 20SQCM/<: CPT | Performed by: PODIATRIST

## 2024-08-08 RX ORDER — IBUPROFEN 200 MG
TABLET ORAL ONCE
OUTPATIENT
Start: 2024-08-08 | End: 2024-08-08

## 2024-08-08 RX ORDER — TRIAMCINOLONE ACETONIDE 1 MG/G
OINTMENT TOPICAL ONCE
OUTPATIENT
Start: 2024-08-08 | End: 2024-08-08

## 2024-08-08 RX ORDER — GINSENG 100 MG
CAPSULE ORAL ONCE
OUTPATIENT
Start: 2024-08-08 | End: 2024-08-08

## 2024-08-08 RX ORDER — BACITRACIN ZINC AND POLYMYXIN B SULFATE 500; 1000 [USP'U]/G; [USP'U]/G
OINTMENT TOPICAL ONCE
OUTPATIENT
Start: 2024-08-08 | End: 2024-08-08

## 2024-08-08 RX ORDER — LIDOCAINE HYDROCHLORIDE 20 MG/ML
JELLY TOPICAL ONCE
OUTPATIENT
Start: 2024-08-08 | End: 2024-08-08

## 2024-08-08 RX ORDER — LIDOCAINE 50 MG/G
OINTMENT TOPICAL ONCE
OUTPATIENT
Start: 2024-08-08 | End: 2024-08-08

## 2024-08-08 RX ORDER — CLOBETASOL PROPIONATE 0.5 MG/G
OINTMENT TOPICAL ONCE
OUTPATIENT
Start: 2024-08-08 | End: 2024-08-08

## 2024-08-08 RX ORDER — SODIUM CHLOR/HYPOCHLOROUS ACID 0.033 %
SOLUTION, IRRIGATION IRRIGATION ONCE
OUTPATIENT
Start: 2024-08-08 | End: 2024-08-08

## 2024-08-08 RX ORDER — LIDOCAINE HYDROCHLORIDE 20 MG/ML
JELLY TOPICAL ONCE
Status: COMPLETED | OUTPATIENT
Start: 2024-08-08 | End: 2024-08-08

## 2024-08-08 RX ORDER — BETAMETHASONE DIPROPIONATE 0.5 MG/G
CREAM TOPICAL ONCE
OUTPATIENT
Start: 2024-08-08 | End: 2024-08-08

## 2024-08-08 RX ORDER — GENTAMICIN SULFATE 1 MG/G
OINTMENT TOPICAL ONCE
Status: COMPLETED | OUTPATIENT
Start: 2024-08-08 | End: 2024-08-08

## 2024-08-08 RX ORDER — LIDOCAINE 40 MG/G
CREAM TOPICAL ONCE
OUTPATIENT
Start: 2024-08-08 | End: 2024-08-08

## 2024-08-08 RX ORDER — GENTAMICIN SULFATE 1 MG/G
OINTMENT TOPICAL ONCE
OUTPATIENT
Start: 2024-08-08 | End: 2024-08-08

## 2024-08-08 RX ORDER — LIDOCAINE HYDROCHLORIDE 40 MG/ML
SOLUTION TOPICAL ONCE
OUTPATIENT
Start: 2024-08-08 | End: 2024-08-08

## 2024-08-08 RX ADMIN — LIDOCAINE HYDROCHLORIDE: 20 JELLY TOPICAL at 09:24

## 2024-08-08 RX ADMIN — GENTAMICIN SULFATE: 1 OINTMENT TOPICAL at 09:54

## 2024-08-08 NOTE — DISCHARGE INSTRUCTIONS
[facility]   Physician Orders and Discharge Instructions  [facility]  [address] [place]  [address]  Telephone: [phone]      [phone]        NAME:  Ant Lora                                                                                                  YOB: 1975  MEDICAL RECORD NUMBER:  [id number]     Your  is:  Davina     Home Care/Facility: None     Wound Location: Right Plantar Foot     Dressing orders:.1.Cleanse wound(s)  with Dakin's Solution.1.Cleanse wound(s) with normal saline. Apply a thin layer of Gentamicin ointment then:  2. Apply dry HYDROFERA BLUE READY FOAM or equivalent to wound bed.  NOTE: If your HYDROFRERA BLUE is not soft and pliable, moisten with saline until it is sponge like and then ring out excess saline and apply to wound bed.  3. Cover with 4x4's and wrap with gauze (emily or kerlix)  4. Change  Daily     Compression: None     Offloading Device:Post op shoe with peg assist (when not driving) felt placed in work shoe.     Other Instructions:    70% Rubbing Alcohol and spray in your work shoes Daily - let air dry.  the oral antibiotic BACTRIM from your pharmacy and take as prescribed. Increase your Protein intake.      Keep all dressings clean, dry and intact.  Keep pressure off the wound(s) at all times.      Follow up visit  2Weeks August 22 , 2024 @ 08:45     Please give 24 hour notice if unable to keep appointment. [phone]     If you experience any of the following, please call the Wound Care Service at  [phone] or go to the nearest emergency room.        *Increase in pain         *Temperature over 101           *Increase in drainage from your wound or a foul odor  *Uncontrolled swelling            *Need for compression bandage changes due to slippage, breakthrough drainage       PLEASE NOTE: IF YOU ARE UNABLE TO OBTAIN WOUND SUPPLIES,

## 2024-08-08 NOTE — PROGRESS NOTES
Ulcers:  Ulcer: Diabetic ulcer, fat layer exposed      Bleeding:  Minimal    Hemostasis Achieved:  not needed    Procedural Pain:  0  / 10     Post Procedural Pain:  0 / 10     Response to treatment:  Well tolerated by patient.       Plan:     The wound was sharply debrided.  Patient instructed to continue offloading the wound is much as possible.  Have also instructed the patient to continue use of the Dakin's solution and topical gentamicin.  Patient encouraged to  the previously prescribed Bactrim as I believe this will help decrease the bioburden and decrease the drainage.  Patient should closely monitor for signs of a sending cellulitis or lymphangitis and call immediately or present to the emergency department if this occurs.      Treatment Note please see attached Discharge Instructions    Written patient dismissal instructions given to patient and signed by patient or POA.         Discharge Instructions              [facility]   Physician Orders and Discharge Instructions  [place] [id number]  Telephone: [phone]      [phone]        NAME:  Ant Lora                                                                                                  YOB: 1975  MEDICAL RECORD NUMBER:  [id number]     Your  is:  Davina     Home Care/Facility: None     Wound Location: Right Plantar Foot     Dressing orders:.1.Cleanse wound(s)  with Dakin's Solution.1.Cleanse wound(s) with normal saline. Apply a thin layer of Gentamicin ointment then:  2. Apply dry HYDROFERA BLUE READY FOAM or equivalent to wound bed.  NOTE: If your HYDROFRERA BLUE is not soft and pliable, moisten with saline until it is sponge like and then ring out excess saline and apply to wound bed.  3. Cover with 4x4's and wrap with gauze (emily or kerlix)  4. Change  Daily     Compression: None     Offloading

## 2024-08-08 NOTE — PLAN OF CARE
Problem: Chronic Conditions and Co-morbidities  Goal: Patient's chronic conditions and co-morbidity symptoms are monitored and maintained or improved  Outcome: Progressing     Problem: Chronic Conditions and Co-morbidities  Goal: Patient's chronic conditions and co-morbidity symptoms are monitored and maintained or improved  Outcome: Progressing     Problem: Wound:  Goal: Will show signs of wound healing; wound closure and no evidence of infection  Outcome: Progressing      Telephone Encounter by Karina Thomas NCMA at 03/07/18 02:05 PM     Author:  Karina Thomas NCMA Service:  (none) Author Type:  Certified Medical Assistant     Filed:  03/07/18 02:07 PM Encounter Date:  3/7/2018 Status:  Signed     :  Karina Thomas NCMA (Certified Medical Assistant)            Pharmacy asking for clarification because Azithromycin 500 mg intravenous solution ordered. Did you mean oral tablets? Please clarify how this should be written[SB1.1M]      Revision History        User Key Date/Time User Provider Type Action    > SB1.1 03/07/18 02:07 PM Karina Thomas NCMA Certified Medical Assistant Sign    M - Manual

## 2024-08-22 ENCOUNTER — HOSPITAL ENCOUNTER (OUTPATIENT)
Dept: WOUND CARE | Age: 49
Discharge: HOME | End: 2024-08-22
Attending: PODIATRIST
Payer: COMMERCIAL

## 2024-08-22 VITALS
DIASTOLIC BLOOD PRESSURE: 101 MMHG | RESPIRATION RATE: 18 BRPM | SYSTOLIC BLOOD PRESSURE: 179 MMHG | HEART RATE: 76 BPM | TEMPERATURE: 96.6 F

## 2024-08-22 DIAGNOSIS — E11.621 TYPE 2 DIABETES MELLITUS WITH FOOT ULCER (CODE) (HCC): ICD-10-CM

## 2024-08-22 DIAGNOSIS — L97.512 ULCER OF RIGHT FOOT WITH FAT LAYER EXPOSED (HCC): Primary | ICD-10-CM

## 2024-08-22 DIAGNOSIS — M20.41 HAMMER TOES OF BOTH FEET: ICD-10-CM

## 2024-08-22 DIAGNOSIS — M20.42 HAMMER TOES OF BOTH FEET: ICD-10-CM

## 2024-08-22 DIAGNOSIS — Z89.411 HISTORY OF AMPUTATION OF RIGHT GREAT TOE (HCC): ICD-10-CM

## 2024-08-22 PROCEDURE — 11042 DBRDMT SUBQ TIS 1ST 20SQCM/<: CPT | Performed by: PODIATRIST

## 2024-08-22 PROCEDURE — 6370000000 HC RX 637 (ALT 250 FOR IP): Performed by: PODIATRIST

## 2024-08-22 PROCEDURE — 11042 DBRDMT SUBQ TIS 1ST 20SQCM/<: CPT

## 2024-08-22 RX ORDER — BETAMETHASONE DIPROPIONATE 0.5 MG/G
CREAM TOPICAL ONCE
OUTPATIENT
Start: 2024-08-22 | End: 2024-08-22

## 2024-08-22 RX ORDER — GENTAMICIN SULFATE 1 MG/G
OINTMENT TOPICAL ONCE
OUTPATIENT
Start: 2024-08-22 | End: 2024-08-22

## 2024-08-22 RX ORDER — LIDOCAINE 40 MG/G
CREAM TOPICAL ONCE
OUTPATIENT
Start: 2024-08-22 | End: 2024-08-22

## 2024-08-22 RX ORDER — IBUPROFEN 200 MG
TABLET ORAL ONCE
OUTPATIENT
Start: 2024-08-22 | End: 2024-08-22

## 2024-08-22 RX ORDER — LIDOCAINE 40 MG/G
CREAM TOPICAL ONCE
Status: COMPLETED | OUTPATIENT
Start: 2024-08-22 | End: 2024-08-22

## 2024-08-22 RX ORDER — LIDOCAINE HYDROCHLORIDE 20 MG/ML
JELLY TOPICAL ONCE
OUTPATIENT
Start: 2024-08-22 | End: 2024-08-22

## 2024-08-22 RX ORDER — LIDOCAINE 50 MG/G
OINTMENT TOPICAL ONCE
OUTPATIENT
Start: 2024-08-22 | End: 2024-08-22

## 2024-08-22 RX ORDER — TRIAMCINOLONE ACETONIDE 1 MG/G
OINTMENT TOPICAL ONCE
OUTPATIENT
Start: 2024-08-22 | End: 2024-08-22

## 2024-08-22 RX ORDER — GINSENG 100 MG
CAPSULE ORAL ONCE
OUTPATIENT
Start: 2024-08-22 | End: 2024-08-22

## 2024-08-22 RX ORDER — SODIUM CHLOR/HYPOCHLOROUS ACID 0.033 %
SOLUTION, IRRIGATION IRRIGATION ONCE
OUTPATIENT
Start: 2024-08-22 | End: 2024-08-22

## 2024-08-22 RX ORDER — BACITRACIN ZINC AND POLYMYXIN B SULFATE 500; 1000 [USP'U]/G; [USP'U]/G
OINTMENT TOPICAL ONCE
OUTPATIENT
Start: 2024-08-22 | End: 2024-08-22

## 2024-08-22 RX ORDER — LIDOCAINE HYDROCHLORIDE 40 MG/ML
SOLUTION TOPICAL ONCE
OUTPATIENT
Start: 2024-08-22 | End: 2024-08-22

## 2024-08-22 RX ORDER — CLOBETASOL PROPIONATE 0.5 MG/G
OINTMENT TOPICAL ONCE
OUTPATIENT
Start: 2024-08-22 | End: 2024-08-22

## 2024-08-22 RX ADMIN — LIDOCAINE 4%: 4 CREAM TOPICAL at 09:04

## 2024-08-22 NOTE — PROGRESS NOTES
[facility]                                                   Progress Note and Procedure Note      Ant Lora  MEDICAL RECORD NUMBER:  [id number]  AGE: 48 y.o.   GENDER: male  : 1975  EPISODE DATE:  2024    Subjective:     Chief Complaint   Patient presents with    Wound Check         HISTORY of PRESENT ILLNESS HPI     Ant Lora is a 48 y.o. male who presents today for wound/ulcer evaluation.   History of Wound Context: Patient presents for continued treatment of a chronic diabetic ulceration of the right foot patient reports compliance with use of the peg assist when not, he has been using the modified insole while working.  Patient reports compliance with dressing changes and use of Dakin's solution and gentamicin.  Patient has not observed signs of infection.  Patient states he does not have diabetic shoes or insoles.    Patient denies nausea, vomiting, fever, chills, chest pain, or shortness of breath.     Wound/Ulcer Pain Timing/Severity: none  Quality of pain: N/A  Severity:  0 / 10   Modifying Factors: None  Associated Signs/Symptoms: drainage    Ulcer Identification:  Ulcer Type: diabetic  Contributing Factors: shear force    Wound:  Full-thickness diabetic ulceration right plantar foot        PAST MEDICAL HISTORY        Diagnosis Date    CAD (coronary artery disease) 3/2/2015, 2020    stents    DM (diabetes mellitus), type 2, uncontrolled 2015    Hyperlipidemia     Neuropathy     legs bilateral    Smoker        PAST SURGICAL HISTORY    Past Surgical History:   Procedure Laterality Date    CARDIAC SURGERY         FAMILY HISTORY    History reviewed. No pertinent family history.    SOCIAL HISTORY    Social History     Tobacco Use    Smoking status: Every Day     Current packs/day: 0.25     Average packs/day: 0.3 packs/day for 25.0 years (6.3 ttl pk-yrs)     Types: Cigarettes    Smokeless tobacco: Never   Substance Use Topics    Alcohol use: Yes     Comment: rarely

## 2024-08-22 NOTE — DISCHARGE INSTRUCTIONS
[facility]   Physician Orders and Discharge Instructions  [facility]  [address] [place]  [address]  Telephone: [phone]      [phone]        NAME:  Ant Lora                                                                                                  YOB: 1975  MEDICAL RECORD NUMBER:  [id number]     Your  is:  Davina     Home Care/Facility: None     Wound Location: Right Plantar Foot     Dressing orders:.1.Cleanse wound(s)  with Dakin's Solution.   2. Apply dry JANETT  Or equivalent to wound bed.  3. Moisten JANETT with a few drops of normal saline.  4. Cover with 4x4's and wrap with gauze (emily or kerlix)  5. Change  Every other day or Tuesday, Thursday and Saturday    Compression: None     Offloading Device:Post op shoe with peg assist (when not driving) felt placed in work shoe.     Other Instructions:    70% Rubbing Alcohol and spray in your work shoes Daily - let air dry.  Increase your Protein intake. Try to stop Smoking. Purchase Diabetic shoes/boots.     Keep all dressings clean, dry and intact.  Keep pressure off the wound(s) at all times.      Follow up visit  2Weeks September 5 , 2024 @  08:45     Please give 24 hour notice if unable to keep appointment. [phone]     If you experience any of the following, please call the Wound Care Service at  [phone] or go to the nearest emergency room.        *Increase in pain         *Temperature over 101           *Increase in drainage from your wound or a foul odor  *Uncontrolled swelling            *Need for compression bandage changes due to slippage, breakthrough drainage       PLEASE NOTE: IF YOU ARE UNABLE TO OBTAIN WOUND SUPPLIES, CONTINUE TO USE THE SUPPLIES YOU HAVE AVAILABLE UNTIL YOU ARE ABLE TO REACH US. IT IS MOST IMPORTANT TO KEEP THE WOUND COVERED AT ALL TIMES

## 2024-09-05 ENCOUNTER — HOSPITAL ENCOUNTER (OUTPATIENT)
Dept: WOUND CARE | Age: 49
Discharge: HOME | End: 2024-09-05
Attending: PODIATRIST
Payer: COMMERCIAL

## 2024-09-05 VITALS
RESPIRATION RATE: 16 BRPM | HEART RATE: 72 BPM | SYSTOLIC BLOOD PRESSURE: 167 MMHG | DIASTOLIC BLOOD PRESSURE: 91 MMHG | TEMPERATURE: 96.5 F

## 2024-09-05 DIAGNOSIS — L97.512 ULCER OF RIGHT FOOT WITH FAT LAYER EXPOSED (HCC): Primary | ICD-10-CM

## 2024-09-05 PROCEDURE — 11042 DBRDMT SUBQ TIS 1ST 20SQCM/<: CPT

## 2024-09-05 PROCEDURE — 6370000000 HC RX 637 (ALT 250 FOR IP): Performed by: PODIATRIST

## 2024-09-05 PROCEDURE — 11042 DBRDMT SUBQ TIS 1ST 20SQCM/<: CPT | Performed by: PODIATRIST

## 2024-09-05 RX ORDER — LIDOCAINE 40 MG/G
CREAM TOPICAL ONCE
Status: COMPLETED | OUTPATIENT
Start: 2024-09-05 | End: 2024-09-05

## 2024-09-05 RX ORDER — LIDOCAINE HYDROCHLORIDE 40 MG/ML
SOLUTION TOPICAL ONCE
OUTPATIENT
Start: 2024-09-05 | End: 2024-09-05

## 2024-09-05 RX ORDER — LIDOCAINE HYDROCHLORIDE 20 MG/ML
JELLY TOPICAL ONCE
OUTPATIENT
Start: 2024-09-05 | End: 2024-09-05

## 2024-09-05 RX ORDER — GENTAMICIN SULFATE 1 MG/G
OINTMENT TOPICAL ONCE
OUTPATIENT
Start: 2024-09-05 | End: 2024-09-05

## 2024-09-05 RX ORDER — MUPIROCIN 20 MG/G
OINTMENT TOPICAL ONCE
OUTPATIENT
Start: 2024-09-05 | End: 2024-09-05

## 2024-09-05 RX ORDER — BETAMETHASONE DIPROPIONATE 0.5 MG/G
CREAM TOPICAL ONCE
OUTPATIENT
Start: 2024-09-05 | End: 2024-09-05

## 2024-09-05 RX ORDER — TRIAMCINOLONE ACETONIDE 1 MG/G
OINTMENT TOPICAL ONCE
OUTPATIENT
Start: 2024-09-05 | End: 2024-09-05

## 2024-09-05 RX ORDER — BACITRACIN ZINC AND POLYMYXIN B SULFATE 500; 1000 [USP'U]/G; [USP'U]/G
OINTMENT TOPICAL ONCE
OUTPATIENT
Start: 2024-09-05 | End: 2024-09-05

## 2024-09-05 RX ORDER — NEOMYCIN/BACITRACIN/POLYMYXINB 3.5-400-5K
OINTMENT (GRAM) TOPICAL ONCE
OUTPATIENT
Start: 2024-09-05 | End: 2024-09-05

## 2024-09-05 RX ORDER — LIDOCAINE 40 MG/G
CREAM TOPICAL ONCE
OUTPATIENT
Start: 2024-09-05 | End: 2024-09-05

## 2024-09-05 RX ORDER — LIDOCAINE 50 MG/G
OINTMENT TOPICAL ONCE
OUTPATIENT
Start: 2024-09-05 | End: 2024-09-05

## 2024-09-05 RX ORDER — GINSENG 100 MG
CAPSULE ORAL ONCE
OUTPATIENT
Start: 2024-09-05 | End: 2024-09-05

## 2024-09-05 RX ORDER — SODIUM CHLOR/HYPOCHLOROUS ACID 0.033 %
SOLUTION, IRRIGATION IRRIGATION ONCE
OUTPATIENT
Start: 2024-09-05 | End: 2024-09-05

## 2024-09-05 RX ORDER — CLOBETASOL PROPIONATE 0.5 MG/G
OINTMENT TOPICAL ONCE
OUTPATIENT
Start: 2024-09-05 | End: 2024-09-05

## 2024-09-05 RX ORDER — SILVER SULFADIAZINE 10 MG/G
CREAM TOPICAL ONCE
OUTPATIENT
Start: 2024-09-05 | End: 2024-09-05

## 2024-09-05 RX ADMIN — LIDOCAINE 4%: 4 CREAM TOPICAL at 09:04

## 2024-09-05 NOTE — DISCHARGE INSTRUCTIONS
[facility]   Physician Orders and Discharge Instructions  [facility]  [address] [place]  [address]  Telephone: [phone]      [phone]        NAME:  Ant Lora                                                                                                  YOB: 1975  MEDICAL RECORD NUMBER:  [id number]     Your  is:  Davina     Home Care/Facility: None     Wound Location: Right Plantar Foot     Dressing orders:.1.Cleanse wound(s)  with Dakin's Solution.   Apply Skin Prep around the wound bed then:  2. Apply dry HYDROFERA  BLUE READY FOAM or equivalent to wound bed.  NOTE: If your HYDROFRERA BLUE is not soft and pliable, moisten with saline until it is sponge like and then ring out excess saline and apply to wound bed.  3. Cover with 4x4's and wrap with gauze (emily or kerlix)  4. Change Daily     Compression: None     Offloading Device:Post op shoe with peg assist (when not driving) felt placed in work shoe.     Other Instructions:    70% Rubbing Alcohol and spray in your work shoes Daily - let air dry.  Increase your Protein intake. Try to stop Smoking. Purchase Diabetic shoes/boots.     Keep all dressings clean, dry and intact.  Keep pressure off the wound(s) at all times.      Follow up visit  2Weeks September 19 , 2024 @       Please give 24 hour notice if unable to keep appointment. [phone]     If you experience any of the following, please call the Wound Care Service at  [phone] or go to the nearest emergency room.        *Increase in pain         *Temperature over 101           *Increase in drainage from your wound or a foul odor  *Uncontrolled swelling            *Need for compression bandage changes due to slippage, breakthrough drainage       PLEASE NOTE: IF YOU ARE UNABLE TO OBTAIN WOUND SUPPLIES, CONTINUE TO USE THE SUPPLIES YOU HAVE AVAILABLE UNTIL YOU ARE ABLE TO

## 2024-09-05 NOTE — PROGRESS NOTES
[facility]                                                   Progress Note and Procedure Note      Ant Lora  MEDICAL RECORD NUMBER:  [id number]  AGE: 48 y.o.   GENDER: male  : 1975  EPISODE DATE:  2024    Subjective:     Chief Complaint   Patient presents with    Wound Check         HISTORY of PRESENT ILLNESS HPI     Ant Lora is a 48 y.o. male who presents today for wound/ulcer evaluation.   History of Wound Context: Patient presents for continued treatment of a chronic diabetic ulceration of the right plantar foot.  Patient reports compliance with using the peg assist when not at work and the modified insole when at work to offload the wound.  Patient reports continued drainage but denies observing redness, increased warmth or alayna purulence.    Patient denies nausea, vomiting, fever, chills, chest pain, or shortness of breath.     Wound/Ulcer Pain Timing/Severity: none  Quality of pain: N/A  Severity:  0 / 10   Modifying Factors: None  Associated Signs/Symptoms: drainage    Ulcer Identification:  Ulcer Type: diabetic  Contributing Factors: shear force    Wound:  Full-thickness diabetic ulceration right plantar foot        PAST MEDICAL HISTORY        Diagnosis Date    CAD (coronary artery disease) 3/2/2015, 2020    stents    DM (diabetes mellitus), type 2, uncontrolled 2015    Hyperlipidemia     Neuropathy     legs bilateral    Smoker        PAST SURGICAL HISTORY    Past Surgical History:   Procedure Laterality Date    CARDIAC SURGERY         FAMILY HISTORY    History reviewed. No pertinent family history.    SOCIAL HISTORY    Social History     Tobacco Use    Smoking status: Every Day     Current packs/day: 0.25     Average packs/day: 0.3 packs/day for 25.0 years (6.3 ttl pk-yrs)     Types: Cigarettes    Smokeless tobacco: Never   Substance Use Topics    Alcohol use: Yes     Comment: rarely    Drug use: No       ALLERGIES    Allergies   Allergen Reactions    Metformin And

## 2024-09-18 ENCOUNTER — OFFICE VISIT (OUTPATIENT)
Dept: PRIMARY CARE CLINIC | Age: 49
End: 2024-09-18
Payer: COMMERCIAL

## 2024-09-18 VITALS
DIASTOLIC BLOOD PRESSURE: 88 MMHG | BODY MASS INDEX: 29.35 KG/M2 | OXYGEN SATURATION: 98 % | WEIGHT: 205 LBS | HEIGHT: 70 IN | SYSTOLIC BLOOD PRESSURE: 138 MMHG | HEART RATE: 73 BPM

## 2024-09-18 DIAGNOSIS — G57.93 NEUROPATHIC PAIN OF BOTH LEGS: ICD-10-CM

## 2024-09-18 PROCEDURE — 99213 OFFICE O/P EST LOW 20 MIN: CPT | Performed by: INTERNAL MEDICINE

## 2024-09-18 RX ORDER — GABAPENTIN 400 MG/1
400 CAPSULE ORAL 3 TIMES DAILY
Qty: 90 CAPSULE | Refills: 2 | Status: SHIPPED | OUTPATIENT
Start: 2024-09-18 | End: 2024-12-17

## 2024-09-19 ENCOUNTER — HOSPITAL ENCOUNTER (OUTPATIENT)
Dept: GENERAL RADIOLOGY | Age: 49
Discharge: HOME | End: 2024-09-21
Attending: PODIATRIST
Payer: COMMERCIAL

## 2024-09-19 ENCOUNTER — HOSPITAL ENCOUNTER (OUTPATIENT)
Dept: WOUND CARE | Age: 49
Discharge: HOME | End: 2024-09-19
Attending: PODIATRIST
Payer: COMMERCIAL

## 2024-09-19 VITALS
TEMPERATURE: 96.7 F | SYSTOLIC BLOOD PRESSURE: 154 MMHG | DIASTOLIC BLOOD PRESSURE: 83 MMHG | HEART RATE: 75 BPM | RESPIRATION RATE: 18 BRPM

## 2024-09-19 DIAGNOSIS — L97.512 ULCER OF RIGHT FOOT WITH FAT LAYER EXPOSED (HCC): Primary | ICD-10-CM

## 2024-09-19 PROCEDURE — 87070 CULTURE OTHR SPECIMN AEROBIC: CPT

## 2024-09-19 PROCEDURE — 87075 CULTR BACTERIA EXCEPT BLOOD: CPT

## 2024-09-19 PROCEDURE — 87184 SC STD DISK METHOD PER PLATE: CPT

## 2024-09-19 PROCEDURE — 99213 OFFICE O/P EST LOW 20 MIN: CPT | Performed by: PODIATRIST

## 2024-09-19 PROCEDURE — 11042 DBRDMT SUBQ TIS 1ST 20SQCM/<: CPT | Performed by: PODIATRIST

## 2024-09-19 PROCEDURE — 6370000000 HC RX 637 (ALT 250 FOR IP): Performed by: PODIATRIST

## 2024-09-19 PROCEDURE — 11042 DBRDMT SUBQ TIS 1ST 20SQCM/<: CPT

## 2024-09-19 PROCEDURE — 73630 X-RAY EXAM OF FOOT: CPT

## 2024-09-19 RX ORDER — NEOMYCIN/BACITRACIN/POLYMYXINB 3.5-400-5K
OINTMENT (GRAM) TOPICAL ONCE
OUTPATIENT
Start: 2024-09-19 | End: 2024-09-19

## 2024-09-19 RX ORDER — GENTAMICIN SULFATE 1 MG/G
OINTMENT TOPICAL ONCE
OUTPATIENT
Start: 2024-09-19 | End: 2024-09-19

## 2024-09-19 RX ORDER — SILVER SULFADIAZINE 10 MG/G
CREAM TOPICAL ONCE
OUTPATIENT
Start: 2024-09-19 | End: 2024-09-19

## 2024-09-19 RX ORDER — LIDOCAINE HYDROCHLORIDE 20 MG/ML
JELLY TOPICAL ONCE
OUTPATIENT
Start: 2024-09-19 | End: 2024-09-19

## 2024-09-19 RX ORDER — CLOBETASOL PROPIONATE 0.5 MG/G
OINTMENT TOPICAL ONCE
OUTPATIENT
Start: 2024-09-19 | End: 2024-09-19

## 2024-09-19 RX ORDER — TRIAMCINOLONE ACETONIDE 1 MG/G
OINTMENT TOPICAL ONCE
OUTPATIENT
Start: 2024-09-19 | End: 2024-09-19

## 2024-09-19 RX ORDER — LIDOCAINE 40 MG/G
CREAM TOPICAL ONCE
Status: COMPLETED | OUTPATIENT
Start: 2024-09-19 | End: 2024-09-19

## 2024-09-19 RX ORDER — LIDOCAINE 50 MG/G
OINTMENT TOPICAL ONCE
OUTPATIENT
Start: 2024-09-19 | End: 2024-09-19

## 2024-09-19 RX ORDER — SODIUM CHLOR/HYPOCHLOROUS ACID 0.033 %
SOLUTION, IRRIGATION IRRIGATION ONCE
OUTPATIENT
Start: 2024-09-19 | End: 2024-09-19

## 2024-09-19 RX ORDER — BACITRACIN ZINC AND POLYMYXIN B SULFATE 500; 1000 [USP'U]/G; [USP'U]/G
OINTMENT TOPICAL ONCE
OUTPATIENT
Start: 2024-09-19 | End: 2024-09-19

## 2024-09-19 RX ORDER — GINSENG 100 MG
CAPSULE ORAL ONCE
OUTPATIENT
Start: 2024-09-19 | End: 2024-09-19

## 2024-09-19 RX ORDER — MUPIROCIN 20 MG/G
OINTMENT TOPICAL ONCE
OUTPATIENT
Start: 2024-09-19 | End: 2024-09-19

## 2024-09-19 RX ORDER — BETAMETHASONE DIPROPIONATE 0.5 MG/G
CREAM TOPICAL ONCE
OUTPATIENT
Start: 2024-09-19 | End: 2024-09-19

## 2024-09-19 RX ORDER — LIDOCAINE 40 MG/G
CREAM TOPICAL ONCE
OUTPATIENT
Start: 2024-09-19 | End: 2024-09-19

## 2024-09-19 RX ORDER — SULFAMETHOXAZOLE/TRIMETHOPRIM 800-160 MG
1 TABLET ORAL 2 TIMES DAILY
Qty: 14 TABLET | Refills: 0 | Status: SHIPPED | OUTPATIENT
Start: 2024-09-19 | End: 2024-09-26

## 2024-09-19 RX ORDER — LIDOCAINE HYDROCHLORIDE 40 MG/ML
SOLUTION TOPICAL ONCE
OUTPATIENT
Start: 2024-09-19 | End: 2024-09-19

## 2024-09-19 RX ADMIN — LIDOCAINE 4%: 4 CREAM TOPICAL at 09:05

## 2024-09-22 LAB
CULTURE WOUND: ABNORMAL
ORGANISM: ABNORMAL
ORGANISM: ABNORMAL

## 2024-09-24 LAB
CULTURE WOUND: ABNORMAL
ORGANISM: ABNORMAL
ORGANISM: ABNORMAL

## 2024-09-25 ASSESSMENT — ENCOUNTER SYMPTOMS
CHOKING: 0
TROUBLE SWALLOWING: 0
SHORTNESS OF BREATH: 0
VOICE CHANGE: 0
NAUSEA: 0
PHOTOPHOBIA: 0
VOMITING: 0

## 2024-10-03 ENCOUNTER — HOSPITAL ENCOUNTER (OUTPATIENT)
Dept: WOUND CARE | Age: 49
Discharge: HOME | End: 2024-10-03
Attending: PODIATRIST
Payer: COMMERCIAL

## 2024-10-03 VITALS
RESPIRATION RATE: 18 BRPM | TEMPERATURE: 96 F | SYSTOLIC BLOOD PRESSURE: 180 MMHG | HEART RATE: 78 BPM | DIASTOLIC BLOOD PRESSURE: 96 MMHG

## 2024-10-03 DIAGNOSIS — L97.512 ULCER OF RIGHT FOOT WITH FAT LAYER EXPOSED (HCC): Primary | ICD-10-CM

## 2024-10-03 PROCEDURE — 11042 DBRDMT SUBQ TIS 1ST 20SQCM/<: CPT

## 2024-10-03 PROCEDURE — 11042 DBRDMT SUBQ TIS 1ST 20SQCM/<: CPT | Performed by: PODIATRIST

## 2024-10-03 RX ORDER — NEOMYCIN/BACITRACIN/POLYMYXINB 3.5-400-5K
OINTMENT (GRAM) TOPICAL ONCE
OUTPATIENT
Start: 2024-10-03 | End: 2024-10-03

## 2024-10-03 RX ORDER — TRIAMCINOLONE ACETONIDE 1 MG/G
OINTMENT TOPICAL ONCE
OUTPATIENT
Start: 2024-10-03 | End: 2024-10-03

## 2024-10-03 RX ORDER — LIDOCAINE 40 MG/G
CREAM TOPICAL ONCE
OUTPATIENT
Start: 2024-10-03 | End: 2024-10-03

## 2024-10-03 RX ORDER — CLOBETASOL PROPIONATE 0.5 MG/G
OINTMENT TOPICAL ONCE
OUTPATIENT
Start: 2024-10-03 | End: 2024-10-03

## 2024-10-03 RX ORDER — SODIUM CHLOR/HYPOCHLOROUS ACID 0.033 %
SOLUTION, IRRIGATION IRRIGATION ONCE
OUTPATIENT
Start: 2024-10-03 | End: 2024-10-03

## 2024-10-03 RX ORDER — LIDOCAINE HYDROCHLORIDE 40 MG/ML
SOLUTION TOPICAL ONCE
OUTPATIENT
Start: 2024-10-03 | End: 2024-10-03

## 2024-10-03 RX ORDER — BACITRACIN ZINC AND POLYMYXIN B SULFATE 500; 1000 [USP'U]/G; [USP'U]/G
OINTMENT TOPICAL ONCE
OUTPATIENT
Start: 2024-10-03 | End: 2024-10-03

## 2024-10-03 RX ORDER — LIDOCAINE HYDROCHLORIDE 20 MG/ML
JELLY TOPICAL ONCE
OUTPATIENT
Start: 2024-10-03 | End: 2024-10-03

## 2024-10-03 RX ORDER — GINSENG 100 MG
CAPSULE ORAL ONCE
OUTPATIENT
Start: 2024-10-03 | End: 2024-10-03

## 2024-10-03 RX ORDER — GENTAMICIN SULFATE 1 MG/G
OINTMENT TOPICAL ONCE
OUTPATIENT
Start: 2024-10-03 | End: 2024-10-03

## 2024-10-03 RX ORDER — MUPIROCIN 20 MG/G
OINTMENT TOPICAL ONCE
OUTPATIENT
Start: 2024-10-03 | End: 2024-10-03

## 2024-10-03 RX ORDER — SILVER SULFADIAZINE 10 MG/G
CREAM TOPICAL ONCE
OUTPATIENT
Start: 2024-10-03 | End: 2024-10-03

## 2024-10-03 RX ORDER — LIDOCAINE 50 MG/G
OINTMENT TOPICAL ONCE
OUTPATIENT
Start: 2024-10-03 | End: 2024-10-03

## 2024-10-03 RX ORDER — BETAMETHASONE DIPROPIONATE 0.5 MG/G
CREAM TOPICAL ONCE
OUTPATIENT
Start: 2024-10-03 | End: 2024-10-03

## 2024-10-03 NOTE — PROGRESS NOTES
Smoking status: Every Day     Current packs/day: 0.25     Average packs/day: 0.3 packs/day for 25.0 years (6.3 ttl pk-yrs)     Types: Cigarettes    Smokeless tobacco: Never   Substance Use Topics    Alcohol use: Yes     Comment: rarely    Drug use: No       ALLERGIES    Allergies   Allergen Reactions    Metformin And Related     Simvastatin      Leg Pains    Metformin Nausea And Vomiting       MEDICATIONS    Current Outpatient Medications on File Prior to Encounter   Medication Sig Dispense Refill    gabapentin (NEURONTIN) 400 MG capsule Take 1 capsule by mouth 3 times daily for 90 days. Intended supply: 30 days 90 capsule 2    sodium hypochlorite (DAKINS) 0.125 % SOLN external solution Apply topically daily 473 mL 3    gentamicin (GARAMYCIN) 0.1 % cream Apply topically daily. 15 g 1    cephALEXin (KEFLEX) 500 MG capsule Take 1 capsule by mouth 3 times daily (Patient not taking: Reported on 6/20/2024) 90 capsule 0    sildenafil (VIAGRA) 100 MG tablet TAKE 1 TABLET BY MOUTH ONCE DAILY AS NEEDED FOR ERECTILE DYSFUNCTION (Patient not taking: Reported on 6/20/2024) 5 tablet 5    insulin 70-30 (NOVOLIN 70/30) (70-30) 100 UNIT per ML injection vial 20-25  units twice a day 20 mL 4    insulin NPH (HUMULIN N KWIKPEN) 100 UNIT/ML injection pen 5 units tid s.c (Patient not taking: Reported on 3/19/2024) 5 Adjustable Dose Pre-filled Pen Syringe 3    insulin NPH (HUMULIN N KWIKPEN) 100 UNIT/ML injection pen Inject 15 Units into the skin 2 times daily (before meals) (Patient not taking: Reported on 6/20/2024) 5 Adjustable Dose Pre-filled Pen Syringe 5    lisinopril (PRINIVIL;ZESTRIL) 10 MG tablet Take 1 tablet by mouth daily (Patient not taking: Reported on 6/20/2024)      furosemide (LASIX) 20 MG tablet One pill po qd x 5 days, then one pill po q week (Patient not taking: Reported on 6/20/2024) 30 tablet 0    potassium chloride (KLOR-CON M) 20 MEQ extended release tablet Take 1 tablet by mouth daily (Patient not taking:

## 2024-10-17 ENCOUNTER — HOSPITAL ENCOUNTER (OUTPATIENT)
Dept: WOUND CARE | Age: 49
Discharge: HOME | End: 2024-10-17
Attending: PODIATRIST
Payer: COMMERCIAL

## 2024-10-17 VITALS
SYSTOLIC BLOOD PRESSURE: 166 MMHG | DIASTOLIC BLOOD PRESSURE: 77 MMHG | HEART RATE: 109 BPM | TEMPERATURE: 96.2 F | RESPIRATION RATE: 16 BRPM

## 2024-10-17 DIAGNOSIS — L97.512 ULCER OF RIGHT FOOT WITH FAT LAYER EXPOSED (HCC): Primary | ICD-10-CM

## 2024-10-17 PROCEDURE — 11042 DBRDMT SUBQ TIS 1ST 20SQCM/<: CPT

## 2024-10-17 PROCEDURE — 11042 DBRDMT SUBQ TIS 1ST 20SQCM/<: CPT | Performed by: PODIATRIST

## 2024-10-17 RX ORDER — BETAMETHASONE DIPROPIONATE 0.5 MG/G
CREAM TOPICAL ONCE
OUTPATIENT
Start: 2024-10-17 | End: 2024-10-17

## 2024-10-17 RX ORDER — LIDOCAINE 40 MG/G
CREAM TOPICAL ONCE
OUTPATIENT
Start: 2024-10-17 | End: 2024-10-17

## 2024-10-17 RX ORDER — SILVER SULFADIAZINE 10 MG/G
CREAM TOPICAL ONCE
OUTPATIENT
Start: 2024-10-17 | End: 2024-10-17

## 2024-10-17 RX ORDER — MUPIROCIN 20 MG/G
OINTMENT TOPICAL ONCE
OUTPATIENT
Start: 2024-10-17 | End: 2024-10-17

## 2024-10-17 RX ORDER — LIDOCAINE HYDROCHLORIDE 20 MG/ML
JELLY TOPICAL ONCE
OUTPATIENT
Start: 2024-10-17 | End: 2024-10-17

## 2024-10-17 RX ORDER — BACITRACIN ZINC AND POLYMYXIN B SULFATE 500; 1000 [USP'U]/G; [USP'U]/G
OINTMENT TOPICAL ONCE
OUTPATIENT
Start: 2024-10-17 | End: 2024-10-17

## 2024-10-17 RX ORDER — SODIUM CHLOR/HYPOCHLOROUS ACID 0.033 %
SOLUTION, IRRIGATION IRRIGATION ONCE
OUTPATIENT
Start: 2024-10-17 | End: 2024-10-17

## 2024-10-17 RX ORDER — GENTAMICIN SULFATE 1 MG/G
OINTMENT TOPICAL ONCE
OUTPATIENT
Start: 2024-10-17 | End: 2024-10-17

## 2024-10-17 RX ORDER — TRIAMCINOLONE ACETONIDE 1 MG/G
OINTMENT TOPICAL ONCE
OUTPATIENT
Start: 2024-10-17 | End: 2024-10-17

## 2024-10-17 RX ORDER — NEOMYCIN/BACITRACIN/POLYMYXINB 3.5-400-5K
OINTMENT (GRAM) TOPICAL ONCE
OUTPATIENT
Start: 2024-10-17 | End: 2024-10-17

## 2024-10-17 RX ORDER — LIDOCAINE HYDROCHLORIDE 40 MG/ML
SOLUTION TOPICAL ONCE
OUTPATIENT
Start: 2024-10-17 | End: 2024-10-17

## 2024-10-17 RX ORDER — GINSENG 100 MG
CAPSULE ORAL ONCE
OUTPATIENT
Start: 2024-10-17 | End: 2024-10-17

## 2024-10-17 RX ORDER — CLOBETASOL PROPIONATE 0.5 MG/G
OINTMENT TOPICAL ONCE
OUTPATIENT
Start: 2024-10-17 | End: 2024-10-17

## 2024-10-17 RX ORDER — LIDOCAINE 50 MG/G
OINTMENT TOPICAL ONCE
OUTPATIENT
Start: 2024-10-17 | End: 2024-10-17

## 2024-10-17 NOTE — DISCHARGE INSTRUCTIONS
[facility]   Physician Orders and Discharge Instructions  [facility]  [address] [place]  [address]  Telephone: [phone]      [phone]        NAME:  Ant Lora                                                                                                  YOB: 1975  MEDICAL RECORD NUMBER:  [id number]     Your  is:  Davina     Home Care/Facility: None     Wound Location: Right Plantar Foot     Dressing orders:.1.Cleanse wound(s)  with Dakin's Solution.   Apply Skin Prep around the wound bed then:  2. Apply dry HYDROFERA  BLUE READY FOAM or equivalent to wound bed.  NOTE: If your HYDROFRERA BLUE is not soft and pliable, moisten with saline until it is sponge like and then ring out excess saline and apply to wound bed.  3. Cover with 4x4's and wrap with gauze (emily or kerlix)  4. Change Daily      Compression: None     Offloading Device:Post op shoe with peg assist (when not driving) felt placed in work shoe.     Other Instructions:    70% Rubbing Alcohol and spray in your work shoes Daily - let air dry.  Increase your Protein intake. Try to stop Smoking. Purchase Diabetic shoes/boots.Do not get wet in the shower. Use a cast cover to keep dry.      Keep all dressings clean, dry and intact.  Keep pressure off the wound(s) at all times.      Follow up visit  2 Weeks October 31, 2024 @  9:15am     Please give 24 hour notice if unable to keep appointment. [phone]     If you experience any of the following, please call the Wound Care Service at  [phone] or go to the nearest emergency room.        *Increase in pain         *Temperature over 101           *Increase in drainage from your wound or a foul odor  *Uncontrolled swelling            *Need for compression bandage changes due to slippage, breakthrough drainage       PLEASE NOTE: IF YOU ARE UNABLE TO OBTAIN WOUND SUPPLIES, CONTINUE

## 2024-10-17 NOTE — PROGRESS NOTES
[facility]                                                   Progress Note and Procedure Note      Ant Lora  MEDICAL RECORD NUMBER:  [id number]  AGE: 48 y.o.   GENDER: male  : 1975  EPISODE DATE:  10/17/2024    Subjective:     Chief Complaint   Patient presents with    Wound Check     R foot         HISTORY of PRESENT ILLNESS HPI     Ant Lora is a 48 y.o. male who presents today for wound/ulcer evaluation.   History of Wound Context: Patient presents for continued wound care for chronic diabetic ulceration of the right plantar foot.  Patient reports compliance with use of modified insole in his work boot and using a peg assist while at home.  Patient states he has been changing the dressing every other day.  Patient admits to not using a shower protector.  Patient reports increased maceration to the periwound skin    Patient denies nausea, vomiting, fever, chills, chest pain, or shortness of breath.    Wound/Ulcer Pain Timing/Severity: none  Quality of pain: N/A  Severity:  0 / 10   Modifying Factors: None  Associated Signs/Symptoms: drainage    Ulcer Identification:  Ulcer Type: diabetic  Contributing Factors: shear force, smoking    Wound:  Full-thickness diabetic ulceration right plantar foot        PAST MEDICAL HISTORY        Diagnosis Date    CAD (coronary artery disease) 3/2/2015, 2020    stents    DM (diabetes mellitus), type 2, uncontrolled 2015    Hyperlipidemia     Neuropathy     legs bilateral    Smoker        PAST SURGICAL HISTORY    Past Surgical History:   Procedure Laterality Date    CARDIAC SURGERY         FAMILY HISTORY    History reviewed. No pertinent family history.    SOCIAL HISTORY    Social History     Tobacco Use    Smoking status: Every Day     Current packs/day: 0.25     Average packs/day: 0.3 packs/day for 25.0 years (6.3 ttl pk-yrs)     Types: Cigarettes    Smokeless tobacco: Never   Substance Use Topics    Alcohol use: Yes     Comment: rarely    Drug

## 2024-10-31 ENCOUNTER — HOSPITAL ENCOUNTER (OUTPATIENT)
Dept: WOUND CARE | Age: 49
Discharge: HOME | End: 2024-10-31
Attending: PODIATRIST
Payer: COMMERCIAL

## 2024-10-31 VITALS
RESPIRATION RATE: 18 BRPM | HEART RATE: 81 BPM | TEMPERATURE: 96.8 F | DIASTOLIC BLOOD PRESSURE: 87 MMHG | SYSTOLIC BLOOD PRESSURE: 168 MMHG

## 2024-10-31 DIAGNOSIS — L97.512 ULCER OF RIGHT FOOT WITH FAT LAYER EXPOSED (HCC): Primary | ICD-10-CM

## 2024-10-31 PROCEDURE — 11042 DBRDMT SUBQ TIS 1ST 20SQCM/<: CPT | Performed by: PODIATRIST

## 2024-10-31 PROCEDURE — 11042 DBRDMT SUBQ TIS 1ST 20SQCM/<: CPT

## 2024-10-31 RX ORDER — BETAMETHASONE DIPROPIONATE 0.5 MG/G
CREAM TOPICAL ONCE
OUTPATIENT
Start: 2024-10-31 | End: 2024-10-31

## 2024-10-31 RX ORDER — LIDOCAINE HYDROCHLORIDE 20 MG/ML
JELLY TOPICAL ONCE
OUTPATIENT
Start: 2024-10-31 | End: 2024-10-31

## 2024-10-31 RX ORDER — GINSENG 100 MG
CAPSULE ORAL ONCE
OUTPATIENT
Start: 2024-10-31 | End: 2024-10-31

## 2024-10-31 RX ORDER — GENTAMICIN SULFATE 1 MG/G
OINTMENT TOPICAL ONCE
OUTPATIENT
Start: 2024-10-31 | End: 2024-10-31

## 2024-10-31 RX ORDER — SILVER SULFADIAZINE 10 MG/G
CREAM TOPICAL ONCE
OUTPATIENT
Start: 2024-10-31 | End: 2024-10-31

## 2024-10-31 RX ORDER — SODIUM CHLOR/HYPOCHLOROUS ACID 0.033 %
SOLUTION, IRRIGATION IRRIGATION ONCE
OUTPATIENT
Start: 2024-10-31 | End: 2024-10-31

## 2024-10-31 RX ORDER — BACITRACIN ZINC AND POLYMYXIN B SULFATE 500; 1000 [USP'U]/G; [USP'U]/G
OINTMENT TOPICAL ONCE
OUTPATIENT
Start: 2024-10-31 | End: 2024-10-31

## 2024-10-31 RX ORDER — LIDOCAINE 50 MG/G
OINTMENT TOPICAL ONCE
OUTPATIENT
Start: 2024-10-31 | End: 2024-10-31

## 2024-10-31 RX ORDER — LIDOCAINE HYDROCHLORIDE 40 MG/ML
SOLUTION TOPICAL ONCE
OUTPATIENT
Start: 2024-10-31 | End: 2024-10-31

## 2024-10-31 RX ORDER — MUPIROCIN 20 MG/G
OINTMENT TOPICAL ONCE
OUTPATIENT
Start: 2024-10-31 | End: 2024-10-31

## 2024-10-31 RX ORDER — CLOBETASOL PROPIONATE 0.5 MG/G
OINTMENT TOPICAL ONCE
OUTPATIENT
Start: 2024-10-31 | End: 2024-10-31

## 2024-10-31 RX ORDER — LIDOCAINE 40 MG/G
CREAM TOPICAL ONCE
OUTPATIENT
Start: 2024-10-31 | End: 2024-10-31

## 2024-10-31 RX ORDER — TRIAMCINOLONE ACETONIDE 1 MG/G
OINTMENT TOPICAL ONCE
OUTPATIENT
Start: 2024-10-31 | End: 2024-10-31

## 2024-10-31 RX ORDER — NEOMYCIN/BACITRACIN/POLYMYXINB 3.5-400-5K
OINTMENT (GRAM) TOPICAL ONCE
OUTPATIENT
Start: 2024-10-31 | End: 2024-10-31

## 2024-10-31 NOTE — DISCHARGE INSTRUCTIONS
[facility]   Physician Orders and Discharge Instructions  [facility]  [address] [place]  [address]  Telephone: [phone]      [phone]        NAME:  Ant Lora                                                                                                  YOB: 1975  MEDICAL RECORD NUMBER:  [id number]     Your  is:  Davina     Home Care/Facility: None     Wound Location: Right Plantar Foot     Dressing orders:.1.Cleanse wound(s)  with Dakin's Solution.     1Apply dry HYDROFERA  BLUE READY FOAM or equivalent to wound bed.  NOTE: If your HYDROFRERA BLUE is not soft and pliable, moisten with saline until it is sponge like and then ring out excess saline and apply to wound bed.  2. Cover with 4x4's and wrap with gauze (emily or kerlix)  3 Change Daily      Compression: None     Offloading Device:Post op shoe with peg assist (when not driving) felt placed in work shoe.     Other Instructions:    70% Rubbing Alcohol and spray in your work shoes Daily - let air dry.  Increase your Protein intake. Try to stop Smoking. Purchase Diabetic shoes/boots.Do not get wet in the shower. Use a cast cover to keep dry.      Keep all dressings clean, dry and intact.  Keep pressure off the wound(s) at all times.      Follow up visit  2 Weeks November 14, 2024 @  08:45     Please give 24 hour notice if unable to keep appointment. [phone]     If you experience any of the following, please call the Wound Care Service at  [phone] or go to the nearest emergency room.        *Increase in pain         *Temperature over 101           *Increase in drainage from your wound or a foul odor  *Uncontrolled swelling            *Need for compression bandage changes due to slippage, breakthrough drainage       PLEASE NOTE: IF YOU ARE UNABLE TO OBTAIN WOUND SUPPLIES, CONTINUE TO USE THE SUPPLIES YOU HAVE AVAILABLE

## 2024-10-31 NOTE — PROGRESS NOTES
[facility]                                                   Progress Note and Procedure Note      Ant Lora  MEDICAL RECORD NUMBER:  [id number]  AGE: 48 y.o.   GENDER: male  : 1975  EPISODE DATE:  10/31/2024    Subjective:     Chief Complaint   Patient presents with    Wound Check         HISTORY of PRESENT ILLNESS HPI     Ant Lora is a 48 y.o. male who presents today for wound/ulcer evaluation.   History of Wound Context: Patient presents for continued treatment of chronic diabetic ulceration of the right plantar foot.  Patient reports compliance with the changes and wound care regimen are made at his last visit.  Patient reports decreased maceration of the surrounding skin.  Patient denies pain.    Patient denies nausea, vomiting, fever, chills, chest pain, or shortness of breath.     Wound/Ulcer Pain Timing/Severity: none  Quality of pain: N/A  Severity:  0 / 10   Modifying Factors: Was receivingNone  Associated Signs/Symptoms: drainage    Ulcer Identification:  Ulcer Type: diabetic  Contributing Factors: shear force    Wound:  Full-thickness diabetic ulceration right plantar        PAST MEDICAL HISTORY        Diagnosis Date    CAD (coronary artery disease) 3/2/2015, 2020    stents    DM (diabetes mellitus), type 2, uncontrolled 2015    Hyperlipidemia     Neuropathy     legs bilateral    Smoker        PAST SURGICAL HISTORY    Past Surgical History:   Procedure Laterality Date    CARDIAC SURGERY         FAMILY HISTORY    History reviewed. No pertinent family history.    SOCIAL HISTORY    Social History     Tobacco Use    Smoking status: Every Day     Current packs/day: 0.25     Average packs/day: 0.3 packs/day for 25.0 years (6.3 ttl pk-yrs)     Types: Cigarettes    Smokeless tobacco: Never   Substance Use Topics    Alcohol use: Yes     Comment: rarely    Drug use: No       ALLERGIES    Allergies   Allergen Reactions    Metformin And Related     Simvastatin      Leg Pains

## 2024-11-14 ENCOUNTER — HOSPITAL ENCOUNTER (OUTPATIENT)
Dept: WOUND CARE | Age: 49
Discharge: HOME | End: 2024-11-14
Attending: PODIATRIST
Payer: COMMERCIAL

## 2024-11-14 VITALS
RESPIRATION RATE: 16 BRPM | TEMPERATURE: 96.9 F | SYSTOLIC BLOOD PRESSURE: 162 MMHG | HEART RATE: 86 BPM | DIASTOLIC BLOOD PRESSURE: 85 MMHG

## 2024-11-14 DIAGNOSIS — L03.115 CELLULITIS OF RIGHT FOOT: ICD-10-CM

## 2024-11-14 DIAGNOSIS — L02.611 ABSCESS OF RIGHT FOOT: ICD-10-CM

## 2024-11-14 DIAGNOSIS — M79.671 PAIN IN RIGHT FOOT: Primary | ICD-10-CM

## 2024-11-14 DIAGNOSIS — L97.513 ULCER OF RIGHT FOOT WITH NECROSIS OF MUSCLE (HCC): ICD-10-CM

## 2024-11-14 PROCEDURE — 10060 I&D ABSCESS SIMPLE/SINGLE: CPT | Performed by: PODIATRIST

## 2024-11-14 PROCEDURE — 10060 I&D ABSCESS SIMPLE/SINGLE: CPT

## 2024-11-14 PROCEDURE — 11043 DBRDMT MUSC&/FSCA 1ST 20/<: CPT | Performed by: PODIATRIST

## 2024-11-14 PROCEDURE — 11043 DBRDMT MUSC&/FSCA 1ST 20/<: CPT

## 2024-11-14 PROCEDURE — 99213 OFFICE O/P EST LOW 20 MIN: CPT | Performed by: PODIATRIST

## 2024-11-14 ASSESSMENT — PAIN SCALES - GENERAL: PAINLEVEL_OUTOF10: 2

## 2024-11-14 NOTE — DISCHARGE INSTRUCTIONS
PLEASE NOTE: IF YOU ARE UNABLE TO OBTAIN WOUND SUPPLIES, CONTINUE TO USE THE SUPPLIES YOU HAVE AVAILABLE UNTIL YOU ARE ABLE TO REACH US. IT IS MOST IMPORTANT TO KEEP THE WOUND COVERED AT ALL TIMES

## 2024-11-14 NOTE — PROGRESS NOTES
lidocaine      Debridement:Excisional Debridement    Using curette and #15 blade scalpel the wound(s)/ulcer(s) was/were sharply debrided down through and including the removal of muscle/fascia.        Devitalized Tissue Debrided:  fibrin, biofilm, slough, necrotic/eschar, exudate, and callus    Pre Debridement Measurements:  Are located in the Wound/Ulcer Documentation Flow Sheet    Wound/Ulcer #: 1    Post Debridement Measurements:  Wound/Ulcer Descriptions are Pre Debridement except measurements:    Wound 06/20/24 Foot Right;Plantar #1 (Active)   Wound Image    11/14/24 0859   Wound Etiology Diabetic 11/14/24 0859   Wound Cleansed Cleansed with saline 11/14/24 0859   Dressing/Treatment Moist to dry 11/14/24 0948   Offloading for Diabetic Foot Ulcers Offloading ordered;Post op shoe;Felt or foam 11/14/24 0948   Wound Length (cm) 4.4 cm 11/14/24 0859   Wound Width (cm) 0.8 cm 11/14/24 0859   Wound Depth (cm) 0.2 cm 11/14/24 0859   Wound Surface Area (cm^2) 3.52 cm^2 11/14/24 0859   Change in Wound Size % (l*w) -134.67 11/14/24 0859   Wound Volume (cm^3) 0.704 cm^3 11/14/24 0859   Wound Healing % 22 11/14/24 0859   Post-Procedure Length (cm) 4.4 cm 11/14/24 0937   Post-Procedure Width (cm) 1 cm 11/14/24 0937   Post-Procedure Depth (cm) 0.8 cm 11/14/24 0937   Post-Procedure Surface Area (cm^2) 4.4 cm^2 11/14/24 0937   Post-Procedure Volume (cm^3) 3.52 cm^3 11/14/24 0937   Undermining Starts ___ O'Clock 77 10/03/24 0859   Undermining Ends___ O'Clock 5 10/03/24 0859   Undermining Maxium Distance (cm) 0.3 10/03/24 0859   Wound Assessment Pale granulation tissue 11/14/24 0859   Drainage Amount Scant (moist but unmeasurable) 11/14/24 0859   Drainage Description Serous 11/14/24 0859   Odor None 11/14/24 0859   Farida-wound Assessment Hyperkeratosis (callous) 11/14/24 0859   Margins Defined edges 11/14/24 0859   Wound Thickness Description not for Pressure Injury Full thickness 11/14/24 0859   Number of days: 147

## 2024-12-24 ENCOUNTER — OFFICE VISIT (OUTPATIENT)
Dept: PRIMARY CARE CLINIC | Age: 49
End: 2024-12-24

## 2024-12-24 VITALS
HEART RATE: 74 BPM | WEIGHT: 211 LBS | DIASTOLIC BLOOD PRESSURE: 98 MMHG | OXYGEN SATURATION: 95 % | SYSTOLIC BLOOD PRESSURE: 142 MMHG | BODY MASS INDEX: 30.28 KG/M2

## 2024-12-24 DIAGNOSIS — I25.10 CORONARY ARTERY DISEASE INVOLVING NATIVE CORONARY ARTERY OF NATIVE HEART WITHOUT ANGINA PECTORIS: ICD-10-CM

## 2024-12-24 DIAGNOSIS — E78.2 MIXED HYPERLIPIDEMIA: ICD-10-CM

## 2024-12-24 DIAGNOSIS — E11.65 UNCONTROLLED TYPE 2 DIABETES MELLITUS WITH HYPERGLYCEMIA (HCC): Primary | ICD-10-CM

## 2024-12-24 DIAGNOSIS — J96.01 ACUTE RESPIRATORY FAILURE WITH HYPOXIA: ICD-10-CM

## 2024-12-24 DIAGNOSIS — E11.65 UNCONTROLLED TYPE 2 DIABETES MELLITUS WITH HYPERGLYCEMIA (HCC): ICD-10-CM

## 2024-12-24 DIAGNOSIS — E34.9 TESTOSTERONE DEFICIENCY: ICD-10-CM

## 2024-12-24 DIAGNOSIS — G57.93 NEUROPATHIC PAIN OF BOTH LEGS: ICD-10-CM

## 2024-12-24 DIAGNOSIS — E55.9 VITAMIN D DEFICIENCY: ICD-10-CM

## 2024-12-24 DIAGNOSIS — N52.8 OTHER MALE ERECTILE DYSFUNCTION: ICD-10-CM

## 2024-12-24 DIAGNOSIS — I10 HYPERTENSION, UNSPECIFIED TYPE: ICD-10-CM

## 2024-12-24 LAB
CHOLEST SERPL-MCNC: 175 MG/DL (ref 0–199)
ESTIMATED AVERAGE GLUCOSE: 186 MG/DL
HBA1C MFR BLD: 8.1 % (ref 4–6)
HDLC SERPL-MCNC: 30 MG/DL (ref 40–59)
LDLC SERPL CALC-MCNC: 113 MG/DL (ref 0–129)
SHBG SERPL-SCNC: 37 NMOL/L (ref 19–76)
TESTOST FREE SERPL-MCNC: 118.9 PG/ML (ref 47–244)
TESTOST SERPL-MCNC: 590 NG/DL (ref 193–740)
TRIGL SERPL-MCNC: 159 MG/DL (ref 0–150)

## 2024-12-24 RX ORDER — ATORVASTATIN CALCIUM 10 MG/1
10 TABLET, FILM COATED ORAL DAILY
Qty: 30 TABLET | Refills: 3 | Status: SHIPPED | OUTPATIENT
Start: 2024-12-24

## 2024-12-24 RX ORDER — GABAPENTIN 400 MG/1
400 CAPSULE ORAL 3 TIMES DAILY
Qty: 90 CAPSULE | Refills: 2 | Status: SHIPPED | OUTPATIENT
Start: 2024-12-24 | End: 2025-03-24

## 2024-12-24 RX ORDER — AMLODIPINE BESYLATE 5 MG/1
5 TABLET ORAL DAILY
Qty: 30 TABLET | Refills: 11 | Status: SHIPPED | OUTPATIENT
Start: 2024-12-24

## 2024-12-24 RX ORDER — ERGOCALCIFEROL 1.25 MG/1
50000 CAPSULE, LIQUID FILLED ORAL WEEKLY
Qty: 4 CAPSULE | Refills: 5 | Status: SHIPPED | OUTPATIENT
Start: 2024-12-24

## 2024-12-24 RX ORDER — TADALAFIL 20 MG/1
20 TABLET ORAL DAILY PRN
Qty: 10 TABLET | Refills: 11 | Status: SHIPPED | OUTPATIENT
Start: 2024-12-24

## 2024-12-24 RX ORDER — AMLODIPINE BESYLATE 5 MG/1
5 TABLET ORAL DAILY
COMMUNITY
Start: 2024-11-19 | End: 2024-12-24 | Stop reason: SDUPTHER

## 2024-12-24 NOTE — PROGRESS NOTES
HENT:      Head: Normocephalic.   Eyes:      Conjunctiva/sclera: Conjunctivae normal.   Cardiovascular:      Rate and Rhythm: Regular rhythm.   Pulmonary:      Effort: Pulmonary effort is normal.   Abdominal:      General: There is no distension.   Musculoskeletal:         General: Normal range of motion.      Cervical back: Normal range of motion.   Skin:     Coloration: Skin is not jaundiced.   Neurological:      Mental Status: He is alert.         Assessment/Plan  Ant was seen today for 3 month follow-up, neurologic problem, medication refill and hypertension.    Diagnoses and all orders for this visit:    Uncontrolled type 2 diabetes mellitus with hyperglycemia (HCC)  -     Hemoglobin A1C; Future    Neuropathic pain of both legs  -     gabapentin (NEURONTIN) 400 MG capsule; Take 1 capsule by mouth 3 times daily for 90 days. Intended supply: 30 days    Acute respiratory failure with hypoxia    Coronary artery disease involving native coronary artery of native heart without angina pectoris    Testosterone deficiency  -     Testosterone, free, total; Future    Mixed hyperlipidemia  -     atorvastatin (LIPITOR) 10 MG tablet; Take 1 tablet by mouth daily  -     Lipid Panel; Future    Vitamin D deficiency  -     vitamin D (ERGOCALCIFEROL) 1.25 MG (80474 UT) CAPS capsule; Take 1 capsule by mouth once a week    Other male erectile dysfunction  -     tadalafil (CIALIS) 20 MG tablet; Take 1 tablet by mouth daily as needed for Erectile Dysfunction    Hypertension, unspecified type  -     amLODIPine (NORVASC) 5 MG tablet; Take 1 tablet by mouth daily        No follow-ups on file.    Cameron Sheehan MD

## 2025-01-23 ENCOUNTER — OFFICE VISIT (OUTPATIENT)
Dept: PRIMARY CARE CLINIC | Age: 50
End: 2025-01-23

## 2025-01-23 VITALS
OXYGEN SATURATION: 97 % | HEART RATE: 89 BPM | DIASTOLIC BLOOD PRESSURE: 82 MMHG | SYSTOLIC BLOOD PRESSURE: 142 MMHG | BODY MASS INDEX: 28.92 KG/M2 | WEIGHT: 202 LBS | HEIGHT: 70 IN

## 2025-01-23 DIAGNOSIS — L97.513 ULCER OF RIGHT FOOT WITH NECROSIS OF MUSCLE (HCC): Primary | ICD-10-CM

## 2025-01-23 DIAGNOSIS — E11.65 UNCONTROLLED TYPE 2 DIABETES MELLITUS WITH HYPERGLYCEMIA (HCC): ICD-10-CM

## 2025-01-23 DIAGNOSIS — E11.621 TYPE 2 DIABETES MELLITUS WITH FOOT ULCER (CODE) (HCC): ICD-10-CM

## 2025-01-23 RX ORDER — CIPROFLOXACIN 500 MG/1
500 TABLET, FILM COATED ORAL 2 TIMES DAILY
COMMUNITY
Start: 2025-01-16 | End: 2025-01-26

## 2025-01-23 RX ORDER — GLUCAGON INJECTION, SOLUTION 1 MG/.2ML
1 INJECTION, SOLUTION SUBCUTANEOUS PRN
COMMUNITY
Start: 2025-01-23

## 2025-01-23 SDOH — ECONOMIC STABILITY: FOOD INSECURITY: WITHIN THE PAST 12 MONTHS, THE FOOD YOU BOUGHT JUST DIDN'T LAST AND YOU DIDN'T HAVE MONEY TO GET MORE.: NEVER TRUE

## 2025-01-23 SDOH — ECONOMIC STABILITY: FOOD INSECURITY: WITHIN THE PAST 12 MONTHS, YOU WORRIED THAT YOUR FOOD WOULD RUN OUT BEFORE YOU GOT MONEY TO BUY MORE.: NEVER TRUE

## 2025-01-23 ASSESSMENT — PATIENT HEALTH QUESTIONNAIRE - PHQ9
SUM OF ALL RESPONSES TO PHQ QUESTIONS 1-9: 0
1. LITTLE INTEREST OR PLEASURE IN DOING THINGS: NOT AT ALL
SUM OF ALL RESPONSES TO PHQ QUESTIONS 1-9: 0
SUM OF ALL RESPONSES TO PHQ QUESTIONS 1-9: 0
SUM OF ALL RESPONSES TO PHQ9 QUESTIONS 1 & 2: 0
SUM OF ALL RESPONSES TO PHQ QUESTIONS 1-9: 0
2. FEELING DOWN, DEPRESSED OR HOPELESS: NOT AT ALL

## 2025-01-23 NOTE — PROGRESS NOTES
Ant Lora 49 y.o. male presents today with   Chief Complaint   Patient presents with    Follow-Up from Hospital     [facility] 1/10/25 toe amputation        Diabetes  He presents for his follow-up diabetic visit. He has type 2 diabetes mellitus. His disease course has been improving. Pertinent negatives for hypoglycemia include no confusion or tremors. Pertinent negatives for diabetes include no chest pain and no fatigue.    also ckd. To see nephrologist  Toe fells better. Right.    Past Medical History:   Diagnosis Date    CAD (coronary artery disease) 3/2/2015, 2020    stents    DM (diabetes mellitus), type 2, uncontrolled 01/29/2015    Hyperlipidemia     Neuropathy     legs bilateral    Smoker      Patient Active Problem List    Diagnosis Date Noted    Right foot pain 11/14/2024    Abscess of right foot 11/14/2024    Ulcer of right foot with necrosis of muscle (HCC) 06/20/2024    Type 2 diabetes mellitus with foot ulcer (CODE) ([facility]) 06/20/2024    Abnormality of gait and mobility 08/26/2020    Ischemic cardiomyopathy 08/26/2020    BMI 24.0-24.9, adult 08/26/2020    S/P percutaneous transluminal angioplasty (PTA) with stent placement 08/26/2020    Smoker 08/25/2020    Closed fracture of multiple ribs of left side with routine healing 08/25/2020    Dysphonia 08/25/2020    Impaired mobility dt hypoxic encephalopathy--[facility] rehab admit 8/26/2020 08/25/2020    Severe hypoxic-ischemic encephalopathy     CAD (coronary artery disease) 03/02/2015    DM (diabetes mellitus), type 2, uncontrolled 01/29/2015     Past Surgical History:   Procedure Laterality Date    CARDIAC SURGERY       No family history on file.  Social History     Socioeconomic History    Marital status:      Spouse name: None    Number of children: None    Years of education: None    Highest education level: None   Tobacco Use    Smoking status: Every Day     Current packs/day: 0.25     Average packs/day: 0.3 packs/day for 25.0 years (6.3 ttl pk-yrs)

## 2025-01-30 PROBLEM — J96.01 ACUTE RESPIRATORY FAILURE WITH HYPOXIA: Status: RESOLVED | Noted: 2020-08-26 | Resolved: 2025-01-30

## 2025-01-30 PROBLEM — I46.9 CARDIAC ARREST: Status: RESOLVED | Noted: 2020-08-19 | Resolved: 2025-01-30

## 2025-01-30 ASSESSMENT — ENCOUNTER SYMPTOMS
PHOTOPHOBIA: 0
VOMITING: 0
TROUBLE SWALLOWING: 0
SHORTNESS OF BREATH: 0
NAUSEA: 0
CHOKING: 0
VOICE CHANGE: 0

## 2025-03-25 ENCOUNTER — OFFICE VISIT (OUTPATIENT)
Dept: PRIMARY CARE CLINIC | Age: 50
End: 2025-03-25

## 2025-03-25 VITALS
OXYGEN SATURATION: 97 % | SYSTOLIC BLOOD PRESSURE: 120 MMHG | BODY MASS INDEX: 27.35 KG/M2 | DIASTOLIC BLOOD PRESSURE: 84 MMHG | WEIGHT: 191 LBS | HEIGHT: 70 IN | HEART RATE: 80 BPM

## 2025-03-25 DIAGNOSIS — Z51.81 THERAPEUTIC DRUG MONITORING: ICD-10-CM

## 2025-03-25 DIAGNOSIS — E11.621 TYPE 2 DIABETES MELLITUS WITH FOOT ULCER (CODE): ICD-10-CM

## 2025-03-25 DIAGNOSIS — G57.93 NEUROPATHIC PAIN OF BOTH LEGS: Primary | ICD-10-CM

## 2025-03-25 DIAGNOSIS — R73.9 HYPERGLYCEMIA: ICD-10-CM

## 2025-03-25 LAB
CREAT UR-MCNC: 146 MG/DL
HBA1C MFR BLD: 6.8 %
MICROALBUMIN UR-MCNC: 135.2 MG/DL
MICROALBUMIN/CREAT UR-RTO: 926 MG/G (ref 0–30)

## 2025-03-25 RX ORDER — GABAPENTIN 400 MG/1
400 CAPSULE ORAL 3 TIMES DAILY
Qty: 90 CAPSULE | Refills: 2 | Status: SHIPPED | OUTPATIENT
Start: 2025-03-25 | End: 2025-06-23

## 2025-03-25 RX ORDER — LOSARTAN POTASSIUM 25 MG/1
25 TABLET ORAL DAILY
COMMUNITY

## 2025-03-25 NOTE — PROGRESS NOTES
Ant Lora 49 y.o. male presents today with   Chief Complaint   Patient presents with    3 Month Follow-Up    Neurologic Problem    Medication Refill       Leg Pain   The incident occurred more than 1 week ago. The incident occurred at home. Injury mechanism: neuropathy. The pain is present in the left leg and right leg. The pain is at a severity of 5/10. The pain is moderate. The pain has been Fluctuating since onset.   Diabetes  He presents for his follow-up diabetic visit. He has type 2 diabetes mellitus. Pertinent negatives for hypoglycemia include no confusion, dizziness or tremors. Pertinent negatives for diabetes include no blurred vision, no chest pain and no fatigue. Symptoms are stable.     Started on ozempic.      Past Medical History:   Diagnosis Date    CAD (coronary artery disease) 3/2/2015, 2020    stents    DM (diabetes mellitus), type 2, uncontrolled 01/29/2015    Hyperlipidemia     Neuropathy     legs bilateral    Smoker      Patient Active Problem List    Diagnosis Date Noted    Right foot pain 11/14/2024    Abscess of right foot 11/14/2024    Ulcer of right foot with necrosis of muscle (HCC) 06/20/2024    Type 2 diabetes mellitus with foot ulcer (CODE) ([facility]) 06/20/2024    Abnormality of gait and mobility 08/26/2020    Ischemic cardiomyopathy 08/26/2020    BMI 24.0-24.9, adult 08/26/2020    S/P percutaneous transluminal angioplasty (PTA) with stent placement 08/26/2020    Smoker 08/25/2020    Closed fracture of multiple ribs of left side with routine healing 08/25/2020    Dysphonia 08/25/2020    Impaired mobility dt hypoxic encephalopathy--[facility] rehab admit 8/26/2020 08/25/2020    Severe hypoxic-ischemic encephalopathy (HCC)     CAD (coronary artery disease) 03/02/2015    DM (diabetes mellitus), type 2, uncontrolled 01/29/2015     Past Surgical History:   Procedure Laterality Date    CARDIAC SURGERY       No family history on file.  Social History     Socioeconomic History    Marital status:

## 2025-03-28 LAB
6MAM UR QL: NOT DETECTED
7-AMINOCLONAZEPAM: NOT DETECTED
ALPHA-OH-ALPRAZOLAM: NOT DETECTED
ALPHA-OH-MIDAZOLAM, URINE: NOT DETECTED
ALPRAZOLAM: NOT DETECTED
AMPHET UR QL SCN: NOT DETECTED
BARBITURATES: NEGATIVE
BENZOYLECGONINE: NEGATIVE
BUPRENORPHINE: NOT DETECTED
CARISOPRODOL UR QL: NEGATIVE
CLONAZEPAM UR QL: NOT DETECTED
CODEINE: NOT DETECTED
CREAT UR-MCNC: 135.6 MG/DL (ref 20–400)
DIAZEPAM: NOT DETECTED
ETHYL GLUCURONIDE: NEGATIVE
FENTANYL UR QL: NOT DETECTED
GABAPENTIN: PRESENT
HYDROCODONE UR QL: NOT DETECTED
HYDROMORPHONE: NOT DETECTED
LORAZEPAM UR QL: NOT DETECTED
MARIJUANA METABOLITE: NEGATIVE
MDA: NOT DETECTED
MDEA: NOT DETECTED
MDMA UR QL: NOT DETECTED
MEPERIDINE: NOT DETECTED
METHADONE: NEGATIVE
METHAMPHETAMINE: NOT DETECTED
METHYLPHENIDATE: NOT DETECTED
MIDAZOLAM UR QL SCN: NOT DETECTED
MORPHINE: NOT DETECTED
NALOXONE: NOT DETECTED
NORBUPRENORPHINE, FREE: NOT DETECTED
NORDIAZEPAM: NOT DETECTED
NORFENTANYL: NOT DETECTED
NORHYDROCODONE, URINE: NOT DETECTED
NOROXYCODONE: NOT DETECTED
NOROXYMORPHONE, URINE: NOT DETECTED
OXAZEPAM UR QL: NOT DETECTED
OXYCODONE UR QL: NOT DETECTED
OXYMORPHONE UR QL: NOT DETECTED
PAIN MANAGEMENT DRUG PANEL: NORMAL
PATHOLOGY STUDY: NORMAL
PCP: NEGATIVE
PHENTERMINE: NOT DETECTED
PREGABALIN: NOT DETECTED
TAPENTADOL, URINE: NOT DETECTED
TAPENTADOL-O-SULFATE, URINE: NOT DETECTED
TEMAZEPAM: NOT DETECTED
TRAMADOL: NEGATIVE
ZOLPIDEM: NOT DETECTED

## 2025-05-05 ENCOUNTER — CARE COORDINATION (OUTPATIENT)
Dept: CARE COORDINATION | Age: 50
End: 2025-05-05

## 2025-05-05 NOTE — CARE COORDINATION
Ambulatory Care Coordination Note     5/5/2025 4:07 PM     Patient outreach attempt by this ACM today to offer care management services. ACM was unable to reach the patient by telephone today;   left voice message requesting a return phone call to this ACM.  letter mailed requesting patient  to contact this ACM.      ACM: Alicia Mcfarland RN     Care Summary Note:     PCP/Specialist follow up:   Future Appointments         Provider Specialty Dept Phone    6/25/2025 8:00 AM Camerno Sheehan MD Primary Care [phone]            Follow Up:   Plan for next ACM outreach in approximately 1 week to complete:  - outreach attempt to offer care management services.

## 2025-05-12 ENCOUNTER — CARE COORDINATION (OUTPATIENT)
Dept: CARE COORDINATION | Age: 50
End: 2025-05-12

## 2025-05-12 NOTE — CARE COORDINATION
Ambulatory Care Coordination Note     5/12/2025 3:10 PM     Patient outreach attempt by this ACM today to offer care management services. ACM was unable to reach the patient by telephone today;   left voice message requesting a return phone call to this ACM.     ACM: Alicia Mcfarland RN     Care Summary Note:     PCP/Specialist follow up:   Future Appointments         Provider Specialty Dept Phone    6/25/2025 8:00 AM Cameron Sheehan MD Primary Care [phone]            Follow Up:   Plan for next ACM outreach in approximately 1 week to complete:  - outreach attempt to offer care management services.

## 2025-05-19 ENCOUNTER — CARE COORDINATION (OUTPATIENT)
Dept: CARE COORDINATION | Age: 50
End: 2025-05-19

## 2025-05-19 NOTE — CARE COORDINATION
Ambulatory Care Coordination Note     5/19/2025 10:09 AM     patient outreach attempt by this AC today to offer care management services. AC was unable to reach the patient by telephone today;   left voice message requesting a return phone call to this ACM.  letter mailed requesting patient  to contact this AC.      Patient closed (unable to reach patient) from the High Risk Care Management program on 5/19/2025.    Care management goals have been completed. No further Ambulatory Care Manager follow up scheduled.

## 2025-06-25 ENCOUNTER — OFFICE VISIT (OUTPATIENT)
Dept: PRIMARY CARE CLINIC | Age: 50
End: 2025-06-25

## 2025-06-25 VITALS
DIASTOLIC BLOOD PRESSURE: 82 MMHG | OXYGEN SATURATION: 99 % | WEIGHT: 187 LBS | HEIGHT: 70 IN | BODY MASS INDEX: 26.77 KG/M2 | SYSTOLIC BLOOD PRESSURE: 126 MMHG | HEART RATE: 61 BPM

## 2025-06-25 DIAGNOSIS — R73.9 HYPERGLYCEMIA: ICD-10-CM

## 2025-06-25 DIAGNOSIS — N52.8 OTHER MALE ERECTILE DYSFUNCTION: ICD-10-CM

## 2025-06-25 DIAGNOSIS — G57.93 NEUROPATHIC PAIN OF BOTH LEGS: Primary | ICD-10-CM

## 2025-06-25 LAB — HBA1C MFR BLD: 7.5 %

## 2025-06-25 RX ORDER — SILDENAFIL 100 MG/1
100 TABLET, FILM COATED ORAL DAILY PRN
Qty: 15 TABLET | Refills: 11 | Status: SHIPPED | OUTPATIENT
Start: 2025-06-25

## 2025-06-25 RX ORDER — GABAPENTIN 400 MG/1
400 CAPSULE ORAL 3 TIMES DAILY
Qty: 90 CAPSULE | Refills: 2 | Status: SHIPPED | OUTPATIENT
Start: 2025-06-25 | End: 2025-09-23

## 2025-06-25 NOTE — PROGRESS NOTES
Ant Lora 49 y.o. male presents today with   Chief Complaint   Patient presents with    3 Month Follow-Up    Neurologic Problem    Medication Refill    Other     Needs meds change from Cialis from Viagra       HPI  History of Present Illness  The patient presents for a follow-up visit.    He is currently under the care of an endocrinologist, Dr. Apple, who has been managing his diabetes. While on Ozempic, his insulin requirements were minimal. However, due to insurance issues, he had to discontinue Ozempic and subsequently increased his insulin dosage. He now administers 30 to 50 units of insulin primarily at night, as his morning readings are typically within normal range. He occasionally takes a second dose of insulin during the day. His endocrinologist is aware of his discontinuation of Ozempic and has advised him to continue with the insulin regimen.    He has transitioned from Cialis to Viagra due to the former's ineffectiveness. He was previously prescribed Viagra 100 mg.    He is also on gabapentin 400 mg three times daily, which he tolerates well without any side effects such as drowsiness or ankle swelling.       Past Medical History:   Diagnosis Date    CAD (coronary artery disease) 3/2/2015, 2020    stents    DM (diabetes mellitus), type 2, uncontrolled 01/29/2015    Hyperlipidemia     Neuropathy     legs bilateral    Smoker      Patient Active Problem List    Diagnosis Date Noted    Right foot pain 11/14/2024    Abscess of right foot 11/14/2024    Ulcer of right foot with necrosis of muscle (HCC) 06/20/2024    Type 2 diabetes mellitus with foot ulcer (CODE) (HCC) 06/20/2024    Abnormality of gait and mobility 08/26/2020    Ischemic cardiomyopathy 08/26/2020    BMI 24.0-24.9, adult 08/26/2020    S/P percutaneous transluminal angioplasty (PTA) with stent placement 08/26/2020    Smoker 08/25/2020    Closed fracture of multiple ribs of left side with routine healing 08/25/2020    Dysphonia 08/25/2020

## 2025-06-30 ENCOUNTER — COMMUNITY OUTREACH (OUTPATIENT)
Dept: PRIMARY CARE CLINIC | Age: 50
End: 2025-06-30

## 2025-06-30 ASSESSMENT — ENCOUNTER SYMPTOMS
CHOKING: 0
PHOTOPHOBIA: 0
SHORTNESS OF BREATH: 0
VOMITING: 0
VOICE CHANGE: 0
TROUBLE SWALLOWING: 0
NAUSEA: 0